# Patient Record
Sex: MALE | Race: BLACK OR AFRICAN AMERICAN | NOT HISPANIC OR LATINO | Employment: OTHER | URBAN - METROPOLITAN AREA
[De-identification: names, ages, dates, MRNs, and addresses within clinical notes are randomized per-mention and may not be internally consistent; named-entity substitution may affect disease eponyms.]

---

## 2017-01-11 ENCOUNTER — LAB VISIT (OUTPATIENT)
Dept: LAB | Facility: HOSPITAL | Age: 62
End: 2017-01-11
Attending: INTERNAL MEDICINE
Payer: COMMERCIAL

## 2017-01-11 DIAGNOSIS — D75.1 POLYCYTHEMIA: ICD-10-CM

## 2017-01-11 LAB
ALBUMIN SERPL BCP-MCNC: 3.6 G/DL
ALP SERPL-CCNC: 106 U/L
ALT SERPL W/O P-5'-P-CCNC: 10 U/L
ANION GAP SERPL CALC-SCNC: 7 MMOL/L
ANISOCYTOSIS BLD QL SMEAR: SLIGHT
AST SERPL-CCNC: 26 U/L
BASOPHILS # BLD AUTO: 0.04 K/UL
BASOPHILS NFR BLD: 0.6 %
BILIRUB SERPL-MCNC: 0.3 MG/DL
BUN SERPL-MCNC: 13 MG/DL
CALCIUM SERPL-MCNC: 9.4 MG/DL
CHLORIDE SERPL-SCNC: 106 MMOL/L
CO2 SERPL-SCNC: 27 MMOL/L
CREAT SERPL-MCNC: 0.9 MG/DL
DIFFERENTIAL METHOD: ABNORMAL
EOSINOPHIL # BLD AUTO: 0.1 K/UL
EOSINOPHIL NFR BLD: 1.3 %
ERYTHROCYTE [DISTWIDTH] IN BLOOD BY AUTOMATED COUNT: 15.5 %
EST. GFR  (AFRICAN AMERICAN): >60 ML/MIN/1.73 M^2
EST. GFR  (NON AFRICAN AMERICAN): >60 ML/MIN/1.73 M^2
GLUCOSE SERPL-MCNC: 84 MG/DL
HCT VFR BLD AUTO: 41.1 %
HGB BLD-MCNC: 12.6 G/DL
HYPOCHROMIA BLD QL SMEAR: ABNORMAL
LDH SERPL L TO P-CCNC: 213 U/L
LYMPHOCYTES # BLD AUTO: 2.3 K/UL
LYMPHOCYTES NFR BLD: 33.1 %
MCH RBC QN AUTO: 21.2 PG
MCHC RBC AUTO-ENTMCNC: 30.7 %
MCV RBC AUTO: 69 FL
MONOCYTES # BLD AUTO: 0.4 K/UL
MONOCYTES NFR BLD: 5.3 %
NEUTROPHILS # BLD AUTO: 4.2 K/UL
NEUTROPHILS NFR BLD: 59.8 %
PLATELET # BLD AUTO: 232 K/UL
PMV BLD AUTO: 10.4 FL
POTASSIUM SERPL-SCNC: 4.4 MMOL/L
PROT SERPL-MCNC: 7.7 G/DL
RBC # BLD AUTO: 5.94 M/UL
SODIUM SERPL-SCNC: 140 MMOL/L
URATE SERPL-MCNC: 5.2 MG/DL
WBC # BLD AUTO: 6.95 K/UL

## 2017-01-11 PROCEDURE — 85025 COMPLETE CBC W/AUTO DIFF WBC: CPT

## 2017-01-11 PROCEDURE — 84550 ASSAY OF BLOOD/URIC ACID: CPT

## 2017-01-11 PROCEDURE — 80053 COMPREHEN METABOLIC PANEL: CPT

## 2017-01-11 PROCEDURE — 83615 LACTATE (LD) (LDH) ENZYME: CPT

## 2017-01-30 PROBLEM — J84.9 INTERSTITIAL LUNG DISEASE: Status: ACTIVE | Noted: 2017-01-30

## 2017-01-30 PROBLEM — I10 HTN, GOAL BELOW 140/90: Status: ACTIVE | Noted: 2017-01-30

## 2017-03-14 ENCOUNTER — LAB VISIT (OUTPATIENT)
Dept: LAB | Facility: HOSPITAL | Age: 62
End: 2017-03-14
Attending: INTERNAL MEDICINE
Payer: COMMERCIAL

## 2017-03-14 DIAGNOSIS — D75.1 POLYCYTHEMIA: ICD-10-CM

## 2017-03-14 LAB
ALBUMIN SERPL BCP-MCNC: 3.6 G/DL
ALP SERPL-CCNC: 101 U/L
ALT SERPL W/O P-5'-P-CCNC: 9 U/L
ANION GAP SERPL CALC-SCNC: 6 MMOL/L
ANISOCYTOSIS BLD QL SMEAR: SLIGHT
AST SERPL-CCNC: 22 U/L
BASOPHILS # BLD AUTO: 0.03 K/UL
BASOPHILS NFR BLD: 0.5 %
BILIRUB SERPL-MCNC: 0.4 MG/DL
BUN SERPL-MCNC: 12 MG/DL
CALCIUM SERPL-MCNC: 9.2 MG/DL
CHLORIDE SERPL-SCNC: 108 MMOL/L
CO2 SERPL-SCNC: 28 MMOL/L
CREAT SERPL-MCNC: 0.9 MG/DL
DIFFERENTIAL METHOD: ABNORMAL
EOSINOPHIL # BLD AUTO: 0.1 K/UL
EOSINOPHIL NFR BLD: 1.3 %
ERYTHROCYTE [DISTWIDTH] IN BLOOD BY AUTOMATED COUNT: 16.8 %
EST. GFR  (AFRICAN AMERICAN): >60 ML/MIN/1.73 M^2
EST. GFR  (NON AFRICAN AMERICAN): >60 ML/MIN/1.73 M^2
GIANT PLATELETS BLD QL SMEAR: PRESENT
GLUCOSE SERPL-MCNC: 86 MG/DL
HCT VFR BLD AUTO: 39.2 %
HGB BLD-MCNC: 12 G/DL
HYPOCHROMIA BLD QL SMEAR: ABNORMAL
LDH SERPL L TO P-CCNC: 219 U/L
LYMPHOCYTES # BLD AUTO: 1.9 K/UL
LYMPHOCYTES NFR BLD: 31.8 %
MCH RBC QN AUTO: 19.9 PG
MCHC RBC AUTO-ENTMCNC: 30.6 %
MCV RBC AUTO: 65 FL
MONOCYTES # BLD AUTO: 0.4 K/UL
MONOCYTES NFR BLD: 7.1 %
NEUTROPHILS # BLD AUTO: 3.5 K/UL
NEUTROPHILS NFR BLD: 59.5 %
OVALOCYTES BLD QL SMEAR: ABNORMAL
PLATELET # BLD AUTO: 243 K/UL
PMV BLD AUTO: 9.5 FL
POTASSIUM SERPL-SCNC: 4.5 MMOL/L
PROT SERPL-MCNC: 8.1 G/DL
RBC # BLD AUTO: 6.04 M/UL
SODIUM SERPL-SCNC: 142 MMOL/L
TARGETS BLD QL SMEAR: ABNORMAL
URATE SERPL-MCNC: 5.1 MG/DL
WBC # BLD AUTO: 5.95 K/UL

## 2017-03-14 PROCEDURE — 85025 COMPLETE CBC W/AUTO DIFF WBC: CPT

## 2017-03-14 PROCEDURE — 83615 LACTATE (LD) (LDH) ENZYME: CPT

## 2017-03-14 PROCEDURE — 80053 COMPREHEN METABOLIC PANEL: CPT

## 2017-03-14 PROCEDURE — 36415 COLL VENOUS BLD VENIPUNCTURE: CPT

## 2017-03-14 PROCEDURE — 84550 ASSAY OF BLOOD/URIC ACID: CPT

## 2017-04-24 ENCOUNTER — LAB VISIT (OUTPATIENT)
Dept: LAB | Facility: HOSPITAL | Age: 62
End: 2017-04-24
Attending: INTERNAL MEDICINE
Payer: COMMERCIAL

## 2017-04-24 DIAGNOSIS — J84.9 INTERSTITIAL LUNG DISEASE: ICD-10-CM

## 2017-04-24 DIAGNOSIS — I10 HTN, GOAL BELOW 140/90: ICD-10-CM

## 2017-04-24 DIAGNOSIS — D75.1 POLYCYTHEMIA: ICD-10-CM

## 2017-04-24 LAB
ALBUMIN SERPL BCP-MCNC: 3.6 G/DL
ALP SERPL-CCNC: 103 U/L
ALT SERPL W/O P-5'-P-CCNC: 9 U/L
ANION GAP SERPL CALC-SCNC: 6 MMOL/L
ANISOCYTOSIS BLD QL SMEAR: SLIGHT
AST SERPL-CCNC: 22 U/L
BASOPHILS # BLD AUTO: 0.05 K/UL
BASOPHILS NFR BLD: 0.6 %
BILIRUB SERPL-MCNC: 0.3 MG/DL
BUN SERPL-MCNC: 12 MG/DL
CALCIUM SERPL-MCNC: 9.2 MG/DL
CHLORIDE SERPL-SCNC: 107 MMOL/L
CO2 SERPL-SCNC: 29 MMOL/L
CREAT SERPL-MCNC: 1 MG/DL
DIFFERENTIAL METHOD: ABNORMAL
EOSINOPHIL # BLD AUTO: 0.1 K/UL
EOSINOPHIL NFR BLD: 1.2 %
ERYTHROCYTE [DISTWIDTH] IN BLOOD BY AUTOMATED COUNT: 20.1 %
EST. GFR  (AFRICAN AMERICAN): >60 ML/MIN/1.73 M^2
EST. GFR  (NON AFRICAN AMERICAN): >60 ML/MIN/1.73 M^2
GLUCOSE SERPL-MCNC: 82 MG/DL
HCT VFR BLD AUTO: 38.1 %
HGB BLD-MCNC: 12 G/DL
HYPOCHROMIA BLD QL SMEAR: ABNORMAL
LYMPHOCYTES # BLD AUTO: 2.7 K/UL
LYMPHOCYTES NFR BLD: 33.1 %
MCH RBC QN AUTO: 19.4 PG
MCHC RBC AUTO-ENTMCNC: 31.5 %
MCV RBC AUTO: 62 FL
MONOCYTES # BLD AUTO: 0.6 K/UL
MONOCYTES NFR BLD: 7.6 %
NEUTROPHILS # BLD AUTO: 4.7 K/UL
NEUTROPHILS NFR BLD: 57.5 %
OVALOCYTES BLD QL SMEAR: ABNORMAL
PLATELET # BLD AUTO: 287 K/UL
PMV BLD AUTO: 10.6 FL
POTASSIUM SERPL-SCNC: 4.2 MMOL/L
PROT SERPL-MCNC: 8.1 G/DL
RBC # BLD AUTO: 6.19 M/UL
SODIUM SERPL-SCNC: 142 MMOL/L
WBC # BLD AUTO: 8.15 K/UL

## 2017-04-24 PROCEDURE — 36415 COLL VENOUS BLD VENIPUNCTURE: CPT

## 2017-04-24 PROCEDURE — 80053 COMPREHEN METABOLIC PANEL: CPT

## 2017-04-24 PROCEDURE — 85025 COMPLETE CBC W/AUTO DIFF WBC: CPT

## 2017-05-16 ENCOUNTER — HOSPITAL ENCOUNTER (OUTPATIENT)
Dept: PULMONOLOGY | Facility: CLINIC | Age: 62
Discharge: HOME | End: 2017-05-16
Payer: COMMERCIAL

## 2017-05-16 ENCOUNTER — HOSPITAL ENCOUNTER (OUTPATIENT)
Dept: RADIOLOGY | Facility: HOSPITAL | Age: 62
Discharge: HOME | End: 2017-05-16
Attending: INTERNAL MEDICINE
Payer: COMMERCIAL

## 2017-05-16 ENCOUNTER — OFFICE VISIT (OUTPATIENT)
Dept: PULMONOLOGY | Facility: CLINIC | Age: 62
End: 2017-05-16
Payer: COMMERCIAL

## 2017-05-16 VITALS
OXYGEN SATURATION: 98 % | WEIGHT: 251 LBS | HEIGHT: 77 IN | DIASTOLIC BLOOD PRESSURE: 76 MMHG | BODY MASS INDEX: 29.64 KG/M2 | HEART RATE: 76 BPM | SYSTOLIC BLOOD PRESSURE: 136 MMHG

## 2017-05-16 DIAGNOSIS — B19.20 HEPATITIS C VIRUS INFECTION, UNSPECIFIED CHRONICITY: ICD-10-CM

## 2017-05-16 DIAGNOSIS — J84.9 ILD (INTERSTITIAL LUNG DISEASE): ICD-10-CM

## 2017-05-16 DIAGNOSIS — J84.10 PULMONARY INTERSTITIAL FIBROSIS: Primary | ICD-10-CM

## 2017-05-16 PROCEDURE — 94727 GAS DIL/WSHOT DETER LNG VOL: CPT | Mod: S$GLB,,, | Performed by: INTERNAL MEDICINE

## 2017-05-16 PROCEDURE — 94729 DIFFUSING CAPACITY: CPT | Mod: S$GLB,,, | Performed by: INTERNAL MEDICINE

## 2017-05-16 PROCEDURE — 3075F SYST BP GE 130 - 139MM HG: CPT | Mod: S$GLB,,, | Performed by: INTERNAL MEDICINE

## 2017-05-16 PROCEDURE — 99214 OFFICE O/P EST MOD 30 MIN: CPT | Mod: S$GLB,,, | Performed by: INTERNAL MEDICINE

## 2017-05-16 PROCEDURE — 1160F RVW MEDS BY RX/DR IN RCRD: CPT | Mod: S$GLB,,, | Performed by: INTERNAL MEDICINE

## 2017-05-16 PROCEDURE — 71020 XR CHEST PA AND LATERAL: CPT | Mod: TC

## 2017-05-16 PROCEDURE — 3078F DIAST BP <80 MM HG: CPT | Mod: S$GLB,,, | Performed by: INTERNAL MEDICINE

## 2017-05-16 PROCEDURE — 71020 XR CHEST PA AND LATERAL: CPT | Mod: 26,,, | Performed by: RADIOLOGY

## 2017-05-16 PROCEDURE — 99999 PR PBB SHADOW E&M-EST. PATIENT-LVL III: CPT | Mod: PBBFAC,,, | Performed by: INTERNAL MEDICINE

## 2017-05-17 NOTE — PROGRESS NOTES
Subjective:       Patient ID: Josiah Orosco Jr. is a 61 y.o. male.    Chief Complaint: Pulmonary Fibrosis    HPI  62 yo AA male who I have followed for the past several years for an unknown interstital lung disease.  He feels well operates a body shop in [place], He has no active pulmonary complaints. His PFT's today are identical to September:FVC:5.65 liters and DLCO: 48% and actually better than the original study done in 2008, at that time FVC was 4.73 liters!! His chest x-ray is stable with a faint increase in interstitial markings.  Love to know what makes the changes but not  Justified to biopsy when he is doing so well.      No flowsheet data found.]  Review of Systems   Constitutional: Negative.    HENT: Negative.    Eyes: Negative.    Respiratory: Negative.         Asymptomatic intersttial lung diseas with only impairment of DLCO   Cardiovascular: Negative.    Genitourinary: Negative.    Musculoskeletal: Negative.    Skin: Negative.    Gastrointestinal: Negative.         Successful treatment of  Hepatitis C   Neurological: Negative.    Hematological:        Polycyhtemia  Treated with periodic plebotomy   Psychiatric/Behavioral: Negative.        Objective:      Physical Exam   Constitutional: He is oriented to person, place, and time. He appears well-developed and well-nourished.   HENT:   Head: Normocephalic and atraumatic.   Right Ear: External ear normal.   Left Ear: External ear normal.   Eyes: Conjunctivae and EOM are normal. Pupils are equal, round, and reactive to light.   Neck: Normal range of motion. Neck supple.   Cardiovascular: Normal rate, regular rhythm and normal heart sounds.    Pulmonary/Chest: Effort normal and breath sounds normal.   Clear without wheezes or rales      Sa02 98% falls to 94% after walking the length of the hunt and vback   Abdominal: Soft. Bowel sounds are normal.   Musculoskeletal: Normal range of motion.   No clubbing   Neurological: He is alert and oriented to person,  place, and time. He has normal reflexes.   Skin: Skin is warm and dry.   Psychiatric: He has a normal mood and affect. His behavior is normal. Judgment and thought content normal.           Assessment:       No diagnosis found.    Outpatient Encounter Prescriptions as of 5/16/2017   Medication Sig Dispense Refill    allopurinol (ZYLOPRIM) 100 MG tablet Take 2 tablets (200 mg total) by mouth once daily. 180 tablet 1    aspirin (ECOTRIN) 81 MG EC tablet Take 81 mg by mouth once daily.      guaifenesin (MUCINEX) 600 mg 12 hr tablet Take 1,200 mg by mouth 2 (two) times daily as needed.       levocetirizine (XYZAL) 5 MG tablet Take 1 tablet (5 mg total) by mouth every evening. 30 tablet 2    losartan-hydrochlorothiazide 50-12.5 mg (HYZAAR) 50-12.5 mg per tablet Take 1 tablet by mouth once daily. 90 tablet 1    naproxen (NAPROSYN) 500 MG tablet Take 1 tablet (500 mg total) by mouth daily as needed. 30 tablet 2    ranitidine (ZANTAC) 150 MG tablet Take 1 tablet (150 mg total) by mouth daily as needed. 180 tablet 1    tamsulosin (FLOMAX) 0.4 mg Cp24 Take 1 capsule (0.4 mg total) by mouth once daily. 90 capsule 2     No facility-administered encounter medications on file as of 5/16/2017.      No orders of the defined types were placed in this encounter.    Plan:         IMP: Stable pulmonary fibrosis of unknown origin   No medication indicated

## 2017-05-30 ENCOUNTER — LAB VISIT (OUTPATIENT)
Dept: LAB | Facility: HOSPITAL | Age: 62
End: 2017-05-30
Attending: INTERNAL MEDICINE
Payer: COMMERCIAL

## 2017-05-30 DIAGNOSIS — D75.1 POLYCYTHEMIA: ICD-10-CM

## 2017-05-30 DIAGNOSIS — I10 HTN, GOAL BELOW 140/90: ICD-10-CM

## 2017-05-30 DIAGNOSIS — J84.9 INTERSTITIAL LUNG DISEASE: ICD-10-CM

## 2017-05-30 DIAGNOSIS — N40.0 BENIGN PROSTATIC HYPERPLASIA, PRESENCE OF LOWER URINARY TRACT SYMPTOMS UNSPECIFIED, UNSPECIFIED MORPHOLOGY: ICD-10-CM

## 2017-05-30 LAB
ALBUMIN SERPL BCP-MCNC: 3.5 G/DL
ALP SERPL-CCNC: 105 U/L
ALT SERPL W/O P-5'-P-CCNC: 10 U/L
ANION GAP SERPL CALC-SCNC: 7 MMOL/L
ANISOCYTOSIS BLD QL SMEAR: SLIGHT
AST SERPL-CCNC: 22 U/L
BASOPHILS # BLD AUTO: 0.04 K/UL
BASOPHILS NFR BLD: 0.5 %
BILIRUB SERPL-MCNC: 0.3 MG/DL
BUN SERPL-MCNC: 9 MG/DL
CALCIUM SERPL-MCNC: 9.2 MG/DL
CHLORIDE SERPL-SCNC: 105 MMOL/L
CO2 SERPL-SCNC: 29 MMOL/L
COMPLEXED PSA SERPL-MCNC: 0.15 NG/ML
CREAT SERPL-MCNC: 0.9 MG/DL
DIFFERENTIAL METHOD: ABNORMAL
EOSINOPHIL # BLD AUTO: 0.1 K/UL
EOSINOPHIL NFR BLD: 1.2 %
ERYTHROCYTE [DISTWIDTH] IN BLOOD BY AUTOMATED COUNT: 21.2 %
EST. GFR  (AFRICAN AMERICAN): >60 ML/MIN/1.73 M^2
EST. GFR  (NON AFRICAN AMERICAN): >60 ML/MIN/1.73 M^2
GLUCOSE SERPL-MCNC: 138 MG/DL
HCT VFR BLD AUTO: 40.6 %
HGB BLD-MCNC: 12.3 G/DL
LYMPHOCYTES # BLD AUTO: 2.1 K/UL
LYMPHOCYTES NFR BLD: 27 %
MCH RBC QN AUTO: 19.1 PG
MCHC RBC AUTO-ENTMCNC: 30.3 %
MCV RBC AUTO: 63 FL
MONOCYTES # BLD AUTO: 0.4 K/UL
MONOCYTES NFR BLD: 5 %
NEUTROPHILS # BLD AUTO: 5.1 K/UL
NEUTROPHILS NFR BLD: 66.3 %
OVALOCYTES BLD QL SMEAR: ABNORMAL
PLATELET # BLD AUTO: 260 K/UL
PMV BLD AUTO: 10.6 FL
POIKILOCYTOSIS BLD QL SMEAR: SLIGHT
POTASSIUM SERPL-SCNC: 4.6 MMOL/L
PROT SERPL-MCNC: 8 G/DL
RBC # BLD AUTO: 6.43 M/UL
SODIUM SERPL-SCNC: 141 MMOL/L
WBC # BLD AUTO: 7.66 K/UL

## 2017-05-30 PROCEDURE — 85025 COMPLETE CBC W/AUTO DIFF WBC: CPT

## 2017-05-30 PROCEDURE — 84153 ASSAY OF PSA TOTAL: CPT

## 2017-05-30 PROCEDURE — 80053 COMPREHEN METABOLIC PANEL: CPT

## 2017-05-30 PROCEDURE — 36415 COLL VENOUS BLD VENIPUNCTURE: CPT

## 2017-07-31 ENCOUNTER — LAB VISIT (OUTPATIENT)
Dept: LAB | Facility: HOSPITAL | Age: 62
End: 2017-07-31
Attending: INTERNAL MEDICINE
Payer: COMMERCIAL

## 2017-07-31 DIAGNOSIS — I10 HTN, GOAL BELOW 140/90: ICD-10-CM

## 2017-07-31 DIAGNOSIS — J84.9 INTERSTITIAL LUNG DISEASE: ICD-10-CM

## 2017-07-31 DIAGNOSIS — D75.1 POLYCYTHEMIA: ICD-10-CM

## 2017-07-31 LAB
ALBUMIN SERPL BCP-MCNC: 3.4 G/DL
ALP SERPL-CCNC: 107 U/L
ALT SERPL W/O P-5'-P-CCNC: 13 U/L
ANION GAP SERPL CALC-SCNC: 3 MMOL/L
ANISOCYTOSIS BLD QL SMEAR: ABNORMAL
AST SERPL-CCNC: 21 U/L
BASOPHILS # BLD AUTO: 0.03 K/UL
BASOPHILS NFR BLD: 0.4 %
BILIRUB SERPL-MCNC: 0.3 MG/DL
BUN SERPL-MCNC: 8 MG/DL
CALCIUM SERPL-MCNC: 9.3 MG/DL
CHLORIDE SERPL-SCNC: 107 MMOL/L
CO2 SERPL-SCNC: 29 MMOL/L
CREAT SERPL-MCNC: 0.9 MG/DL
DIFFERENTIAL METHOD: ABNORMAL
EOSINOPHIL # BLD AUTO: 0.1 K/UL
EOSINOPHIL NFR BLD: 1.2 %
ERYTHROCYTE [DISTWIDTH] IN BLOOD BY AUTOMATED COUNT: 19.7 %
EST. GFR  (AFRICAN AMERICAN): >60 ML/MIN/1.73 M^2
EST. GFR  (NON AFRICAN AMERICAN): >60 ML/MIN/1.73 M^2
GIANT PLATELETS BLD QL SMEAR: PRESENT
GLUCOSE SERPL-MCNC: 116 MG/DL
HCT VFR BLD AUTO: 41.1 %
HGB BLD-MCNC: 12.4 G/DL
LYMPHOCYTES # BLD AUTO: 2.1 K/UL
LYMPHOCYTES NFR BLD: 27.8 %
MCH RBC QN AUTO: 19.6 PG
MCHC RBC AUTO-ENTMCNC: 30.2 G/DL
MCV RBC AUTO: 65 FL
MONOCYTES # BLD AUTO: 0.5 K/UL
MONOCYTES NFR BLD: 6.7 %
NEUTROPHILS # BLD AUTO: 4.8 K/UL
NEUTROPHILS NFR BLD: 64 %
OVALOCYTES BLD QL SMEAR: ABNORMAL
PLATELET # BLD AUTO: 219 K/UL
PMV BLD AUTO: ABNORMAL FL
POIKILOCYTOSIS BLD QL SMEAR: ABNORMAL
POTASSIUM SERPL-SCNC: 5.1 MMOL/L
PROT SERPL-MCNC: 7.9 G/DL
RBC # BLD AUTO: 6.33 M/UL
SCHISTOCYTES BLD QL SMEAR: PRESENT
SODIUM SERPL-SCNC: 139 MMOL/L
TARGETS BLD QL SMEAR: ABNORMAL
WBC # BLD AUTO: 7.51 K/UL

## 2017-07-31 PROCEDURE — 36415 COLL VENOUS BLD VENIPUNCTURE: CPT

## 2017-07-31 PROCEDURE — 80053 COMPREHEN METABOLIC PANEL: CPT

## 2017-07-31 PROCEDURE — 85025 COMPLETE CBC W/AUTO DIFF WBC: CPT

## 2017-10-13 PROBLEM — Z23 FLU VACCINE NEED: Status: ACTIVE | Noted: 2017-10-13

## 2018-01-11 ENCOUNTER — LAB VISIT (OUTPATIENT)
Dept: LAB | Facility: HOSPITAL | Age: 63
End: 2018-01-11
Attending: INTERNAL MEDICINE
Payer: COMMERCIAL

## 2018-01-11 DIAGNOSIS — I10 HTN, GOAL BELOW 140/90: ICD-10-CM

## 2018-01-11 DIAGNOSIS — D75.1 POLYCYTHEMIA: ICD-10-CM

## 2018-01-11 DIAGNOSIS — J84.9 INTERSTITIAL LUNG DISEASE: ICD-10-CM

## 2018-01-11 LAB
ALBUMIN SERPL BCP-MCNC: 3.5 G/DL
ALP SERPL-CCNC: 101 U/L
ALT SERPL W/O P-5'-P-CCNC: 13 U/L
ANION GAP SERPL CALC-SCNC: 5 MMOL/L
ANISOCYTOSIS BLD QL SMEAR: SLIGHT
AST SERPL-CCNC: 23 U/L
BASOPHILS # BLD AUTO: 0.04 K/UL
BASOPHILS NFR BLD: 0.5 %
BILIRUB SERPL-MCNC: 0.4 MG/DL
BUN SERPL-MCNC: 13 MG/DL
CALCIUM SERPL-MCNC: 9.3 MG/DL
CHLORIDE SERPL-SCNC: 105 MMOL/L
CO2 SERPL-SCNC: 28 MMOL/L
CREAT SERPL-MCNC: 1 MG/DL
DIFFERENTIAL METHOD: ABNORMAL
EOSINOPHIL # BLD AUTO: 0.1 K/UL
EOSINOPHIL NFR BLD: 1.3 %
ERYTHROCYTE [DISTWIDTH] IN BLOOD BY AUTOMATED COUNT: 18 %
EST. GFR  (AFRICAN AMERICAN): >60 ML/MIN/1.73 M^2
EST. GFR  (NON AFRICAN AMERICAN): >60 ML/MIN/1.73 M^2
GLUCOSE SERPL-MCNC: 96 MG/DL
HCT VFR BLD AUTO: 39.6 %
HGB BLD-MCNC: 12.2 G/DL
HYPOCHROMIA BLD QL SMEAR: ABNORMAL
LYMPHOCYTES # BLD AUTO: 2.3 K/UL
LYMPHOCYTES NFR BLD: 27.7 %
MCH RBC QN AUTO: 19.5 PG
MCHC RBC AUTO-ENTMCNC: 30.8 G/DL
MCV RBC AUTO: 63 FL
MONOCYTES # BLD AUTO: 0.6 K/UL
MONOCYTES NFR BLD: 7.4 %
NEUTROPHILS # BLD AUTO: 5.2 K/UL
NEUTROPHILS NFR BLD: 63.3 %
OVALOCYTES BLD QL SMEAR: ABNORMAL
PLATELET # BLD AUTO: 226 K/UL
PLATELET BLD QL SMEAR: ABNORMAL
PMV BLD AUTO: 9.2 FL
POIKILOCYTOSIS BLD QL SMEAR: SLIGHT
POLYCHROMASIA BLD QL SMEAR: ABNORMAL
POTASSIUM SERPL-SCNC: 3.9 MMOL/L
PROT SERPL-MCNC: 7.8 G/DL
RBC # BLD AUTO: 6.27 M/UL
SODIUM SERPL-SCNC: 138 MMOL/L
WBC # BLD AUTO: 8.24 K/UL

## 2018-01-11 PROCEDURE — 85025 COMPLETE CBC W/AUTO DIFF WBC: CPT

## 2018-01-11 PROCEDURE — 80053 COMPREHEN METABOLIC PANEL: CPT

## 2018-01-11 PROCEDURE — 36415 COLL VENOUS BLD VENIPUNCTURE: CPT

## 2018-01-12 PROBLEM — R05.9 COUGH: Status: ACTIVE | Noted: 2018-01-12

## 2018-01-12 PROBLEM — J20.9 ACUTE BRONCHITIS: Status: ACTIVE | Noted: 2018-01-12

## 2018-01-17 ENCOUNTER — OFFICE VISIT (OUTPATIENT)
Dept: FAMILY MEDICINE | Facility: CLINIC | Age: 63
End: 2018-01-17
Payer: COMMERCIAL

## 2018-01-17 VITALS
HEIGHT: 77 IN | WEIGHT: 251.13 LBS | TEMPERATURE: 99 F | DIASTOLIC BLOOD PRESSURE: 60 MMHG | BODY MASS INDEX: 29.65 KG/M2 | HEART RATE: 84 BPM | SYSTOLIC BLOOD PRESSURE: 118 MMHG | OXYGEN SATURATION: 97 %

## 2018-01-17 DIAGNOSIS — I10 HTN, GOAL BELOW 140/90: ICD-10-CM

## 2018-01-17 DIAGNOSIS — D75.1 POLYCYTHEMIA: ICD-10-CM

## 2018-01-17 DIAGNOSIS — Z00.00 ROUTINE MEDICAL EXAM: Primary | ICD-10-CM

## 2018-01-17 DIAGNOSIS — E66.3 OVERWEIGHT (BMI 25.0-29.9): ICD-10-CM

## 2018-01-17 DIAGNOSIS — J84.9 ILD (INTERSTITIAL LUNG DISEASE): ICD-10-CM

## 2018-01-17 DIAGNOSIS — R73.03 PREDIABETES: ICD-10-CM

## 2018-01-17 DIAGNOSIS — Z86.19 HISTORY OF HEPATITIS C: ICD-10-CM

## 2018-01-17 DIAGNOSIS — K21.9 GERD WITHOUT ESOPHAGITIS: ICD-10-CM

## 2018-01-17 PROBLEM — R05.9 COUGH: Status: RESOLVED | Noted: 2018-01-12 | Resolved: 2018-01-17

## 2018-01-17 PROBLEM — J20.9 ACUTE BRONCHITIS: Status: RESOLVED | Noted: 2018-01-12 | Resolved: 2018-01-17

## 2018-01-17 PROBLEM — Z23 FLU VACCINE NEED: Status: RESOLVED | Noted: 2017-10-13 | Resolved: 2018-01-17

## 2018-01-17 PROCEDURE — 99999 PR PBB SHADOW E&M-EST. PATIENT-LVL III: CPT | Mod: PBBFAC,,, | Performed by: INTERNAL MEDICINE

## 2018-01-17 PROCEDURE — 99396 PREV VISIT EST AGE 40-64: CPT | Mod: S$GLB,,, | Performed by: INTERNAL MEDICINE

## 2018-01-17 NOTE — PROGRESS NOTES
HISTORY OF PRESENT ILLNESS:  Josiah Orosco Jr. is a 62 y.o. male who presents to the clinic today for a routine medical physical exam. His last physical exam was approximately 1 years(s) ago.      PAST MEDICAL HISTORY:  Past Medical History:   Diagnosis Date    Arthritis     GERD (gastroesophageal reflux disease)     History of HCV, s/p successful treatment w/ SVR12 5/2015     Failed treatment (stage I/II) - followed by hepatology     Joint pain     Lung disease caused by breathing particles     Widespread essentially symmetric interstitial lung disease    Obesity     Polycythemia vera     Prediabetes        PAST SURGICAL HISTORY:  Past Surgical History:   Procedure Laterality Date    BUNIONECTOMY      bilateral    COLONOSCOPY N/A 12/9/2015    Procedure: COLONOSCOPY;  Surgeon: Conrad Iqbal MD;  Location: [facility];  Service: Endoscopy;  Laterality: N/A;    Liver biopsy x2         SOCIAL HISTORY:  Social History     Social History    Marital status:      Spouse name: N/A    Number of children: 0    Years of education: N/A     Occupational History    Self-employed [employer]     Social History Main Topics    Smoking status: Former Smoker     Types: Cigars    Smokeless tobacco: Former User      Comment: pt. smokes 10 cigars per day.    Alcohol use No      Comment: Rarely    Drug use: Yes     Types: Marijuana      Comment: daily    Sexual activity: Not on file     Other Topics Concern    Not on file     Social History Narrative    No narrative on file       FAMILY HISTORY:  Family History   Problem Relation Age of Onset    Heart disease Mother     Kidney disease Mother     Parkinsonism Father     Prostate cancer Cousin      maternal side    Heart attack Sister      CABG    Deep vein thrombosis Sister     Pulmonary embolism Sister     Osteoarthritis Sister      s/p knee replacement    Chronic back pain Brother     Liver disease Neg Hx     Diabetes Neg Hx     Melanoma Neg  Hx        ALLERGIES AND MEDICATIONS: updated and reviewed.  Review of patient's allergies indicates:   Allergen Reactions    Ace inhibitors Other (See Comments)     cough     Medication List with Changes/Refills   Current Medications    ALLOPURINOL (ZYLOPRIM) 100 MG TABLET    Take 2 tablets (200 mg total) by mouth once daily.    ASPIRIN (ECOTRIN) 81 MG EC TABLET    Take 81 mg by mouth once daily.    GUAIFENESIN (MUCINEX) 600 MG 12 HR TABLET    Take 1,200 mg by mouth 2 (two) times daily as needed.     LEVOCETIRIZINE (XYZAL) 5 MG TABLET    Take 1 tablet (5 mg total) by mouth every evening.    LOSARTAN-HYDROCHLOROTHIAZIDE 50-12.5 MG (HYZAAR) 50-12.5 MG PER TABLET    Take 1 tablet by mouth once daily.    NAPROXEN (NAPROSYN) 500 MG TABLET    Take 1 tablet (500 mg total) by mouth daily as needed.    RANITIDINE (ZANTAC) 150 MG TABLET    Take 1 tablet (150 mg total) by mouth daily as needed.    TAMSULOSIN (FLOMAX) 0.4 MG CP24    Take 1 capsule (0.4 mg total) by mouth once daily.   Discontinued Medications    BENZONATATE (TESSALON) 200 MG CAPSULE    Take 1 capsule (200 mg total) by mouth 3 (three) times daily as needed for Cough.         CARE TEAM:  Patient Care Team:  Elaine Landry MD as PCP - General (Internal Medicine)  Jennifer B. Scheuermann, PA as Physician Assistant (Hepatology)           SCREENING HISTORY:  Health Maintenance       Date Due Completion Date    Zoster Vaccine 10/05/2014 ---    Lipid Panel 01/11/2022 1/11/2017    Colonoscopy 12/09/2025 12/9/2015    Override on 2/7/2005: Done    TETANUS VACCINE 05/18/2026 5/18/2016            REVIEW OF SYSTEMS:   The patient reports good dietary habits.  The patient does not exercise regularly, but stays active.  Review of Systems   Constitutional: Negative for chills, fever, malaise/fatigue and weight loss.   HENT: Negative for congestion, ear pain, nosebleeds and sore throat.    Eyes: Negative for blurred vision, pain and discharge.   Respiratory: Negative for  "cough, hemoptysis, shortness of breath and wheezing.    Cardiovascular: Negative for chest pain, palpitations, claudication and leg swelling.   Gastrointestinal: Negative for abdominal pain, blood in stool, constipation, diarrhea, heartburn, melena, nausea and vomiting.   Genitourinary: Negative for dysuria, frequency, hematuria and urgency.   Musculoskeletal: Negative for joint pain and myalgias.   Skin: Negative for itching and rash.   Neurological: Negative for dizziness, tremors, focal weakness, seizures, weakness and headaches.   Endo/Heme/Allergies: Negative for polydipsia. Does not bruise/bleed easily.   Psychiatric/Behavioral: Negative for depression, memory loss, substance abuse and suicidal ideas. The patient is not nervous/anxious and does not have insomnia.            PHYSICAL EXAM:  Vitals:    01/17/18 1006   BP: 118/60   Pulse: 84   Temp: 98.5 °F (36.9 °C)     Weight: 113.9 kg (251 lb 1.7 oz)   Height: 6' 5" (195.6 cm)   Body mass index is 29.78 kg/m².    Physical Examination: General appearance - alert, well appearing, and in no distress and overweight  Mental status - alert, oriented to person, place, and time, normal mood, behavior, speech, dress, motor activity, and thought processes  Eyes - pupils equal and reactive, extraocular eye movements intact, sclera anicteric  Mouth - mucous membranes moist, pharynx normal without lesions  Neck - supple, no significant adenopathy, carotids upstroke normal bilaterally, no bruits, thyroid exam: thyroid is normal in size without nodules or tenderness  Lymphatics - no palpable lymphadenopathy  Chest - clear to auscultation, no wheezes, rales or rhonchi, symmetric air entry  Heart - normal rate and regular rhythm, no gallops noted  Abdomen - soft, nontender, nondistended, no masses or organomegaly   Male - not examined  Back exam - full range of motion, no tenderness, palpable spasm or pain on motion  Neurological - alert, oriented, normal speech, no focal " findings or movement disorder noted, cranial nerves II through XII intact  Musculoskeletal - no joint tenderness, deformity or swelling, no muscular tenderness noted  Extremities - peripheral pulses normal, no pedal edema, no clubbing or cyanosis  Skin - normal coloration and turgor, no rashes, no suspicious skin lesions noted       ASSESSMENT AND PLAN:  1. Routine medical exam  Counseled on age appropriate medical preventative services including age appropriate cancer screenings, age appropriate eye and dental exams, over all nutritional health, need for a consistent exercise regimen, and an over all push towards maintaining a vigorous and active lifestyle.  Counseled on age appropriate vaccines and discussed upcoming health care needs based on age/gender. Discussed good sleep hygiene and stress management.     2. Prediabetes  Stable. We discussed low sugar/low carbohydrate diet and regular exercise to prevent progression. No need for prescription medication at this time.   - Hemoglobin A1c; Future    3. ILD (interstitial lung disease)  Stable. Followed by pulmonary.    4. Polycythemia  He receives regular phlebotomies. Followed by hematology.    5. GERD without esophagitis  Symptoms controlled. Reflux precautions discussed (eliminate tobacco if a smoker; minimize caffeine, chocolate and red/white peppermint intake; avoid heavy and spicy meals; don't lay down within 2-3 hours after eating). Medication as needed. Patient asked to take medication breaks, if possible - discussed chronic use can limit calcium absorption, increases the risk for intestinal infections, and can cause kidney damage. There are also some newer studies that show possible increased risk of mortality.     6. History of HCV, s/p successful treatment w/ SVR12 5/2015  S/p successful treatment. Observe.    7. Overweight (BMI 25.0-29.9)  The patient is asked to make an attempt to improve diet and exercise patterns to aid in medical management of this  problem.     8. HTN, goal below 140/90  Discussed sodium restriction, maintaining ideal body weight and regular exercise program as physiologic means to achieve blood pressure control. The patient will strive towards this. The current medical regimen is effective;  continue present plan and medications. Recommended patient to check home readings to monitor and see me for followup as scheduled or sooner as needed. Patient was educated that both decongestant and anti-inflammatory medication may raise blood pressure.   - Lipid panel; Future           Follow-up in about 1 year (around 1/17/2019). or sooner as needed.

## 2018-03-07 DIAGNOSIS — J44.9 CHRONIC OBSTRUCTIVE PULMONARY DISEASE, UNSPECIFIED COPD TYPE: Primary | ICD-10-CM

## 2018-03-14 ENCOUNTER — OFFICE VISIT (OUTPATIENT)
Dept: PULMONOLOGY | Facility: CLINIC | Age: 63
End: 2018-03-14
Payer: COMMERCIAL

## 2018-03-14 ENCOUNTER — HOSPITAL ENCOUNTER (OUTPATIENT)
Dept: PULMONOLOGY | Facility: CLINIC | Age: 63
Discharge: HOME | End: 2018-03-14
Payer: COMMERCIAL

## 2018-03-14 ENCOUNTER — HOSPITAL ENCOUNTER (OUTPATIENT)
Dept: RADIOLOGY | Facility: HOSPITAL | Age: 63
Discharge: HOME | End: 2018-03-14
Attending: INTERNAL MEDICINE
Payer: COMMERCIAL

## 2018-03-14 VITALS
WEIGHT: 250 LBS | BODY MASS INDEX: 30.44 KG/M2 | OXYGEN SATURATION: 97 % | HEART RATE: 77 BPM | SYSTOLIC BLOOD PRESSURE: 116 MMHG | HEIGHT: 76 IN | DIASTOLIC BLOOD PRESSURE: 74 MMHG

## 2018-03-14 DIAGNOSIS — J84.9 ILD (INTERSTITIAL LUNG DISEASE): Primary | ICD-10-CM

## 2018-03-14 DIAGNOSIS — J44.9 CHRONIC OBSTRUCTIVE PULMONARY DISEASE, UNSPECIFIED COPD TYPE: ICD-10-CM

## 2018-03-14 DIAGNOSIS — D45 POLYCYTHEMIA VERA: ICD-10-CM

## 2018-03-14 DIAGNOSIS — J84.10 PULMONARY INTERSTITIAL FIBROSIS: ICD-10-CM

## 2018-03-14 PROCEDURE — 3078F DIAST BP <80 MM HG: CPT | Mod: CPTII,S$GLB,, | Performed by: INTERNAL MEDICINE

## 2018-03-14 PROCEDURE — 94727 GAS DIL/WSHOT DETER LNG VOL: CPT | Mod: S$GLB,,, | Performed by: INTERNAL MEDICINE

## 2018-03-14 PROCEDURE — 71046 X-RAY EXAM CHEST 2 VIEWS: CPT | Mod: TC,FY

## 2018-03-14 PROCEDURE — 99214 OFFICE O/P EST MOD 30 MIN: CPT | Mod: S$GLB,,, | Performed by: INTERNAL MEDICINE

## 2018-03-14 PROCEDURE — 3074F SYST BP LT 130 MM HG: CPT | Mod: CPTII,S$GLB,, | Performed by: INTERNAL MEDICINE

## 2018-03-14 PROCEDURE — 94729 DIFFUSING CAPACITY: CPT | Mod: S$GLB,,, | Performed by: INTERNAL MEDICINE

## 2018-03-14 PROCEDURE — 99999 PR PBB SHADOW E&M-EST. PATIENT-LVL III: CPT | Mod: PBBFAC,,, | Performed by: INTERNAL MEDICINE

## 2018-03-14 PROCEDURE — 71046 X-RAY EXAM CHEST 2 VIEWS: CPT | Mod: 26,,, | Performed by: RADIOLOGY

## 2018-03-15 NOTE — PROGRESS NOTES
Subjective:       Patient ID: Josiah Orosco Jr. is a 62 y.o. male.    Chief Complaint: No chief complaint on file.    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 He works in his business without limitation but does note exertional dyspnea with climbing stairs. He feels well, His PFT's are unchanged, has normal lung volumes a decreased DLCO; 39% His chest x-ray although abnormal has been stable over the years. I told him that I would like to biopsy his lung, but he is not sick and his process did not respond to steroids in the past. 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      No flowsheet data found.]  Review of Systems   Constitutional: Negative.    HENT: Negative.    Eyes: Negative.    Respiratory: Negative.  Positive for dyspnea on extertion.         ILD   Cardiovascular: Negative.    Genitourinary: Negative.    Musculoskeletal: Negative.    Skin: Negative.    Gastrointestinal: Negative.         Treated Hepatitis C    Hx of GERD's   Neurological: Negative.    Hematological:        Polycythemia Vera   Psychiatric/Behavioral: Negative.        Objective:      Physical Exam   Constitutional: He is oriented to person, place, and time. He appears well-developed and well-nourished.   HENT:   Head: Normocephalic and atraumatic.   Right Ear: External ear normal.   Left Ear: External ear normal.   Eyes: Conjunctivae and EOM are normal. Pupils are equal, round, and reactive to light.   Neck: Normal range of motion. Neck supple.   Cardiovascular: Normal rate, regular rhythm and normal heart sounds.    Pulmonary/Chest: Effort normal and breath sounds normal.   Resting Sa02: 98% fell to 94% after walking the length of the hunt and back   Abdominal: Soft. Bowel sounds are normal.   Musculoskeletal: Normal range of motion.   Neurological: He is alert and oriented to person, place, and time. He  has normal reflexes.   Skin: Skin is warm and dry.   Psychiatric: He has a normal mood and affect. His behavior is normal. Judgment and thought content normal.           Assessment:       No diagnosis found.    Outpatient Encounter Prescriptions as of 3/14/2018   Medication Sig Dispense Refill    allopurinol (ZYLOPRIM) 100 MG tablet TAKE TWO TABLETS BY MOUTH EVERY  tablet 3    aspirin (ECOTRIN) 81 MG EC tablet Take 81 mg by mouth once daily.      guaifenesin (MUCINEX) 600 mg 12 hr tablet Take 1,200 mg by mouth 2 (two) times daily as needed.       losartan-hydrochlorothiazide 50-12.5 mg (HYZAAR) 50-12.5 mg per tablet TAKE ONE TABLET BY MOUTH EVERY DAY 90 tablet 3    naproxen (NAPROSYN) 500 MG tablet Take 1 tablet (500 mg total) by mouth daily as needed. 30 tablet 2    ranitidine (ZANTAC) 150 MG tablet Take 1 tablet (150 mg total) by mouth daily as needed. 180 tablet 1    tamsulosin (FLOMAX) 0.4 mg Cp24 Take 1 capsule (0.4 mg total) by mouth once daily. 90 capsule 2    levocetirizine (XYZAL) 5 MG tablet Take 1 tablet (5 mg total) by mouth every evening. 30 tablet 2     No facility-administered encounter medications on file as of 3/14/2018.      No orders of the defined types were placed in this encounter.    Plan:             IMP: Stable Interstitial Lung Disease of unknown etiology

## 2018-03-16 DIAGNOSIS — J84.9 ILD (INTERSTITIAL LUNG DISEASE): Primary | ICD-10-CM

## 2018-04-06 ENCOUNTER — LAB VISIT (OUTPATIENT)
Dept: LAB | Facility: HOSPITAL | Age: 63
End: 2018-04-06
Attending: INTERNAL MEDICINE
Payer: COMMERCIAL

## 2018-04-06 DIAGNOSIS — J84.9 INTERSTITIAL LUNG DISEASE: ICD-10-CM

## 2018-04-06 DIAGNOSIS — D75.1 POLYCYTHEMIA: ICD-10-CM

## 2018-04-06 DIAGNOSIS — I10 HTN, GOAL BELOW 140/90: ICD-10-CM

## 2018-04-06 LAB
ALBUMIN SERPL BCP-MCNC: 3.7 G/DL
ALP SERPL-CCNC: 105 U/L
ALT SERPL W/O P-5'-P-CCNC: 15 U/L
ANION GAP SERPL CALC-SCNC: 5 MMOL/L
ANISOCYTOSIS BLD QL SMEAR: ABNORMAL
AST SERPL-CCNC: 29 U/L
BASOPHILS # BLD AUTO: 0.03 K/UL
BASOPHILS NFR BLD: 0.5 %
BILIRUB SERPL-MCNC: 0.5 MG/DL
BUN SERPL-MCNC: 13 MG/DL
CALCIUM SERPL-MCNC: 9.7 MG/DL
CHLORIDE SERPL-SCNC: 106 MMOL/L
CO2 SERPL-SCNC: 29 MMOL/L
CREAT SERPL-MCNC: 0.9 MG/DL
DIFFERENTIAL METHOD: ABNORMAL
EOSINOPHIL # BLD AUTO: 0.1 K/UL
EOSINOPHIL NFR BLD: 1.4 %
ERYTHROCYTE [DISTWIDTH] IN BLOOD BY AUTOMATED COUNT: 20.3 %
EST. GFR  (AFRICAN AMERICAN): >60 ML/MIN/1.73 M^2
EST. GFR  (NON AFRICAN AMERICAN): >60 ML/MIN/1.73 M^2
GIANT PLATELETS BLD QL SMEAR: PRESENT
GLUCOSE SERPL-MCNC: 120 MG/DL
HCT VFR BLD AUTO: 40.1 %
HGB BLD-MCNC: 12.2 G/DL
HYPOCHROMIA BLD QL SMEAR: ABNORMAL
LYMPHOCYTES # BLD AUTO: 1.9 K/UL
LYMPHOCYTES NFR BLD: 30.2 %
MCH RBC QN AUTO: 19 PG
MCHC RBC AUTO-ENTMCNC: 30.4 G/DL
MCV RBC AUTO: 62 FL
MONOCYTES # BLD AUTO: 0.4 K/UL
MONOCYTES NFR BLD: 6.7 %
NEUTROPHILS # BLD AUTO: 3.8 K/UL
NEUTROPHILS NFR BLD: 61.2 %
OVALOCYTES BLD QL SMEAR: ABNORMAL
PLATELET # BLD AUTO: 285 K/UL
PLATELET BLD QL SMEAR: ABNORMAL
PMV BLD AUTO: 10.1 FL
POIKILOCYTOSIS BLD QL SMEAR: SLIGHT
POTASSIUM SERPL-SCNC: 4.8 MMOL/L
PROT SERPL-MCNC: 8 G/DL
RBC # BLD AUTO: 6.43 M/UL
SODIUM SERPL-SCNC: 140 MMOL/L
WBC # BLD AUTO: 6.25 K/UL

## 2018-04-06 PROCEDURE — 36415 COLL VENOUS BLD VENIPUNCTURE: CPT

## 2018-04-06 PROCEDURE — 85025 COMPLETE CBC W/AUTO DIFF WBC: CPT

## 2018-04-06 PROCEDURE — 80053 COMPREHEN METABOLIC PANEL: CPT

## 2018-04-16 ENCOUNTER — LAB VISIT (OUTPATIENT)
Dept: LAB | Facility: HOSPITAL | Age: 63
End: 2018-04-16
Attending: INTERNAL MEDICINE
Payer: COMMERCIAL

## 2018-04-16 DIAGNOSIS — R73.03 PREDIABETES: ICD-10-CM

## 2018-04-16 DIAGNOSIS — I10 HTN, GOAL BELOW 140/90: ICD-10-CM

## 2018-04-16 LAB
CHOLEST SERPL-MCNC: 143 MG/DL
CHOLEST/HDLC SERPL: 2.8 {RATIO}
ESTIMATED AVG GLUCOSE: 117 MG/DL
HBA1C MFR BLD HPLC: 5.7 %
HDLC SERPL-MCNC: 52 MG/DL
HDLC SERPL: 36.4 %
LDLC SERPL CALC-MCNC: 76.6 MG/DL
NONHDLC SERPL-MCNC: 91 MG/DL
TRIGL SERPL-MCNC: 72 MG/DL

## 2018-04-16 PROCEDURE — 80061 LIPID PANEL: CPT

## 2018-04-16 PROCEDURE — 83036 HEMOGLOBIN GLYCOSYLATED A1C: CPT

## 2018-04-16 PROCEDURE — 36415 COLL VENOUS BLD VENIPUNCTURE: CPT

## 2018-04-23 ENCOUNTER — TELEPHONE (OUTPATIENT)
Dept: FAMILY MEDICINE | Facility: CLINIC | Age: 63
End: 2018-04-23

## 2018-04-23 NOTE — TELEPHONE ENCOUNTER
Results sent to patient via [facility]:  Your prediabetes is controlled. Please continue a low sugar/low carbohydrate diet and regular exercise to prevent progression.   Your cholesterol is controlled. Please continue current regimen.    Spoke with wife, explained the advise of .  Patient verbalized understandings.

## 2018-04-23 NOTE — TELEPHONE ENCOUNTER
Results sent to patient via [facility]:  Your prediabetes is controlled. Please continue a low sugar/low carbohydrate diet and regular exercise to prevent progression.   Your cholesterol is controlled. Please continue current regimen.

## 2018-10-12 PROBLEM — I27.20 PULMONARY HYPERTENSION: Status: ACTIVE | Noted: 2018-10-12

## 2018-10-12 PROBLEM — Z23 FLU VACCINE NEED: Status: ACTIVE | Noted: 2018-10-12

## 2018-10-18 ENCOUNTER — HOSPITAL ENCOUNTER (OUTPATIENT)
Dept: CARDIOLOGY | Facility: HOSPITAL | Age: 63
Discharge: HOME | End: 2018-10-18
Attending: INTERNAL MEDICINE
Payer: COMMERCIAL

## 2018-10-18 VITALS — HEIGHT: 77 IN | WEIGHT: 250 LBS | BODY MASS INDEX: 29.52 KG/M2

## 2018-10-18 DIAGNOSIS — I27.20 PULMONARY HYPERTENSION: ICD-10-CM

## 2018-10-18 LAB
BSA FOR ECHO PROCEDURE: 2.48 M2
RA PRESSURE: 3 MMHG

## 2018-10-18 PROCEDURE — 93306 TTE W/DOPPLER COMPLETE: CPT | Mod: 26,,, | Performed by: INTERNAL MEDICINE

## 2018-10-18 PROCEDURE — 93306 TTE W/DOPPLER COMPLETE: CPT

## 2019-01-14 ENCOUNTER — LAB VISIT (OUTPATIENT)
Dept: LAB | Facility: HOSPITAL | Age: 64
End: 2019-01-14
Attending: INTERNAL MEDICINE
Payer: COMMERCIAL

## 2019-01-14 DIAGNOSIS — D75.1 POLYCYTHEMIA: ICD-10-CM

## 2019-01-14 LAB
ANISOCYTOSIS BLD QL SMEAR: SLIGHT
BASOPHILS # BLD AUTO: 0.02 K/UL
BASOPHILS NFR BLD: 0.3 %
DACRYOCYTES BLD QL SMEAR: ABNORMAL
DIFFERENTIAL METHOD: ABNORMAL
EOSINOPHIL # BLD AUTO: 0.1 K/UL
EOSINOPHIL NFR BLD: 1.8 %
ERYTHROCYTE [DISTWIDTH] IN BLOOD BY AUTOMATED COUNT: 20.3 %
GIANT PLATELETS BLD QL SMEAR: PRESENT
HCT VFR BLD AUTO: 38.7 %
HGB BLD-MCNC: 11.4 G/DL
LYMPHOCYTES # BLD AUTO: 1.8 K/UL
LYMPHOCYTES NFR BLD: 26.3 %
MCH RBC QN AUTO: 17.8 PG
MCHC RBC AUTO-ENTMCNC: 29.5 G/DL
MCV RBC AUTO: 61 FL
MONOCYTES # BLD AUTO: 0.4 K/UL
MONOCYTES NFR BLD: 6.5 %
NEUTROPHILS # BLD AUTO: 4.4 K/UL
NEUTROPHILS NFR BLD: 65.2 %
OVALOCYTES BLD QL SMEAR: ABNORMAL
PLATELET # BLD AUTO: 308 K/UL
PLATELET BLD QL SMEAR: ABNORMAL
PMV BLD AUTO: ABNORMAL FL
RBC # BLD AUTO: 6.39 M/UL
SCHISTOCYTES BLD QL SMEAR: ABNORMAL
TARGETS BLD QL SMEAR: ABNORMAL
WBC # BLD AUTO: 6.72 K/UL

## 2019-01-14 PROCEDURE — 85025 COMPLETE CBC W/AUTO DIFF WBC: CPT

## 2019-01-14 PROCEDURE — 36415 COLL VENOUS BLD VENIPUNCTURE: CPT

## 2019-03-07 PROBLEM — Z23 FLU VACCINE NEED: Status: RESOLVED | Noted: 2018-10-12 | Resolved: 2019-03-07

## 2019-03-08 ENCOUNTER — OFFICE VISIT (OUTPATIENT)
Dept: FAMILY MEDICINE | Facility: CLINIC | Age: 64
End: 2019-03-08
Payer: COMMERCIAL

## 2019-03-08 VITALS
HEIGHT: 77 IN | TEMPERATURE: 98 F | HEART RATE: 94 BPM | SYSTOLIC BLOOD PRESSURE: 118 MMHG | OXYGEN SATURATION: 95 % | WEIGHT: 244.5 LBS | BODY MASS INDEX: 28.87 KG/M2 | DIASTOLIC BLOOD PRESSURE: 66 MMHG

## 2019-03-08 DIAGNOSIS — J84.9 INTERSTITIAL LUNG DISEASE: ICD-10-CM

## 2019-03-08 DIAGNOSIS — K21.9 GASTROESOPHAGEAL REFLUX DISEASE, ESOPHAGITIS PRESENCE NOT SPECIFIED: ICD-10-CM

## 2019-03-08 DIAGNOSIS — E66.3 OVERWEIGHT (BMI 25.0-29.9): ICD-10-CM

## 2019-03-08 DIAGNOSIS — D75.1 POLYCYTHEMIA: ICD-10-CM

## 2019-03-08 DIAGNOSIS — R35.0 URINARY FREQUENCY: ICD-10-CM

## 2019-03-08 DIAGNOSIS — J84.10 PULMONARY INTERSTITIAL FIBROSIS: ICD-10-CM

## 2019-03-08 DIAGNOSIS — Z71.89 ADVANCED DIRECTIVES, COUNSELING/DISCUSSION: ICD-10-CM

## 2019-03-08 DIAGNOSIS — I27.20 PULMONARY HYPERTENSION: ICD-10-CM

## 2019-03-08 DIAGNOSIS — I10 HTN, GOAL BELOW 140/90: ICD-10-CM

## 2019-03-08 DIAGNOSIS — R73.03 PREDIABETES: ICD-10-CM

## 2019-03-08 DIAGNOSIS — Z23 NEED FOR SHINGLES VACCINE: ICD-10-CM

## 2019-03-08 DIAGNOSIS — Z00.00 ROUTINE MEDICAL EXAM: Primary | ICD-10-CM

## 2019-03-08 DIAGNOSIS — E79.0 HYPERURICEMIA: ICD-10-CM

## 2019-03-08 DIAGNOSIS — Z23 NEED FOR STREPTOCOCCUS PNEUMONIAE VACCINATION: ICD-10-CM

## 2019-03-08 PROCEDURE — 90471 PNEUMOCOCCAL POLYSACCHARIDE VACCINE 23-VALENT =>2YO SQ IM: ICD-10-PCS | Mod: S$GLB,,, | Performed by: INTERNAL MEDICINE

## 2019-03-08 PROCEDURE — 3078F PR MOST RECENT DIASTOLIC BLOOD PRESSURE < 80 MM HG: ICD-10-PCS | Mod: CPTII,S$GLB,, | Performed by: INTERNAL MEDICINE

## 2019-03-08 PROCEDURE — 3074F SYST BP LT 130 MM HG: CPT | Mod: CPTII,S$GLB,, | Performed by: INTERNAL MEDICINE

## 2019-03-08 PROCEDURE — 99999 PR PBB SHADOW E&M-EST. PATIENT-LVL IV: CPT | Mod: PBBFAC,,, | Performed by: INTERNAL MEDICINE

## 2019-03-08 PROCEDURE — 99999 PR PBB SHADOW E&M-EST. PATIENT-LVL IV: ICD-10-PCS | Mod: PBBFAC,,, | Performed by: INTERNAL MEDICINE

## 2019-03-08 PROCEDURE — 99396 PR PREVENTIVE VISIT,EST,40-64: ICD-10-PCS | Mod: 25,S$GLB,, | Performed by: INTERNAL MEDICINE

## 2019-03-08 PROCEDURE — 90732 PNEUMOCOCCAL POLYSACCHARIDE VACCINE 23-VALENT =>2YO SQ IM: ICD-10-PCS | Mod: S$GLB,,, | Performed by: INTERNAL MEDICINE

## 2019-03-08 PROCEDURE — 99396 PREV VISIT EST AGE 40-64: CPT | Mod: 25,S$GLB,, | Performed by: INTERNAL MEDICINE

## 2019-03-08 PROCEDURE — 3078F DIAST BP <80 MM HG: CPT | Mod: CPTII,S$GLB,, | Performed by: INTERNAL MEDICINE

## 2019-03-08 PROCEDURE — 3074F PR MOST RECENT SYSTOLIC BLOOD PRESSURE < 130 MM HG: ICD-10-PCS | Mod: CPTII,S$GLB,, | Performed by: INTERNAL MEDICINE

## 2019-03-08 PROCEDURE — 90471 IMMUNIZATION ADMIN: CPT | Mod: S$GLB,,, | Performed by: INTERNAL MEDICINE

## 2019-03-08 PROCEDURE — 90732 PPSV23 VACC 2 YRS+ SUBQ/IM: CPT | Mod: S$GLB,,, | Performed by: INTERNAL MEDICINE

## 2019-03-08 RX ORDER — LOSARTAN POTASSIUM AND HYDROCHLOROTHIAZIDE 12.5; 5 MG/1; MG/1
1 TABLET ORAL DAILY
Qty: 90 TABLET | Refills: 3 | Status: SHIPPED | OUTPATIENT
Start: 2019-03-08 | End: 2020-03-09 | Stop reason: ALTCHOICE

## 2019-03-08 RX ORDER — ALLOPURINOL 100 MG/1
200 TABLET ORAL DAILY
Qty: 180 TABLET | Refills: 3 | Status: SHIPPED | OUTPATIENT
Start: 2019-03-08 | End: 2020-03-17

## 2019-03-08 NOTE — PROGRESS NOTES
HISTORY OF PRESENT ILLNESS:  Josiah Orosco Jr. is a 63 y.o. male who presents to the clinic today for a routine medical physical exam. His last physical exam was approximately 1 years(s) ago.      PAST MEDICAL HISTORY:  Past Medical History:   Diagnosis Date    Arthritis     GERD (gastroesophageal reflux disease)     History of HCV, s/p successful treatment w/ SVR12 5/2015     Failed treatment (stage I/II) - followed by hepatology     Joint pain     Lung disease caused by breathing particles     Widespread essentially symmetric interstitial lung disease    Obesity     Polycythemia vera     Prediabetes        PAST SURGICAL HISTORY:  Past Surgical History:   Procedure Laterality Date    BIOPSY-BONE MARROW N/A 8/11/2014    Performed by [facility] at [facility]    BUNIONECTOMY      bilateral    COLONOSCOPY N/A 12/9/2015    [facility] by [facility] at [facility]    Liver biopsy x2         SOCIAL HISTORY:  Social History     Socioeconomic History    Marital status:      Spouse name: Not on file    Number of children: 0    Years of education: Not on file    Highest education level: Not on file   Social Needs    Financial resource strain: Not on file    Food insecurity - worry: Not on file    Food insecurity - inability: Not on file    Transportation needs - medical: Not on file    Transportation needs - non-medical: Not on file   Occupational History    Occupation: Self-employed     Employer: [employer]   Tobacco Use    Smoking status: Former Smoker     Types: Cigars    Smokeless tobacco: Former User    Tobacco comment: pt. smokes 10 cigars per day.   Substance and Sexual Activity    Alcohol use: No     Alcohol/week: 0.0 oz     Comment: Rarely    Drug use: Yes     Types: Marijuana     Comment: daily    Sexual activity: Not on file   Other Topics Concern    Not on file   Social History Narrative    Not on file       FAMILY HISTORY:  Family History   Problem Relation  Age of Onset    Heart disease Mother     Kidney disease Mother     Parkinsonism Father     Prostate cancer Cousin         maternal side    Heart attack Sister         CABG    Deep vein thrombosis Sister     Pulmonary embolism Sister     Osteoarthritis Sister         s/p knee replacement    Chronic back pain Brother     Liver disease Neg Hx     Diabetes Neg Hx     Melanoma Neg Hx        ALLERGIES AND MEDICATIONS: updated and reviewed.  Review of patient's allergies indicates:   Allergen Reactions    Ace inhibitors Other (See Comments)     cough     Medication List with Changes/Refills   Current Medications    ASPIRIN (ECOTRIN) 81 MG EC TABLET    Take 81 mg by mouth once daily.    LEVOCETIRIZINE (XYZAL) 5 MG TABLET    Take 1 tablet (5 mg total) by mouth every evening.    NAPROXEN (NAPROSYN) 500 MG TABLET    Take 1 tablet (500 mg total) by mouth daily as needed.    RANITIDINE (ZANTAC) 150 MG TABLET    Take 1 tablet (150 mg total) by mouth daily as needed.   Changed and/or Refilled Medications    Modified Medication Previous Medication    ALLOPURINOL (ZYLOPRIM) 100 MG TABLET allopurinol (ZYLOPRIM) 100 MG tablet       Take 2 tablets (200 mg total) by mouth once daily.    TAKE TWO TABLETS BY MOUTH EVERY DAY    LOSARTAN-HYDROCHLOROTHIAZIDE 50-12.5 MG (HYZAAR) 50-12.5 MG PER TABLET losartan-hydrochlorothiazide 50-12.5 mg (HYZAAR) 50-12.5 mg per tablet       Take 1 tablet by mouth once daily.    TAKE ONE TABLET BY MOUTH EVERY DAY         CARE TEAM:  Patient Care Team:  Elaine Landry MD as PCP - General (Internal Medicine)  Jennifer B. Scheuermann, PA as Physician Assistant (Hepatology)  Karen Johnson MD as Consulting Physician (Hematology)           SCREENING HISTORY:  Health Maintenance       Date Due Completion Date    Zoster Vaccine 10/05/2014 ---    Lipid Panel 04/16/2019 4/16/2018    Hemoglobin A1c 04/16/2019 4/16/2018    Colonoscopy 12/09/2025 12/9/2015    Override on 2/7/2005: Done    TETANUS  "VACCINE 05/18/2026 5/18/2016            REVIEW OF SYSTEMS:   The patient reports good dietary habits.  The patient does not exercise regularly, but is very active at work.  Review of Systems   Constitutional: Negative for appetite change, chills, fatigue, fever and unexpected weight change.   HENT: Negative for congestion, dental problem, ear discharge, ear pain, nosebleeds, sinus pain, sore throat and trouble swallowing.    Eyes: Negative for pain, discharge, itching and visual disturbance.   Respiratory: Positive for shortness of breath (- chronic; intermittent). Negative for cough, chest tightness, wheezing and stridor.    Cardiovascular: Negative for chest pain, palpitations and leg swelling.   Gastrointestinal: Negative for abdominal distention, abdominal pain, blood in stool, diarrhea, nausea and vomiting.   Endocrine: Negative for cold intolerance, heat intolerance, polydipsia and polyuria.   Genitourinary: Positive for difficulty urinating and frequency. Negative for dysuria, flank pain, hematuria and testicular pain.   Musculoskeletal: Negative for back pain, joint swelling and myalgias.   Skin: Negative for rash.   Allergic/Immunologic: Negative for food allergies.   Neurological: Negative for dizziness, tremors, seizures, syncope, weakness, light-headedness, numbness and headaches.   Hematological: Negative for adenopathy. Does not bruise/bleed easily.   Psychiatric/Behavioral: Negative for behavioral problems, confusion, dysphoric mood and sleep disturbance. The patient is not nervous/anxious.            PHYSICAL EXAM:  Vitals:    03/08/19 1109   BP: 118/66   Pulse: 94   Temp: 98.3 °F (36.8 °C)     Weight: 110.9 kg (244 lb 7.8 oz)   Height: 6' 5" (195.6 cm)   Body mass index is 28.99 kg/m².    Physical Examination: General appearance - alert, well appearing, and in no distress and overweight  Mental status - alert, oriented to person, place, and time, normal mood, behavior, speech, dress, motor activity, " and thought processes  Eyes - pupils equal and reactive, extraocular eye movements intact, sclera anicteric  Mouth - mucous membranes moist, pharynx normal without lesions  Neck - supple, no significant adenopathy, carotids upstroke normal bilaterally, no bruits, thyroid exam: thyroid is normal in size without nodules or tenderness  Lymphatics - no palpable lymphadenopathy  Chest - clear to auscultation, no wheezes, rales or rhonchi, symmetric air entry  Heart - normal rate and regular rhythm, no gallops noted  Abdomen - soft, nontender, nondistended, no masses or organomegaly   Male - not examined  Back exam - full range of motion, no tenderness, palpable spasm or pain on motion  Neurological - alert, oriented, normal speech, no focal findings or movement disorder noted, cranial nerves II through XII intact  Musculoskeletal - no joint tenderness, deformity or swelling, no muscular tenderness noted  Extremities - peripheral pulses normal, no pedal edema, no clubbing or cyanosis  Skin - normal coloration and turgor, no rashes, no suspicious skin lesions noted       ASSESSMENT AND PLAN:  1. Routine medical exam  Counseled on age appropriate medical preventative services including age appropriate cancer screenings, age appropriate eye and dental exams, over all nutritional health, need for a consistent exercise regimen, and an over all push towards maintaining a vigorous and active lifestyle.  Counseled on age appropriate vaccines and discussed upcoming health care needs based on age/gender. Discussed good sleep hygiene and stress management.     2. HTN, goal below 140/90  Discussed sodium restriction, maintaining ideal body weight and regular exercise program as physiologic means to achieve blood pressure control. The patient will strive towards this. The current medical regimen is effective;  continue present plan and medications. Recommended patient to check home readings to monitor and see me for followup as  scheduled or sooner as needed. Patient was educated that both decongestant and anti-inflammatory medication may raise blood pressure.   - Lipid panel; Future  - losartan-hydrochlorothiazide 50-12.5 mg (HYZAAR) 50-12.5 mg per tablet; Take 1 tablet by mouth once daily.  Dispense: 90 tablet; Refill: 3    3. Prediabetes  Stable. We discussed low sugar/low carbohydrate diet and regular exercise to prevent progression. No need for prescription medication at this time.   - Hemoglobin A1c; Future    4. Gastroesophageal reflux disease, esophagitis presence not specified  Symptoms controlled: yes. Reflux precautions discussed (eliminate tobacco if a smoker; minimize caffeine, chocolate and red/white peppermint intake; avoid heavy and spicy meals; don't lay down within 2-3 hours after eating; minimize the intake of NSAIDs). Medication as needed. Patient asked to take medication breaks, if possible - discussed chronic use can limit calcium absorption (which can lead to osteopenia/osteoporosis), increases the risk for intestinal infections, and can cause kidney damage. There are also some newer studies that show possible increased risk of mortality.     5. Interstitial lung disease/6. Pulmonary interstitial fibrosis/7. Pulmonary hypertension  Stable/?improving. Followed by pulmonary.    8. Polycythemia  He has phlebotomy about every 6 weeks for treatment per Hematology.  Stable.    9. Overweight (BMI 25.0-29.9)  The patient is asked to make an attempt to improve diet and exercise patterns to aid in medical management of this problem.     10. Need for Streptococcus pneumoniae vaccination    - (In Office Administered) Pneumococcal Polysaccharide Vaccine (23 Valent) (SQ/IM)    11. Need for shingles vaccine  Defer until next office visit.    12. Hyperuricemia  The current medical regimen is effective;  continue present plan and medications.   - allopurinol (ZYLOPRIM) 100 MG tablet; Take 2 tablets (200 mg total) by mouth once daily.   Dispense: 180 tablet; Refill: 3    13. Advanced directives, counseling/discussion  I initiated the process and explained the importance of advance care planning today with the patient and wife.  Advanced directives were discussed due to patient's age and/or chronic medical conditions. Prognosis based on current condition: good.   Paperwork was given to complete living will (At this point in time, the patient does have full decision-making capacity.  We discussed different extreme health states that he could experience, and reviewed what kind of medical care he would want in those situations) and medical POA (The patient received detailed information about the importance of designating a Health Care Power of . He was also instructed to communicate with this person about their wishes for future healthcare, should he become sick and lose decision-making capacity).   [place] was not discussed.   Approximately 8 minute(s) were spent on counseling/discussion regarding end of life decision making.         14. Urinary frequency  The natural history of prostate cancer and ongoing controversy regarding screening and potential treatment outcomes of prostate cancer has been discussed with the patient. The meaning of a false positive PSA and a false negative PSA has been discussed. He indicates understanding of the limitations of this screening test and wishes not to proceed with screening PSA testing - he will discuss further with urology.   He likely has BPH.  I will refer him to Urology for further evaluation and treatment.  - Ambulatory referral to Urology           Follow-up in about 1 year (around 3/8/2020), or if symptoms worsen or fail to improve, for annual exam. or sooner as needed.

## 2019-03-11 ENCOUNTER — LAB VISIT (OUTPATIENT)
Dept: LAB | Facility: HOSPITAL | Age: 64
End: 2019-03-11
Attending: INTERNAL MEDICINE
Payer: COMMERCIAL

## 2019-03-11 DIAGNOSIS — R73.03 PREDIABETES: ICD-10-CM

## 2019-03-11 DIAGNOSIS — I10 HTN, GOAL BELOW 140/90: ICD-10-CM

## 2019-03-11 LAB
CHOLEST SERPL-MCNC: 133 MG/DL
CHOLEST/HDLC SERPL: 3.4 {RATIO}
ESTIMATED AVG GLUCOSE: 123 MG/DL
HBA1C MFR BLD HPLC: 5.9 %
HDLC SERPL-MCNC: 39 MG/DL
HDLC SERPL: 29.3 %
LDLC SERPL CALC-MCNC: 81 MG/DL
NONHDLC SERPL-MCNC: 94 MG/DL
TRIGL SERPL-MCNC: 65 MG/DL

## 2019-03-11 PROCEDURE — 83036 HEMOGLOBIN GLYCOSYLATED A1C: CPT

## 2019-03-11 PROCEDURE — 80061 LIPID PANEL: CPT

## 2019-03-11 PROCEDURE — 36415 COLL VENOUS BLD VENIPUNCTURE: CPT | Mod: PO

## 2019-04-04 ENCOUNTER — OFFICE VISIT (OUTPATIENT)
Dept: UROLOGY | Facility: CLINIC | Age: 64
End: 2019-04-04
Payer: COMMERCIAL

## 2019-04-04 ENCOUNTER — LAB VISIT (OUTPATIENT)
Dept: LAB | Facility: HOSPITAL | Age: 64
End: 2019-04-04
Attending: UROLOGY
Payer: COMMERCIAL

## 2019-04-04 VITALS
WEIGHT: 244.69 LBS | HEIGHT: 77 IN | SYSTOLIC BLOOD PRESSURE: 138 MMHG | BODY MASS INDEX: 28.89 KG/M2 | DIASTOLIC BLOOD PRESSURE: 70 MMHG

## 2019-04-04 DIAGNOSIS — R35.1 NOCTURIA MORE THAN TWICE PER NIGHT: ICD-10-CM

## 2019-04-04 DIAGNOSIS — R33.9 INCOMPLETE EMPTYING OF BLADDER: ICD-10-CM

## 2019-04-04 DIAGNOSIS — N13.8 ENLARGED PROSTATE WITH URINARY OBSTRUCTION: Primary | ICD-10-CM

## 2019-04-04 DIAGNOSIS — N40.3 NODULAR PROSTATE WITH URINARY OBSTRUCTION: ICD-10-CM

## 2019-04-04 DIAGNOSIS — N13.8 NODULAR PROSTATE WITH URINARY OBSTRUCTION: ICD-10-CM

## 2019-04-04 DIAGNOSIS — N40.1 BPH WITH OBSTRUCTION/LOWER URINARY TRACT SYMPTOMS: Primary | ICD-10-CM

## 2019-04-04 DIAGNOSIS — N52.9 ED (ERECTILE DYSFUNCTION) OF ORGANIC ORIGIN: ICD-10-CM

## 2019-04-04 DIAGNOSIS — N40.1 ENLARGED PROSTATE WITH URINARY OBSTRUCTION: Primary | ICD-10-CM

## 2019-04-04 DIAGNOSIS — N13.8 BPH WITH OBSTRUCTION/LOWER URINARY TRACT SYMPTOMS: Primary | ICD-10-CM

## 2019-04-04 LAB — COMPLEXED PSA SERPL-MCNC: 0.17 NG/ML (ref 0–4)

## 2019-04-04 PROCEDURE — 99999 PR PBB SHADOW E&M-EST. PATIENT-LVL III: ICD-10-PCS | Mod: PBBFAC,,, | Performed by: UROLOGY

## 2019-04-04 PROCEDURE — 36415 COLL VENOUS BLD VENIPUNCTURE: CPT

## 2019-04-04 PROCEDURE — 99244 OFF/OP CNSLTJ NEW/EST MOD 40: CPT | Mod: S$GLB,,, | Performed by: UROLOGY

## 2019-04-04 PROCEDURE — 84153 ASSAY OF PSA TOTAL: CPT

## 2019-04-04 PROCEDURE — 99999 PR PBB SHADOW E&M-EST. PATIENT-LVL III: CPT | Mod: PBBFAC,,, | Performed by: UROLOGY

## 2019-04-04 PROCEDURE — 99244 PR OFFICE CONSULTATION,LEVEL IV: ICD-10-PCS | Mod: S$GLB,,, | Performed by: UROLOGY

## 2019-04-04 RX ORDER — ALFUZOSIN HYDROCHLORIDE 10 MG/1
10 TABLET, EXTENDED RELEASE ORAL DAILY
Qty: 30 TABLET | Refills: 11 | Status: SHIPPED | OUTPATIENT
Start: 2019-04-04 | End: 2019-05-07 | Stop reason: ALTCHOICE

## 2019-04-04 NOTE — PROGRESS NOTES
Subjective:       Patient ID: Josiah Orosco Jr. is a 63 y.o. male who was referred by Elaine Landry MD    Chief Complaint:   Chief Complaint   Patient presents with    Urinary Frequency     new pt coming in for incomplete voiding an urinary frequency an prostate exam      Benign Prostatic Hyperplasia  He patient reports frequency, nocturia three times a night and straining. He denies hematuria. The patient states symptoms are of moderate severity. Onset of symptoms was several years ago and was gradual in onset.  He has no personal history and a family history of prostate cancer in a maternal first cousin. He reports a history of kidney stones. He denies flank pain, gross hematuria and recurrent UTI.  He is currently taking no prostate medications.    Erectile Dysfunction  Patient complains of erectile dysfunction. Onset of dysfunction was several years ago and was gradual in onset.  Patient states the nature of difficulty is both attaining and maintaining erection. Full erections occur never. Partial erections occur with intercourse. Libido is not affected. Risk factors for ED include cardiovascular disease. Patient denies history of pelvic radiation. Previous treatment of ED includes none.  He is concerned about his polycythemia.      ACTIVE MEDICAL ISSUES:  Patient Active Problem List   Diagnosis    History of HCV, s/p successful treatment w/ SVR12 5/2015    Lung disease caused by breathing particles    Overweight (BMI 25.0-29.9)    GERD (gastroesophageal reflux disease)    Polycythemia    Former smoker    Hyperuricemia    ILD (interstitial lung disease)    Prediabetes    Arthritis    Allergic cough    HTN, goal below 140/90    Interstitial lung disease    Pulmonary interstitial fibrosis    Pulmonary hypertension       PAST MEDICAL HISTORY  Past Medical History:   Diagnosis Date    Arthritis     GERD (gastroesophageal reflux disease)     History of HCV, s/p successful treatment w/ SVR12  5/2015     Failed treatment (stage I/II) - followed by hepatology     Joint pain     Lung disease caused by breathing particles     Widespread essentially symmetric interstitial lung disease    Obesity     Polycythemia vera     Prediabetes        PAST SURGICAL HISTORY:  Past Surgical History:   Procedure Laterality Date    BIOPSY-BONE MARROW N/A 8/11/2014    [facility]    BUNIONECTOMY      bilateral    COLONOSCOPY N/A 12/9/2015    [facility] by [facility] at [facility] END[facility]    Liver biopsy x2         SOCIAL HISTORY:  Social History     Tobacco Use    Smoking status: Former Smoker     Types: Cigars    Smokeless tobacco: Former User    Tobacco comment: pt. smokes 10 cigars per day.   Substance Use Topics    Alcohol use: No     Alcohol/week: 0.0 oz     Comment: Rarely    Drug use: Yes     Types: Marijuana     Comment: daily       FAMILY HISTORY:  Family History   Problem Relation Age of Onset    Heart disease Mother     Kidney disease Mother     Parkinsonism Father     Prostate cancer Cousin         maternal side    Heart attack Sister         CABG    Deep vein thrombosis Sister     Pulmonary embolism Sister     Osteoarthritis Sister         s/p knee replacement    Chronic back pain Brother     Liver disease Neg Hx     Diabetes Neg Hx     Melanoma Neg Hx        ALLERGIES AND MEDICATIONS: updated and reviewed.  Review of patient's allergies indicates:   Allergen Reactions    Ace inhibitors Other (See Comments)     cough     Current Outpatient Medications   Medication Sig    allopurinol (ZYLOPRIM) 100 MG tablet Take 2 tablets (200 mg total) by mouth once daily.    aspirin (ECOTRIN) 81 MG EC tablet Take 81 mg by mouth once daily.    levocetirizine (XYZAL) 5 MG tablet Take 1 tablet (5 mg total) by mouth every evening.    losartan-hydrochlorothiazide 50-12.5 mg (HYZAAR) 50-12.5 mg per tablet Take 1 tablet by mouth once daily.    naproxen (NAPROSYN) 500 MG  "tablet Take 1 tablet (500 mg total) by mouth daily as needed.    ranitidine (ZANTAC) 150 MG tablet Take 1 tablet (150 mg total) by mouth daily as needed.    alfuzosin (UROXATRAL) 10 mg Tb24 Take 1 tablet (10 mg total) by mouth once daily.     No current facility-administered medications for this visit.        Review of Systems   Constitutional: Negative for activity change, fatigue, fever and unexpected weight change.   HENT: Negative for congestion.    Eyes: Negative for redness.   Respiratory: Negative for chest tightness and shortness of breath.    Cardiovascular: Negative for chest pain and leg swelling.   Gastrointestinal: Negative for abdominal pain, constipation, diarrhea, nausea and vomiting.   Genitourinary: Negative for dysuria, flank pain, frequency, hematuria, penile pain, penile swelling, scrotal swelling, testicular pain and urgency.   Musculoskeletal: Negative for arthralgias and back pain.   Neurological: Negative for dizziness and light-headedness.   Psychiatric/Behavioral: Negative for behavioral problems and confusion. The patient is not nervous/anxious.    All other systems reviewed and are negative.      Objective:      Vitals:    04/04/19 1130   BP: 138/70   Weight: 111 kg (244 lb 11.4 oz)   Height: 6' 5" (1.956 m)     Physical Exam   Nursing note and vitals reviewed.  Constitutional: He is oriented to person, place, and time. He appears well-developed and well-nourished.   HENT:   Head: Normocephalic.   Eyes: Conjunctivae are normal.   Neck: Normal range of motion. No tracheal deviation present. No thyromegaly present.   Cardiovascular: Normal rate and normal heart sounds.    Pulmonary/Chest: Effort normal and breath sounds normal. No respiratory distress. He has no wheezes.   Abdominal: Soft. He exhibits no distension and no mass. There is no hepatosplenomegaly. There is no tenderness. There is no rebound, no guarding and no CVA tenderness. No hernia. Hernia confirmed negative in the right " inguinal area and confirmed negative in the left inguinal area.   Genitourinary: Rectum normal, testes normal and penis normal. Rectal exam shows no external hemorrhoid, no mass and no tenderness. Prostate is enlarged. Prostate is not tender. Right testis shows no mass and no tenderness. Left testis shows no mass and no tenderness.       Musculoskeletal: He exhibits no edema or tenderness.   Lymphadenopathy: No inguinal adenopathy noted on the right or left side.   Neurological: He is alert and oriented to person, place, and time.   Skin: Skin is warm and dry. No rash noted. No erythema.     Psychiatric: He has a normal mood and affect. His behavior is normal. Judgment and thought content normal.       Urine dipstick shows negative for all components.  Micro exam: negative for WBC's or RBC's.    Assessment:       1. BPH with obstruction/lower urinary tract symptoms    2. Nocturia more than twice per night    3. Incomplete emptying of bladder    4. ED (erectile dysfunction) of organic origin          Plan:       1. BPH with obstruction/lower urinary tract symptoms    - POCT urinalysis, dipstick or tablet reag  - Prostate Specific Antigen, Diagnostic; Future  - alfuzosin (UROXATRAL) 10 mg Tb24; Take 1 tablet (10 mg total) by mouth once daily.  Dispense: 30 tablet; Refill: 11    2. Nocturia more than twice per night  Limit evening fluids    3. Incomplete emptying of bladder  DRAGAN    4. ED (erectile dysfunction) of organic origin  I feel it would be okay to try a PDE5i but he wants to check with his hematologist.            No follow-ups on file.

## 2019-04-04 NOTE — LETTER
April 4, 2019      Elaine Landry MD  [address] [facility]  [place] [address]           [facility]  [address] [facility] [address]  [place] [address]  Phone: [phone]  Fax: [phone]          Patient: Josiah Orosco Jr.   MR Number: [id number]   YOB: 1955   Date of Visit: 4/4/2019       Dear Dr. Elaine Landry:    Thank you for referring Josiah Orosco to me for evaluation. Attached you will find relevant portions of my assessment and plan of care.    If you have questions, please do not hesitate to call me. I look forward to following Josiah Orosco along with you.    Sincerely,    ERWIN Shah MD    Enclosure  CC:  No Recipients    If you would like to receive this communication electronically, please contact [email or web address] or [phone] to request more information on "[employer] Link access.    For providers and/or their staff who would like to refer a patient to [facility], please contact us through our one-stop-shop provider referral line, [facility], at [phone].    If you feel you have received this communication in error or would no longer like to receive these types of communications, please e-mail [email or web address]

## 2019-04-12 DIAGNOSIS — J84.10 PULMONARY INTERSTITIAL FIBROSIS: Primary | ICD-10-CM

## 2019-05-02 ENCOUNTER — HOSPITAL ENCOUNTER (OUTPATIENT)
Dept: RADIOLOGY | Facility: HOSPITAL | Age: 64
Discharge: HOME | End: 2019-05-02
Attending: UROLOGY
Payer: COMMERCIAL

## 2019-05-02 DIAGNOSIS — N13.8 BPH WITH OBSTRUCTION/LOWER URINARY TRACT SYMPTOMS: ICD-10-CM

## 2019-05-02 DIAGNOSIS — N40.1 BPH WITH OBSTRUCTION/LOWER URINARY TRACT SYMPTOMS: ICD-10-CM

## 2019-05-02 PROCEDURE — 76770 US EXAM ABDO BACK WALL COMP: CPT | Mod: TC

## 2019-05-02 PROCEDURE — 76770 US RETROPERITONEAL COMPLETE: ICD-10-PCS | Mod: 26,,, | Performed by: RADIOLOGY

## 2019-05-02 PROCEDURE — 76770 US EXAM ABDO BACK WALL COMP: CPT | Mod: 26,,, | Performed by: RADIOLOGY

## 2019-05-07 ENCOUNTER — OFFICE VISIT (OUTPATIENT)
Dept: UROLOGY | Facility: CLINIC | Age: 64
End: 2019-05-07
Payer: COMMERCIAL

## 2019-05-07 VITALS — BODY MASS INDEX: 28.81 KG/M2 | HEIGHT: 77 IN | WEIGHT: 244 LBS

## 2019-05-07 DIAGNOSIS — R35.1 NOCTURIA MORE THAN TWICE PER NIGHT: ICD-10-CM

## 2019-05-07 DIAGNOSIS — N40.1 BPH WITH OBSTRUCTION/LOWER URINARY TRACT SYMPTOMS: Primary | ICD-10-CM

## 2019-05-07 DIAGNOSIS — N13.8 BPH WITH OBSTRUCTION/LOWER URINARY TRACT SYMPTOMS: Primary | ICD-10-CM

## 2019-05-07 DIAGNOSIS — N52.9 ED (ERECTILE DYSFUNCTION) OF ORGANIC ORIGIN: ICD-10-CM

## 2019-05-07 DIAGNOSIS — R33.9 INCOMPLETE EMPTYING OF BLADDER: ICD-10-CM

## 2019-05-07 LAB
BILIRUB SERPL-MCNC: NORMAL MG/DL
BLOOD URINE, POC: NORMAL
COLOR, POC UA: YELLOW
GLUCOSE UR QL STRIP: NORMAL
KETONES UR QL STRIP: NORMAL
LEUKOCYTE ESTERASE URINE, POC: NORMAL
NITRITE, POC UA: NORMAL
PH, POC UA: 5
PROTEIN, POC: NORMAL
SPECIFIC GRAVITY, POC UA: 1030
UROBILINOGEN, POC UA: NORMAL

## 2019-05-07 PROCEDURE — 81001 PR  URINALYSIS, AUTO, W/SCOPE: ICD-10-PCS | Mod: S$GLB,,, | Performed by: UROLOGY

## 2019-05-07 PROCEDURE — 99999 PR PBB SHADOW E&M-EST. PATIENT-LVL III: CPT | Mod: PBBFAC,,, | Performed by: UROLOGY

## 2019-05-07 PROCEDURE — 99214 OFFICE O/P EST MOD 30 MIN: CPT | Mod: 25,S$GLB,, | Performed by: UROLOGY

## 2019-05-07 PROCEDURE — 99999 PR PBB SHADOW E&M-EST. PATIENT-LVL III: ICD-10-PCS | Mod: PBBFAC,,, | Performed by: UROLOGY

## 2019-05-07 PROCEDURE — 3008F BODY MASS INDEX DOCD: CPT | Mod: CPTII,S$GLB,, | Performed by: UROLOGY

## 2019-05-07 PROCEDURE — 81001 URINALYSIS AUTO W/SCOPE: CPT | Mod: S$GLB,,, | Performed by: UROLOGY

## 2019-05-07 PROCEDURE — 99214 PR OFFICE/OUTPT VISIT, EST, LEVL IV, 30-39 MIN: ICD-10-PCS | Mod: 25,S$GLB,, | Performed by: UROLOGY

## 2019-05-07 PROCEDURE — 3008F PR BODY MASS INDEX (BMI) DOCUMENTED: ICD-10-PCS | Mod: CPTII,S$GLB,, | Performed by: UROLOGY

## 2019-05-07 RX ORDER — SILODOSIN 8 MG/1
8 CAPSULE ORAL DAILY
Qty: 30 CAPSULE | Refills: 0 | Status: SHIPPED | OUTPATIENT
Start: 2019-05-07 | End: 2019-06-12 | Stop reason: SDUPTHER

## 2019-05-07 NOTE — PROGRESS NOTES
Subjective:       Patient ID: Josiah Orosco Jr. is a 63 y.o. male who was last seen in this office 4/4/2019    Chief Complaint:   Chief Complaint   Patient presents with    Benign Prostatic Hypertrophy     6 week f/u        Benign Prostatic Hyperplasia  He patient reports frequency, nocturia three times a night and straining. He denies hematuria. The patient states symptoms are of moderate severity. Onset of symptoms was several years ago and was gradual in onset.  He has no personal history and a family history of prostate cancer in a maternal first cousin. He reports a history of kidney stones. He denies flank pain, gross hematuria and recurrent UTI.  He is currently taking no prostate medications.  He took Flomax and now Uroxatral without much success.      Erectile Dysfunction  Patient complains of erectile dysfunction. Onset of dysfunction was several years ago and was gradual in onset.  Patient states the nature of difficulty is both attaining and maintaining erection. Full erections occur never. Partial erections occur with intercourse. Libido is not affected. Risk factors for ED include cardiovascular disease. Patient denies history of pelvic radiation. Previous treatment of ED includes none.  He is concerned about his polycythemia.    ACTIVE MEDICAL ISSUES:  Patient Active Problem List   Diagnosis    History of HCV, s/p successful treatment w/ SVR12 5/2015    Lung disease caused by breathing particles    Overweight (BMI 25.0-29.9)    GERD (gastroesophageal reflux disease)    Polycythemia    Former smoker    Hyperuricemia    ILD (interstitial lung disease)    Prediabetes    Arthritis    Allergic cough    HTN, goal below 140/90    Interstitial lung disease    Pulmonary interstitial fibrosis    Pulmonary hypertension       ALLERGIES AND MEDICATIONS: updated and reviewed.  Review of patient's allergies indicates:   Allergen Reactions    Ace inhibitors Other (See Comments)     cough     Current  "Outpatient Medications   Medication Sig    allopurinol (ZYLOPRIM) 100 MG tablet Take 2 tablets (200 mg total) by mouth once daily.    aspirin (ECOTRIN) 81 MG EC tablet Take 81 mg by mouth once daily.    levocetirizine (XYZAL) 5 MG tablet Take 1 tablet (5 mg total) by mouth every evening.    losartan-hydrochlorothiazide 50-12.5 mg (HYZAAR) 50-12.5 mg per tablet Take 1 tablet by mouth once daily.    naproxen (NAPROSYN) 500 MG tablet Take 1 tablet (500 mg total) by mouth daily as needed.    ranitidine (ZANTAC) 150 MG tablet Take 1 tablet (150 mg total) by mouth daily as needed.    silodosin (RAPAFLO) 8 mg Cap capsule Take 1 capsule (8 mg total) by mouth once daily.     No current facility-administered medications for this visit.        Review of Systems   Constitutional: Negative for activity change, fatigue, fever and unexpected weight change.   HENT: Negative for congestion.    Eyes: Negative for redness.   Respiratory: Negative for chest tightness and shortness of breath.    Cardiovascular: Negative for chest pain and leg swelling.   Gastrointestinal: Negative for abdominal pain, constipation, diarrhea, nausea and vomiting.   Genitourinary: Negative for dysuria, flank pain, frequency, hematuria, penile pain, penile swelling, scrotal swelling, testicular pain and urgency.   Musculoskeletal: Negative for arthralgias and back pain.   Neurological: Negative for dizziness and light-headedness.   Psychiatric/Behavioral: Negative for behavioral problems and confusion. The patient is not nervous/anxious.    All other systems reviewed and are negative.      Objective:      Vitals:    05/07/19 1121   Weight: 110.7 kg (244 lb)   Height: 6' 5" (1.956 m)     Physical Exam   Nursing note and vitals reviewed.  Constitutional: He is oriented to person, place, and time. He appears well-developed and well-nourished.   HENT:   Head: Normocephalic.   Eyes: Conjunctivae are normal.   Neck: Normal range of motion. Neck supple. No " tracheal deviation present. No thyromegaly present.   Cardiovascular: Normal rate and normal heart sounds.    Pulmonary/Chest: Effort normal and breath sounds normal. No respiratory distress. He has no wheezes.   Abdominal: Soft. Bowel sounds are normal. There is no hepatosplenomegaly. There is no tenderness. There is no rebound and no CVA tenderness. No hernia.   Musculoskeletal: Normal range of motion. He exhibits no edema or tenderness.   Lymphadenopathy:     He has no cervical adenopathy.   Neurological: He is alert and oriented to person, place, and time.   Skin: Skin is warm and dry. No rash noted. No erythema.     Psychiatric: He has a normal mood and affect. His behavior is normal. Judgment and thought content normal.       Urine dipstick shows negative for all components.  Micro exam: negative for WBC's or RBC's.    US Retroperitoneal Complete (Kidney and   Order: [id number]   Status:  Final result   Visible to patient:  No (Not Released)   Next appt:  05/09/2019 at 09:45 AM in Radiology ([facility] XROP3 485 LB LIMIT)   Dx:  BPH with obstruction/lower urinary tr...   Details     Reading Physician Reading Date Result Priority   Inder Oakley MD 5/2/2019       Narrative     EXAMINATION:  US RETROPERITONEAL COMPLETE    CLINICAL HISTORY:  Obstructive and reflux of uropathy.  Benign prostatic hyperplasia with lower urinary tract symptoms    TECHNIQUE:  Ultrasound of the kidneys and urinary bladder was performed including color flow and Doppler evaluation of the kidneys.    COMPARISON:  None    FINDINGS:  Right kidney    Right kidney measures 10.9 x 6.5 x 6.3-cm.  Right kidney is normal in location, size, echogenicity and morphology.  No cortical thinning. No loss of corticomedullary distinction. No sizable suspicious mass. No obstructing nephrolith or hydronephrosis.  Resistive index measures 0.7.    Left kidney    Left kidney measures 11.6 x 6.4 x 6.3-cm.  Left kidney is normal in location, size, echogenicity and  morphology.  No cortical thinning. No loss of corticomedullary distinction. No sizable suspicious mass. No obstructing nephrolith or hydronephrosis. Resistive index measures 0.7.    Urinary bladder    The bladder is well distended at the time of scanning and is grossly unremarkable in appearance without appreciable mural thickening or focal nodularity.  Postvoid residual of 7 cc is within normal limits.      Impression       1. Retroperitoneal/renal sonogram is grossly unremarkable.      Electronically signed by: Inder Oakley  Date: 05/02/2019  Time: 11:29           PSA DIAGNOSTIC 0.00 - 4.00 ng/mL 0.17    Comment: PSA Expected levels:   Hormonal Therapy: <0.05 ng/ml   Prostatectomy: <0.01 ng/ml   Radiation Therapy: <1.00 ng/ml    Resulting Agency  [facility]         Specimen Collected: 04/04/19 12:24   Last Resulted: 04/04/19 16:14            Assessment:       1. BPH with obstruction/lower urinary tract symptoms    2. Nocturia more than twice per night    3. Incomplete emptying of bladder    4. ED (erectile dysfunction) of organic origin          Plan:       1. BPH with obstruction/lower urinary tract symptoms  He may need a cystoscopy  - POCT urinalysis, dipstick or tablet reag  - silodosin (RAPAFLO) 8 mg Cap capsule; Take 1 capsule (8 mg total) by mouth once daily.  Dispense: 30 capsule; Refill: 0    2. Nocturia more than twice per night  Limit evening fluids    3. Incomplete emptying of bladder  stable    4. ED (erectile dysfunction) of organic origin  He is declining treatment            Follow up in about 3 months (around 8/7/2019) for Follow up.

## 2019-05-09 ENCOUNTER — HOSPITAL ENCOUNTER (OUTPATIENT)
Dept: RADIOLOGY | Facility: HOSPITAL | Age: 64
Discharge: HOME | End: 2019-05-09
Attending: INTERNAL MEDICINE
Payer: COMMERCIAL

## 2019-05-09 ENCOUNTER — HOSPITAL ENCOUNTER (OUTPATIENT)
Dept: PULMONOLOGY | Facility: CLINIC | Age: 64
Discharge: HOME | End: 2019-05-09
Payer: COMMERCIAL

## 2019-05-09 ENCOUNTER — OFFICE VISIT (OUTPATIENT)
Dept: PULMONOLOGY | Facility: CLINIC | Age: 64
End: 2019-05-09
Payer: COMMERCIAL

## 2019-05-09 VITALS
WEIGHT: 248 LBS | SYSTOLIC BLOOD PRESSURE: 128 MMHG | DIASTOLIC BLOOD PRESSURE: 72 MMHG | HEART RATE: 83 BPM | BODY MASS INDEX: 30.2 KG/M2 | OXYGEN SATURATION: 97 % | HEIGHT: 76 IN

## 2019-05-09 DIAGNOSIS — R06.09 DYSPNEA ON EXERTION: Primary | ICD-10-CM

## 2019-05-09 DIAGNOSIS — J84.10 PULMONARY INTERSTITIAL FIBROSIS: ICD-10-CM

## 2019-05-09 DIAGNOSIS — J84.9 ILD (INTERSTITIAL LUNG DISEASE): ICD-10-CM

## 2019-05-09 PROCEDURE — 71046 XR CHEST PA AND LATERAL: ICD-10-PCS | Mod: 26,,, | Performed by: RADIOLOGY

## 2019-05-09 PROCEDURE — 71046 X-RAY EXAM CHEST 2 VIEWS: CPT | Mod: 26,,, | Performed by: RADIOLOGY

## 2019-05-09 PROCEDURE — 94729 DIFFUSING CAPACITY: CPT | Mod: S$GLB,,, | Performed by: INTERNAL MEDICINE

## 2019-05-09 PROCEDURE — 71046 X-RAY EXAM CHEST 2 VIEWS: CPT | Mod: TC,FY

## 2019-05-09 PROCEDURE — 94727 GAS DIL/WSHOT DETER LNG VOL: CPT | Mod: S$GLB,,, | Performed by: INTERNAL MEDICINE

## 2019-05-09 PROCEDURE — 3078F DIAST BP <80 MM HG: CPT | Mod: CPTII,S$GLB,, | Performed by: INTERNAL MEDICINE

## 2019-05-09 PROCEDURE — 99214 PR OFFICE/OUTPT VISIT, EST, LEVL IV, 30-39 MIN: ICD-10-PCS | Mod: S$GLB,,, | Performed by: INTERNAL MEDICINE

## 2019-05-09 PROCEDURE — 99999 PR PBB SHADOW E&M-EST. PATIENT-LVL III: ICD-10-PCS | Mod: PBBFAC,,, | Performed by: INTERNAL MEDICINE

## 2019-05-09 PROCEDURE — 3008F PR BODY MASS INDEX (BMI) DOCUMENTED: ICD-10-PCS | Mod: CPTII,S$GLB,, | Performed by: INTERNAL MEDICINE

## 2019-05-09 PROCEDURE — 3008F BODY MASS INDEX DOCD: CPT | Mod: CPTII,S$GLB,, | Performed by: INTERNAL MEDICINE

## 2019-05-09 PROCEDURE — 3074F PR MOST RECENT SYSTOLIC BLOOD PRESSURE < 130 MM HG: ICD-10-PCS | Mod: CPTII,S$GLB,, | Performed by: INTERNAL MEDICINE

## 2019-05-09 PROCEDURE — 99999 PR PBB SHADOW E&M-EST. PATIENT-LVL III: CPT | Mod: PBBFAC,,, | Performed by: INTERNAL MEDICINE

## 2019-05-09 PROCEDURE — 99214 OFFICE O/P EST MOD 30 MIN: CPT | Mod: S$GLB,,, | Performed by: INTERNAL MEDICINE

## 2019-05-09 PROCEDURE — 94729 PR C02/MEMBANE DIFFUSE CAPACITY: ICD-10-PCS | Mod: S$GLB,,, | Performed by: INTERNAL MEDICINE

## 2019-05-09 PROCEDURE — 3078F PR MOST RECENT DIASTOLIC BLOOD PRESSURE < 80 MM HG: ICD-10-PCS | Mod: CPTII,S$GLB,, | Performed by: INTERNAL MEDICINE

## 2019-05-09 PROCEDURE — 94727 PR PULM FUNCTION TEST BY GAS: ICD-10-PCS | Mod: S$GLB,,, | Performed by: INTERNAL MEDICINE

## 2019-05-09 PROCEDURE — 3074F SYST BP LT 130 MM HG: CPT | Mod: CPTII,S$GLB,, | Performed by: INTERNAL MEDICINE

## 2019-05-09 NOTE — PROGRESS NOTES
Subjective:      Patient ID: Josiah Orosco Jr. is a 63 y.o. male.    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 I would love to know what has made the changes but he is not sick and his working full time. His PFT's are stable and unchanged: Lung volumes are normal but has a sginificant impairment of his DLCO. Resitng  Sa02;35%  Resting Sa02: 95% pulse of 87 and after walking the length of the hunt and back: 90% and pulse delfin to 117. All parameters are stable and Chest x-ray is unchanged.       No flowsheet data found.  Review of Systems   Constitutional: Negative.    HENT: Negative.    Eyes: Negative.    Respiratory: Negative.  Positive for dyspnea on extertion.         Abnormal chest x-ray   Cardiovascular: Negative.    Genitourinary: Negative.    Musculoskeletal: Negative.    Skin: Negative.    Gastrointestinal: Negative.    Neurological: Negative.    Hematological:        P. vera   Psychiatric/Behavioral: Negative.      Objective:     Physical Exam   Constitutional: He is oriented to person, place, and time. He appears well-developed and well-nourished.   HENT:   Head: Normocephalic and atraumatic.   Right Ear: External ear normal.   Left Ear: External ear normal.   Eyes: Pupils are equal, round, and reactive to light. Conjunctivae and EOM are normal.   Neck: Normal range of motion. Neck supple.   Cardiovascular: Normal rate, regular rhythm and normal heart sounds.   Pulmonary/Chest: Effort normal and breath sounds normal.   Clear     Lung volumes: Normal    DLCO:35%   Abdominal: Soft. Bowel sounds are normal.   Musculoskeletal: Normal range of motion.   Neurological: He is alert and oriented to person, place, and time. He has normal reflexes.   Skin: Skin is warm and dry.   Psychiatric: He has a normal mood and affect. His behavior is normal. Judgment and thought content  normal.       Assessment:     No diagnosis found.  Outpatient Encounter Medications as of 5/9/2019   Medication Sig Dispense Refill    allopurinol (ZYLOPRIM) 100 MG tablet Take 2 tablets (200 mg total) by mouth once daily. 180 tablet 3    aspirin (ECOTRIN) 81 MG EC tablet Take 81 mg by mouth once daily.      levocetirizine (XYZAL) 5 MG tablet Take 1 tablet (5 mg total) by mouth every evening. 30 tablet 2    losartan-hydrochlorothiazide 50-12.5 mg (HYZAAR) 50-12.5 mg per tablet Take 1 tablet by mouth once daily. 90 tablet 3    naproxen (NAPROSYN) 500 MG tablet Take 1 tablet (500 mg total) by mouth daily as needed. 30 tablet 2    ranitidine (ZANTAC) 150 MG tablet Take 1 tablet (150 mg total) by mouth daily as needed. 180 tablet 1    silodosin (RAPAFLO) 8 mg Cap capsule Take 1 capsule (8 mg total) by mouth once daily. 30 capsule 0     No facility-administered encounter medications on file as of 5/9/2019.        Plan:   Stable ILD  Problem List Items Addressed This Visit     None

## 2019-06-12 DIAGNOSIS — N13.8 BPH WITH OBSTRUCTION/LOWER URINARY TRACT SYMPTOMS: ICD-10-CM

## 2019-06-12 DIAGNOSIS — N40.1 BPH WITH OBSTRUCTION/LOWER URINARY TRACT SYMPTOMS: ICD-10-CM

## 2019-06-13 RX ORDER — SILODOSIN 8 MG/1
CAPSULE ORAL
Qty: 30 CAPSULE | Refills: 0 | Status: SHIPPED | OUTPATIENT
Start: 2019-06-13 | End: 2020-12-03

## 2019-11-08 ENCOUNTER — PES CALL (OUTPATIENT)
Dept: ADMINISTRATIVE | Facility: CLINIC | Age: 64
End: 2019-11-08

## 2019-12-03 ENCOUNTER — OFFICE VISIT (OUTPATIENT)
Dept: FAMILY MEDICINE | Facility: CLINIC | Age: 64
End: 2019-12-03
Payer: MEDICARE

## 2019-12-03 VITALS
WEIGHT: 251 LBS | TEMPERATURE: 98 F | DIASTOLIC BLOOD PRESSURE: 78 MMHG | HEART RATE: 97 BPM | HEIGHT: 77 IN | SYSTOLIC BLOOD PRESSURE: 122 MMHG | BODY MASS INDEX: 29.64 KG/M2 | OXYGEN SATURATION: 95 %

## 2019-12-03 DIAGNOSIS — J84.9 ILD (INTERSTITIAL LUNG DISEASE): ICD-10-CM

## 2019-12-03 DIAGNOSIS — Z23 NEED FOR VACCINATION: ICD-10-CM

## 2019-12-03 DIAGNOSIS — Z00.00 ENCOUNTER FOR PREVENTIVE HEALTH EXAMINATION: Primary | ICD-10-CM

## 2019-12-03 DIAGNOSIS — K21.9 GASTROESOPHAGEAL REFLUX DISEASE, ESOPHAGITIS PRESENCE NOT SPECIFIED: ICD-10-CM

## 2019-12-03 DIAGNOSIS — J84.9 INTERSTITIAL LUNG DISEASE: ICD-10-CM

## 2019-12-03 DIAGNOSIS — J64: ICD-10-CM

## 2019-12-03 DIAGNOSIS — J84.10 PULMONARY INTERSTITIAL FIBROSIS: ICD-10-CM

## 2019-12-03 DIAGNOSIS — D45 POLYCYTHEMIA VERA: ICD-10-CM

## 2019-12-03 DIAGNOSIS — I10 HTN, GOAL BELOW 140/90: ICD-10-CM

## 2019-12-03 DIAGNOSIS — I27.20 PULMONARY HYPERTENSION: ICD-10-CM

## 2019-12-03 DIAGNOSIS — Z13.6 SCREENING FOR AAA (ABDOMINAL AORTIC ANEURYSM): ICD-10-CM

## 2019-12-03 PROCEDURE — 99999 PR PBB SHADOW E&M-EST. PATIENT-LVL IV: CPT | Mod: PBBFAC,,, | Performed by: NURSE PRACTITIONER

## 2019-12-03 PROCEDURE — 3078F DIAST BP <80 MM HG: CPT | Mod: S$GLB,,, | Performed by: NURSE PRACTITIONER

## 2019-12-03 PROCEDURE — 3074F SYST BP LT 130 MM HG: CPT | Mod: S$GLB,,, | Performed by: NURSE PRACTITIONER

## 2019-12-03 PROCEDURE — 3078F PR MOST RECENT DIASTOLIC BLOOD PRESSURE < 80 MM HG: ICD-10-PCS | Mod: S$GLB,,, | Performed by: NURSE PRACTITIONER

## 2019-12-03 PROCEDURE — 99999 PR PBB SHADOW E&M-EST. PATIENT-LVL IV: ICD-10-PCS | Mod: PBBFAC,,, | Performed by: NURSE PRACTITIONER

## 2019-12-03 PROCEDURE — 3074F PR MOST RECENT SYSTOLIC BLOOD PRESSURE < 130 MM HG: ICD-10-PCS | Mod: S$GLB,,, | Performed by: NURSE PRACTITIONER

## 2019-12-03 PROCEDURE — G0439 PPPS, SUBSEQ VISIT: HCPCS | Mod: S$GLB,,, | Performed by: NURSE PRACTITIONER

## 2019-12-03 PROCEDURE — G0439 PR MEDICARE ANNUAL WELLNESS SUBSEQUENT VISIT: ICD-10-PCS | Mod: S$GLB,,, | Performed by: NURSE PRACTITIONER

## 2019-12-03 NOTE — PATIENT INSTRUCTIONS
Counseling and Referral of Other Preventative  (Italic type indicates deductible and co-insurance are waived)    Patient Name: Josiah Orosco  Today's Date: 12/3/2019    Health Maintenance       Date Due Completion Date    Shingles Vaccine (1 of 2) 10/05/2004 Patient aware of recommendation for shingles vaccine.     Influenza Vaccine (1) 09/01/2019 10/12/2018, Patient aware of recommendation for updated influenza vaccine.     Abdominal Aortic Aneurysm Screening 10/05/2019 5/24/2011, Patient aware of recommendation for aneurysm screening.     Pneumococcal Vaccine (65+ Low/Medium Risk) (1 of 2 - PCV13) 03/08/2020 3/8/2019    Lipid Panel 03/11/2020 3/11/2019    Hemoglobin A1c 03/11/2020 3/11/2019    Colonoscopy 12/09/2025 12/9/2015    Override on 2/7/2005: Done    TETANUS VACCINE 05/18/2026 5/18/2016        No orders of the defined types were placed in this encounter.    The following information is provided to all patients.  This information is to help you find resources for any of the problems found today that may be affecting your health:                Living healthy guide: www.[facility].[place].gov      Understanding Diabetes: www.diabetes.org      Eating healthy: www.cdc.gov/healthyweight      CDC home safety checklist: www.cdc.gov/steadi/patient.html      Agency on Aging: www.goea.[place].gov      Alcoholics anonymous (AA): www.aa.org      Physical Activity: www.rosendo.nih.gov/nq5vklh      Tobacco use: www.quitwithusla.org

## 2019-12-03 NOTE — PROGRESS NOTES
"Josiah Orosco presented for a  Medicare AWV and comprehensive Health Risk Assessment today. The following components were reviewed and updated:    · Medical history  · Family History  · Social history  · Allergies and Current Medications  · Health Risk Assessment  · Health Maintenance  · Care Team     ** See Completed Assessments for Annual Wellness Visit within the encounter summary.**       The following assessments were completed:  · Living Situation  · CAGE  · Depression Screening  · Timed Get Up and Go  · Whisper Test  · Cognitive Function Screening  ·   ·   · Nutrition Screening  · ADL Screening  · PAQ Screening    Vitals:    12/03/19 0904   BP: 122/78   BP Location: Left arm   Patient Position: Sitting   BP Method: Large (Manual)   Pulse: 97   Temp: 97.9 °F (36.6 °C)   TempSrc: Oral   SpO2: 95%   Weight: 113.9 kg (250 lb 15.9 oz)   Height: 6' 5" (1.956 m)     Body mass index is 29.76 kg/m².  Physical Exam   Constitutional: He is oriented to person, place, and time.   Cardiovascular: Normal rate, regular rhythm and normal heart sounds.   Pulmonary/Chest: Effort normal and breath sounds normal.   Musculoskeletal: Normal range of motion. He exhibits no edema.   Neurological: He is alert and oriented to person, place, and time.   Skin: Skin is warm. Capillary refill takes less than 2 seconds.   Psychiatric: He has a normal mood and affect. His behavior is normal. Thought content normal.   Vitals reviewed.        Diagnoses and health risks identified today and associated recommendations/orders:    1. Encounter for preventive health examination  Education provided about preventive health examinations and procedures; addressed and discussed patient's health concerns. Additionally, reviewed medical record for risk factors and documented the results during this encounter.  - US Abdominal Aorta; Future    2. Polycythemia vera  Stable, patient evaluated/monitored by [facility] hematology dept; continue as advised. "     3. ILD (interstitial lung disease)  Stable, patient evaluated/monitored by [facility] pulmonology dept; continue as advised.     4. Interstitial lung disease  Stable, patient evaluated/monitored by [facility] pulmonology dept; continue as advised.     5. Lung disease caused by breathing particles  Stable, patient evaluated/monitored by [facility] pulmonology dept; continue as advised.     6. Pulmonary hypertension  Stable, patient evaluated/monitored by [facility] pulmonology dept; continue as advised.     7. Pulmonary interstitial fibrosis  Stable, patient evaluated/monitored by [facility] pulmonology dept; continue as advised.     8. Screening for AAA (abdominal aortic aneurysm)  - US Abdominal Aorta; Future    9. Gastroesophageal reflux disease, esophagitis presence not specified  Stable, managed with ranitidine. Continue as advised regarding dietary and lifestyle modifications.      10. HTN, goal below 140/90  Presently at goal. Continue as advised regarding dietary and lifestyle modifications.     11. Need for vaccination  Patient aware of recommendation for shingles vaccine.     Provided Josiah with a 5-10 year written screening schedule and personal prevention plan. Recommendations were developed using the USPSTF age appropriate recommendations. Education, counseling, and referrals were provided as needed. After Visit Summary printed and given to patient which includes a list of additional screenings\tests needed.    Follow up in about 1 year (around 12/3/2020) for assessment .    Dhaval Waller Jr, NP

## 2020-03-06 PROBLEM — J84.9 INTERSTITIAL LUNG DISEASE: Status: RESOLVED | Noted: 2017-01-30 | Resolved: 2020-03-06

## 2020-03-06 PROBLEM — D45 POLYCYTHEMIA VERA: Status: RESOLVED | Noted: 2019-12-03 | Resolved: 2020-03-06

## 2020-03-09 ENCOUNTER — OFFICE VISIT (OUTPATIENT)
Dept: FAMILY MEDICINE | Facility: CLINIC | Age: 65
End: 2020-03-09
Payer: MEDICARE

## 2020-03-09 VITALS
SYSTOLIC BLOOD PRESSURE: 120 MMHG | BODY MASS INDEX: 29.77 KG/M2 | WEIGHT: 252.13 LBS | OXYGEN SATURATION: 95 % | HEIGHT: 77 IN | HEART RATE: 101 BPM | TEMPERATURE: 98 F | DIASTOLIC BLOOD PRESSURE: 54 MMHG

## 2020-03-09 DIAGNOSIS — D75.1 POLYCYTHEMIA: ICD-10-CM

## 2020-03-09 DIAGNOSIS — Z11.4 SCREENING FOR HIV (HUMAN IMMUNODEFICIENCY VIRUS): ICD-10-CM

## 2020-03-09 DIAGNOSIS — E66.3 OVERWEIGHT (BMI 25.0-29.9): ICD-10-CM

## 2020-03-09 DIAGNOSIS — R73.03 PREDIABETES: ICD-10-CM

## 2020-03-09 DIAGNOSIS — Z23 NEED FOR SHINGLES VACCINE: ICD-10-CM

## 2020-03-09 DIAGNOSIS — K21.9 GASTROESOPHAGEAL REFLUX DISEASE, ESOPHAGITIS PRESENCE NOT SPECIFIED: ICD-10-CM

## 2020-03-09 DIAGNOSIS — I27.20 PULMONARY HYPERTENSION: ICD-10-CM

## 2020-03-09 DIAGNOSIS — Z12.5 PROSTATE CANCER SCREENING: ICD-10-CM

## 2020-03-09 DIAGNOSIS — Z23 NEED FOR VACCINATION AGAINST STREPTOCOCCUS PNEUMONIAE USING PNEUMOCOCCAL CONJUGATE VACCINE 13: ICD-10-CM

## 2020-03-09 DIAGNOSIS — Z00.00 ROUTINE MEDICAL EXAM: Primary | ICD-10-CM

## 2020-03-09 DIAGNOSIS — J84.9 ILD (INTERSTITIAL LUNG DISEASE): ICD-10-CM

## 2020-03-09 DIAGNOSIS — I10 HTN, GOAL BELOW 140/90: ICD-10-CM

## 2020-03-09 PROCEDURE — 90670 PCV13 VACCINE IM: CPT | Mod: S$GLB,,, | Performed by: INTERNAL MEDICINE

## 2020-03-09 PROCEDURE — 90670 PNEUMOCOCCAL CONJUGATE VACCINE 13-VALENT LESS THAN 5YO & GREATER THAN: ICD-10-PCS | Mod: S$GLB,,, | Performed by: INTERNAL MEDICINE

## 2020-03-09 PROCEDURE — 99397 PER PM REEVAL EST PAT 65+ YR: CPT | Mod: 25,S$GLB,, | Performed by: INTERNAL MEDICINE

## 2020-03-09 PROCEDURE — 3074F SYST BP LT 130 MM HG: CPT | Mod: S$GLB,,, | Performed by: INTERNAL MEDICINE

## 2020-03-09 PROCEDURE — 99999 PR PBB SHADOW E&M-EST. PATIENT-LVL III: CPT | Mod: PBBFAC,,, | Performed by: INTERNAL MEDICINE

## 2020-03-09 PROCEDURE — G0009 ADMIN PNEUMOCOCCAL VACCINE: HCPCS | Mod: 59,S$GLB,, | Performed by: INTERNAL MEDICINE

## 2020-03-09 PROCEDURE — 99397 PR PREVENTIVE VISIT,EST,65 & OVER: ICD-10-PCS | Mod: 25,S$GLB,, | Performed by: INTERNAL MEDICINE

## 2020-03-09 PROCEDURE — 99999 PR PBB SHADOW E&M-EST. PATIENT-LVL III: ICD-10-PCS | Mod: PBBFAC,,, | Performed by: INTERNAL MEDICINE

## 2020-03-09 PROCEDURE — 3078F DIAST BP <80 MM HG: CPT | Mod: S$GLB,,, | Performed by: INTERNAL MEDICINE

## 2020-03-09 PROCEDURE — G0009 PNEUMOCOCCAL CONJUGATE VACCINE 13-VALENT LESS THAN 5YO & GREATER THAN: ICD-10-PCS | Mod: 59,S$GLB,, | Performed by: INTERNAL MEDICINE

## 2020-03-09 PROCEDURE — 3074F PR MOST RECENT SYSTOLIC BLOOD PRESSURE < 130 MM HG: ICD-10-PCS | Mod: S$GLB,,, | Performed by: INTERNAL MEDICINE

## 2020-03-09 PROCEDURE — 3078F PR MOST RECENT DIASTOLIC BLOOD PRESSURE < 80 MM HG: ICD-10-PCS | Mod: S$GLB,,, | Performed by: INTERNAL MEDICINE

## 2020-03-09 RX ORDER — HYDROCHLOROTHIAZIDE 12.5 MG/1
TABLET ORAL
COMMUNITY
Start: 2020-01-06 | End: 2020-03-09 | Stop reason: SDUPTHER

## 2020-03-09 RX ORDER — HYDROCHLOROTHIAZIDE 12.5 MG/1
TABLET ORAL
Qty: 90 TABLET | Refills: 3 | Status: SHIPPED | OUTPATIENT
Start: 2020-03-09 | End: 2021-04-13 | Stop reason: SDUPTHER

## 2020-03-09 RX ORDER — LOSARTAN POTASSIUM 50 MG/1
TABLET ORAL
COMMUNITY
Start: 2020-01-06 | End: 2020-03-09 | Stop reason: SDUPTHER

## 2020-03-09 RX ORDER — LOSARTAN POTASSIUM 50 MG/1
TABLET ORAL
Qty: 90 TABLET | Refills: 3 | Status: SHIPPED | OUTPATIENT
Start: 2020-03-09 | End: 2021-04-13 | Stop reason: SDUPTHER

## 2020-03-09 NOTE — PROGRESS NOTES
Patient received Pneumococcal 13 vaccine IM and tolerated it well. Patient received VIS information.

## 2020-03-09 NOTE — PROGRESS NOTES
HISTORY OF PRESENT ILLNESS:  Josiah Orosco Jr. is a 64 y.o. male who presents to the clinic today for a routine medical physical exam. His last physical exam was approximately 1 years(s) ago.      PAST MEDICAL HISTORY:  Past Medical History:   Diagnosis Date    Arthritis     GERD (gastroesophageal reflux disease)     History of HCV, s/p successful treatment w/ SVR12 5/2015     Failed treatment (stage I/II) - followed by hepatology     Joint pain     Lung disease caused by breathing particles     Widespread essentially symmetric interstitial lung disease    Obesity     Polycythemia vera     Prediabetes        PAST SURGICAL HISTORY:  Past Surgical History:   Procedure Laterality Date    BUNIONECTOMY      bilateral    COLONOSCOPY N/A 12/9/2015    Procedure: COLONOSCOPY;  Surgeon: Conrad Iqbal MD;  Location: [facility];  Service: Endoscopy;  Laterality: N/A;    Liver biopsy x2         SOCIAL HISTORY:  Social History     Socioeconomic History    Marital status:      Spouse name: Not on file    Number of children: 0    Years of education: Not on file    Highest education level: Not on file   Occupational History    Occupation: Self-employed     Employer: [employer]   Social Needs    Financial resource strain: Not on file    Food insecurity:     Worry: Not on file     Inability: Not on file    Transportation needs:     Medical: Not on file     Non-medical: Not on file   Tobacco Use    Smoking status: Former Smoker     Types: Cigars    Smokeless tobacco: Former User    Tobacco comment: pt. smokes 10 cigars per day.   Substance and Sexual Activity    Alcohol use: No     Alcohol/week: 0.0 standard drinks     Comment: Rarely    Drug use: Yes     Types: Marijuana     Comment: daily    Sexual activity: Yes   Lifestyle    Physical activity:     Days per week: Not on file     Minutes per session: Not on file    Stress: Not on file   Relationships    Social connections:     Talks on phone:  Not on file     Gets together: Not on file     Attends Confucianism service: Not on file     Active member of club or organization: Not on file     Attends meetings of clubs or organizations: Not on file     Relationship status: Not on file   Other Topics Concern    Not on file   Social History Narrative    Not on file       FAMILY HISTORY:  Family History   Problem Relation Age of Onset    Heart disease Mother     Kidney disease Mother     Parkinsonism Father     Prostate cancer Cousin         maternal side    Heart attack Sister         CABG    Deep vein thrombosis Sister     Pulmonary embolism Sister     Osteoarthritis Sister         s/p knee replacement    Chronic back pain Brother     Liver disease Neg Hx     Diabetes Neg Hx     Melanoma Neg Hx        ALLERGIES AND MEDICATIONS: updated and reviewed.  Review of patient's allergies indicates:   Allergen Reactions    Ace inhibitors Other (See Comments)     cough     Medication List with Changes/Refills   Current Medications    ALLOPURINOL (ZYLOPRIM) 100 MG TABLET    Take 2 tablets (200 mg total) by mouth once daily.    ASPIRIN (ECOTRIN) 81 MG EC TABLET    Take 81 mg by mouth once daily.    LEVOCETIRIZINE (XYZAL) 5 MG TABLET    Take 1 tablet (5 mg total) by mouth every evening.    NAPROXEN (NAPROSYN) 500 MG TABLET    Take 1 tablet (500 mg total) by mouth daily as needed.    RANITIDINE (ZANTAC) 150 MG TABLET    Take 1 tablet (150 mg total) by mouth daily as needed.    SILODOSIN (RAPAFLO) 8 MG CAP CAPSULE    TAKE 1 CAPSULE(8 MG) BY MOUTH EVERY DAY   Changed and/or Refilled Medications    Modified Medication Previous Medication    HYDROCHLOROTHIAZIDE (HYDRODIURIL) 12.5 MG TAB hydroCHLOROthiazide (HYDRODIURIL) 12.5 MG Tab       TK 1 T PO QD IN THE MORNING    TK 1 T PO QD IN THE MORNING    LOSARTAN (COZAAR) 50 MG TABLET losartan (COZAAR) 50 MG tablet       TK 1 T PO D    TK 1 T PO D   Discontinued Medications    LOSARTAN-HYDROCHLOROTHIAZIDE 50-12.5 MG  (HYZAAR) 50-12.5 MG PER TABLET    Take 1 tablet by mouth once daily.         CARE TEAM:  Patient Care Team:  Elaine Landry MD as PCP - General (Internal Medicine)  Jennifer B. Scheuermann, PA as Physician Assistant (Hepatology)  Karen Johnson MD as Consulting Physician (Hematology)  Junior Torres OD (Optometry)           SCREENING HISTORY:  Health Maintenance       Date Due Completion Date    HIV Screening 10[birth year]05/1969 ---    Shingles Vaccine (1 of 2) 10/05/2004 ---    Abdominal Aortic Aneurysm Screening 10/05/2019 5/24/2011    Pneumococcal Vaccine (65+ High/Highest Risk) (1 of 2 - PCV13) 03/08/2020 3/8/2019    Hemoglobin A1c 03/11/2020 3/11/2019    Lipid Panel 03/11/2020 3/11/2019    Colonoscopy 12/09/2025 12/9/2015    Override on 2/7/2005: Done    TETANUS VACCINE 05/18/2026 5/18/2016            REVIEW OF SYSTEMS:   The patient reports: good dietary habits.  The patient reports : that they do not exercise, but stay active.  Review of Systems   Constitutional: Negative for chills, fatigue, fever and unexpected weight change.   HENT: Negative for congestion, ear pain, sore throat and voice change.    Eyes: Negative for photophobia, pain, discharge and visual disturbance.   Respiratory: Negative for cough, shortness of breath and wheezing.    Cardiovascular: Negative for chest pain, palpitations and leg swelling.   Gastrointestinal: Negative for abdominal pain, blood in stool, constipation, diarrhea, nausea and vomiting.   Genitourinary: Negative for dysuria and frequency.        Mild nocturia, but he does tend to drink a lot of water and go to bed early.   Musculoskeletal: Positive for arthralgias (- mild; chronic). Negative for gait problem, joint swelling and neck stiffness.   Skin: Negative for color change and rash.   Neurological: Negative for seizures, weakness and headaches.   Hematological: Negative for adenopathy. Does not bruise/bleed easily.   Psychiatric/Behavioral: Negative for behavioral  "problems and dysphoric mood. The patient is not nervous/anxious.      ROS : patient denies: difficulty initiating urination          PHYSICAL EXAM:  Vitals:    03/09/20 1350   BP: (!) 120/54   Pulse: 101   Temp: 98 °F (36.7 °C)     Weight: 114.4 kg (252 lb 1.5 oz)   Height: 6' 5" (195.6 cm)   Body mass index is 29.89 kg/m².    General appearance - alert, well appearing, and in no distress, overweight  Psychiatric - alert, oriented to person, place, and time, normal mood, behavior, speech, dress, motor activity, and thought processes  Eyes - pupils equal and reactive, extraocular eye movements intact, sclera anicteric  Ears - bilateral TM's and external ear canals normal  Mouth - mucous membranes moist, pharynx normal without lesions  Neck - supple, no significant adenopathy, carotids upstroke normal bilaterally, no bruits, thyroid exam: thyroid is normal in size without nodules or tenderness  Lymphatics - no palpable cervical lymphadenopathy  Chest - clear to auscultation, no wheezes, rales or rhonchi, symmetric air entry  Heart - normal rate and regular rhythm, no gallops noted  Abdomen - soft, nontender, nondistended, no masses or organomegaly   Male - not examined  Back exam - full range of motion, no tenderness, palpable spasm or pain on motion  Neurological - alert, normal speech, no focal findings or movement disorder noted, cranial nerves II through XII intact  Musculoskeletal - patient noted to have Mild osteoarthritic changes to both knee joints. No joint effusions noted., no muscular tenderness noted  Extremities - peripheral pulses normal, no pedal edema, no clubbing or cyanosis  Skin - normal coloration and turgor, no rashes, no suspicious skin lesions noted      Labs:  Ordered/Scheduled        ASSESSMENT AND PLAN:  1. Routine medical exam  Counseled on age appropriate medical preventative services including age appropriate cancer screenings, age appropriate eye and dental exams, over all nutritional " health, need for a consistent exercise regimen, and an over all push towards maintaining a vigorous and active lifestyle.  Counseled on age appropriate vaccines and discussed upcoming health care needs based on age/gender. Discussed good sleep hygiene and stress management.    2. HTN, goal below 140/90  Discussed sodium restriction, maintaining ideal body weight and regular exercise program as physiologic means to achieve blood pressure control. The patient will strive towards this.   The current medical regimen is effective;  continue present plan and medications. Recommended patient to check home readings to monitor and see me for followup as scheduled or sooner as needed.   Patient was educated that both decongestant and anti-inflammatory medication may raise blood pressure.  The patient is not eligible for the digital hypertension program.  - CBC auto differential; Future  - Comprehensive metabolic panel; Future  - Lipid panel; Future  - losartan (COZAAR) 50 MG tablet; TK 1 T PO D  Dispense: 90 tablet; Refill: 3  - hydroCHLOROthiazide (HYDRODIURIL) 12.5 MG Tab; TK 1 T PO QD IN THE MORNING  Dispense: 90 tablet; Refill: 3    3. ILD (interstitial lung disease)/4. Pulmonary hypertension  Stable. Followed by pulmonary.    5. Polycythemia  Stable. Asymptomatic. Observe.    6. Prediabetes  Stable. We discussed low sugar/low carbohydrate diet and regular exercise to prevent progression. No need for prescription medication at this time.  - Hemoglobin A1c; Future    7. Gastroesophageal reflux disease, esophagitis presence not specified  Symptoms controlled: yes - takes medication occasionally as needed.   Reflux precautions discussed (eliminate tobacco if a smoker; minimize caffeine, chocolate and red/white peppermint intake; avoid heavy and spicy meals; don't lay down within 2-3 hours after eating; minimize the intake of NSAIDs). Medication as needed.   Patient asked to take medication breaks, if possible - discussed  chronic use can limit calcium absorption (which can lead to osteopenia/osteoporosis), increases the risk for intestinal infections, and can cause kidney damage. There are also some newer studies that show possible increased risk of mortality.    8. Overweight (BMI 25.0-29.9)  The patient is asked to make an attempt to improve diet and exercise patterns to aid in medical management of this problem.    9. Need for shingles vaccine  Deferred today.    10. Screening for HIV (human immunodeficiency virus)    - HIV 1/2 Ag/Ab (4th Gen); Future    11. Prostate cancer screening  The natural history of prostate cancer and ongoing controversy regarding screening and potential treatment outcomes of prostate cancer has been discussed with the patient. The meaning of a false positive PSA and a false negative PSA has been discussed. He indicates understanding of the limitations of this screening test and wishes not to proceed with screening PSA testing.           Follow up in about 1 year (around 3/9/2021), or if symptoms worsen or fail to improve, for annual exam. or sooner as needed.

## 2020-03-14 ENCOUNTER — LAB VISIT (OUTPATIENT)
Dept: LAB | Facility: HOSPITAL | Age: 65
End: 2020-03-14
Attending: INTERNAL MEDICINE
Payer: MEDICARE

## 2020-03-14 DIAGNOSIS — I10 HTN, GOAL BELOW 140/90: ICD-10-CM

## 2020-03-14 DIAGNOSIS — Z11.4 SCREENING FOR HIV (HUMAN IMMUNODEFICIENCY VIRUS): ICD-10-CM

## 2020-03-14 DIAGNOSIS — R73.03 PREDIABETES: ICD-10-CM

## 2020-03-14 LAB
ALBUMIN SERPL BCP-MCNC: 3.5 G/DL (ref 3.5–5.2)
ALP SERPL-CCNC: 101 U/L (ref 55–135)
ALT SERPL W/O P-5'-P-CCNC: 13 U/L (ref 10–44)
ANION GAP SERPL CALC-SCNC: 8 MMOL/L (ref 8–16)
AST SERPL-CCNC: 29 U/L (ref 10–40)
BASOPHILS # BLD AUTO: 0.07 K/UL (ref 0–0.2)
BASOPHILS NFR BLD: 1.2 % (ref 0–1.9)
BILIRUB SERPL-MCNC: 0.3 MG/DL (ref 0.1–1)
BUN SERPL-MCNC: 13 MG/DL (ref 8–23)
CALCIUM SERPL-MCNC: 9.2 MG/DL (ref 8.7–10.5)
CHLORIDE SERPL-SCNC: 108 MMOL/L (ref 95–110)
CHOLEST SERPL-MCNC: 144 MG/DL (ref 120–199)
CHOLEST/HDLC SERPL: 3.3 {RATIO} (ref 2–5)
CO2 SERPL-SCNC: 26 MMOL/L (ref 23–29)
CREAT SERPL-MCNC: 0.9 MG/DL (ref 0.5–1.4)
DIFFERENTIAL METHOD: ABNORMAL
EOSINOPHIL # BLD AUTO: 0.1 K/UL (ref 0–0.5)
EOSINOPHIL NFR BLD: 2.4 % (ref 0–8)
ERYTHROCYTE [DISTWIDTH] IN BLOOD BY AUTOMATED COUNT: 21.7 % (ref 11.5–14.5)
EST. GFR  (AFRICAN AMERICAN): >60 ML/MIN/1.73 M^2
EST. GFR  (NON AFRICAN AMERICAN): >60 ML/MIN/1.73 M^2
ESTIMATED AVG GLUCOSE: 126 MG/DL (ref 68–131)
GLUCOSE SERPL-MCNC: 95 MG/DL (ref 70–110)
HBA1C MFR BLD HPLC: 6 % (ref 4–5.6)
HCT VFR BLD AUTO: 46.6 % (ref 40–54)
HDLC SERPL-MCNC: 43 MG/DL (ref 40–75)
HDLC SERPL: 29.9 % (ref 20–50)
HGB BLD-MCNC: 12.6 G/DL (ref 14–18)
IMM GRANULOCYTES # BLD AUTO: 0.01 K/UL (ref 0–0.04)
IMM GRANULOCYTES NFR BLD AUTO: 0.2 % (ref 0–0.5)
LDLC SERPL CALC-MCNC: 89.2 MG/DL (ref 63–159)
LYMPHOCYTES # BLD AUTO: 1.6 K/UL (ref 1–4.8)
LYMPHOCYTES NFR BLD: 27.5 % (ref 18–48)
MCH RBC QN AUTO: 18.7 PG (ref 27–31)
MCHC RBC AUTO-ENTMCNC: 27 G/DL (ref 32–36)
MCV RBC AUTO: 69 FL (ref 82–98)
MONOCYTES # BLD AUTO: 0.4 K/UL (ref 0.3–1)
MONOCYTES NFR BLD: 7.1 % (ref 4–15)
NEUTROPHILS # BLD AUTO: 3.6 K/UL (ref 1.8–7.7)
NEUTROPHILS NFR BLD: 61.6 % (ref 38–73)
NONHDLC SERPL-MCNC: 101 MG/DL
NRBC BLD-RTO: 0 /100 WBC
PLATELET # BLD AUTO: 279 K/UL (ref 150–350)
PMV BLD AUTO: 10.2 FL (ref 9.2–12.9)
POTASSIUM SERPL-SCNC: 5 MMOL/L (ref 3.5–5.1)
PROT SERPL-MCNC: 8.3 G/DL (ref 6–8.4)
RBC # BLD AUTO: 6.74 M/UL (ref 4.6–6.2)
SODIUM SERPL-SCNC: 142 MMOL/L (ref 136–145)
TRIGL SERPL-MCNC: 59 MG/DL (ref 30–150)
WBC # BLD AUTO: 5.79 K/UL (ref 3.9–12.7)

## 2020-03-14 PROCEDURE — 86703 HIV-1/HIV-2 1 RESULT ANTBDY: CPT

## 2020-03-14 PROCEDURE — 85025 COMPLETE CBC W/AUTO DIFF WBC: CPT

## 2020-03-14 PROCEDURE — 80061 LIPID PANEL: CPT

## 2020-03-14 PROCEDURE — 80053 COMPREHEN METABOLIC PANEL: CPT

## 2020-03-14 PROCEDURE — 83036 HEMOGLOBIN GLYCOSYLATED A1C: CPT

## 2020-03-14 PROCEDURE — 36415 COLL VENOUS BLD VENIPUNCTURE: CPT | Mod: PO

## 2020-03-16 LAB — HIV 1+2 AB+HIV1 P24 AG SERPL QL IA: NEGATIVE

## 2020-03-17 DIAGNOSIS — E79.0 HYPERURICEMIA: ICD-10-CM

## 2020-03-17 RX ORDER — ALLOPURINOL 100 MG/1
TABLET ORAL
Qty: 180 TABLET | Refills: 3 | Status: SHIPPED | OUTPATIENT
Start: 2020-03-17 | End: 2021-03-29 | Stop reason: SDUPTHER

## 2020-04-14 DIAGNOSIS — J84.10 PULMONARY INTERSTITIAL FIBROSIS: Primary | ICD-10-CM

## 2020-09-23 LAB
LEFT EYE DM RETINOPATHY: NEGATIVE
RIGHT EYE DM RETINOPATHY: NEGATIVE

## 2020-10-15 ENCOUNTER — PATIENT OUTREACH (OUTPATIENT)
Dept: ADMINISTRATIVE | Facility: HOSPITAL | Age: 65
End: 2020-10-15

## 2020-11-19 ENCOUNTER — PES CALL (OUTPATIENT)
Dept: ADMINISTRATIVE | Facility: CLINIC | Age: 65
End: 2020-11-19

## 2020-12-03 ENCOUNTER — OFFICE VISIT (OUTPATIENT)
Dept: FAMILY MEDICINE | Facility: CLINIC | Age: 65
End: 2020-12-03
Payer: MEDICARE

## 2020-12-03 VITALS
RESPIRATION RATE: 17 BRPM | TEMPERATURE: 98 F | SYSTOLIC BLOOD PRESSURE: 124 MMHG | HEART RATE: 82 BPM | DIASTOLIC BLOOD PRESSURE: 72 MMHG | OXYGEN SATURATION: 96 % | WEIGHT: 233.94 LBS | HEIGHT: 77 IN | BODY MASS INDEX: 27.62 KG/M2

## 2020-12-03 DIAGNOSIS — Z86.19 HISTORY OF HEPATITIS C: ICD-10-CM

## 2020-12-03 DIAGNOSIS — D75.1 POLYCYTHEMIA: ICD-10-CM

## 2020-12-03 DIAGNOSIS — Z13.6 SCREENING FOR AAA (ABDOMINAL AORTIC ANEURYSM): ICD-10-CM

## 2020-12-03 DIAGNOSIS — I27.20 PULMONARY HYPERTENSION: ICD-10-CM

## 2020-12-03 DIAGNOSIS — J84.10 PULMONARY INTERSTITIAL FIBROSIS: ICD-10-CM

## 2020-12-03 DIAGNOSIS — J84.9 ILD (INTERSTITIAL LUNG DISEASE): ICD-10-CM

## 2020-12-03 DIAGNOSIS — Z00.00 ENCOUNTER FOR PREVENTIVE HEALTH EXAMINATION: Primary | ICD-10-CM

## 2020-12-03 DIAGNOSIS — Z23 NEED FOR SHINGLES VACCINE: ICD-10-CM

## 2020-12-03 PROCEDURE — 99999 PR PBB SHADOW E&M-EST. PATIENT-LVL IV: CPT | Mod: PBBFAC,,, | Performed by: PHYSICIAN ASSISTANT

## 2020-12-03 PROCEDURE — 3008F BODY MASS INDEX DOCD: CPT | Mod: S$GLB,,, | Performed by: PHYSICIAN ASSISTANT

## 2020-12-03 PROCEDURE — 1126F AMNT PAIN NOTED NONE PRSNT: CPT | Mod: S$GLB,,, | Performed by: PHYSICIAN ASSISTANT

## 2020-12-03 PROCEDURE — 3074F SYST BP LT 130 MM HG: CPT | Mod: S$GLB,,, | Performed by: PHYSICIAN ASSISTANT

## 2020-12-03 PROCEDURE — 3074F PR MOST RECENT SYSTOLIC BLOOD PRESSURE < 130 MM HG: ICD-10-PCS | Mod: S$GLB,,, | Performed by: PHYSICIAN ASSISTANT

## 2020-12-03 PROCEDURE — 3078F DIAST BP <80 MM HG: CPT | Mod: S$GLB,,, | Performed by: PHYSICIAN ASSISTANT

## 2020-12-03 PROCEDURE — 1126F PR PAIN SEVERITY QUANTIFIED, NO PAIN PRESENT: ICD-10-PCS | Mod: S$GLB,,, | Performed by: PHYSICIAN ASSISTANT

## 2020-12-03 PROCEDURE — 1101F PR PT FALLS ASSESS DOC 0-1 FALLS W/OUT INJ PAST YR: ICD-10-PCS | Mod: S$GLB,,, | Performed by: PHYSICIAN ASSISTANT

## 2020-12-03 PROCEDURE — 1157F PR ADVANCE CARE PLAN OR EQUIV PRESENT IN MEDICAL RECORD: ICD-10-PCS | Mod: S$GLB,,, | Performed by: PHYSICIAN ASSISTANT

## 2020-12-03 PROCEDURE — 1101F PT FALLS ASSESS-DOCD LE1/YR: CPT | Mod: S$GLB,,, | Performed by: PHYSICIAN ASSISTANT

## 2020-12-03 PROCEDURE — G0439 PR MEDICARE ANNUAL WELLNESS SUBSEQUENT VISIT: ICD-10-PCS | Mod: S$GLB,,, | Performed by: PHYSICIAN ASSISTANT

## 2020-12-03 PROCEDURE — 3008F PR BODY MASS INDEX (BMI) DOCUMENTED: ICD-10-PCS | Mod: S$GLB,,, | Performed by: PHYSICIAN ASSISTANT

## 2020-12-03 PROCEDURE — 1157F ADVNC CARE PLAN IN RCRD: CPT | Mod: S$GLB,,, | Performed by: PHYSICIAN ASSISTANT

## 2020-12-03 PROCEDURE — G0439 PPPS, SUBSEQ VISIT: HCPCS | Mod: S$GLB,,, | Performed by: PHYSICIAN ASSISTANT

## 2020-12-03 PROCEDURE — 99999 PR PBB SHADOW E&M-EST. PATIENT-LVL IV: ICD-10-PCS | Mod: PBBFAC,,, | Performed by: PHYSICIAN ASSISTANT

## 2020-12-03 PROCEDURE — 3288F FALL RISK ASSESSMENT DOCD: CPT | Mod: S$GLB,,, | Performed by: PHYSICIAN ASSISTANT

## 2020-12-03 PROCEDURE — 3288F PR FALLS RISK ASSESSMENT DOCUMENTED: ICD-10-PCS | Mod: S$GLB,,, | Performed by: PHYSICIAN ASSISTANT

## 2020-12-03 PROCEDURE — 3078F PR MOST RECENT DIASTOLIC BLOOD PRESSURE < 80 MM HG: ICD-10-PCS | Mod: S$GLB,,, | Performed by: PHYSICIAN ASSISTANT

## 2020-12-03 RX ORDER — A/SINGAPORE/GP1908/2015 IVR-180 (AN A/MICHIGAN/45/2015 (H1N1)PDM09-LIKE VIRUS, A/HONG KONG/4801/2014, NYMC X-263B (H3N2) (AN A/HONG KONG/4801/2014-LIKE VIRUS), AND B/BRISBANE/60/2008, WILD TYPE (A B/BRISBANE/60/2008-LIKE VIRUS) 15; 15; 15 UG/.5ML; UG/.5ML; UG/.5ML
INJECTION, SUSPENSION INTRAMUSCULAR
COMMUNITY
Start: 2020-10-12 | End: 2021-03-10

## 2020-12-03 NOTE — PROGRESS NOTES
"  Josiah Orosco presented for a  Medicare AWV and comprehensive Health Risk Assessment today. The following components were reviewed and updated:    · Medical history  · Family History  · Social history  · Allergies and Current Medications  · Health Risk Assessment  · Health Maintenance  · Care Team         ** See Completed Assessments for Annual Wellness Visit within the encounter summary.**         The following assessments were completed:  · Living Situation  · CAGE  · Depression Screening  · Timed Get Up and Go  · Whisper Test  · Cognitive Function Screening  · Nutrition Screening  · ADL Screening  · PAQ Screening        Vitals:    12/03/20 1012   BP: 124/72   BP Location: Left arm   Patient Position: Sitting   BP Method: Large (Manual)   Pulse: 82   Resp: 17   Temp: 98.1 °F (36.7 °C)   TempSrc: Oral   SpO2: 96%   Weight: 106.1 kg (233 lb 14.5 oz)   Height: 6' 5" (1.956 m)     Body mass index is 27.74 kg/m².  Physical Exam          Diagnoses and health risks identified today and associated recommendations/orders:    1. Encounter for preventive health examination  Provided Josiah with a 5-10 year written screening schedule and personal prevention plan. Recommendations were developed using the USPSTF age appropriate recommendations. Education, counseling, and referrals were provided as needed. After Visit Summary printed and given to patient which includes a list of additional screenings\tests needed.    2. Need for shingles vaccine  Will get at pharmacy    3. Screening for AAA (abdominal aortic aneurysm)  Had US    4. Pulmonary hypertension  Followed by pulm    5. Pulmonary interstitial fibrosis  Followed by pulm    6. ILD (interstitial lung disease)  Followed by pulm    7. Polycythemia  Followed by heme onc    8. History of HCV, s/p successful treatment w/ SVR12 5/2015  stable      No follow-ups on file.    Karen Newby PA-C    "

## 2020-12-03 NOTE — PATIENT INSTRUCTIONS
Counseling and Referral of Other Preventative  (Italic type indicates deductible and co-insurance are waived)    Patient Name: Josiah Orosco  Today's Date: 12/3/2020    Health Maintenance       Date Due Completion Date    Shingles Vaccine (1 of 2) 10/05/2004 ---    Abdominal Aortic Aneurysm Screening 10/05/2019 5/24/2011    Lipid Panel 03/14/2021 3/14/2020    Hemoglobin A1c 03/14/2021 3/14/2020    Pneumococcal Vaccine (65+ Low/Medium Risk) (2 of 2 - PPSV23) 03/08/2024 3/9/2020    Colorectal Cancer Screening 12/09/2025 12/9/2015    Override on 2/7/2005: Done    TETANUS VACCINE 05/18/2026 5/18/2016        No orders of the defined types were placed in this encounter.    The following information is provided to all patients.  This information is to help you find resources for any of the problems found today that may be affecting your health:                Living healthy guide: www.[facility].[place].[facility]      Understanding Diabetes: www.diabetes.org      Eating healthy: www.cdc.gov/healthyweight      CDC home safety checklist: www.cdc.gov/steadi/patient.html      Agency on Aging: www.goea.[place].[facility]      Alcoholics anonymous (AA): www.aa.org      Physical Activity: www.rosendo.nih.gov/lh4smpt      Tobacco use: www.quitwithusla.org

## 2021-02-24 ENCOUNTER — PATIENT OUTREACH (OUTPATIENT)
Dept: ADMINISTRATIVE | Facility: HOSPITAL | Age: 66
End: 2021-02-24

## 2021-02-24 DIAGNOSIS — Z13.6 SCREENING FOR AAA (ABDOMINAL AORTIC ANEURYSM): Primary | ICD-10-CM

## 2021-03-02 ENCOUNTER — TELEPHONE (OUTPATIENT)
Dept: FAMILY MEDICINE | Facility: CLINIC | Age: 66
End: 2021-03-02

## 2021-03-02 DIAGNOSIS — I10 HTN, GOAL BELOW 140/90: Primary | ICD-10-CM

## 2021-03-02 DIAGNOSIS — R73.03 PREDIABETES: ICD-10-CM

## 2021-03-06 ENCOUNTER — LAB VISIT (OUTPATIENT)
Dept: LAB | Facility: HOSPITAL | Age: 66
End: 2021-03-06
Attending: INTERNAL MEDICINE
Payer: MEDICARE

## 2021-03-06 DIAGNOSIS — R73.03 PREDIABETES: ICD-10-CM

## 2021-03-06 DIAGNOSIS — I10 HTN, GOAL BELOW 140/90: ICD-10-CM

## 2021-03-06 LAB
ALBUMIN SERPL BCP-MCNC: 3.4 G/DL (ref 3.5–5.2)
ALP SERPL-CCNC: 102 U/L (ref 55–135)
ALT SERPL W/O P-5'-P-CCNC: 11 U/L (ref 10–44)
ANION GAP SERPL CALC-SCNC: 5 MMOL/L (ref 8–16)
AST SERPL-CCNC: 26 U/L (ref 10–40)
BASOPHILS # BLD AUTO: 0.06 K/UL (ref 0–0.2)
BASOPHILS NFR BLD: 0.9 % (ref 0–1.9)
BILIRUB SERPL-MCNC: 0.5 MG/DL (ref 0.1–1)
BUN SERPL-MCNC: 12 MG/DL (ref 8–23)
CALCIUM SERPL-MCNC: 9.3 MG/DL (ref 8.7–10.5)
CHLORIDE SERPL-SCNC: 106 MMOL/L (ref 95–110)
CHOLEST SERPL-MCNC: 136 MG/DL (ref 120–199)
CHOLEST/HDLC SERPL: 3.5 {RATIO} (ref 2–5)
CO2 SERPL-SCNC: 29 MMOL/L (ref 23–29)
CREAT SERPL-MCNC: 0.9 MG/DL (ref 0.5–1.4)
DIFFERENTIAL METHOD: ABNORMAL
EOSINOPHIL # BLD AUTO: 0.1 K/UL (ref 0–0.5)
EOSINOPHIL NFR BLD: 1.4 % (ref 0–8)
ERYTHROCYTE [DISTWIDTH] IN BLOOD BY AUTOMATED COUNT: 19.9 % (ref 11.5–14.5)
EST. GFR  (AFRICAN AMERICAN): >60 ML/MIN/1.73 M^2
EST. GFR  (NON AFRICAN AMERICAN): >60 ML/MIN/1.73 M^2
ESTIMATED AVG GLUCOSE: 120 MG/DL (ref 68–131)
GLUCOSE SERPL-MCNC: 96 MG/DL (ref 70–110)
HBA1C MFR BLD: 5.8 % (ref 4–5.6)
HCT VFR BLD AUTO: 47 % (ref 40–54)
HDLC SERPL-MCNC: 39 MG/DL (ref 40–75)
HDLC SERPL: 28.7 % (ref 20–50)
HGB BLD-MCNC: 13.6 G/DL (ref 14–18)
IMM GRANULOCYTES # BLD AUTO: 0.01 K/UL (ref 0–0.04)
IMM GRANULOCYTES NFR BLD AUTO: 0.1 % (ref 0–0.5)
LDLC SERPL CALC-MCNC: 87.8 MG/DL (ref 63–159)
LYMPHOCYTES # BLD AUTO: 1.6 K/UL (ref 1–4.8)
LYMPHOCYTES NFR BLD: 22.9 % (ref 18–48)
MCH RBC QN AUTO: 20.3 PG (ref 27–31)
MCHC RBC AUTO-ENTMCNC: 28.9 G/DL (ref 32–36)
MCV RBC AUTO: 70 FL (ref 82–98)
MONOCYTES # BLD AUTO: 0.5 K/UL (ref 0.3–1)
MONOCYTES NFR BLD: 7.8 % (ref 4–15)
NEUTROPHILS # BLD AUTO: 4.6 K/UL (ref 1.8–7.7)
NEUTROPHILS NFR BLD: 66.9 % (ref 38–73)
NONHDLC SERPL-MCNC: 97 MG/DL
NRBC BLD-RTO: 0 /100 WBC
PLATELET # BLD AUTO: 259 K/UL (ref 150–350)
PMV BLD AUTO: 11 FL (ref 9.2–12.9)
POTASSIUM SERPL-SCNC: 4.4 MMOL/L (ref 3.5–5.1)
PROT SERPL-MCNC: 8.3 G/DL (ref 6–8.4)
RBC # BLD AUTO: 6.69 M/UL (ref 4.6–6.2)
SODIUM SERPL-SCNC: 140 MMOL/L (ref 136–145)
TRIGL SERPL-MCNC: 46 MG/DL (ref 30–150)
WBC # BLD AUTO: 6.91 K/UL (ref 3.9–12.7)

## 2021-03-06 PROCEDURE — 36415 COLL VENOUS BLD VENIPUNCTURE: CPT | Mod: PO | Performed by: INTERNAL MEDICINE

## 2021-03-06 PROCEDURE — 80061 LIPID PANEL: CPT | Performed by: INTERNAL MEDICINE

## 2021-03-06 PROCEDURE — 83036 HEMOGLOBIN GLYCOSYLATED A1C: CPT | Performed by: INTERNAL MEDICINE

## 2021-03-06 PROCEDURE — 85025 COMPLETE CBC W/AUTO DIFF WBC: CPT | Performed by: INTERNAL MEDICINE

## 2021-03-06 PROCEDURE — 80053 COMPREHEN METABOLIC PANEL: CPT | Performed by: INTERNAL MEDICINE

## 2021-03-10 ENCOUNTER — OFFICE VISIT (OUTPATIENT)
Dept: FAMILY MEDICINE | Facility: CLINIC | Age: 66
End: 2021-03-10
Payer: MEDICARE

## 2021-03-10 VITALS
HEART RATE: 88 BPM | BODY MASS INDEX: 26.86 KG/M2 | DIASTOLIC BLOOD PRESSURE: 70 MMHG | HEIGHT: 77 IN | WEIGHT: 227.5 LBS | TEMPERATURE: 98 F | SYSTOLIC BLOOD PRESSURE: 128 MMHG | OXYGEN SATURATION: 96 %

## 2021-03-10 DIAGNOSIS — Z00.00 ROUTINE MEDICAL EXAM: Primary | ICD-10-CM

## 2021-03-10 DIAGNOSIS — E66.3 OVERWEIGHT (BMI 25.0-29.9): ICD-10-CM

## 2021-03-10 DIAGNOSIS — I10 HTN, GOAL BELOW 140/90: ICD-10-CM

## 2021-03-10 DIAGNOSIS — K21.9 GASTROESOPHAGEAL REFLUX DISEASE, UNSPECIFIED WHETHER ESOPHAGITIS PRESENT: ICD-10-CM

## 2021-03-10 DIAGNOSIS — Z23 NEED FOR SHINGLES VACCINE: ICD-10-CM

## 2021-03-10 DIAGNOSIS — Z13.6 SCREENING FOR AAA (ABDOMINAL AORTIC ANEURYSM): ICD-10-CM

## 2021-03-10 DIAGNOSIS — D75.1 POLYCYTHEMIA: ICD-10-CM

## 2021-03-10 DIAGNOSIS — I27.20 PULMONARY HYPERTENSION: ICD-10-CM

## 2021-03-10 DIAGNOSIS — J84.9 ILD (INTERSTITIAL LUNG DISEASE): ICD-10-CM

## 2021-03-10 DIAGNOSIS — J84.10 PULMONARY INTERSTITIAL FIBROSIS: ICD-10-CM

## 2021-03-10 DIAGNOSIS — R73.03 PREDIABETES: ICD-10-CM

## 2021-03-10 DIAGNOSIS — F12.90 MARIJUANA USER: ICD-10-CM

## 2021-03-10 PROCEDURE — 3008F BODY MASS INDEX DOCD: CPT | Mod: S$GLB,,, | Performed by: INTERNAL MEDICINE

## 2021-03-10 PROCEDURE — 1126F AMNT PAIN NOTED NONE PRSNT: CPT | Mod: S$GLB,,, | Performed by: INTERNAL MEDICINE

## 2021-03-10 PROCEDURE — 99213 PR OFFICE/OUTPT VISIT, EST, LEVL III, 20-29 MIN: ICD-10-PCS | Mod: S$GLB,,, | Performed by: INTERNAL MEDICINE

## 2021-03-10 PROCEDURE — 1157F PR ADVANCE CARE PLAN OR EQUIV PRESENT IN MEDICAL RECORD: ICD-10-PCS | Mod: S$GLB,,, | Performed by: INTERNAL MEDICINE

## 2021-03-10 PROCEDURE — 99999 PR PBB SHADOW E&M-EST. PATIENT-LVL IV: CPT | Mod: PBBFAC,,, | Performed by: INTERNAL MEDICINE

## 2021-03-10 PROCEDURE — 1101F PT FALLS ASSESS-DOCD LE1/YR: CPT | Mod: S$GLB,,, | Performed by: INTERNAL MEDICINE

## 2021-03-10 PROCEDURE — 3074F PR MOST RECENT SYSTOLIC BLOOD PRESSURE < 130 MM HG: ICD-10-PCS | Mod: S$GLB,,, | Performed by: INTERNAL MEDICINE

## 2021-03-10 PROCEDURE — 1126F PR PAIN SEVERITY QUANTIFIED, NO PAIN PRESENT: ICD-10-PCS | Mod: S$GLB,,, | Performed by: INTERNAL MEDICINE

## 2021-03-10 PROCEDURE — 99999 PR PBB SHADOW E&M-EST. PATIENT-LVL IV: ICD-10-PCS | Mod: PBBFAC,,, | Performed by: INTERNAL MEDICINE

## 2021-03-10 PROCEDURE — 1157F ADVNC CARE PLAN IN RCRD: CPT | Mod: S$GLB,,, | Performed by: INTERNAL MEDICINE

## 2021-03-10 PROCEDURE — 99213 OFFICE O/P EST LOW 20 MIN: CPT | Mod: S$GLB,,, | Performed by: INTERNAL MEDICINE

## 2021-03-10 PROCEDURE — 3008F PR BODY MASS INDEX (BMI) DOCUMENTED: ICD-10-PCS | Mod: S$GLB,,, | Performed by: INTERNAL MEDICINE

## 2021-03-10 PROCEDURE — 3078F PR MOST RECENT DIASTOLIC BLOOD PRESSURE < 80 MM HG: ICD-10-PCS | Mod: S$GLB,,, | Performed by: INTERNAL MEDICINE

## 2021-03-10 PROCEDURE — 3078F DIAST BP <80 MM HG: CPT | Mod: S$GLB,,, | Performed by: INTERNAL MEDICINE

## 2021-03-10 PROCEDURE — 3074F SYST BP LT 130 MM HG: CPT | Mod: S$GLB,,, | Performed by: INTERNAL MEDICINE

## 2021-03-10 PROCEDURE — 3288F PR FALLS RISK ASSESSMENT DOCUMENTED: ICD-10-PCS | Mod: S$GLB,,, | Performed by: INTERNAL MEDICINE

## 2021-03-10 PROCEDURE — 1101F PR PT FALLS ASSESS DOC 0-1 FALLS W/OUT INJ PAST YR: ICD-10-PCS | Mod: S$GLB,,, | Performed by: INTERNAL MEDICINE

## 2021-03-10 PROCEDURE — 3288F FALL RISK ASSESSMENT DOCD: CPT | Mod: S$GLB,,, | Performed by: INTERNAL MEDICINE

## 2021-03-29 DIAGNOSIS — E79.0 HYPERURICEMIA: ICD-10-CM

## 2021-03-29 RX ORDER — ALLOPURINOL 100 MG/1
200 TABLET ORAL DAILY
Qty: 180 TABLET | Refills: 3 | Status: SHIPPED | OUTPATIENT
Start: 2021-03-29

## 2021-04-13 DIAGNOSIS — I10 HTN, GOAL BELOW 140/90: ICD-10-CM

## 2021-04-13 RX ORDER — HYDROCHLOROTHIAZIDE 12.5 MG/1
TABLET ORAL
Qty: 90 TABLET | Refills: 2 | Status: SHIPPED | OUTPATIENT
Start: 2021-04-13 | End: 2023-01-05

## 2021-04-13 RX ORDER — LOSARTAN POTASSIUM 50 MG/1
TABLET ORAL
Qty: 90 TABLET | Refills: 2 | Status: ON HOLD | OUTPATIENT
Start: 2021-04-13 | End: 2023-08-08 | Stop reason: HOSPADM

## 2021-11-03 LAB
LEFT EYE DM RETINOPATHY: NEGATIVE
RIGHT EYE DM RETINOPATHY: NEGATIVE

## 2021-11-19 ENCOUNTER — PATIENT OUTREACH (OUTPATIENT)
Dept: ADMINISTRATIVE | Facility: HOSPITAL | Age: 66
End: 2021-11-19
Payer: MEDICARE

## 2022-02-04 ENCOUNTER — TELEPHONE (OUTPATIENT)
Dept: PULMONOLOGY | Facility: CLINIC | Age: 67
End: 2022-02-04
Payer: MEDICARE

## 2022-02-04 DIAGNOSIS — J84.10 PULMONARY INTERSTITIAL FIBROSIS: Primary | ICD-10-CM

## 2022-02-04 NOTE — TELEPHONE ENCOUNTER
----- Message from Cruzito Almaraz sent at 2/4/2022 11:47 AM CST -----  Contact: patient  Patient requesting call back in regards to scheduling appointment.      [phone]

## 2022-02-04 NOTE — TELEPHONE ENCOUNTER
I called Mrs Orosco back and offered a visit Tuesday 2-8-22 with a chest xray prior. She accepted the appointment. Melisa Bhatt

## 2022-02-08 ENCOUNTER — HOSPITAL ENCOUNTER (OUTPATIENT)
Dept: RADIOLOGY | Facility: HOSPITAL | Age: 67
Discharge: HOME | End: 2022-02-08
Attending: INTERNAL MEDICINE
Payer: MEDICARE

## 2022-02-08 ENCOUNTER — OFFICE VISIT (OUTPATIENT)
Dept: PULMONOLOGY | Facility: CLINIC | Age: 67
End: 2022-02-08
Payer: MEDICARE

## 2022-02-08 VITALS
HEART RATE: 88 BPM | HEIGHT: 77 IN | SYSTOLIC BLOOD PRESSURE: 118 MMHG | BODY MASS INDEX: 26 KG/M2 | WEIGHT: 220.25 LBS | OXYGEN SATURATION: 88 % | DIASTOLIC BLOOD PRESSURE: 76 MMHG

## 2022-02-08 DIAGNOSIS — J84.10 PULMONARY INTERSTITIAL FIBROSIS: ICD-10-CM

## 2022-02-08 DIAGNOSIS — J84.10 PULMONARY INTERSTITIAL FIBROSIS: Primary | ICD-10-CM

## 2022-02-08 PROCEDURE — 99214 OFFICE O/P EST MOD 30 MIN: CPT | Mod: S$GLB,,, | Performed by: INTERNAL MEDICINE

## 2022-02-08 PROCEDURE — 1159F MED LIST DOCD IN RCRD: CPT | Mod: CPTII,S$GLB,, | Performed by: INTERNAL MEDICINE

## 2022-02-08 PROCEDURE — 3074F SYST BP LT 130 MM HG: CPT | Mod: CPTII,S$GLB,, | Performed by: INTERNAL MEDICINE

## 2022-02-08 PROCEDURE — 1126F PR PAIN SEVERITY QUANTIFIED, NO PAIN PRESENT: ICD-10-PCS | Mod: CPTII,S$GLB,, | Performed by: INTERNAL MEDICINE

## 2022-02-08 PROCEDURE — 3008F BODY MASS INDEX DOCD: CPT | Mod: CPTII,S$GLB,, | Performed by: INTERNAL MEDICINE

## 2022-02-08 PROCEDURE — 99214 PR OFFICE/OUTPT VISIT, EST, LEVL IV, 30-39 MIN: ICD-10-PCS | Mod: S$GLB,,, | Performed by: INTERNAL MEDICINE

## 2022-02-08 PROCEDURE — 3078F PR MOST RECENT DIASTOLIC BLOOD PRESSURE < 80 MM HG: ICD-10-PCS | Mod: CPTII,S$GLB,, | Performed by: INTERNAL MEDICINE

## 2022-02-08 PROCEDURE — 4010F ACE/ARB THERAPY RXD/TAKEN: CPT | Mod: CPTII,S$GLB,, | Performed by: INTERNAL MEDICINE

## 2022-02-08 PROCEDURE — 1126F AMNT PAIN NOTED NONE PRSNT: CPT | Mod: CPTII,S$GLB,, | Performed by: INTERNAL MEDICINE

## 2022-02-08 PROCEDURE — 3078F DIAST BP <80 MM HG: CPT | Mod: CPTII,S$GLB,, | Performed by: INTERNAL MEDICINE

## 2022-02-08 PROCEDURE — 3008F PR BODY MASS INDEX (BMI) DOCUMENTED: ICD-10-PCS | Mod: CPTII,S$GLB,, | Performed by: INTERNAL MEDICINE

## 2022-02-08 PROCEDURE — 1159F PR MEDICATION LIST DOCUMENTED IN MEDICAL RECORD: ICD-10-PCS | Mod: CPTII,S$GLB,, | Performed by: INTERNAL MEDICINE

## 2022-02-08 PROCEDURE — 99999 PR PBB SHADOW E&M-EST. PATIENT-LVL III: ICD-10-PCS | Mod: PBBFAC,,, | Performed by: INTERNAL MEDICINE

## 2022-02-08 PROCEDURE — 1157F PR ADVANCE CARE PLAN OR EQUIV PRESENT IN MEDICAL RECORD: ICD-10-PCS | Mod: CPTII,S$GLB,, | Performed by: INTERNAL MEDICINE

## 2022-02-08 PROCEDURE — 99999 PR PBB SHADOW E&M-EST. PATIENT-LVL III: CPT | Mod: PBBFAC,,, | Performed by: INTERNAL MEDICINE

## 2022-02-08 PROCEDURE — 1157F ADVNC CARE PLAN IN RCRD: CPT | Mod: CPTII,S$GLB,, | Performed by: INTERNAL MEDICINE

## 2022-02-08 PROCEDURE — 4010F PR ACE/ARB THEARPY RXD/TAKEN: ICD-10-PCS | Mod: CPTII,S$GLB,, | Performed by: INTERNAL MEDICINE

## 2022-02-08 PROCEDURE — 3074F PR MOST RECENT SYSTOLIC BLOOD PRESSURE < 130 MM HG: ICD-10-PCS | Mod: CPTII,S$GLB,, | Performed by: INTERNAL MEDICINE

## 2022-02-08 PROCEDURE — 71046 XR CHEST PA AND LATERAL: ICD-10-PCS | Mod: 26,,, | Performed by: RADIOLOGY

## 2022-02-08 PROCEDURE — 71046 X-RAY EXAM CHEST 2 VIEWS: CPT | Mod: 26,,, | Performed by: RADIOLOGY

## 2022-02-08 PROCEDURE — 71046 X-RAY EXAM CHEST 2 VIEWS: CPT | Mod: TC,FY

## 2022-02-08 RX ORDER — INFLUENZA A VIRUS A/VICTORIA/2570/2019 IVR-215 (H1N1) ANTIGEN (FORMALDEHYDE INACTIVATED), INFLUENZA A VIRUS A/TASMANIA/503/2020 IVR-221 (H3N2) ANTIGEN (FORMALDEHYDE INACTIVATED), INFLUENZA B VIRUS B/PHUKET/3073/2013 ANTIGEN (FORMALDEHYDE INACTIVATED), AND INFLUENZA B VIRUS B/WASHINGTON/02/2019 ANTIGEN (FORMALDEHYDE INACTIVATED) 60; 60; 60; 60 UG/.7ML; UG/.7ML; UG/.7ML; UG/.7ML
0.5 INJECTION, SUSPENSION INTRAMUSCULAR ONCE
COMMUNITY
Start: 2021-10-28 | End: 2022-03-14 | Stop reason: ALTCHOICE

## 2022-02-08 NOTE — PROGRESS NOTES
Subjective:      Patient ID: Josiah Orosco Jr. is a 66 y.o. male.    Chief Complaint: Pulmonary Fibrosis    HPI  65 yo AA male with a hx  Of interstitial lung  Disease without any limiting pulmonary symptoms. 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 Not limited in his  Work. His chest x-ray today shows slight more prominence of the small nodules R>L   Rarely coughs  No hemoptysis.    Peak flow : 600 l/min  And Sa02: 96% Last PFT's were done in 2019  Had normal spirometry but reduced DLCO.      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 Since his process seems to be inert. Can't justify an open lung biopsy to satisfy my curiosity.  Mes: Cozaar and HCTZ12.5   Zyloprim for gout.        No flowsheet data found.  Review of Systems   Respiratory:        Chronic interstitial lung disease  First seen by me August 2013   Musculoskeletal:        On zyloprim for gout.     Objective:     Physical Exam  Constitutional:       Appearance: He is well-developed and well-nourished.   HENT:      Head: Normocephalic and atraumatic.      Right Ear: External ear normal.      Left Ear: External ear normal.   Eyes:      Extraocular Movements: EOM normal.      Conjunctiva/sclera: Conjunctivae normal.      Pupils: Pupils are equal, round, and reactive to light.   Cardiovascular:      Rate and Rhythm: Normal rate and regular rhythm.      Heart sounds: Normal heart sounds.   Pulmonary:      Effort: Pulmonary effort is normal.      Breath sounds: Normal breath sounds.      Comments: Clear to A&P   No wheezes  Or rales    Peak flow: 600 l/min    Sa02; 97%    X-ray stable over the years.   Abdominal:      General: Bowel sounds are normal.      Palpations: Abdomen is soft.   Musculoskeletal:         General: Normal range of motion.      Cervical back: Normal range of motion and neck supple.   Skin:     General: Skin is warm and dry.   Neurological:      Mental Status: He is alert and oriented to person, place, and  time.      Deep Tendon Reflexes: Reflexes are normal and symmetric.   Psychiatric:         Mood and Affect: Mood and affect normal.         Behavior: Behavior normal.         Thought Content: Thought content normal.         Judgment: Judgment normal.         Assessment:     1. Pulmonary interstitial fibrosis      Outpatient Encounter Medications as of 2/8/2022   Medication Sig Dispense Refill    allopurinoL (ZYLOPRIM) 100 MG tablet Take 2 tablets (200 mg total) by mouth once daily. 180 tablet 3    aspirin (ECOTRIN) 81 MG EC tablet Take 81 mg by mouth once daily.      FLUZONE HIGHDOSE QUAD 21-22  mcg/0.7 mL Syrg 0.5 mLs once.      hydroCHLOROthiazide (HYDRODIURIL) 12.5 MG Tab TK 1 T PO QD IN THE MORNING 90 tablet 2    levocetirizine (XYZAL) 5 MG tablet Take 1 tablet (5 mg total) by mouth every evening. 30 tablet 2    losartan (COZAAR) 50 MG tablet TK 1 T PO D 90 tablet 2     No facility-administered encounter medications on file as of 2/8/2022.       Plan:     Problem List Items Addressed This Visit     Pulmonary interstitial fibrosis - Primary        Stable interstitial lung disease

## 2022-03-08 DIAGNOSIS — R73.03 PREDIABETES: ICD-10-CM

## 2022-03-08 DIAGNOSIS — I10 HTN, GOAL BELOW 140/90: Primary | ICD-10-CM

## 2022-03-09 ENCOUNTER — TELEPHONE (OUTPATIENT)
Dept: FAMILY MEDICINE | Facility: CLINIC | Age: 67
End: 2022-03-09
Payer: MEDICARE

## 2022-03-09 NOTE — TELEPHONE ENCOUNTER
----- Message from Elaine Landry MD sent at 3/8/2022  9:35 PM CST -----  Please call patient to schedule labs and address health maintenance prior to next office visit.

## 2022-03-11 ENCOUNTER — LAB VISIT (OUTPATIENT)
Dept: LAB | Facility: HOSPITAL | Age: 67
End: 2022-03-11
Attending: INTERNAL MEDICINE
Payer: MEDICARE

## 2022-03-11 DIAGNOSIS — I10 HTN, GOAL BELOW 140/90: ICD-10-CM

## 2022-03-11 DIAGNOSIS — R73.03 PREDIABETES: ICD-10-CM

## 2022-03-11 LAB
ALBUMIN SERPL BCP-MCNC: 3.6 G/DL (ref 3.5–5.2)
ALP SERPL-CCNC: 100 U/L (ref 55–135)
ALT SERPL W/O P-5'-P-CCNC: 12 U/L (ref 10–44)
ANION GAP SERPL CALC-SCNC: 10 MMOL/L (ref 8–16)
AST SERPL-CCNC: 25 U/L (ref 10–40)
BASOPHILS # BLD AUTO: 0.03 K/UL (ref 0–0.2)
BASOPHILS NFR BLD: 0.4 % (ref 0–1.9)
BILIRUB SERPL-MCNC: 0.5 MG/DL (ref 0.1–1)
BUN SERPL-MCNC: 10 MG/DL (ref 8–23)
CALCIUM SERPL-MCNC: 9.5 MG/DL (ref 8.7–10.5)
CHLORIDE SERPL-SCNC: 105 MMOL/L (ref 95–110)
CHOLEST SERPL-MCNC: 142 MG/DL (ref 120–199)
CHOLEST/HDLC SERPL: 3.4 {RATIO} (ref 2–5)
CO2 SERPL-SCNC: 28 MMOL/L (ref 23–29)
CREAT SERPL-MCNC: 0.9 MG/DL (ref 0.5–1.4)
DIFFERENTIAL METHOD: ABNORMAL
EOSINOPHIL # BLD AUTO: 0.1 K/UL (ref 0–0.5)
EOSINOPHIL NFR BLD: 1.6 % (ref 0–8)
ERYTHROCYTE [DISTWIDTH] IN BLOOD BY AUTOMATED COUNT: 18.6 % (ref 11.5–14.5)
EST. GFR  (AFRICAN AMERICAN): >60 ML/MIN/1.73 M^2
EST. GFR  (NON AFRICAN AMERICAN): >60 ML/MIN/1.73 M^2
ESTIMATED AVG GLUCOSE: 123 MG/DL (ref 68–131)
GLUCOSE SERPL-MCNC: 101 MG/DL (ref 70–110)
HBA1C MFR BLD: 5.9 % (ref 4–5.6)
HCT VFR BLD AUTO: 50.4 % (ref 40–54)
HDLC SERPL-MCNC: 42 MG/DL (ref 40–75)
HDLC SERPL: 29.6 % (ref 20–50)
HGB BLD-MCNC: 14.8 G/DL (ref 14–18)
IMM GRANULOCYTES # BLD AUTO: 0.02 K/UL (ref 0–0.04)
IMM GRANULOCYTES NFR BLD AUTO: 0.3 % (ref 0–0.5)
LDLC SERPL CALC-MCNC: 88.8 MG/DL (ref 63–159)
LYMPHOCYTES # BLD AUTO: 1.9 K/UL (ref 1–4.8)
LYMPHOCYTES NFR BLD: 27.1 % (ref 18–48)
MCH RBC QN AUTO: 21.5 PG (ref 27–31)
MCHC RBC AUTO-ENTMCNC: 29.4 G/DL (ref 32–36)
MCV RBC AUTO: 73 FL (ref 82–98)
MONOCYTES # BLD AUTO: 0.5 K/UL (ref 0.3–1)
MONOCYTES NFR BLD: 7.6 % (ref 4–15)
NEUTROPHILS # BLD AUTO: 4.3 K/UL (ref 1.8–7.7)
NEUTROPHILS NFR BLD: 63 % (ref 38–73)
NONHDLC SERPL-MCNC: 100 MG/DL
NRBC BLD-RTO: 0 /100 WBC
PLATELET # BLD AUTO: 256 K/UL (ref 150–450)
PMV BLD AUTO: 10.2 FL (ref 9.2–12.9)
POTASSIUM SERPL-SCNC: 4.6 MMOL/L (ref 3.5–5.1)
PROT SERPL-MCNC: 8.7 G/DL (ref 6–8.4)
RBC # BLD AUTO: 6.87 M/UL (ref 4.6–6.2)
SODIUM SERPL-SCNC: 143 MMOL/L (ref 136–145)
TRIGL SERPL-MCNC: 56 MG/DL (ref 30–150)
WBC # BLD AUTO: 6.87 K/UL (ref 3.9–12.7)

## 2022-03-11 PROCEDURE — 80061 LIPID PANEL: CPT | Performed by: INTERNAL MEDICINE

## 2022-03-11 PROCEDURE — 80053 COMPREHEN METABOLIC PANEL: CPT | Performed by: INTERNAL MEDICINE

## 2022-03-11 PROCEDURE — 85025 COMPLETE CBC W/AUTO DIFF WBC: CPT | Performed by: INTERNAL MEDICINE

## 2022-03-11 PROCEDURE — 83036 HEMOGLOBIN GLYCOSYLATED A1C: CPT | Performed by: INTERNAL MEDICINE

## 2022-03-11 PROCEDURE — 36415 COLL VENOUS BLD VENIPUNCTURE: CPT | Performed by: INTERNAL MEDICINE

## 2022-03-14 ENCOUNTER — OFFICE VISIT (OUTPATIENT)
Dept: FAMILY MEDICINE | Facility: CLINIC | Age: 67
End: 2022-03-14
Payer: MEDICARE

## 2022-03-14 VITALS
DIASTOLIC BLOOD PRESSURE: 64 MMHG | TEMPERATURE: 98 F | SYSTOLIC BLOOD PRESSURE: 132 MMHG | OXYGEN SATURATION: 96 % | WEIGHT: 219.13 LBS | BODY MASS INDEX: 25.87 KG/M2 | HEART RATE: 70 BPM | HEIGHT: 77 IN

## 2022-03-14 DIAGNOSIS — Z00.00 ROUTINE MEDICAL EXAM: Primary | ICD-10-CM

## 2022-03-14 DIAGNOSIS — I10 HTN, GOAL BELOW 140/90: ICD-10-CM

## 2022-03-14 DIAGNOSIS — I27.20 PULMONARY HYPERTENSION: ICD-10-CM

## 2022-03-14 DIAGNOSIS — K21.9 GASTROESOPHAGEAL REFLUX DISEASE, UNSPECIFIED WHETHER ESOPHAGITIS PRESENT: ICD-10-CM

## 2022-03-14 DIAGNOSIS — E66.3 OVERWEIGHT (BMI 25.0-29.9): ICD-10-CM

## 2022-03-14 DIAGNOSIS — D75.1 POLYCYTHEMIA: ICD-10-CM

## 2022-03-14 DIAGNOSIS — R73.03 PREDIABETES: ICD-10-CM

## 2022-03-14 DIAGNOSIS — Z13.6 SCREENING FOR AAA (ABDOMINAL AORTIC ANEURYSM): ICD-10-CM

## 2022-03-14 DIAGNOSIS — J84.10 PULMONARY INTERSTITIAL FIBROSIS: ICD-10-CM

## 2022-03-14 PROCEDURE — 1157F PR ADVANCE CARE PLAN OR EQUIV PRESENT IN MEDICAL RECORD: ICD-10-PCS | Mod: CPTII,S$GLB,, | Performed by: INTERNAL MEDICINE

## 2022-03-14 PROCEDURE — 3008F BODY MASS INDEX DOCD: CPT | Mod: CPTII,S$GLB,, | Performed by: INTERNAL MEDICINE

## 2022-03-14 PROCEDURE — 99999 PR PBB SHADOW E&M-EST. PATIENT-LVL IV: CPT | Mod: PBBFAC,,, | Performed by: INTERNAL MEDICINE

## 2022-03-14 PROCEDURE — 3044F PR MOST RECENT HEMOGLOBIN A1C LEVEL <7.0%: ICD-10-PCS | Mod: CPTII,S$GLB,, | Performed by: INTERNAL MEDICINE

## 2022-03-14 PROCEDURE — 1101F PR PT FALLS ASSESS DOC 0-1 FALLS W/OUT INJ PAST YR: ICD-10-PCS | Mod: CPTII,S$GLB,, | Performed by: INTERNAL MEDICINE

## 2022-03-14 PROCEDURE — 1126F AMNT PAIN NOTED NONE PRSNT: CPT | Mod: CPTII,S$GLB,, | Performed by: INTERNAL MEDICINE

## 2022-03-14 PROCEDURE — 99999 PR PBB SHADOW E&M-EST. PATIENT-LVL IV: ICD-10-PCS | Mod: PBBFAC,,, | Performed by: INTERNAL MEDICINE

## 2022-03-14 PROCEDURE — 99397 PR PREVENTIVE VISIT,EST,65 & OVER: ICD-10-PCS | Mod: GZ,S$GLB,, | Performed by: INTERNAL MEDICINE

## 2022-03-14 PROCEDURE — 4010F ACE/ARB THERAPY RXD/TAKEN: CPT | Mod: CPTII,S$GLB,, | Performed by: INTERNAL MEDICINE

## 2022-03-14 PROCEDURE — 3075F SYST BP GE 130 - 139MM HG: CPT | Mod: CPTII,S$GLB,, | Performed by: INTERNAL MEDICINE

## 2022-03-14 PROCEDURE — 99397 PER PM REEVAL EST PAT 65+ YR: CPT | Mod: GZ,S$GLB,, | Performed by: INTERNAL MEDICINE

## 2022-03-14 PROCEDURE — 3078F PR MOST RECENT DIASTOLIC BLOOD PRESSURE < 80 MM HG: ICD-10-PCS | Mod: CPTII,S$GLB,, | Performed by: INTERNAL MEDICINE

## 2022-03-14 PROCEDURE — 1157F ADVNC CARE PLAN IN RCRD: CPT | Mod: CPTII,S$GLB,, | Performed by: INTERNAL MEDICINE

## 2022-03-14 PROCEDURE — 3078F DIAST BP <80 MM HG: CPT | Mod: CPTII,S$GLB,, | Performed by: INTERNAL MEDICINE

## 2022-03-14 PROCEDURE — 3288F PR FALLS RISK ASSESSMENT DOCUMENTED: ICD-10-PCS | Mod: CPTII,S$GLB,, | Performed by: INTERNAL MEDICINE

## 2022-03-14 PROCEDURE — 4010F PR ACE/ARB THEARPY RXD/TAKEN: ICD-10-PCS | Mod: CPTII,S$GLB,, | Performed by: INTERNAL MEDICINE

## 2022-03-14 PROCEDURE — 1160F RVW MEDS BY RX/DR IN RCRD: CPT | Mod: CPTII,S$GLB,, | Performed by: INTERNAL MEDICINE

## 2022-03-14 PROCEDURE — 1160F PR REVIEW ALL MEDS BY PRESCRIBER/CLIN PHARMACIST DOCUMENTED: ICD-10-PCS | Mod: CPTII,S$GLB,, | Performed by: INTERNAL MEDICINE

## 2022-03-14 PROCEDURE — 3075F PR MOST RECENT SYSTOLIC BLOOD PRESS GE 130-139MM HG: ICD-10-PCS | Mod: CPTII,S$GLB,, | Performed by: INTERNAL MEDICINE

## 2022-03-14 PROCEDURE — 3044F HG A1C LEVEL LT 7.0%: CPT | Mod: CPTII,S$GLB,, | Performed by: INTERNAL MEDICINE

## 2022-03-14 PROCEDURE — 1159F MED LIST DOCD IN RCRD: CPT | Mod: CPTII,S$GLB,, | Performed by: INTERNAL MEDICINE

## 2022-03-14 PROCEDURE — 3008F PR BODY MASS INDEX (BMI) DOCUMENTED: ICD-10-PCS | Mod: CPTII,S$GLB,, | Performed by: INTERNAL MEDICINE

## 2022-03-14 PROCEDURE — 1159F PR MEDICATION LIST DOCUMENTED IN MEDICAL RECORD: ICD-10-PCS | Mod: CPTII,S$GLB,, | Performed by: INTERNAL MEDICINE

## 2022-03-14 PROCEDURE — 1101F PT FALLS ASSESS-DOCD LE1/YR: CPT | Mod: CPTII,S$GLB,, | Performed by: INTERNAL MEDICINE

## 2022-03-14 PROCEDURE — 3288F FALL RISK ASSESSMENT DOCD: CPT | Mod: CPTII,S$GLB,, | Performed by: INTERNAL MEDICINE

## 2022-03-14 PROCEDURE — 1126F PR PAIN SEVERITY QUANTIFIED, NO PAIN PRESENT: ICD-10-PCS | Mod: CPTII,S$GLB,, | Performed by: INTERNAL MEDICINE

## 2022-03-14 NOTE — PROGRESS NOTES
HISTORY OF PRESENT ILLNESS:  Josiah Orosco Jr. is a 66 y.o. male who presents to the clinic today for a routine medical physical exam. His last physical exam was approximately 1 years(s) ago.      PAST MEDICAL HISTORY:  Past Medical History:   Diagnosis Date    Arthritis     GERD (gastroesophageal reflux disease)     History of HCV, s/p successful treatment w/ SVR12 5/2015     Failed treatment (stage I/II) - followed by hepatology     Joint pain     Lung disease caused by breathing particles     Widespread essentially symmetric interstitial lung disease    Obesity     Polycythemia vera     Prediabetes        PAST SURGICAL HISTORY:  Past Surgical History:   Procedure Laterality Date    BUNIONECTOMY      bilateral    COLONOSCOPY N/A 12/9/2015    Procedure: COLONOSCOPY;  Surgeon: Conrad Iqbal MD;  Location: [facility];  Service: Endoscopy;  Laterality: N/A;    Liver biopsy x2      rotator cuff Right        SOCIAL HISTORY:  Social History     Socioeconomic History    Marital status:     Number of children: 0    Highest education level: Some college, no degree   Occupational History    Occupation: Self-employed     Employer: [employer]   Tobacco Use    Smoking status: Former Smoker     Types: Cigars    Smokeless tobacco: Former User    Tobacco comment: pt. smokes 10 cigars per day.   Substance and Sexual Activity    Alcohol use: No     Alcohol/week: 0.0 standard drinks     Comment: Rarely    Drug use: Yes     Types: Marijuana     Comment: daily    Sexual activity: Yes     Partners: Female       FAMILY HISTORY:  Family History   Problem Relation Age of Onset    Heart disease Mother     Kidney disease Mother     Parkinsonism Father     Prostate cancer Cousin         maternal side    Heart attack Sister         CABG    Deep vein thrombosis Sister     Pulmonary embolism Sister     Osteoarthritis Sister         s/p knee replacement    Chronic back pain Brother     Liver disease  Neg Hx     Diabetes Neg Hx     Melanoma Neg Hx        ALLERGIES AND MEDICATIONS: updated and reviewed.  Review of patient's allergies indicates:   Allergen Reactions    Ace inhibitors Other (See Comments)     cough     Medication List with Changes/Refills   Current Medications    ALLOPURINOL (ZYLOPRIM) 100 MG TABLET    Take 2 tablets (200 mg total) by mouth once daily.    ASPIRIN (ECOTRIN) 81 MG EC TABLET    Take 81 mg by mouth once daily.    FLUZONE HIGHDOSE QUAD 21-22  MCG/0.7 ML SYRG    0.5 mLs once.    HYDROCHLOROTHIAZIDE (HYDRODIURIL) 12.5 MG TAB    TK 1 T PO QD IN THE MORNING    LEVOCETIRIZINE (XYZAL) 5 MG TABLET    Take 1 tablet (5 mg total) by mouth every evening.    LOSARTAN (COZAAR) 50 MG TABLET    TK 1 T PO D         CARE TEAM:  Patient Care Team:  Elaine Landry MD as PCP - General (Internal Medicine)  Jennifer B. Scheuermann, PA as Physician Assistant (Hepatology)  Karen Johnson MD as Consulting Physician (Hematology)  Junior Torres OD (Optometry)  Meche Reyes LPN as Licensed Practical Nurse           SCREENING HISTORY:  Health Maintenance       Date Due Completion Date    Abdominal Aortic Aneurysm Screening 10/05/2019 5/24/2011    Lipid Panel 03/11/2023 3/11/2022    Hemoglobin A1c 03/11/2023 3/11/2022    Pneumococcal Vaccines (Age 65+) (2 of 2 - PPSV23) 03/08/2024 3/9/2020    Colorectal Cancer Screening 12/09/2025 12/9/2015    Override on 2/7/2005: Done    TETANUS VACCINE 05/18/2026 5/18/2016            REVIEW OF SYSTEMS:   The patient reports: good dietary habits. He is overall consuming fewer calories in the last few years (initially due to grieving over the sudden loss of his nephew) - he has lost about 30 lbs in this time frame).  The patient reports : that they do not exercise regularly, but stay active.  Review of Systems   Constitutional: Negative for chills, fatigue, fever and unexpected weight change.   HENT: Negative for congestion, ear pain, sore throat and voice  "change.    Eyes: Negative for photophobia, pain, discharge and visual disturbance.   Respiratory: Positive for shortness of breath (- with walking up the stairs; stable; followed by pulmonology). Negative for cough and wheezing.    Cardiovascular: Negative for chest pain, palpitations and leg swelling.   Gastrointestinal: Negative for abdominal pain, blood in stool, constipation, diarrhea, nausea and vomiting.   Genitourinary: Negative for dysuria and frequency.   Musculoskeletal: Positive for arthralgias (- mild; chronic; stable). Negative for gait problem, joint swelling and neck stiffness.   Skin: Negative for color change and rash.   Neurological: Negative for seizures, weakness and headaches.   Hematological: Negative for adenopathy. Does not bruise/bleed easily.   Psychiatric/Behavioral: Negative for behavioral problems and dysphoric mood. The patient is not nervous/anxious.      ROS : patient denies: difficulty initiating urination          PHYSICAL EXAM:  Vitals:    03/14/22 0855   BP: 132/64   Pulse: 70   Temp: 98 °F (36.7 °C)     Weight: 99.4 kg (219 lb 2.2 oz)   Height: 6' 5" (195.6 cm)   Body mass index is 25.99 kg/m².    General appearance - alert, well appearing, and in no distress, overweight  Psychiatric - alert, oriented to person, place, and time, normal behavior, speech, dress, motor activity and thought process  Eyes - pupils equal and reactive, extraocular eye movements intact, sclera anicteric  Mouth - not examined; patient wearing mask due to Covid 19 pandemic  Neck - supple, no significant adenopathy, carotids upstroke normal bilaterally, no bruits  Lymphatics - no palpable cervical lymphadenopathy  Chest - clear to auscultation, no wheezes, rales or rhonchi, symmetric air entry  Heart - normal rate and regular rhythm, no gallops noted  Neurological - alert, normal speech, no focal findings or movement disorder noted, cranial nerves II through XII intact  Musculoskeletal - patient noted to " have Mild osteoarthritic changes to both knee joints. No joint effusions noted., no muscular tenderness noted  Extremities - no pedal edema noted  Skin - normal coloration, no suspicious skin lesions      Labs:  Results for orders placed or performed in visit on 03/11/22   CBC Auto Differential   Result Value Ref Range    WBC 6.87 3.90 - 12.70 K/uL    RBC 6.87 (H) 4.60 - 6.20 M/uL    Hemoglobin 14.8 14.0 - 18.0 g/dL    Hematocrit 50.4 40.0 - 54.0 %    MCV 73 (L) 82 - 98 fL    MCH 21.5 (L) 27.0 - 31.0 pg    MCHC 29.4 (L) 32.0 - 36.0 g/dL    RDW 18.6 (H) 11.5 - 14.5 %    Platelets 256 150 - 450 K/uL    MPV 10.2 9.2 - 12.9 fL    Immature Granulocytes 0.3 0.0 - 0.5 %    Gran # (ANC) 4.3 1.8 - 7.7 K/uL    Immature Grans (Abs) 0.02 0.00 - 0.04 K/uL    Lymph # 1.9 1.0 - 4.8 K/uL    Mono # 0.5 0.3 - 1.0 K/uL    Eos # 0.1 0.0 - 0.5 K/uL    Baso # 0.03 0.00 - 0.20 K/uL    nRBC 0 0 /100 WBC    Gran % 63.0 38.0 - 73.0 %    Lymph % 27.1 18.0 - 48.0 %    Mono % 7.6 4.0 - 15.0 %    Eosinophil % 1.6 0.0 - 8.0 %    Basophil % 0.4 0.0 - 1.9 %    Differential Method Automated    Comprehensive Metabolic Panel   Result Value Ref Range    Sodium 143 136 - 145 mmol/L    Potassium 4.6 3.5 - 5.1 mmol/L    Chloride 105 95 - 110 mmol/L    CO2 28 23 - 29 mmol/L    Glucose 101 70 - 110 mg/dL    BUN 10 8 - 23 mg/dL    Creatinine 0.9 0.5 - 1.4 mg/dL    Calcium 9.5 8.7 - 10.5 mg/dL    Total Protein 8.7 (H) 6.0 - 8.4 g/dL    Albumin 3.6 3.5 - 5.2 g/dL    Total Bilirubin 0.5 0.1 - 1.0 mg/dL    Alkaline Phosphatase 100 55 - 135 U/L    AST 25 10 - 40 U/L    ALT 12 10 - 44 U/L    Anion Gap 10 8 - 16 mmol/L    eGFR if African American >60 >60 mL/min/1.73 m^2    eGFR if non African American >60 >60 mL/min/1.73 m^2   Lipid Panel   Result Value Ref Range    Cholesterol 142 120 - 199 mg/dL    Triglycerides 56 30 - 150 mg/dL    HDL 42 40 - 75 mg/dL    LDL Cholesterol 88.8 63.0 - 159.0 mg/dL    HDL/Cholesterol Ratio 29.6 20.0 - 50.0 %    Total Cholesterol/HDL  Ratio 3.4 2.0 - 5.0    Non-HDL Cholesterol 100 mg/dL   Hemoglobin A1C   Result Value Ref Range    Hemoglobin A1C 5.9 (H) 4.0 - 5.6 %    Estimated Avg Glucose 123 68 - 131 mg/dL            ASSESSMENT AND PLAN:  1. Routine medical exam  Counseled on age appropriate medical preventative services including age appropriate cancer screenings, age appropriate eye and dental exams, over all nutritional health, need for a consistent exercise regimen, and an over all push towards maintaining a vigorous and active lifestyle.  Counseled on age appropriate vaccines and discussed upcoming health care needs based on age/gender. Discussed good sleep hygiene and stress management.    2. HTN, goal below 140/90  The current medical regimen is effective;  continue present plan and medications. Recommended patient to check home readings to monitor and see me for followup as scheduled or sooner as needed.   Discussed sodium restriction, maintaining ideal body weight and regular exercise program as physiologic means to continue to achieve blood pressure control in addition to medication compliance.  Patient was educated that both decongestant and anti-inflammatory medication may raise blood pressure.  The patient declined participation in the digital hypertension program.    3. Pulmonary hypertension/4. Pulmonary interstitial fibrosis  The current medical regimen is effective;  continue present plan and medications.   Followed by: Pulmonology.     5. Polycythemia  The current medical regimen is effective;  continue present plan and medications.   Followed by: Hematology/Oncology.     6. Prediabetes  Stable. We discussed low sugar/low carbohydrate diet and regular exercise to prevent progression. No need for prescription medication at this time.    7. Gastroesophageal reflux disease, unspecified whether esophagitis present  Symptoms controlled: yes - takes medication occasionally as needed.   Reflux precautions discussed (eliminate tobacco  if a smoker; minimize caffeine, chocolate and red/white peppermint intake; avoid heavy and spicy meals; don't lay down within 2-3 hours after eating; minimize the intake of NSAIDs). Medication as needed.   Patient asked to take medication breaks, if possible - discussed chronic use can limit calcium absorption (which can lead to osteopenia/osteoporosis), increases the risk for intestinal infections, and can cause kidney damage. There are also some newer studies that show possible increased risk of mortality.    8. Screening for AAA (abdominal aortic aneurysm)    - US Abdominal Aorta; Future    9. Overweight (BMI 25.0-29.9)  The patient is asked to continue to make an attempt to improve diet and exercise patterns to aid in medical management of this problem.          Orders Placed This Encounter   Procedures    US Abdominal Aorta       Follow up in about 1 year (around 3/14/2023), or if symptoms worsen or fail to improve, for annual exam. or sooner as needed.

## 2022-03-17 ENCOUNTER — HOSPITAL ENCOUNTER (OUTPATIENT)
Dept: RADIOLOGY | Facility: HOSPITAL | Age: 67
Discharge: HOME | End: 2022-03-17
Attending: INTERNAL MEDICINE
Payer: MEDICARE

## 2022-03-17 DIAGNOSIS — Z13.6 SCREENING FOR AAA (ABDOMINAL AORTIC ANEURYSM): ICD-10-CM

## 2022-03-17 PROBLEM — I70.0 THORACIC AORTA ATHEROSCLEROSIS: Status: ACTIVE | Noted: 2022-03-17

## 2022-03-17 PROCEDURE — 76775 US EXAM ABDO BACK WALL LIM: CPT | Mod: TC

## 2022-03-17 PROCEDURE — 76775 US EXAM ABDO BACK WALL LIM: CPT | Mod: 26,,, | Performed by: RADIOLOGY

## 2022-03-17 PROCEDURE — 76775 US ABDOMINAL AORTA: ICD-10-PCS | Mod: 26,,, | Performed by: RADIOLOGY

## 2022-05-18 LAB
LEFT EYE DM RETINOPATHY: NEGATIVE
RIGHT EYE DM RETINOPATHY: NEGATIVE

## 2022-05-24 ENCOUNTER — PATIENT OUTREACH (OUTPATIENT)
Dept: ADMINISTRATIVE | Facility: HOSPITAL | Age: 67
End: 2022-05-24
Payer: MEDICARE

## 2022-08-02 ENCOUNTER — TELEPHONE (OUTPATIENT)
Dept: FAMILY MEDICINE | Facility: CLINIC | Age: 67
End: 2022-08-02
Payer: MEDICARE

## 2022-08-02 NOTE — TELEPHONE ENCOUNTER
----- Message from Una Waller sent at 8/2/2022 12:42 PM CDT -----  Contact: Lillian Logan [phone]  Type:  Patient Requesting Referral    Who Called: Lillian Logan    Referral to What Specialty: Cardiology    Reason for Referral:Heart rate was fast, wife states per Dr Morelos    Does the patient want the referral with a specific physician?: No     [facility]    Would the patient rather a call back or a response via [facility]? Call back    Best Call Back Number: [phone]

## 2022-08-03 ENCOUNTER — TELEPHONE (OUTPATIENT)
Dept: FAMILY MEDICINE | Facility: CLINIC | Age: 67
End: 2022-08-03
Payer: MEDICARE

## 2022-08-03 DIAGNOSIS — R00.0 RAPID HEART RATE: Primary | ICD-10-CM

## 2022-08-03 DIAGNOSIS — I49.9 CARDIAC ARRHYTHMIA, UNSPECIFIED CARDIAC ARRHYTHMIA TYPE: ICD-10-CM

## 2022-08-03 NOTE — TELEPHONE ENCOUNTER
Spoke with patient's wife and she informed me that when she saw Dr Morelos; he request that patient see Cardiology for rapid heart rate. Patient was like to stay on the [facility].

## 2022-08-03 NOTE — TELEPHONE ENCOUNTER
----- Message from Una Waller sent at 8/2/2022  3:48 PM CDT -----  Contact: Lillian Logan [phone]  Type:  Patient Returning Call    Who Called: Wife Doreen    Who Left Message for Patient: Francisca    Does the patient know what this is regarding?: Returning call    Would the patient rather a call back or a response via [facility]? Call back    Best Call Back Number: [phone]

## 2022-08-08 ENCOUNTER — OFFICE VISIT (OUTPATIENT)
Dept: CARDIOLOGY | Facility: CLINIC | Age: 67
End: 2022-08-08
Payer: MEDICARE

## 2022-08-08 VITALS
HEART RATE: 83 BPM | OXYGEN SATURATION: 94 % | HEIGHT: 77 IN | SYSTOLIC BLOOD PRESSURE: 131 MMHG | WEIGHT: 214.75 LBS | DIASTOLIC BLOOD PRESSURE: 63 MMHG | BODY MASS INDEX: 25.36 KG/M2 | RESPIRATION RATE: 18 BRPM

## 2022-08-08 DIAGNOSIS — I49.9 CARDIAC ARRHYTHMIA, UNSPECIFIED CARDIAC ARRHYTHMIA TYPE: ICD-10-CM

## 2022-08-08 DIAGNOSIS — R06.09 DOE (DYSPNEA ON EXERTION): Primary | ICD-10-CM

## 2022-08-08 DIAGNOSIS — R00.0 RAPID HEART RATE: ICD-10-CM

## 2022-08-08 DIAGNOSIS — R06.02 SOB (SHORTNESS OF BREATH): ICD-10-CM

## 2022-08-08 PROCEDURE — 1126F PR PAIN SEVERITY QUANTIFIED, NO PAIN PRESENT: ICD-10-PCS | Mod: CPTII,S$GLB,, | Performed by: INTERNAL MEDICINE

## 2022-08-08 PROCEDURE — 3075F SYST BP GE 130 - 139MM HG: CPT | Mod: CPTII,S$GLB,, | Performed by: INTERNAL MEDICINE

## 2022-08-08 PROCEDURE — 1159F MED LIST DOCD IN RCRD: CPT | Mod: CPTII,S$GLB,, | Performed by: INTERNAL MEDICINE

## 2022-08-08 PROCEDURE — 99204 PR OFFICE/OUTPT VISIT, NEW, LEVL IV, 45-59 MIN: ICD-10-PCS | Mod: S$GLB,,, | Performed by: INTERNAL MEDICINE

## 2022-08-08 PROCEDURE — 3044F PR MOST RECENT HEMOGLOBIN A1C LEVEL <7.0%: ICD-10-PCS | Mod: CPTII,S$GLB,, | Performed by: INTERNAL MEDICINE

## 2022-08-08 PROCEDURE — 1157F PR ADVANCE CARE PLAN OR EQUIV PRESENT IN MEDICAL RECORD: ICD-10-PCS | Mod: CPTII,S$GLB,, | Performed by: INTERNAL MEDICINE

## 2022-08-08 PROCEDURE — 1101F PR PT FALLS ASSESS DOC 0-1 FALLS W/OUT INJ PAST YR: ICD-10-PCS | Mod: CPTII,S$GLB,, | Performed by: INTERNAL MEDICINE

## 2022-08-08 PROCEDURE — 1160F PR REVIEW ALL MEDS BY PRESCRIBER/CLIN PHARMACIST DOCUMENTED: ICD-10-PCS | Mod: CPTII,S$GLB,, | Performed by: INTERNAL MEDICINE

## 2022-08-08 PROCEDURE — 4010F ACE/ARB THERAPY RXD/TAKEN: CPT | Mod: CPTII,S$GLB,, | Performed by: INTERNAL MEDICINE

## 2022-08-08 PROCEDURE — 1126F AMNT PAIN NOTED NONE PRSNT: CPT | Mod: CPTII,S$GLB,, | Performed by: INTERNAL MEDICINE

## 2022-08-08 PROCEDURE — 99204 OFFICE O/P NEW MOD 45 MIN: CPT | Mod: S$GLB,,, | Performed by: INTERNAL MEDICINE

## 2022-08-08 PROCEDURE — 3078F PR MOST RECENT DIASTOLIC BLOOD PRESSURE < 80 MM HG: ICD-10-PCS | Mod: CPTII,S$GLB,, | Performed by: INTERNAL MEDICINE

## 2022-08-08 PROCEDURE — 93000 EKG 12-LEAD: ICD-10-PCS | Mod: S$GLB,,, | Performed by: INTERNAL MEDICINE

## 2022-08-08 PROCEDURE — 3288F FALL RISK ASSESSMENT DOCD: CPT | Mod: CPTII,S$GLB,, | Performed by: INTERNAL MEDICINE

## 2022-08-08 PROCEDURE — 1157F ADVNC CARE PLAN IN RCRD: CPT | Mod: CPTII,S$GLB,, | Performed by: INTERNAL MEDICINE

## 2022-08-08 PROCEDURE — 3008F PR BODY MASS INDEX (BMI) DOCUMENTED: ICD-10-PCS | Mod: CPTII,S$GLB,, | Performed by: INTERNAL MEDICINE

## 2022-08-08 PROCEDURE — 3075F PR MOST RECENT SYSTOLIC BLOOD PRESS GE 130-139MM HG: ICD-10-PCS | Mod: CPTII,S$GLB,, | Performed by: INTERNAL MEDICINE

## 2022-08-08 PROCEDURE — 99999 PR PBB SHADOW E&M-EST. PATIENT-LVL IV: CPT | Mod: PBBFAC,,, | Performed by: INTERNAL MEDICINE

## 2022-08-08 PROCEDURE — 1159F PR MEDICATION LIST DOCUMENTED IN MEDICAL RECORD: ICD-10-PCS | Mod: CPTII,S$GLB,, | Performed by: INTERNAL MEDICINE

## 2022-08-08 PROCEDURE — 99999 PR PBB SHADOW E&M-EST. PATIENT-LVL IV: ICD-10-PCS | Mod: PBBFAC,,, | Performed by: INTERNAL MEDICINE

## 2022-08-08 PROCEDURE — 93000 ELECTROCARDIOGRAM COMPLETE: CPT | Mod: S$GLB,,, | Performed by: INTERNAL MEDICINE

## 2022-08-08 PROCEDURE — 3288F PR FALLS RISK ASSESSMENT DOCUMENTED: ICD-10-PCS | Mod: CPTII,S$GLB,, | Performed by: INTERNAL MEDICINE

## 2022-08-08 PROCEDURE — 1101F PT FALLS ASSESS-DOCD LE1/YR: CPT | Mod: CPTII,S$GLB,, | Performed by: INTERNAL MEDICINE

## 2022-08-08 PROCEDURE — 4010F PR ACE/ARB THEARPY RXD/TAKEN: ICD-10-PCS | Mod: CPTII,S$GLB,, | Performed by: INTERNAL MEDICINE

## 2022-08-08 PROCEDURE — 1160F RVW MEDS BY RX/DR IN RCRD: CPT | Mod: CPTII,S$GLB,, | Performed by: INTERNAL MEDICINE

## 2022-08-08 PROCEDURE — 3044F HG A1C LEVEL LT 7.0%: CPT | Mod: CPTII,S$GLB,, | Performed by: INTERNAL MEDICINE

## 2022-08-08 PROCEDURE — 3078F DIAST BP <80 MM HG: CPT | Mod: CPTII,S$GLB,, | Performed by: INTERNAL MEDICINE

## 2022-08-08 PROCEDURE — 3008F BODY MASS INDEX DOCD: CPT | Mod: CPTII,S$GLB,, | Performed by: INTERNAL MEDICINE

## 2022-08-08 NOTE — PROGRESS NOTES
CARDIOVASCULAR CONSULTATION    REASON FOR CONSULT:   Josiah Orosco Jr. is a 66 y.o. male who presents for evaluation      HISTORY OF PRESENT ILLNESS:     Patient is a pleasant 66-year-old man.  Here for evaluation.  Patient states that lately he has been experiencing dyspnea on exertion.  Also he was told that he has a regular rhythm and hence has been referred.  Patient denies any palpitations.  Denies any orthopnea or PND.  EKG done personally reviewed and shows sinus rhythm with PACs.          PAST MEDICAL HISTORY:     Past Medical History:   Diagnosis Date    Arthritis     GERD (gastroesophageal reflux disease)     History of HCV, s/p successful treatment w/ SVR12 5/2015     Failed treatment (stage I/II) - followed by hepatology     Joint pain     Lung disease caused by breathing particles     Widespread essentially symmetric interstitial lung disease    Obesity     Polycythemia vera     Prediabetes        PAST SURGICAL HISTORY:     Past Surgical History:   Procedure Laterality Date    BUNIONECTOMY      bilateral    COLONOSCOPY N/A 12/9/2015    Procedure: COLONOSCOPY;  Surgeon: Conrad Iqbal MD;  Location: [facility];  Service: Endoscopy;  Laterality: N/A;    Liver biopsy x2      rotator cuff Right        ALLERGIES AND MEDICATION:     Review of patient's allergies indicates:   Allergen Reactions    Ace inhibitors Other (See Comments)     cough        Medication List          Accurate as of August 8, 2022  4:12 PM. If you have any questions, ask your nurse or doctor.            CONTINUE taking these medications    allopurinoL 100 MG tablet  Commonly known as: ZYLOPRIM  Take 2 tablets (200 mg total) by mouth once daily.     aspirin 81 MG EC tablet  Commonly known as: ECOTRIN     hydroCHLOROthiazide 12.5 MG Tab  Commonly known as: HYDRODIURIL  TK 1 T PO QD IN THE MORNING     levocetirizine 5 MG tablet  Commonly known as: XYZAL  Take 1 tablet (5 mg total) by mouth every evening.     losartan 50 MG  "tablet  Commonly known as: COZAAR  TK 1 T PO D            SOCIAL HISTORY:     Social History     Socioeconomic History    Marital status:     Number of children: 0    Highest education level: Some college, no degree   Occupational History    Occupation: Self-employed     Employer: [employer]   Tobacco Use    Smoking status: Former Smoker     Types: Cigars    Smokeless tobacco: Former User    Tobacco comment: pt. smokes 10 cigars per day.   Substance and Sexual Activity    Alcohol use: No     Alcohol/week: 0.0 standard drinks     Comment: Rarely    Drug use: Yes     Types: Marijuana     Comment: daily    Sexual activity: Yes     Partners: Female       FAMILY HISTORY:     Family History   Problem Relation Age of Onset    Heart disease Mother     Kidney disease Mother     Parkinsonism Father     Prostate cancer Cousin         maternal side    Heart attack Sister         CABG    Deep vein thrombosis Sister     Pulmonary embolism Sister     Osteoarthritis Sister         s/p knee replacement    Chronic back pain Brother     Liver disease Neg Hx     Diabetes Neg Hx     Melanoma Neg Hx        REVIEW OF SYSTEMS:   Review of Systems   Constitutional: Negative.   HENT: Negative.    Eyes: Negative.    Cardiovascular: Positive for dyspnea on exertion and palpitations.   Respiratory: Positive for shortness of breath.    Endocrine: Negative.    Hematologic/Lymphatic: Negative.    Skin: Negative.    Musculoskeletal: Negative.    Gastrointestinal: Negative.    Genitourinary: Negative.    Neurological: Negative.    Psychiatric/Behavioral: Negative.    Allergic/Immunologic: Negative.        A 10 point review of systems was performed and all the pertinent positives have been mentioned. Rest of review of systems was negative.        PHYSICAL EXAM:     Vitals:    08/08/22 1505   BP: 131/63   Pulse: 83   Resp: 18    Body mass index is 25.46 kg/m².  Weight: 97.4 kg (214 lb 11.7 oz)   Height: 6' 5" (195.6 cm) "     Physical Exam  Vitals reviewed.   Constitutional:       Appearance: He is well-developed.   HENT:      Head: Normocephalic.   Eyes:      Conjunctiva/sclera: Conjunctivae normal.      Pupils: Pupils are equal, round, and reactive to light.   Cardiovascular:      Rate and Rhythm: Normal rate and regular rhythm.      Heart sounds: Normal heart sounds.   Pulmonary:      Effort: Pulmonary effort is normal.      Breath sounds: Normal breath sounds.   Abdominal:      General: Bowel sounds are normal.      Palpations: Abdomen is soft.   Musculoskeletal:      Cervical back: Normal range of motion and neck supple.   Skin:     General: Skin is warm.   Neurological:      Mental Status: He is alert and oriented to person, place, and time.           DATA:     Laboratory:  CBC:  Recent Labs   Lab 03/14/20  0837 03/06/21  0822 03/11/22  0705   WBC 5.79 6.91 6.87   Hemoglobin 12.6 L 13.6 L 14.8   Hematocrit 46.6 47.0 50.4   Platelets 279 259 256       CHEMISTRIES:  Recent Labs   Lab 03/14/20  0837 03/06/21  0822 03/11/22  0705   Glucose 95 96 101   Sodium 142 140 143   Potassium 5.0 4.4 4.6   BUN 13 12 10   Creatinine 0.9 0.9 0.9   eGFR if African American >60.0 >60.0 >60   eGFR if non African American >60.0 >60.0 >60   Calcium 9.2 9.3 9.5       CARDIAC BIOMARKERS:        COAGS:        LIPIDS/LFTS:  Recent Labs   Lab 03/14/20  0837 03/06/21  0822 03/11/22  0705   Cholesterol 144 136 142   Triglycerides 59 46 56   HDL 43 39 L 42   LDL Cholesterol 89.2 87.8 88.8   Non-HDL Cholesterol 101 97 100   AST 29 26 25   ALT 13 11 12       Hemoglobin A1C   Date Value Ref Range Status   03/11/2022 5.9 (H) 4.0 - 5.6 % Final     Comment:     ADA Screening Guidelines:  5.7-6.4%  Consistent with prediabetes  >or=6.5%  Consistent with diabetes    High levels of fetal hemoglobin interfere with the HbA1C  assay. Heterozygous hemoglobin variants (HbS, HgC, etc)do  not significantly interfere with this assay.   However, presence of multiple variants  may affect accuracy.     03/06/2021 5.8 (H) 4.0 - 5.6 % Final     Comment:     ADA Screening Guidelines:  5.7-6.4%  Consistent with prediabetes  >or=6.5%  Consistent with diabetes    High levels of fetal hemoglobin interfere with the HbA1C  assay. Heterozygous hemoglobin variants (HbS, HgC, etc)do  not significantly interfere with this assay.   However, presence of multiple variants may affect accuracy.     03/14/2020 6.0 (H) 4.0 - 5.6 % Final     Comment:     ADA Screening Guidelines:  5.7-6.4%  Consistent with prediabetes  >or=6.5%  Consistent with diabetes  High levels of fetal hemoglobin interfere with the HbA1C  assay. Heterozygous hemoglobin variants (HbS, HgC, etc)do  not significantly interfere with this assay.   However, presence of multiple variants may affect accuracy.         TSH        The 10-year ASCVD risk score (Michael SANDHU Jr., et al., 2013) is: 17.1%    Values used to calculate the score:      Age: 66 years      Sex: Male      Is Non- : Yes      Diabetic: No      Tobacco smoker: No      Systolic Blood Pressure: 131 mmHg      Is BP treated: Yes      HDL Cholesterol: 42 mg/dL      Total Cholesterol: 142 mg/dL             ASSESSMENT AND PLAN     Patient Active Problem List   Diagnosis    History of HCV, s/p successful treatment w/ SVR12 5/2015    Lung disease caused by breathing particles    Overweight (BMI 25.0-29.9)    GERD (gastroesophageal reflux disease)    Polycythemia    Former smoker    Hyperuricemia    ILD (interstitial lung disease)    Prediabetes    Arthritis    Allergic cough    HTN, goal below 140/90    Pulmonary interstitial fibrosis    Pulmonary hypertension    Marijuana user    Thoracic aorta atherosclerosis       Dyspnea on exertion, palpitations and shortness of breath.  Further evaluation with the help of stress test, echo, Holter monitor.  Follow-up after testing.          Thank you very much for involving me in the care of your patient.  Please  do not hesitate to contact me if there are any questions.      David Cueva MD, FAC, [facility]  Interventional Cardiologist, [facility].           This note was dictated with the help of speech recognition software.  There might be un-intended errors and/or substitutions.

## 2022-08-30 ENCOUNTER — HOSPITAL ENCOUNTER (OUTPATIENT)
Dept: CARDIOLOGY | Facility: HOSPITAL | Age: 67
Discharge: HOME | End: 2022-08-30
Attending: INTERNAL MEDICINE
Payer: MEDICARE

## 2022-08-30 ENCOUNTER — HOSPITAL ENCOUNTER (OUTPATIENT)
Dept: RADIOLOGY | Facility: HOSPITAL | Age: 67
Discharge: HOME | End: 2022-08-30
Attending: INTERNAL MEDICINE
Payer: MEDICARE

## 2022-08-30 DIAGNOSIS — I49.9 CARDIAC ARRHYTHMIA, UNSPECIFIED CARDIAC ARRHYTHMIA TYPE: ICD-10-CM

## 2022-08-30 DIAGNOSIS — R06.09 DOE (DYSPNEA ON EXERTION): ICD-10-CM

## 2022-08-30 DIAGNOSIS — R00.0 RAPID HEART RATE: ICD-10-CM

## 2022-08-30 DIAGNOSIS — R06.02 SOB (SHORTNESS OF BREATH): ICD-10-CM

## 2022-08-30 LAB
ASCENDING AORTA: 3.36 CM
AV INDEX (PROSTH): 0.85
AV MEAN GRADIENT: 4 MMHG
AV PEAK GRADIENT: 6 MMHG
AV VALVE AREA: 2.69 CM2
AV VELOCITY RATIO: 0.8
CV ECHO LV RWT: 0.45 CM
CV STRESS BASE HR: 65 BPM
DIASTOLIC BLOOD PRESSURE: 90 MMHG
DOP CALC AO PEAK VEL: 1.26 M/S
DOP CALC AO VTI: 27.05 CM
DOP CALC LVOT AREA: 3.2 CM2
DOP CALC LVOT DIAMETER: 2.01 CM
DOP CALC LVOT PEAK VEL: 1.01 M/S
DOP CALC LVOT STROKE VOLUME: 72.72 CM3
DOP CALCLVOT PEAK VEL VTI: 22.93 CM
E WAVE DECELERATION TIME: 183.63 MSEC
E/A RATIO: 1.28
E/E' RATIO: 11.73 M/S
ECHO LV POSTERIOR WALL: 1.08 CM (ref 0.6–1.1)
EJECTION FRACTION: 60 %
FRACTIONAL SHORTENING: 30 % (ref 28–44)
INTERVENTRICULAR SEPTUM: 0.96 CM (ref 0.6–1.1)
IVRT: 107.27 MSEC
LA MAJOR: 5.59 CM
LA MINOR: 6.09 CM
LA WIDTH: 4.62 CM
LEFT ATRIUM SIZE: 4.36 CM
LEFT ATRIUM VOLUME: 99.81 CM3
LEFT INTERNAL DIMENSION IN SYSTOLE: 3.37 CM (ref 2.1–4)
LEFT VENTRICLE DIASTOLIC VOLUME: 109.61 ML
LEFT VENTRICLE SYSTOLIC VOLUME: 46.32 ML
LEFT VENTRICULAR INTERNAL DIMENSION IN DIASTOLE: 4.84 CM (ref 3.5–6)
LEFT VENTRICULAR MASS: 177.22 G
LV LATERAL E/E' RATIO: 9.78 M/S
LV SEPTAL E/E' RATIO: 14.67 M/S
MV PEAK A VEL: 0.69 M/S
MV PEAK E VEL: 0.88 M/S
NUC STRESS DIASTOLIC VOLUME INDEX: 92
NUC STRESS EJECTION FRACTION: 61 %
NUC STRESS SYSTOLIC VOLUME INDEX: 36
OHS CV CPX 1 MINUTE RECOVERY HEART RATE: 157 BPM
OHS CV CPX 85 PERCENT MAX PREDICTED HEART RATE MALE: 130
OHS CV CPX ESTIMATED METS: 6
OHS CV CPX MAX PREDICTED HEART RATE: 153
OHS CV CPX PATIENT IS FEMALE: 0
OHS CV CPX PATIENT IS MALE: 1
OHS CV CPX PEAK DIASTOLIC BLOOD PRESSURE: 113 MMHG
OHS CV CPX PEAK HEAR RATE: 148 BPM
OHS CV CPX PEAK RATE PRESSURE PRODUCT: NORMAL
OHS CV CPX PEAK SYSTOLIC BLOOD PRESSURE: 175 MMHG
OHS CV CPX PERCENT MAX PREDICTED HEART RATE ACHIEVED: 97
OHS CV CPX RATE PRESSURE PRODUCT PRESENTING: NORMAL
PISA TR MAX VEL: 2.97 M/S
PV PEAK VELOCITY: 0.89 CM/S
RA MAJOR: 5.7 CM
RA PRESSURE: 3 MMHG
RA WIDTH: 3.96 CM
RIGHT VENTRICULAR END-DIASTOLIC DIMENSION: 4.08 CM
RV TISSUE DOPPLER FREE WALL SYSTOLIC VELOCITY 1 (APICAL 4 CHAMBER VIEW): 9.69 CM/S
SINUS: 3.83 CM
STJ: 3.3 CM
STRESS ECHO POST EXERCISE DUR MIN: 4 MINUTES
STRESS ECHO POST EXERCISE DUR SEC: 11 SECONDS
SYSTOLIC BLOOD PRESSURE: 183 MMHG
TDI LATERAL: 0.09 M/S
TDI SEPTAL: 0.06 M/S
TDI: 0.08 M/S
TR MAX PG: 35 MMHG
TRICUSPID ANNULAR PLANE SYSTOLIC EXCURSION: 2.5 CM
TV REST PULMONARY ARTERY PRESSURE: 38 MMHG

## 2022-08-30 PROCEDURE — 93018 STRESS TEST WITH MYOCARDIAL PERFUSION (CUPID ONLY): ICD-10-PCS | Mod: ,,, | Performed by: INTERNAL MEDICINE

## 2022-08-30 PROCEDURE — 93016 STRESS TEST WITH MYOCARDIAL PERFUSION (CUPID ONLY): ICD-10-PCS | Mod: ,,, | Performed by: INTERNAL MEDICINE

## 2022-08-30 PROCEDURE — 93306 TTE W/DOPPLER COMPLETE: CPT

## 2022-08-30 PROCEDURE — 78452 STRESS TEST WITH MYOCARDIAL PERFUSION (CUPID ONLY): ICD-10-PCS | Mod: 26,,, | Performed by: INTERNAL MEDICINE

## 2022-08-30 PROCEDURE — 93227 HOLTER MONITOR - 48 HOUR (CUPID ONLY): ICD-10-PCS | Mod: ,,, | Performed by: INTERNAL MEDICINE

## 2022-08-30 PROCEDURE — 93225 XTRNL ECG REC<48 HRS REC: CPT

## 2022-08-30 PROCEDURE — 93306 TTE W/DOPPLER COMPLETE: CPT | Mod: 26,,, | Performed by: INTERNAL MEDICINE

## 2022-08-30 PROCEDURE — 93017 CV STRESS TEST TRACING ONLY: CPT

## 2022-08-30 PROCEDURE — 78452 HT MUSCLE IMAGE SPECT MULT: CPT | Mod: 26,,, | Performed by: INTERNAL MEDICINE

## 2022-08-30 PROCEDURE — 93306 ECHO (CUPID ONLY): ICD-10-PCS | Mod: 26,,, | Performed by: INTERNAL MEDICINE

## 2022-08-30 PROCEDURE — 93016 CV STRESS TEST SUPVJ ONLY: CPT | Mod: ,,, | Performed by: INTERNAL MEDICINE

## 2022-08-30 PROCEDURE — A9502 TC99M TETROFOSMIN: HCPCS

## 2022-08-30 PROCEDURE — 93018 CV STRESS TEST I&R ONLY: CPT | Mod: ,,, | Performed by: INTERNAL MEDICINE

## 2022-08-30 PROCEDURE — 93227 XTRNL ECG REC<48 HR R&I: CPT | Mod: ,,, | Performed by: INTERNAL MEDICINE

## 2022-08-30 NOTE — NURSING
Patient completed nuclear treadmill stress test, achieving max  during test. In recovery, patient noted to be in SVT with , /102, SpO2 86%. Placed on 3 L/min NC and Dr. Wade called. All vitals improved back to baseline within a few minutes: HR 86, /94, SpO2 99% on room air. Patient stated breathing was back to baseline. Dr. Wade notified and instructed to continue to monitor and administer Labetalol IV if needed.

## 2022-09-01 LAB
OHS CV EVENT MONITOR DAY: 0
OHS CV HOLTER LENGTH DECIMAL HOURS: 47.98
OHS CV HOLTER LENGTH HOURS: 47
OHS CV HOLTER LENGTH MINUTES: 59
OHS CV HOLTER SINUS AVERAGE HR: 88
OHS CV HOLTER SINUS MAX HR: 164
OHS CV HOLTER SINUS MIN HR: 44

## 2022-09-06 ENCOUNTER — OFFICE VISIT (OUTPATIENT)
Dept: CARDIOLOGY | Facility: CLINIC | Age: 67
End: 2022-09-06
Payer: MEDICARE

## 2022-09-06 VITALS
RESPIRATION RATE: 18 BRPM | BODY MASS INDEX: 25.5 KG/M2 | HEART RATE: 85 BPM | HEIGHT: 77 IN | DIASTOLIC BLOOD PRESSURE: 64 MMHG | WEIGHT: 215.94 LBS | OXYGEN SATURATION: 97 % | SYSTOLIC BLOOD PRESSURE: 118 MMHG

## 2022-09-06 DIAGNOSIS — F12.90 MARIJUANA USER: ICD-10-CM

## 2022-09-06 DIAGNOSIS — R05.8 ALLERGIC COUGH: ICD-10-CM

## 2022-09-06 DIAGNOSIS — J64: ICD-10-CM

## 2022-09-06 DIAGNOSIS — M19.90 ARTHRITIS: ICD-10-CM

## 2022-09-06 DIAGNOSIS — R00.2 PALPITATIONS: Primary | ICD-10-CM

## 2022-09-06 DIAGNOSIS — K21.9 GASTROESOPHAGEAL REFLUX DISEASE, UNSPECIFIED WHETHER ESOPHAGITIS PRESENT: ICD-10-CM

## 2022-09-06 DIAGNOSIS — Z86.19 HISTORY OF HEPATITIS C: ICD-10-CM

## 2022-09-06 DIAGNOSIS — D75.1 POLYCYTHEMIA: ICD-10-CM

## 2022-09-06 DIAGNOSIS — J84.9 ILD (INTERSTITIAL LUNG DISEASE): ICD-10-CM

## 2022-09-06 DIAGNOSIS — I70.0 THORACIC AORTA ATHEROSCLEROSIS: ICD-10-CM

## 2022-09-06 DIAGNOSIS — E66.3 OVERWEIGHT (BMI 25.0-29.9): ICD-10-CM

## 2022-09-06 DIAGNOSIS — I27.20 PULMONARY HYPERTENSION: ICD-10-CM

## 2022-09-06 DIAGNOSIS — J84.10 PULMONARY INTERSTITIAL FIBROSIS: ICD-10-CM

## 2022-09-06 DIAGNOSIS — R73.03 PREDIABETES: ICD-10-CM

## 2022-09-06 DIAGNOSIS — I10 HTN, GOAL BELOW 140/90: ICD-10-CM

## 2022-09-06 DIAGNOSIS — Z87.891 FORMER SMOKER: ICD-10-CM

## 2022-09-06 PROCEDURE — 1160F RVW MEDS BY RX/DR IN RCRD: CPT | Mod: CPTII,S$GLB,, | Performed by: INTERNAL MEDICINE

## 2022-09-06 PROCEDURE — 3008F BODY MASS INDEX DOCD: CPT | Mod: CPTII,S$GLB,, | Performed by: INTERNAL MEDICINE

## 2022-09-06 PROCEDURE — 1101F PR PT FALLS ASSESS DOC 0-1 FALLS W/OUT INJ PAST YR: ICD-10-PCS | Mod: CPTII,S$GLB,, | Performed by: INTERNAL MEDICINE

## 2022-09-06 PROCEDURE — 99999 PR PBB SHADOW E&M-EST. PATIENT-LVL III: CPT | Mod: PBBFAC,,, | Performed by: INTERNAL MEDICINE

## 2022-09-06 PROCEDURE — 1159F PR MEDICATION LIST DOCUMENTED IN MEDICAL RECORD: ICD-10-PCS | Mod: CPTII,S$GLB,, | Performed by: INTERNAL MEDICINE

## 2022-09-06 PROCEDURE — 1101F PT FALLS ASSESS-DOCD LE1/YR: CPT | Mod: CPTII,S$GLB,, | Performed by: INTERNAL MEDICINE

## 2022-09-06 PROCEDURE — 4010F ACE/ARB THERAPY RXD/TAKEN: CPT | Mod: CPTII,S$GLB,, | Performed by: INTERNAL MEDICINE

## 2022-09-06 PROCEDURE — 3044F HG A1C LEVEL LT 7.0%: CPT | Mod: CPTII,S$GLB,, | Performed by: INTERNAL MEDICINE

## 2022-09-06 PROCEDURE — 1157F ADVNC CARE PLAN IN RCRD: CPT | Mod: CPTII,S$GLB,, | Performed by: INTERNAL MEDICINE

## 2022-09-06 PROCEDURE — 99214 OFFICE O/P EST MOD 30 MIN: CPT | Mod: S$GLB,,, | Performed by: INTERNAL MEDICINE

## 2022-09-06 PROCEDURE — 99214 PR OFFICE/OUTPT VISIT, EST, LEVL IV, 30-39 MIN: ICD-10-PCS | Mod: S$GLB,,, | Performed by: INTERNAL MEDICINE

## 2022-09-06 PROCEDURE — 3044F PR MOST RECENT HEMOGLOBIN A1C LEVEL <7.0%: ICD-10-PCS | Mod: CPTII,S$GLB,, | Performed by: INTERNAL MEDICINE

## 2022-09-06 PROCEDURE — 3074F PR MOST RECENT SYSTOLIC BLOOD PRESSURE < 130 MM HG: ICD-10-PCS | Mod: CPTII,S$GLB,, | Performed by: INTERNAL MEDICINE

## 2022-09-06 PROCEDURE — 3288F FALL RISK ASSESSMENT DOCD: CPT | Mod: CPTII,S$GLB,, | Performed by: INTERNAL MEDICINE

## 2022-09-06 PROCEDURE — 3288F PR FALLS RISK ASSESSMENT DOCUMENTED: ICD-10-PCS | Mod: CPTII,S$GLB,, | Performed by: INTERNAL MEDICINE

## 2022-09-06 PROCEDURE — 1159F MED LIST DOCD IN RCRD: CPT | Mod: CPTII,S$GLB,, | Performed by: INTERNAL MEDICINE

## 2022-09-06 PROCEDURE — 3078F PR MOST RECENT DIASTOLIC BLOOD PRESSURE < 80 MM HG: ICD-10-PCS | Mod: CPTII,S$GLB,, | Performed by: INTERNAL MEDICINE

## 2022-09-06 PROCEDURE — 1160F PR REVIEW ALL MEDS BY PRESCRIBER/CLIN PHARMACIST DOCUMENTED: ICD-10-PCS | Mod: CPTII,S$GLB,, | Performed by: INTERNAL MEDICINE

## 2022-09-06 PROCEDURE — 99999 PR PBB SHADOW E&M-EST. PATIENT-LVL III: ICD-10-PCS | Mod: PBBFAC,,, | Performed by: INTERNAL MEDICINE

## 2022-09-06 PROCEDURE — 3074F SYST BP LT 130 MM HG: CPT | Mod: CPTII,S$GLB,, | Performed by: INTERNAL MEDICINE

## 2022-09-06 PROCEDURE — 1126F PR PAIN SEVERITY QUANTIFIED, NO PAIN PRESENT: ICD-10-PCS | Mod: CPTII,S$GLB,, | Performed by: INTERNAL MEDICINE

## 2022-09-06 PROCEDURE — 1157F PR ADVANCE CARE PLAN OR EQUIV PRESENT IN MEDICAL RECORD: ICD-10-PCS | Mod: CPTII,S$GLB,, | Performed by: INTERNAL MEDICINE

## 2022-09-06 PROCEDURE — 3008F PR BODY MASS INDEX (BMI) DOCUMENTED: ICD-10-PCS | Mod: CPTII,S$GLB,, | Performed by: INTERNAL MEDICINE

## 2022-09-06 PROCEDURE — 4010F PR ACE/ARB THEARPY RXD/TAKEN: ICD-10-PCS | Mod: CPTII,S$GLB,, | Performed by: INTERNAL MEDICINE

## 2022-09-06 PROCEDURE — 3078F DIAST BP <80 MM HG: CPT | Mod: CPTII,S$GLB,, | Performed by: INTERNAL MEDICINE

## 2022-09-06 PROCEDURE — 1126F AMNT PAIN NOTED NONE PRSNT: CPT | Mod: CPTII,S$GLB,, | Performed by: INTERNAL MEDICINE

## 2022-09-06 NOTE — PROGRESS NOTES
CARDIOVASCULAR CONSULTATION    REASON FOR CONSULT:   Josiah Orosco Jr. is a 66 y.o. male who presents for evaluation      HISTORY OF PRESENT ILLNESS:     Patient is a pleasant 66-year-old man.  Here for evaluation.  Patient states that lately he has been experiencing dyspnea on exertion.  Also he was told that he has a regular rhythm and hence has been referred.  Patient denies any palpitations.  Denies any orthopnea or PND.  EKG done personally reviewed and shows sinus rhythm with PACs.    Notes from September 2022:  Patient here for follow-up.    Sinus rhythm with heart rates varying between 44 and 164 BPM with an average of 88 BPM.  There were rare PVCs totalling 402 and averaging 8.38 per hour. There were 1 couplets. There were 1 triplets.  There were very frequent PACs  SVT/possible atrial flutter.  Recommend event monitor.    The left ventricle is normal in size with mild concentric hypertrophy and normal systolic function.  The estimated ejection fraction is 60%.  Indeterminate left ventricular diastolic function.  Mild right atrial enlargement.  Normal right ventricular size with normal right ventricular systolic function.  Mild tricuspid regurgitation.  Normal central venous pressure (3 mmHg).  The estimated PA systolic pressure is 38 mmHg.  There is mild pulmonary hypertension.      Normal myocardial perfusion scan. There is no evidence of myocardial ischemia or infarction.    There is a  mild to moderate intensity fixed perfusion abnormality in the inferior wall of the left ventricle secondary to diaphragm attenuation.    The gated perfusion images showed an ejection fraction of 61% post stress.    There is normal wall motion post stress.    The EKG portion of this study is negative for ischemia.    The patient reported no chest pain during the stress test.    During stress, occasional PVCs are noted. , During stress, SVTs are noted.          PAST MEDICAL HISTORY:     Past Medical History:   Diagnosis  Date    Arthritis     GERD (gastroesophageal reflux disease)     History of HCV, s/p successful treatment w/ SVR12 5/2015     Failed treatment (stage I/II) - followed by hepatology     Joint pain     Lung disease caused by breathing particles     Widespread essentially symmetric interstitial lung disease    Obesity     Polycythemia vera     Prediabetes        PAST SURGICAL HISTORY:     Past Surgical History:   Procedure Laterality Date    BUNIONECTOMY      bilateral    COLONOSCOPY N/A 12/9/2015    Procedure: COLONOSCOPY;  Surgeon: Conrad Iqbal MD;  Location: [facility];  Service: Endoscopy;  Laterality: N/A;    Liver biopsy x2      rotator cuff Right        ALLERGIES AND MEDICATION:     Review of patient's allergies indicates:   Allergen Reactions    Ace inhibitors Other (See Comments)     cough        Medication List            Accurate as of September 6, 2022  3:09 PM. If you have any questions, ask your nurse or doctor.                CONTINUE taking these medications      allopurinoL 100 MG tablet  Commonly known as: ZYLOPRIM  Take 2 tablets (200 mg total) by mouth once daily.     aspirin 81 MG EC tablet  Commonly known as: ECOTRIN     hydroCHLOROthiazide 12.5 MG Tab  Commonly known as: HYDRODIURIL  TK 1 T PO QD IN THE MORNING     levocetirizine 5 MG tablet  Commonly known as: XYZAL  Take 1 tablet (5 mg total) by mouth every evening.     losartan 50 MG tablet  Commonly known as: COZAAR  TK 1 T PO D              SOCIAL HISTORY:     Social History     Socioeconomic History    Marital status:     Number of children: 0    Highest education level: Some college, no degree   Occupational History    Occupation: Self-employed     Employer: [employer]   Tobacco Use    Smoking status: Former     Types: Cigars    Smokeless tobacco: Former    Tobacco comments:     pt. smokes 10 cigars per day.   Substance and Sexual Activity    Alcohol use: No     Alcohol/week: 0.0 standard drinks     Comment: Rarely    Drug  "use: Yes     Types: Marijuana     Comment: daily    Sexual activity: Yes     Partners: Female       FAMILY HISTORY:     Family History   Problem Relation Age of Onset    Heart disease Mother     Kidney disease Mother     Parkinsonism Father     Prostate cancer Cousin         maternal side    Heart attack Sister         CABG    Deep vein thrombosis Sister     Pulmonary embolism Sister     Osteoarthritis Sister         s/p knee replacement    Chronic back pain Brother     Liver disease Neg Hx     Diabetes Neg Hx     Melanoma Neg Hx        REVIEW OF SYSTEMS:   Review of Systems   Constitutional: Negative.   HENT: Negative.     Eyes: Negative.    Cardiovascular:  Positive for palpitations.   Endocrine: Negative.    Hematologic/Lymphatic: Negative.    Skin: Negative.    Musculoskeletal: Negative.    Gastrointestinal: Negative.    Genitourinary: Negative.    Neurological: Negative.    Psychiatric/Behavioral: Negative.     Allergic/Immunologic: Negative.      A 10 point review of systems was performed and all the pertinent positives have been mentioned. Rest of review of systems was negative.        PHYSICAL EXAM:     Vitals:    09/06/22 1451   BP: 118/64   Pulse: 85   Resp: 18    Body mass index is 25.61 kg/m².  Weight: 98 kg (215 lb 15.1 oz)   Height: 6' 5" (195.6 cm)     Physical Exam  Vitals reviewed.   Constitutional:       Appearance: He is well-developed.   HENT:      Head: Normocephalic.   Eyes:      Conjunctiva/sclera: Conjunctivae normal.      Pupils: Pupils are equal, round, and reactive to light.   Cardiovascular:      Rate and Rhythm: Normal rate and regular rhythm.      Heart sounds: Normal heart sounds.   Pulmonary:      Effort: Pulmonary effort is normal.      Breath sounds: Normal breath sounds.   Abdominal:      General: Bowel sounds are normal.      Palpations: Abdomen is soft.   Musculoskeletal:      Cervical back: Normal range of motion and neck supple.   Skin:     General: Skin is warm. "   Neurological:      Mental Status: He is alert and oriented to person, place, and time.         DATA:     Laboratory:  CBC:  Recent Labs   Lab 03/14/20  0837 03/06/21  0822 03/11/22  0705   WBC 5.79 6.91 6.87   Hemoglobin 12.6 L 13.6 L 14.8   Hematocrit 46.6 47.0 50.4   Platelets 279 259 256         CHEMISTRIES:  Recent Labs   Lab 03/14/20  0837 03/06/21  0822 03/11/22  0705   Glucose 95 96 101   Sodium 142 140 143   Potassium 5.0 4.4 4.6   BUN 13 12 10   Creatinine 0.9 0.9 0.9   eGFR if African American >60.0 >60.0 >60   eGFR if non African American >60.0 >60.0 >60   Calcium 9.2 9.3 9.5         CARDIAC BIOMARKERS:        COAGS:        LIPIDS/LFTS:  Recent Labs   Lab 03/14/20  0837 03/06/21  0822 03/11/22  0705   Cholesterol 144 136 142   Triglycerides 59 46 56   HDL 43 39 L 42   LDL Cholesterol 89.2 87.8 88.8   Non-HDL Cholesterol 101 97 100   AST 29 26 25   ALT 13 11 12         Hemoglobin A1C   Date Value Ref Range Status   03/11/2022 5.9 (H) 4.0 - 5.6 % Final     Comment:     ADA Screening Guidelines:  5.7-6.4%  Consistent with prediabetes  >or=6.5%  Consistent with diabetes    High levels of fetal hemoglobin interfere with the HbA1C  assay. Heterozygous hemoglobin variants (HbS, HgC, etc)do  not significantly interfere with this assay.   However, presence of multiple variants may affect accuracy.     03/06/2021 5.8 (H) 4.0 - 5.6 % Final     Comment:     ADA Screening Guidelines:  5.7-6.4%  Consistent with prediabetes  >or=6.5%  Consistent with diabetes    High levels of fetal hemoglobin interfere with the HbA1C  assay. Heterozygous hemoglobin variants (HbS, HgC, etc)do  not significantly interfere with this assay.   However, presence of multiple variants may affect accuracy.     03/14/2020 6.0 (H) 4.0 - 5.6 % Final     Comment:     ADA Screening Guidelines:  5.7-6.4%  Consistent with prediabetes  >or=6.5%  Consistent with diabetes  High levels of fetal hemoglobin interfere with the HbA1C  assay. Heterozygous  hemoglobin variants (HbS, HgC, etc)do  not significantly interfere with this assay.   However, presence of multiple variants may affect accuracy.         TSH        The 10-year ASCVD risk score (Felix DK, et al., 2019) is: 14.3%    Values used to calculate the score:      Age: 66 years      Sex: Male      Is Non- : Yes      Diabetic: No      Tobacco smoker: No      Systolic Blood Pressure: 118 mmHg      Is BP treated: Yes      HDL Cholesterol: 42 mg/dL      Total Cholesterol: 142 mg/dL             ASSESSMENT AND PLAN     Patient Active Problem List   Diagnosis    History of HCV, s/p successful treatment w/ SVR12 5/2015    Lung disease caused by breathing particles    Overweight (BMI 25.0-29.9)    GERD (gastroesophageal reflux disease)    Polycythemia    Former smoker    Hyperuricemia    ILD (interstitial lung disease)    Prediabetes    Arthritis    Allergic cough    HTN, goal below 140/90    Pulmonary interstitial fibrosis    Pulmonary hypertension    Marijuana user    Thoracic aorta atherosclerosis       Dyspnea on exertion, palpitations and shortness of breath.  Further evaluation with the help of stress test, echo, Holter monitor.  Stress test did not show any significant ischemia.  Echo showed normal left ventricle systolic function.  Holter was suspicious for SVT/atrial flutter.  Event monitor was recommended.  Will order event monitor.  Follow-up in 2-3 months.          Thank you very much for involving me in the care of your patient.  Please do not hesitate to contact me if there are any questions.      David Cueva MD, FACC, [facility]  Interventional Cardiologist, [facility].           This note was dictated with the help of speech recognition software.  There might be un-intended errors and/or substitutions.

## 2022-09-12 ENCOUNTER — CLINICAL SUPPORT (OUTPATIENT)
Dept: CARDIOLOGY | Facility: HOSPITAL | Age: 67
End: 2022-09-12
Attending: INTERNAL MEDICINE
Payer: MEDICARE

## 2022-09-12 DIAGNOSIS — R00.2 PALPITATIONS: ICD-10-CM

## 2022-09-12 PROCEDURE — 93272 ECG/REVIEW INTERPRET ONLY: CPT | Mod: ,,, | Performed by: INTERNAL MEDICINE

## 2022-09-12 PROCEDURE — 93272 CARDIAC EVENT MONITOR (CUPID ONLY): ICD-10-PCS | Mod: ,,, | Performed by: INTERNAL MEDICINE

## 2022-09-12 PROCEDURE — 93270 REMOTE 30 DAY ECG REV/REPORT: CPT

## 2022-10-24 ENCOUNTER — OFFICE VISIT (OUTPATIENT)
Dept: CARDIOLOGY | Facility: CLINIC | Age: 67
End: 2022-10-24
Payer: MEDICARE

## 2022-10-24 VITALS
RESPIRATION RATE: 18 BRPM | WEIGHT: 215.19 LBS | SYSTOLIC BLOOD PRESSURE: 118 MMHG | HEIGHT: 77 IN | BODY MASS INDEX: 25.41 KG/M2 | HEART RATE: 100 BPM | OXYGEN SATURATION: 98 % | DIASTOLIC BLOOD PRESSURE: 62 MMHG

## 2022-10-24 DIAGNOSIS — I27.20 PULMONARY HYPERTENSION: ICD-10-CM

## 2022-10-24 DIAGNOSIS — J84.10 PULMONARY INTERSTITIAL FIBROSIS: ICD-10-CM

## 2022-10-24 DIAGNOSIS — I10 HTN, GOAL BELOW 140/90: Primary | ICD-10-CM

## 2022-10-24 DIAGNOSIS — E66.3 OVERWEIGHT (BMI 25.0-29.9): ICD-10-CM

## 2022-10-24 DIAGNOSIS — K21.9 GASTROESOPHAGEAL REFLUX DISEASE, UNSPECIFIED WHETHER ESOPHAGITIS PRESENT: ICD-10-CM

## 2022-10-24 DIAGNOSIS — Z86.19 HISTORY OF HEPATITIS C: ICD-10-CM

## 2022-10-24 DIAGNOSIS — Z87.891 FORMER SMOKER: ICD-10-CM

## 2022-10-24 DIAGNOSIS — J64: ICD-10-CM

## 2022-10-24 DIAGNOSIS — J84.9 ILD (INTERSTITIAL LUNG DISEASE): ICD-10-CM

## 2022-10-24 DIAGNOSIS — M19.90 ARTHRITIS: ICD-10-CM

## 2022-10-24 DIAGNOSIS — I70.0 THORACIC AORTA ATHEROSCLEROSIS: ICD-10-CM

## 2022-10-24 PROCEDURE — 99999 PR PBB SHADOW E&M-EST. PATIENT-LVL III: CPT | Mod: PBBFAC,,, | Performed by: INTERNAL MEDICINE

## 2022-10-24 PROCEDURE — 4010F PR ACE/ARB THEARPY RXD/TAKEN: ICD-10-PCS | Mod: CPTII,S$GLB,, | Performed by: INTERNAL MEDICINE

## 2022-10-24 PROCEDURE — 4010F ACE/ARB THERAPY RXD/TAKEN: CPT | Mod: CPTII,S$GLB,, | Performed by: INTERNAL MEDICINE

## 2022-10-24 PROCEDURE — 3288F PR FALLS RISK ASSESSMENT DOCUMENTED: ICD-10-PCS | Mod: CPTII,S$GLB,, | Performed by: INTERNAL MEDICINE

## 2022-10-24 PROCEDURE — 1159F PR MEDICATION LIST DOCUMENTED IN MEDICAL RECORD: ICD-10-PCS | Mod: CPTII,S$GLB,, | Performed by: INTERNAL MEDICINE

## 2022-10-24 PROCEDURE — 99999 PR PBB SHADOW E&M-EST. PATIENT-LVL III: ICD-10-PCS | Mod: PBBFAC,,, | Performed by: INTERNAL MEDICINE

## 2022-10-24 PROCEDURE — 3074F PR MOST RECENT SYSTOLIC BLOOD PRESSURE < 130 MM HG: ICD-10-PCS | Mod: CPTII,S$GLB,, | Performed by: INTERNAL MEDICINE

## 2022-10-24 PROCEDURE — 1126F PR PAIN SEVERITY QUANTIFIED, NO PAIN PRESENT: ICD-10-PCS | Mod: CPTII,S$GLB,, | Performed by: INTERNAL MEDICINE

## 2022-10-24 PROCEDURE — 99214 PR OFFICE/OUTPT VISIT, EST, LEVL IV, 30-39 MIN: ICD-10-PCS | Mod: S$GLB,,, | Performed by: INTERNAL MEDICINE

## 2022-10-24 PROCEDURE — 1101F PT FALLS ASSESS-DOCD LE1/YR: CPT | Mod: CPTII,S$GLB,, | Performed by: INTERNAL MEDICINE

## 2022-10-24 PROCEDURE — 1157F PR ADVANCE CARE PLAN OR EQUIV PRESENT IN MEDICAL RECORD: ICD-10-PCS | Mod: CPTII,S$GLB,, | Performed by: INTERNAL MEDICINE

## 2022-10-24 PROCEDURE — 1157F ADVNC CARE PLAN IN RCRD: CPT | Mod: CPTII,S$GLB,, | Performed by: INTERNAL MEDICINE

## 2022-10-24 PROCEDURE — 3044F HG A1C LEVEL LT 7.0%: CPT | Mod: CPTII,S$GLB,, | Performed by: INTERNAL MEDICINE

## 2022-10-24 PROCEDURE — 1126F AMNT PAIN NOTED NONE PRSNT: CPT | Mod: CPTII,S$GLB,, | Performed by: INTERNAL MEDICINE

## 2022-10-24 PROCEDURE — 3078F DIAST BP <80 MM HG: CPT | Mod: CPTII,S$GLB,, | Performed by: INTERNAL MEDICINE

## 2022-10-24 PROCEDURE — 1101F PR PT FALLS ASSESS DOC 0-1 FALLS W/OUT INJ PAST YR: ICD-10-PCS | Mod: CPTII,S$GLB,, | Performed by: INTERNAL MEDICINE

## 2022-10-24 PROCEDURE — 3044F PR MOST RECENT HEMOGLOBIN A1C LEVEL <7.0%: ICD-10-PCS | Mod: CPTII,S$GLB,, | Performed by: INTERNAL MEDICINE

## 2022-10-24 PROCEDURE — 1160F PR REVIEW ALL MEDS BY PRESCRIBER/CLIN PHARMACIST DOCUMENTED: ICD-10-PCS | Mod: CPTII,S$GLB,, | Performed by: INTERNAL MEDICINE

## 2022-10-24 PROCEDURE — 3288F FALL RISK ASSESSMENT DOCD: CPT | Mod: CPTII,S$GLB,, | Performed by: INTERNAL MEDICINE

## 2022-10-24 PROCEDURE — 3074F SYST BP LT 130 MM HG: CPT | Mod: CPTII,S$GLB,, | Performed by: INTERNAL MEDICINE

## 2022-10-24 PROCEDURE — 99214 OFFICE O/P EST MOD 30 MIN: CPT | Mod: S$GLB,,, | Performed by: INTERNAL MEDICINE

## 2022-10-24 PROCEDURE — 1159F MED LIST DOCD IN RCRD: CPT | Mod: CPTII,S$GLB,, | Performed by: INTERNAL MEDICINE

## 2022-10-24 PROCEDURE — 3078F PR MOST RECENT DIASTOLIC BLOOD PRESSURE < 80 MM HG: ICD-10-PCS | Mod: CPTII,S$GLB,, | Performed by: INTERNAL MEDICINE

## 2022-10-24 PROCEDURE — 1160F RVW MEDS BY RX/DR IN RCRD: CPT | Mod: CPTII,S$GLB,, | Performed by: INTERNAL MEDICINE

## 2022-10-24 RX ORDER — METOPROLOL SUCCINATE 25 MG/1
25 TABLET, EXTENDED RELEASE ORAL DAILY
Qty: 90 TABLET | Refills: 3 | Status: SHIPPED | OUTPATIENT
Start: 2022-10-24 | End: 2023-04-26

## 2022-10-24 NOTE — PROGRESS NOTES
CARDIOVASCULAR CONSULTATION    REASON FOR CONSULT:   Josiah Orosco Jr. is a 67 y.o. male who presents for evaluation      HISTORY OF PRESENT ILLNESS:     Patient is a pleasant 66-year-old man.  Here for evaluation.  Patient states that lately he has been experiencing dyspnea on exertion.  Also he was told that he has a regular rhythm and hence has been referred.  Patient denies any palpitations.  Denies any orthopnea or PND.  EKG done personally reviewed and shows sinus rhythm with PACs.    Notes from September 2022:  Patient here for follow-up.    Sinus rhythm with heart rates varying between 44 and 164 BPM with an average of 88 BPM.  There were rare PVCs totalling 402 and averaging 8.38 per hour. There were 1 couplets. There were 1 triplets.  There were very frequent PACs  SVT/possible atrial flutter.  Recommend event monitor.    The left ventricle is normal in size with mild concentric hypertrophy and normal systolic function.  The estimated ejection fraction is 60%.  Indeterminate left ventricular diastolic function.  Mild right atrial enlargement.  Normal right ventricular size with normal right ventricular systolic function.  Mild tricuspid regurgitation.  Normal central venous pressure (3 mmHg).  The estimated PA systolic pressure is 38 mmHg.  There is mild pulmonary hypertension.      Normal myocardial perfusion scan. There is no evidence of myocardial ischemia or infarction.    There is a  mild to moderate intensity fixed perfusion abnormality in the inferior wall of the left ventricle secondary to diaphragm attenuation.    The gated perfusion images showed an ejection fraction of 61% post stress.    There is normal wall motion post stress.    The EKG portion of this study is negative for ischemia.    The patient reported no chest pain during the stress test.    During stress, occasional PVCs are noted. , During stress, SVTs are noted.      Notes from October 2022:    Patient here for follow-up.  Doing  fine.  Event monitor showed some PACs and periods of tachycardia as described below.  Patient asymptomatic.      At baseline, in the absence of symptoms, the rhythm was sinus with heart rate 85 beats per minute.  There were PACs and sequential PACs.  An auto trigger event on 09/27 showed sinus tachycardia at 155 beats per minute, and on 09/28 another auto trigger event also showed sinus tachycardia.  There also was a regular narrow complex tachycardia at 157 beats per minute.  This lasted 27 seconds and began following 2 PVCs.  P-waves were not clearly evident.  There was a gradual slow down with P-waves becoming evident, to become consistent with sinus tachycardia with occasional PACs.  No activity level (e.g., exercise) was specified.     Impression: Sinus rhythm with PACs and periods of tachycardia, as described above.        PAST MEDICAL HISTORY:     Past Medical History:   Diagnosis Date    Arthritis     GERD (gastroesophageal reflux disease)     History of HCV, s/p successful treatment w/ SVR12 5/2015     Failed treatment (stage I/II) - followed by hepatology     Joint pain     Lung disease caused by breathing particles     Widespread essentially symmetric interstitial lung disease    Obesity     Polycythemia vera     Prediabetes        PAST SURGICAL HISTORY:     Past Surgical History:   Procedure Laterality Date    BUNIONECTOMY      bilateral    COLONOSCOPY N/A 12/9/2015    Procedure: COLONOSCOPY;  Surgeon: Conrad Iqbal MD;  Location: [facility];  Service: Endoscopy;  Laterality: N/A;    Liver biopsy x2      rotator cuff Right        ALLERGIES AND MEDICATION:     Review of patient's allergies indicates:   Allergen Reactions    Ace inhibitors Other (See Comments)     cough        Medication List            Accurate as of October 24, 2022  3:00 PM. If you have any questions, ask your nurse or doctor.                CONTINUE taking these medications      allopurinoL 100 MG tablet  Commonly known as:  ZYLOPRIM  Take 2 tablets (200 mg total) by mouth once daily.     aspirin 81 MG EC tablet  Commonly known as: ECOTRIN     hydroCHLOROthiazide 12.5 MG Tab  Commonly known as: HYDRODIURIL  TK 1 T PO QD IN THE MORNING     levocetirizine 5 MG tablet  Commonly known as: XYZAL  Take 1 tablet (5 mg total) by mouth every evening.     losartan 50 MG tablet  Commonly known as: COZAAR  TK 1 T PO D              SOCIAL HISTORY:     Social History     Socioeconomic History    Marital status:     Number of children: 0    Highest education level: Some college, no degree   Occupational History    Occupation: Self-employed     Employer: [employer]   Tobacco Use    Smoking status: Former     Types: Cigars    Smokeless tobacco: Former    Tobacco comments:     pt. smokes 10 cigars per day.   Substance and Sexual Activity    Alcohol use: No     Alcohol/week: 0.0 standard drinks     Comment: Rarely    Drug use: Yes     Types: Marijuana     Comment: daily    Sexual activity: Yes     Partners: Female       FAMILY HISTORY:     Family History   Problem Relation Age of Onset    Heart disease Mother     Kidney disease Mother     Parkinsonism Father     Prostate cancer Cousin         maternal side    Heart attack Sister         CABG    Deep vein thrombosis Sister     Pulmonary embolism Sister     Osteoarthritis Sister         s/p knee replacement    Chronic back pain Brother     Liver disease Neg Hx     Diabetes Neg Hx     Melanoma Neg Hx        REVIEW OF SYSTEMS:   Review of Systems   Constitutional: Negative.   HENT: Negative.     Eyes: Negative.    Cardiovascular:  Positive for palpitations.   Endocrine: Negative.    Hematologic/Lymphatic: Negative.    Skin: Negative.    Musculoskeletal: Negative.    Gastrointestinal: Negative.    Genitourinary: Negative.    Neurological: Negative.    Psychiatric/Behavioral: Negative.     Allergic/Immunologic: Negative.      A 10 point review of systems was performed and all the pertinent positives  "have been mentioned. Rest of review of systems was negative.        PHYSICAL EXAM:     Vitals:    10/24/22 1454   BP: 118/62   Pulse: 100   Resp: 18    Body mass index is 25.52 kg/m².  Weight: 97.6 kg (215 lb 2.7 oz)   Height: 6' 5" (195.6 cm)     Physical Exam  Vitals reviewed.   Constitutional:       Appearance: He is well-developed.   HENT:      Head: Normocephalic.   Eyes:      Conjunctiva/sclera: Conjunctivae normal.      Pupils: Pupils are equal, round, and reactive to light.   Cardiovascular:      Rate and Rhythm: Normal rate and regular rhythm.      Heart sounds: Normal heart sounds.   Pulmonary:      Effort: Pulmonary effort is normal.      Breath sounds: Normal breath sounds.   Abdominal:      General: Bowel sounds are normal.      Palpations: Abdomen is soft.   Musculoskeletal:      Cervical back: Normal range of motion and neck supple.   Skin:     General: Skin is warm.   Neurological:      Mental Status: He is alert and oriented to person, place, and time.         DATA:     Laboratory:  CBC:  Recent Labs   Lab 03/14/20  0837 03/06/21  0822 03/11/22  0705   WBC 5.79 6.91 6.87   Hemoglobin 12.6 L 13.6 L 14.8   Hematocrit 46.6 47.0 50.4   Platelets 279 259 256         CHEMISTRIES:  Recent Labs   Lab 03/14/20  0837 03/06/21  0822 03/11/22  0705   Glucose 95 96 101   Sodium 142 140 143   Potassium 5.0 4.4 4.6   BUN 13 12 10   Creatinine 0.9 0.9 0.9   eGFR if African American >60.0 >60.0 >60   eGFR if non African American >60.0 >60.0 >60   Calcium 9.2 9.3 9.5         CARDIAC BIOMARKERS:        COAGS:        LIPIDS/LFTS:  Recent Labs   Lab 03/14/20  0837 03/06/21  0822 03/11/22  0705   Cholesterol 144 136 142   Triglycerides 59 46 56   HDL 43 39 L 42   LDL Cholesterol 89.2 87.8 88.8   Non-HDL Cholesterol 101 97 100   AST 29 26 25   ALT 13 11 12         Hemoglobin A1C   Date Value Ref Range Status   03/11/2022 5.9 (H) 4.0 - 5.6 % Final     Comment:     ADA Screening Guidelines:  5.7-6.4%  Consistent with " prediabetes  >or=6.5%  Consistent with diabetes    High levels of fetal hemoglobin interfere with the HbA1C  assay. Heterozygous hemoglobin variants (HbS, HgC, etc)do  not significantly interfere with this assay.   However, presence of multiple variants may affect accuracy.     03/06/2021 5.8 (H) 4.0 - 5.6 % Final     Comment:     ADA Screening Guidelines:  5.7-6.4%  Consistent with prediabetes  >or=6.5%  Consistent with diabetes    High levels of fetal hemoglobin interfere with the HbA1C  assay. Heterozygous hemoglobin variants (HbS, HgC, etc)do  not significantly interfere with this assay.   However, presence of multiple variants may affect accuracy.     03/14/2020 6.0 (H) 4.0 - 5.6 % Final     Comment:     ADA Screening Guidelines:  5.7-6.4%  Consistent with prediabetes  >or=6.5%  Consistent with diabetes  High levels of fetal hemoglobin interfere with the HbA1C  assay. Heterozygous hemoglobin variants (HbS, HgC, etc)do  not significantly interfere with this assay.   However, presence of multiple variants may affect accuracy.         TSH        The 10-year ASCVD risk score (Felix DK, et al., 2019) is: 14.8%    Values used to calculate the score:      Age: 67 years      Sex: Male      Is Non- : Yes      Diabetic: No      Tobacco smoker: No      Systolic Blood Pressure: 118 mmHg      Is BP treated: Yes      HDL Cholesterol: 42 mg/dL      Total Cholesterol: 142 mg/dL             ASSESSMENT AND PLAN     Patient Active Problem List   Diagnosis    History of HCV, s/p successful treatment w/ SVR12 5/2015    Lung disease caused by breathing particles    Overweight (BMI 25.0-29.9)    GERD (gastroesophageal reflux disease)    Polycythemia    Former smoker    Hyperuricemia    ILD (interstitial lung disease)    Prediabetes    Arthritis    Allergic cough    HTN, goal below 140/90    Pulmonary interstitial fibrosis    Pulmonary hypertension    Marijuana user    Thoracic aorta atherosclerosis        Dyspnea on exertion, palpitations and shortness of breath.  Further evaluation with the help of stress test, echo, Holter monitor.  Stress test did not show any significant ischemia.  Echo showed normal left ventricle systolic function.  Discontinue hydrochlorothiazide.  Start Toprol-XL 25 mg daily     Follow-up in 3 months.        Thank you very much for involving me in the care of your patient.  Please do not hesitate to contact me if there are any questions.      David Cueva MD, FACC, [facility]  Interventional Cardiologist, [facility].           This note was dictated with the help of speech recognition software.  There might be un-intended errors and/or substitutions.

## 2022-11-28 ENCOUNTER — TELEPHONE (OUTPATIENT)
Dept: FAMILY MEDICINE | Facility: CLINIC | Age: 67
End: 2022-11-28
Payer: MEDICARE

## 2022-11-28 NOTE — TELEPHONE ENCOUNTER
----- Message from Lise Brian sent at 11/28/2022 12:58 PM CST -----  Type: Call Back      Who called: Doreen       What is the request in detail: Patient is requesting a call back. Pt wife states that patient needs a surgery clearance to have cataract surgery. She stats that she would like to schedule an appointment between 12/26 - 01/06.   Please advise.     Can the clinic reply by [facility]? No      Would the patient rather a call back or a response via [facility]? Call back       Best call back number: [phone] (home) [phone] (work)      Additional Information:

## 2022-11-28 NOTE — TELEPHONE ENCOUNTER
Called pt twice, no answer. LVM for pt to call the clinic back to scheduled pre op clearance with Dr. Landry.

## 2022-11-29 ENCOUNTER — TELEPHONE (OUTPATIENT)
Dept: FAMILY MEDICINE | Facility: CLINIC | Age: 67
End: 2022-11-29
Payer: MEDICARE

## 2022-11-29 NOTE — TELEPHONE ENCOUNTER
----- Message from Roxanne French sent at 11/29/2022 11:46 AM CST -----  Regarding: rt a missed call   Type:  Patient Returning Call    Who Called: Doreen pt wife     Who Left Message for Patient:unknown    Does the patient know what this is regarding? yes    Best Call Back Number:[phone]    Additional Information:

## 2022-11-29 NOTE — TELEPHONE ENCOUNTER
Called and spoke with wife (ANDREW) and scheduled pre-op appointment 12/29 at 11:00 am with NP Sonja Carpenter

## 2022-12-20 ENCOUNTER — TELEPHONE (OUTPATIENT)
Dept: PULMONOLOGY | Facility: CLINIC | Age: 67
End: 2022-12-20
Payer: MEDICARE

## 2022-12-20 DIAGNOSIS — J84.10 PULMONARY INTERSTITIAL FIBROSIS: Primary | ICD-10-CM

## 2022-12-20 NOTE — TELEPHONE ENCOUNTER
----- Message from Rossana Osorio sent at 12/20/2022  8:43 AM CST -----  Contact: Pt  Pt is requesting a callback from Melisa to schedule pt from a recall letter with provider and Xray. Pt is requesting Feb. Appts.      Confirmed contact below:   Contact Name:Josiah Landrumord  Phone Number: [phone]

## 2022-12-20 NOTE — TELEPHONE ENCOUNTER
Mrs Orosco called to request a February visit for her  Josiah for Pulmonary Fibrosis. I told her Dr Hunter's schedule is not open yet but I will hold on to their message and will schedule it in February and mail. Melisa Bhatt

## 2022-12-29 ENCOUNTER — OFFICE VISIT (OUTPATIENT)
Dept: FAMILY MEDICINE | Facility: CLINIC | Age: 67
End: 2022-12-29
Payer: MEDICARE

## 2022-12-29 VITALS
HEIGHT: 77 IN | SYSTOLIC BLOOD PRESSURE: 130 MMHG | WEIGHT: 217.5 LBS | HEART RATE: 88 BPM | TEMPERATURE: 99 F | BODY MASS INDEX: 25.68 KG/M2 | DIASTOLIC BLOOD PRESSURE: 76 MMHG | OXYGEN SATURATION: 96 %

## 2022-12-29 DIAGNOSIS — R05.8 ALLERGIC COUGH: ICD-10-CM

## 2022-12-29 DIAGNOSIS — I27.20 PULMONARY HYPERTENSION: ICD-10-CM

## 2022-12-29 DIAGNOSIS — I10 HTN, GOAL BELOW 140/90: ICD-10-CM

## 2022-12-29 DIAGNOSIS — J84.10 PULMONARY INTERSTITIAL FIBROSIS: ICD-10-CM

## 2022-12-29 DIAGNOSIS — K21.9 GASTROESOPHAGEAL REFLUX DISEASE, UNSPECIFIED WHETHER ESOPHAGITIS PRESENT: ICD-10-CM

## 2022-12-29 DIAGNOSIS — E66.3 OVERWEIGHT (BMI 25.0-29.9): ICD-10-CM

## 2022-12-29 DIAGNOSIS — D75.1 POLYCYTHEMIA: ICD-10-CM

## 2022-12-29 DIAGNOSIS — Z01.818 PRE-OP EVALUATION: Primary | ICD-10-CM

## 2022-12-29 PROCEDURE — 4010F PR ACE/ARB THEARPY RXD/TAKEN: ICD-10-PCS | Mod: CPTII,S$GLB,, | Performed by: NURSE PRACTITIONER

## 2022-12-29 PROCEDURE — 1126F AMNT PAIN NOTED NONE PRSNT: CPT | Mod: CPTII,S$GLB,, | Performed by: NURSE PRACTITIONER

## 2022-12-29 PROCEDURE — 3008F BODY MASS INDEX DOCD: CPT | Mod: CPTII,S$GLB,, | Performed by: NURSE PRACTITIONER

## 2022-12-29 PROCEDURE — 1159F PR MEDICATION LIST DOCUMENTED IN MEDICAL RECORD: ICD-10-PCS | Mod: CPTII,S$GLB,, | Performed by: NURSE PRACTITIONER

## 2022-12-29 PROCEDURE — 3075F SYST BP GE 130 - 139MM HG: CPT | Mod: CPTII,S$GLB,, | Performed by: NURSE PRACTITIONER

## 2022-12-29 PROCEDURE — 1159F MED LIST DOCD IN RCRD: CPT | Mod: CPTII,S$GLB,, | Performed by: NURSE PRACTITIONER

## 2022-12-29 PROCEDURE — 1157F ADVNC CARE PLAN IN RCRD: CPT | Mod: CPTII,S$GLB,, | Performed by: NURSE PRACTITIONER

## 2022-12-29 PROCEDURE — 3008F PR BODY MASS INDEX (BMI) DOCUMENTED: ICD-10-PCS | Mod: CPTII,S$GLB,, | Performed by: NURSE PRACTITIONER

## 2022-12-29 PROCEDURE — 3288F PR FALLS RISK ASSESSMENT DOCUMENTED: ICD-10-PCS | Mod: CPTII,S$GLB,, | Performed by: NURSE PRACTITIONER

## 2022-12-29 PROCEDURE — 1101F PT FALLS ASSESS-DOCD LE1/YR: CPT | Mod: CPTII,S$GLB,, | Performed by: NURSE PRACTITIONER

## 2022-12-29 PROCEDURE — 99999 PR PBB SHADOW E&M-EST. PATIENT-LVL III: ICD-10-PCS | Mod: PBBFAC,,, | Performed by: NURSE PRACTITIONER

## 2022-12-29 PROCEDURE — 3075F PR MOST RECENT SYSTOLIC BLOOD PRESS GE 130-139MM HG: ICD-10-PCS | Mod: CPTII,S$GLB,, | Performed by: NURSE PRACTITIONER

## 2022-12-29 PROCEDURE — 1101F PR PT FALLS ASSESS DOC 0-1 FALLS W/OUT INJ PAST YR: ICD-10-PCS | Mod: CPTII,S$GLB,, | Performed by: NURSE PRACTITIONER

## 2022-12-29 PROCEDURE — 3078F PR MOST RECENT DIASTOLIC BLOOD PRESSURE < 80 MM HG: ICD-10-PCS | Mod: CPTII,S$GLB,, | Performed by: NURSE PRACTITIONER

## 2022-12-29 PROCEDURE — 1126F PR PAIN SEVERITY QUANTIFIED, NO PAIN PRESENT: ICD-10-PCS | Mod: CPTII,S$GLB,, | Performed by: NURSE PRACTITIONER

## 2022-12-29 PROCEDURE — 3044F HG A1C LEVEL LT 7.0%: CPT | Mod: CPTII,S$GLB,, | Performed by: NURSE PRACTITIONER

## 2022-12-29 PROCEDURE — 1160F PR REVIEW ALL MEDS BY PRESCRIBER/CLIN PHARMACIST DOCUMENTED: ICD-10-PCS | Mod: CPTII,S$GLB,, | Performed by: NURSE PRACTITIONER

## 2022-12-29 PROCEDURE — 3078F DIAST BP <80 MM HG: CPT | Mod: CPTII,S$GLB,, | Performed by: NURSE PRACTITIONER

## 2022-12-29 PROCEDURE — 4010F ACE/ARB THERAPY RXD/TAKEN: CPT | Mod: CPTII,S$GLB,, | Performed by: NURSE PRACTITIONER

## 2022-12-29 PROCEDURE — 1157F PR ADVANCE CARE PLAN OR EQUIV PRESENT IN MEDICAL RECORD: ICD-10-PCS | Mod: CPTII,S$GLB,, | Performed by: NURSE PRACTITIONER

## 2022-12-29 PROCEDURE — 1160F RVW MEDS BY RX/DR IN RCRD: CPT | Mod: CPTII,S$GLB,, | Performed by: NURSE PRACTITIONER

## 2022-12-29 PROCEDURE — 3044F PR MOST RECENT HEMOGLOBIN A1C LEVEL <7.0%: ICD-10-PCS | Mod: CPTII,S$GLB,, | Performed by: NURSE PRACTITIONER

## 2022-12-29 PROCEDURE — 99214 OFFICE O/P EST MOD 30 MIN: CPT | Mod: S$GLB,,, | Performed by: NURSE PRACTITIONER

## 2022-12-29 PROCEDURE — 3288F FALL RISK ASSESSMENT DOCD: CPT | Mod: CPTII,S$GLB,, | Performed by: NURSE PRACTITIONER

## 2022-12-29 PROCEDURE — 99214 PR OFFICE/OUTPT VISIT, EST, LEVL IV, 30-39 MIN: ICD-10-PCS | Mod: S$GLB,,, | Performed by: NURSE PRACTITIONER

## 2022-12-29 PROCEDURE — 99999 PR PBB SHADOW E&M-EST. PATIENT-LVL III: CPT | Mod: PBBFAC,,, | Performed by: NURSE PRACTITIONER

## 2022-12-29 RX ORDER — FLUTICASONE PROPIONATE 50 MCG
1 SPRAY, SUSPENSION (ML) NASAL DAILY
Qty: 9.9 ML | Refills: 0 | Status: SHIPPED | OUTPATIENT
Start: 2022-12-29 | End: 2023-05-03

## 2023-01-05 ENCOUNTER — OFFICE VISIT (OUTPATIENT)
Dept: CARDIOLOGY | Facility: CLINIC | Age: 68
End: 2023-01-05
Payer: MEDICARE

## 2023-01-05 VITALS
DIASTOLIC BLOOD PRESSURE: 66 MMHG | HEIGHT: 77 IN | HEART RATE: 89 BPM | OXYGEN SATURATION: 93 % | BODY MASS INDEX: 25.75 KG/M2 | WEIGHT: 218.13 LBS | SYSTOLIC BLOOD PRESSURE: 132 MMHG | RESPIRATION RATE: 18 BRPM

## 2023-01-05 DIAGNOSIS — J64: ICD-10-CM

## 2023-01-05 DIAGNOSIS — I27.20 PULMONARY HYPERTENSION: Primary | ICD-10-CM

## 2023-01-05 DIAGNOSIS — J84.9 ILD (INTERSTITIAL LUNG DISEASE): ICD-10-CM

## 2023-01-05 DIAGNOSIS — I70.0 THORACIC AORTA ATHEROSCLEROSIS: ICD-10-CM

## 2023-01-05 DIAGNOSIS — R73.03 PREDIABETES: ICD-10-CM

## 2023-01-05 DIAGNOSIS — E66.3 OVERWEIGHT (BMI 25.0-29.9): ICD-10-CM

## 2023-01-05 DIAGNOSIS — I10 HTN, GOAL BELOW 140/90: ICD-10-CM

## 2023-01-05 PROCEDURE — 1160F RVW MEDS BY RX/DR IN RCRD: CPT | Mod: CPTII,S$GLB,, | Performed by: INTERNAL MEDICINE

## 2023-01-05 PROCEDURE — 1159F PR MEDICATION LIST DOCUMENTED IN MEDICAL RECORD: ICD-10-PCS | Mod: CPTII,S$GLB,, | Performed by: INTERNAL MEDICINE

## 2023-01-05 PROCEDURE — 99999 PR PBB SHADOW E&M-EST. PATIENT-LVL III: ICD-10-PCS | Mod: PBBFAC,,, | Performed by: INTERNAL MEDICINE

## 2023-01-05 PROCEDURE — 3008F PR BODY MASS INDEX (BMI) DOCUMENTED: ICD-10-PCS | Mod: CPTII,S$GLB,, | Performed by: INTERNAL MEDICINE

## 2023-01-05 PROCEDURE — 3008F BODY MASS INDEX DOCD: CPT | Mod: CPTII,S$GLB,, | Performed by: INTERNAL MEDICINE

## 2023-01-05 PROCEDURE — 99214 PR OFFICE/OUTPT VISIT, EST, LEVL IV, 30-39 MIN: ICD-10-PCS | Mod: S$GLB,,, | Performed by: INTERNAL MEDICINE

## 2023-01-05 PROCEDURE — 1101F PT FALLS ASSESS-DOCD LE1/YR: CPT | Mod: CPTII,S$GLB,, | Performed by: INTERNAL MEDICINE

## 2023-01-05 PROCEDURE — 99214 OFFICE O/P EST MOD 30 MIN: CPT | Mod: S$GLB,,, | Performed by: INTERNAL MEDICINE

## 2023-01-05 PROCEDURE — 3288F PR FALLS RISK ASSESSMENT DOCUMENTED: ICD-10-PCS | Mod: CPTII,S$GLB,, | Performed by: INTERNAL MEDICINE

## 2023-01-05 PROCEDURE — 93000 EKG 12-LEAD: ICD-10-PCS | Mod: S$GLB,,, | Performed by: INTERNAL MEDICINE

## 2023-01-05 PROCEDURE — 3288F FALL RISK ASSESSMENT DOCD: CPT | Mod: CPTII,S$GLB,, | Performed by: INTERNAL MEDICINE

## 2023-01-05 PROCEDURE — 1157F PR ADVANCE CARE PLAN OR EQUIV PRESENT IN MEDICAL RECORD: ICD-10-PCS | Mod: CPTII,S$GLB,, | Performed by: INTERNAL MEDICINE

## 2023-01-05 PROCEDURE — 99999 PR PBB SHADOW E&M-EST. PATIENT-LVL III: CPT | Mod: PBBFAC,,, | Performed by: INTERNAL MEDICINE

## 2023-01-05 PROCEDURE — 1126F PR PAIN SEVERITY QUANTIFIED, NO PAIN PRESENT: ICD-10-PCS | Mod: CPTII,S$GLB,, | Performed by: INTERNAL MEDICINE

## 2023-01-05 PROCEDURE — 3075F SYST BP GE 130 - 139MM HG: CPT | Mod: CPTII,S$GLB,, | Performed by: INTERNAL MEDICINE

## 2023-01-05 PROCEDURE — 1160F PR REVIEW ALL MEDS BY PRESCRIBER/CLIN PHARMACIST DOCUMENTED: ICD-10-PCS | Mod: CPTII,S$GLB,, | Performed by: INTERNAL MEDICINE

## 2023-01-05 PROCEDURE — 1159F MED LIST DOCD IN RCRD: CPT | Mod: CPTII,S$GLB,, | Performed by: INTERNAL MEDICINE

## 2023-01-05 PROCEDURE — 3075F PR MOST RECENT SYSTOLIC BLOOD PRESS GE 130-139MM HG: ICD-10-PCS | Mod: CPTII,S$GLB,, | Performed by: INTERNAL MEDICINE

## 2023-01-05 PROCEDURE — 3078F PR MOST RECENT DIASTOLIC BLOOD PRESSURE < 80 MM HG: ICD-10-PCS | Mod: CPTII,S$GLB,, | Performed by: INTERNAL MEDICINE

## 2023-01-05 PROCEDURE — 3078F DIAST BP <80 MM HG: CPT | Mod: CPTII,S$GLB,, | Performed by: INTERNAL MEDICINE

## 2023-01-05 PROCEDURE — 1157F ADVNC CARE PLAN IN RCRD: CPT | Mod: CPTII,S$GLB,, | Performed by: INTERNAL MEDICINE

## 2023-01-05 PROCEDURE — 1101F PR PT FALLS ASSESS DOC 0-1 FALLS W/OUT INJ PAST YR: ICD-10-PCS | Mod: CPTII,S$GLB,, | Performed by: INTERNAL MEDICINE

## 2023-01-05 PROCEDURE — 93000 ELECTROCARDIOGRAM COMPLETE: CPT | Mod: S$GLB,,, | Performed by: INTERNAL MEDICINE

## 2023-01-05 PROCEDURE — 1126F AMNT PAIN NOTED NONE PRSNT: CPT | Mod: CPTII,S$GLB,, | Performed by: INTERNAL MEDICINE

## 2023-01-05 NOTE — PROGRESS NOTES
CARDIOVASCULAR CONSULTATION    REASON FOR CONSULT:   Josiah Orosco Jr. is a 67 y.o. male who presents for evaluation      HISTORY OF PRESENT ILLNESS:     Patient is a pleasant 66-year-old man.  Here for evaluation.  Patient states that lately he has been experiencing dyspnea on exertion.  Also he was told that he has a regular rhythm and hence has been referred.  Patient denies any palpitations.  Denies any orthopnea or PND.  EKG done personally reviewed and shows sinus rhythm with PACs.    Notes from September 2022:  Patient here for follow-up.    Sinus rhythm with heart rates varying between 44 and 164 BPM with an average of 88 BPM.  There were rare PVCs totalling 402 and averaging 8.38 per hour. There were 1 couplets. There were 1 triplets.  There were very frequent PACs  SVT/possible atrial flutter.  Recommend event monitor.    The left ventricle is normal in size with mild concentric hypertrophy and normal systolic function.  The estimated ejection fraction is 60%.  Indeterminate left ventricular diastolic function.  Mild right atrial enlargement.  Normal right ventricular size with normal right ventricular systolic function.  Mild tricuspid regurgitation.  Normal central venous pressure (3 mmHg).  The estimated PA systolic pressure is 38 mmHg.  There is mild pulmonary hypertension.      Normal myocardial perfusion scan. There is no evidence of myocardial ischemia or infarction.    There is a  mild to moderate intensity fixed perfusion abnormality in the inferior wall of the left ventricle secondary to diaphragm attenuation.    The gated perfusion images showed an ejection fraction of 61% post stress.    There is normal wall motion post stress.    The EKG portion of this study is negative for ischemia.    The patient reported no chest pain during the stress test.    During stress, occasional PVCs are noted. , During stress, SVTs are noted.      Notes from October 2022:    Patient here for follow-up.  Doing  fine.  Event monitor showed some PACs and periods of tachycardia as described below.  Patient asymptomatic.      At baseline, in the absence of symptoms, the rhythm was sinus with heart rate 85 beats per minute.  There were PACs and sequential PACs.  An auto trigger event on 09/27 showed sinus tachycardia at 155 beats per minute, and on 09/28 another auto trigger event also showed sinus tachycardia.  There also was a regular narrow complex tachycardia at 157 beats per minute.  This lasted 27 seconds and began following 2 PVCs.  P-waves were not clearly evident.  There was a gradual slow down with P-waves becoming evident, to become consistent with sinus tachycardia with occasional PACs.  No activity level (e.g., exercise) was specified.     Impression: Sinus rhythm with PACs and periods of tachycardia, as described above.    Notes from January 23    Patient doing fine.  Denies any chest pains at rest on exertion, orthopnea, PND, swelling of feet    PAST MEDICAL HISTORY:     Past Medical History:   Diagnosis Date    Arthritis     GERD (gastroesophageal reflux disease)     History of HCV, s/p successful treatment w/ SVR12 5/2015     Failed treatment (stage I/II) - followed by hepatology     Joint pain     Lung disease caused by breathing particles     Widespread essentially symmetric interstitial lung disease    Obesity     Polycythemia vera     Prediabetes        PAST SURGICAL HISTORY:     Past Surgical History:   Procedure Laterality Date    BUNIONECTOMY      bilateral    COLONOSCOPY N/A 12/9/2015    Procedure: COLONOSCOPY;  Surgeon: Conrad Iqbal MD;  Location: [facility];  Service: Endoscopy;  Laterality: N/A;    Liver biopsy x2      rotator cuff Right        ALLERGIES AND MEDICATION:     Review of patient's allergies indicates:   Allergen Reactions    Ace inhibitors Other (See Comments)     cough        Medication List            Accurate as of January 5, 2023  3:25 PM. If you have any questions, ask your nurse  or doctor.                CONTINUE taking these medications      allopurinoL 100 MG tablet  Commonly known as: ZYLOPRIM  Take 2 tablets (200 mg total) by mouth once daily.     aspirin 81 MG EC tablet  Commonly known as: ECOTRIN     fluticasone propionate 50 mcg/actuation nasal spray  Commonly known as: FLONASE  1 spray (50 mcg total) by Each Nostril route once daily.     levocetirizine 5 MG tablet  Commonly known as: XYZAL  Take 1 tablet (5 mg total) by mouth every evening.     losartan 50 MG tablet  Commonly known as: COZAAR  TK 1 T PO D     metoprolol succinate 25 MG 24 hr tablet  Commonly known as: TOPROL-XL  Take 1 tablet (25 mg total) by mouth once daily.            STOP taking these medications      hydroCHLOROthiazide 12.5 MG Tab  Commonly known as: HYDRODIURIL  Stopped by: David Cueva MD              SOCIAL HISTORY:     Social History     Socioeconomic History    Marital status:     Number of children: 0    Highest education level: Some college, no degree   Occupational History    Occupation: Self-employed     Employer: [email or web address]   Tobacco Use    Smoking status: Former     Types: Cigars    Smokeless tobacco: Never    Tobacco comments:     pt. smokes 10 cigars per day.   Substance and Sexual Activity    Alcohol use: No     Alcohol/week: 0.0 standard drinks     Comment: Rarely    Drug use: Yes     Types: Marijuana     Comment: daily    Sexual activity: Yes     Partners: Female       FAMILY HISTORY:     Family History   Problem Relation Age of Onset    Heart disease Mother     Kidney disease Mother     Parkinsonism Father     Prostate cancer Cousin         maternal side    Heart attack Sister         CABG    Deep vein thrombosis Sister     Pulmonary embolism Sister     Osteoarthritis Sister         s/p knee replacement    Chronic back pain Brother     Liver disease Neg Hx     Diabetes Neg Hx     Melanoma Neg Hx        REVIEW OF SYSTEMS:   Review of Systems   Constitutional: Negative.   HENT:  "Negative.     Eyes: Negative.    Cardiovascular:  Positive for palpitations.   Endocrine: Negative.    Hematologic/Lymphatic: Negative.    Skin: Negative.    Musculoskeletal: Negative.    Gastrointestinal: Negative.    Genitourinary: Negative.    Neurological: Negative.    Psychiatric/Behavioral: Negative.     Allergic/Immunologic: Negative.      A 10 point review of systems was performed and all the pertinent positives have been mentioned. Rest of review of systems was negative.        PHYSICAL EXAM:     Vitals:    01/05/23 1458   BP: 132/66   Pulse: 89   Resp: 18    Body mass index is 25.87 kg/m².  Weight: 98.9 kg (218 lb 2.3 oz)   Height: 6' 5" (195.6 cm)     Physical Exam  Vitals reviewed.   Constitutional:       Appearance: He is well-developed.   HENT:      Head: Normocephalic.   Eyes:      Conjunctiva/sclera: Conjunctivae normal.      Pupils: Pupils are equal, round, and reactive to light.   Cardiovascular:      Rate and Rhythm: Normal rate and regular rhythm.      Heart sounds: Normal heart sounds.   Pulmonary:      Effort: Pulmonary effort is normal.      Breath sounds: Normal breath sounds.   Abdominal:      General: Bowel sounds are normal.      Palpations: Abdomen is soft.   Musculoskeletal:      Cervical back: Normal range of motion and neck supple.   Skin:     General: Skin is warm.   Neurological:      Mental Status: He is alert and oriented to person, place, and time.         DATA:     Laboratory:  CBC:  Recent Labs   Lab 03/14/20  0837 03/06/21  0822 03/11/22  0705   WBC 5.79 6.91 6.87   Hemoglobin 12.6 L 13.6 L 14.8   Hematocrit 46.6 47.0 50.4   Platelets 279 259 256       CHEMISTRIES:  Recent Labs   Lab 03/14/20  0837 03/06/21  0822 03/11/22  0705   Glucose 95 96 101   Sodium 142 140 143   Potassium 5.0 4.4 4.6   BUN 13 12 10   Creatinine 0.9 0.9 0.9   eGFR if African American >60.0 >60.0 >60   eGFR if non African American >60.0 >60.0 >60   Calcium 9.2 9.3 9.5       CARDIAC BIOMARKERS:    "     COAGS:        LIPIDS/LFTS:  Recent Labs   Lab 03/14/20  0837 03/06/21  0822 03/11/22  0705   Cholesterol 144 136 142   Triglycerides 59 46 56   HDL 43 39 L 42   LDL Cholesterol 89.2 87.8 88.8   Non-HDL Cholesterol 101 97 100   AST 29 26 25   ALT 13 11 12       Hemoglobin A1C   Date Value Ref Range Status   03/11/2022 5.9 (H) 4.0 - 5.6 % Final     Comment:     ADA Screening Guidelines:  5.7-6.4%  Consistent with prediabetes  >or=6.5%  Consistent with diabetes    High levels of fetal hemoglobin interfere with the HbA1C  assay. Heterozygous hemoglobin variants (HbS, HgC, etc)do  not significantly interfere with this assay.   However, presence of multiple variants may affect accuracy.     03/06/2021 5.8 (H) 4.0 - 5.6 % Final     Comment:     ADA Screening Guidelines:  5.7-6.4%  Consistent with prediabetes  >or=6.5%  Consistent with diabetes    High levels of fetal hemoglobin interfere with the HbA1C  assay. Heterozygous hemoglobin variants (HbS, HgC, etc)do  not significantly interfere with this assay.   However, presence of multiple variants may affect accuracy.     03/14/2020 6.0 (H) 4.0 - 5.6 % Final     Comment:     ADA Screening Guidelines:  5.7-6.4%  Consistent with prediabetes  >or=6.5%  Consistent with diabetes  High levels of fetal hemoglobin interfere with the HbA1C  assay. Heterozygous hemoglobin variants (HbS, HgC, etc)do  not significantly interfere with this assay.   However, presence of multiple variants may affect accuracy.         TSH        The 10-year ASCVD risk score (Felix CHOE, et al., 2019) is: 18%    Values used to calculate the score:      Age: 67 years      Sex: Male      Is Non- : Yes      Diabetic: No      Tobacco smoker: No      Systolic Blood Pressure: 132 mmHg      Is BP treated: Yes      HDL Cholesterol: 42 mg/dL      Total Cholesterol: 142 mg/dL             ASSESSMENT AND PLAN     Patient Active Problem List   Diagnosis    History of HCV, s/p successful  treatment w/ SVR12 5/2015    Lung disease caused by breathing particles    Overweight (BMI 25.0-29.9)    GERD (gastroesophageal reflux disease)    Polycythemia    Former smoker    Hyperuricemia    ILD (interstitial lung disease)    Prediabetes    Arthritis    Allergic cough    HTN, goal below 140/90    Pulmonary interstitial fibrosis    Pulmonary hypertension    Marijuana user    Thoracic aorta atherosclerosis       Dyspnea on exertion, palpitations and shortness of breath.  Further evaluation with the help of stress test, echo, Holter monitor.  Stress test did not show any significant ischemia.  Echo showed normal left ventricle systolic function.  Continue Toprol-XL and losartan Follow-up in 6 months.        Thank you very much for involving me in the care of your patient.  Please do not hesitate to contact me if there are any questions.      David Cueva MD, FACC, [facility]  Interventional Cardiologist, [facility].           This note was dictated with the help of speech recognition software.  There might be un-intended errors and/or substitutions.

## 2023-01-10 ENCOUNTER — PES CALL (OUTPATIENT)
Dept: ADMINISTRATIVE | Facility: CLINIC | Age: 68
End: 2023-01-10
Payer: MEDICARE

## 2023-01-17 ENCOUNTER — TELEPHONE (OUTPATIENT)
Dept: PULMONOLOGY | Facility: CLINIC | Age: 68
End: 2023-01-17
Payer: MEDICARE

## 2023-01-17 NOTE — TELEPHONE ENCOUNTER
I called Mrs Orosco and her 's appointment with Dr Hunter is 2-1-22. He will have a chest xray and PFTS and see Dr Hunter. Appointment accepted and mailed. Melisa Lucio LPN

## 2023-01-17 NOTE — TELEPHONE ENCOUNTER
----- Message from Juan Luis Celis sent at 1/17/2023 11:24 AM CST -----  Regarding: Appt  Contact: Doreen/wife [phone]  Doreen pt's wife is calling to schedule follow up with provider as she was told she would received a call med January to schedule for February, she has not received that call.

## 2023-02-01 ENCOUNTER — HOSPITAL ENCOUNTER (OUTPATIENT)
Dept: PULMONOLOGY | Facility: CLINIC | Age: 68
Discharge: HOME | End: 2023-02-01
Payer: MEDICARE

## 2023-02-01 ENCOUNTER — HOSPITAL ENCOUNTER (OUTPATIENT)
Dept: RADIOLOGY | Facility: HOSPITAL | Age: 68
Discharge: HOME | End: 2023-02-01
Attending: INTERNAL MEDICINE
Payer: MEDICARE

## 2023-02-01 ENCOUNTER — OFFICE VISIT (OUTPATIENT)
Dept: PULMONOLOGY | Facility: CLINIC | Age: 68
End: 2023-02-01
Payer: MEDICARE

## 2023-02-01 VITALS
HEIGHT: 76 IN | OXYGEN SATURATION: 94 % | BODY MASS INDEX: 26.26 KG/M2 | WEIGHT: 215.63 LBS | SYSTOLIC BLOOD PRESSURE: 138 MMHG | DIASTOLIC BLOOD PRESSURE: 76 MMHG | HEART RATE: 56 BPM

## 2023-02-01 DIAGNOSIS — J84.10 PULMONARY INTERSTITIAL FIBROSIS: ICD-10-CM

## 2023-02-01 DIAGNOSIS — J84.9 ILD (INTERSTITIAL LUNG DISEASE): Primary | ICD-10-CM

## 2023-02-01 PROCEDURE — 94729 DIFFUSING CAPACITY: CPT | Mod: S$GLB,,, | Performed by: INTERNAL MEDICINE

## 2023-02-01 PROCEDURE — 99214 PR OFFICE/OUTPT VISIT, EST, LEVL IV, 30-39 MIN: ICD-10-PCS | Mod: 25,S$GLB,, | Performed by: INTERNAL MEDICINE

## 2023-02-01 PROCEDURE — 1159F MED LIST DOCD IN RCRD: CPT | Mod: CPTII,S$GLB,, | Performed by: INTERNAL MEDICINE

## 2023-02-01 PROCEDURE — 4010F ACE/ARB THERAPY RXD/TAKEN: CPT | Mod: CPTII,S$GLB,, | Performed by: INTERNAL MEDICINE

## 2023-02-01 PROCEDURE — 3075F PR MOST RECENT SYSTOLIC BLOOD PRESS GE 130-139MM HG: ICD-10-PCS | Mod: CPTII,S$GLB,, | Performed by: INTERNAL MEDICINE

## 2023-02-01 PROCEDURE — 71046 X-RAY EXAM CHEST 2 VIEWS: CPT | Mod: 26,,, | Performed by: RADIOLOGY

## 2023-02-01 PROCEDURE — 94729 PR C02/MEMBANE DIFFUSE CAPACITY: ICD-10-PCS | Mod: S$GLB,,, | Performed by: INTERNAL MEDICINE

## 2023-02-01 PROCEDURE — 3078F PR MOST RECENT DIASTOLIC BLOOD PRESSURE < 80 MM HG: ICD-10-PCS | Mod: CPTII,S$GLB,, | Performed by: INTERNAL MEDICINE

## 2023-02-01 PROCEDURE — 1157F ADVNC CARE PLAN IN RCRD: CPT | Mod: CPTII,S$GLB,, | Performed by: INTERNAL MEDICINE

## 2023-02-01 PROCEDURE — 71046 X-RAY EXAM CHEST 2 VIEWS: CPT | Mod: TC,FY

## 2023-02-01 PROCEDURE — 3008F BODY MASS INDEX DOCD: CPT | Mod: CPTII,S$GLB,, | Performed by: INTERNAL MEDICINE

## 2023-02-01 PROCEDURE — 1157F PR ADVANCE CARE PLAN OR EQUIV PRESENT IN MEDICAL RECORD: ICD-10-PCS | Mod: CPTII,S$GLB,, | Performed by: INTERNAL MEDICINE

## 2023-02-01 PROCEDURE — 94727 PR PULM FUNCTION TEST BY GAS: ICD-10-PCS | Mod: S$GLB,,, | Performed by: INTERNAL MEDICINE

## 2023-02-01 PROCEDURE — 99999 PR PBB SHADOW E&M-EST. PATIENT-LVL III: ICD-10-PCS | Mod: PBBFAC,,, | Performed by: INTERNAL MEDICINE

## 2023-02-01 PROCEDURE — 3008F PR BODY MASS INDEX (BMI) DOCUMENTED: ICD-10-PCS | Mod: CPTII,S$GLB,, | Performed by: INTERNAL MEDICINE

## 2023-02-01 PROCEDURE — 1126F PR PAIN SEVERITY QUANTIFIED, NO PAIN PRESENT: ICD-10-PCS | Mod: CPTII,S$GLB,, | Performed by: INTERNAL MEDICINE

## 2023-02-01 PROCEDURE — 94010 BREATHING CAPACITY TEST: CPT | Mod: S$GLB,,, | Performed by: INTERNAL MEDICINE

## 2023-02-01 PROCEDURE — 4010F PR ACE/ARB THEARPY RXD/TAKEN: ICD-10-PCS | Mod: CPTII,S$GLB,, | Performed by: INTERNAL MEDICINE

## 2023-02-01 PROCEDURE — 71046 XR CHEST PA AND LATERAL: ICD-10-PCS | Mod: 26,,, | Performed by: RADIOLOGY

## 2023-02-01 PROCEDURE — 3075F SYST BP GE 130 - 139MM HG: CPT | Mod: CPTII,S$GLB,, | Performed by: INTERNAL MEDICINE

## 2023-02-01 PROCEDURE — 3078F DIAST BP <80 MM HG: CPT | Mod: CPTII,S$GLB,, | Performed by: INTERNAL MEDICINE

## 2023-02-01 PROCEDURE — 1159F PR MEDICATION LIST DOCUMENTED IN MEDICAL RECORD: ICD-10-PCS | Mod: CPTII,S$GLB,, | Performed by: INTERNAL MEDICINE

## 2023-02-01 PROCEDURE — 94010 BREATHING CAPACITY TEST: ICD-10-PCS | Mod: S$GLB,,, | Performed by: INTERNAL MEDICINE

## 2023-02-01 PROCEDURE — 99214 OFFICE O/P EST MOD 30 MIN: CPT | Mod: 25,S$GLB,, | Performed by: INTERNAL MEDICINE

## 2023-02-01 PROCEDURE — 99999 PR PBB SHADOW E&M-EST. PATIENT-LVL III: CPT | Mod: PBBFAC,,, | Performed by: INTERNAL MEDICINE

## 2023-02-01 PROCEDURE — 94727 GAS DIL/WSHOT DETER LNG VOL: CPT | Mod: S$GLB,,, | Performed by: INTERNAL MEDICINE

## 2023-02-01 PROCEDURE — 1126F AMNT PAIN NOTED NONE PRSNT: CPT | Mod: CPTII,S$GLB,, | Performed by: INTERNAL MEDICINE

## 2023-02-01 NOTE — PROGRESS NOTES
Subjective:      Patient ID: Josiah Orosco Jr. is a 67 y.o. male.    Chief Complaint: Pulmonary Fibrosis    HPI67 yo male with a interstitial lung disease that I have followed for years He has been in the auto repair business for years and the changes on his chest  x-ray would be compatible with  an inhalation disease. His mechanics  of breathing  eg, spirometry is normal but has impairment of DLCO  Resting Sa02  96% but does fall to 92% when he walks  the length of the [place] and back.,   He does appreciate dyspnea climbing stairs or if has to rush.      I reviewed his film and it is stable  compared to a film of 2013  and his spirometry is normal DLCO/VA:32%  Was 49 % August 2013 when I first saw him.   He is not interested in using supplemental oxygen.      No flowsheet data found.  Review of Systems   Constitutional: Negative.    HENT: Negative.     Eyes: Negative.    Respiratory:  Positive for dyspnea on extertion.         Chronic interstitial lung disease of unknown etiology   Cardiovascular: Negative.    Genitourinary: Negative.    Musculoskeletal: Negative.    Skin: Negative.    Gastrointestinal: Negative.    Neurological: Negative.    Psychiatric/Behavioral: Negative.     Objective:     Physical Exam  Constitutional:       Appearance: He is well-developed.   HENT:      Head: Normocephalic and atraumatic.      Right Ear: External ear normal.      Left Ear: External ear normal.   Eyes:      Conjunctiva/sclera: Conjunctivae normal.      Pupils: Pupils are equal, round, and reactive to light.   Cardiovascular:      Rate and Rhythm: Normal rate and regular rhythm.      Heart sounds: Normal heart sounds.   Pulmonary:      Effort: Pulmonary effort is normal.      Breath sounds: Normal breath sounds.      Comments: Clear without focal wheezes or rales.     Reviewed   his  x-ray and lung studies with the patient and wife  Abdominal:      General: Bowel sounds are normal.      Palpations: Abdomen is soft.    Musculoskeletal:         General: Normal range of motion.      Cervical back: Normal range of motion and neck supple.   Skin:     General: Skin is warm and dry.   Neurological:      Mental Status: He is alert and oriented to person, place, and time.      Deep Tendon Reflexes: Reflexes are normal and symmetric.   Psychiatric:         Behavior: Behavior normal.         Thought Content: Thought content normal.         Judgment: Judgment normal.       Assessment:     No diagnosis found.  Outpatient Encounter Medications as of 2/1/2023   Medication Sig Dispense Refill    allopurinoL (ZYLOPRIM) 100 MG tablet Take 2 tablets (200 mg total) by mouth once daily. 180 tablet 3    aspirin (ECOTRIN) 81 MG EC tablet Take 81 mg by mouth once daily.      fluticasone propionate (FLONASE) 50 mcg/actuation nasal spray 1 spray (50 mcg total) by Each Nostril route once daily. 9.9 mL 0    levocetirizine (XYZAL) 5 MG tablet Take 1 tablet (5 mg total) by mouth every evening. 30 tablet 2    losartan (COZAAR) 50 MG tablet TK 1 T PO D 90 tablet 2    metoprolol succinate (TOPROL-XL) 25 MG 24 hr tablet Take 1 tablet (25 mg total) by mouth once daily. 90 tablet 3     No facility-administered encounter medications on file as of 2/1/2023.       Plan:   Continue annual follow up   Problem List Items Addressed This Visit    None

## 2023-02-07 DIAGNOSIS — J84.9 ILD (INTERSTITIAL LUNG DISEASE): Primary | ICD-10-CM

## 2023-03-17 ENCOUNTER — PATIENT OUTREACH (OUTPATIENT)
Dept: ADMINISTRATIVE | Facility: HOSPITAL | Age: 68
End: 2023-03-17
Payer: MEDICARE

## 2023-03-17 DIAGNOSIS — R73.03 PREDIABETES: Primary | ICD-10-CM

## 2023-03-17 NOTE — PROGRESS NOTES
[facility] [employer] Annual Wellness Exam 02.20.23 - appointment already scheduled on 04/21/2023.

## 2023-03-27 ENCOUNTER — TELEPHONE (OUTPATIENT)
Dept: FAMILY MEDICINE | Facility: CLINIC | Age: 68
End: 2023-03-27
Payer: MEDICARE

## 2023-03-27 NOTE — TELEPHONE ENCOUNTER
----- Message from Stu Sauceda sent at 3/27/2023 10:25 AM CDT -----  Type: Patient Call Back    Who called:Doreen/ Spouse     What is the request in detail: Asking for clearance to be faxed over to [facility] for  cataract Surgery with fax # > [phone]    Can the clinic reply by [facility]? NO     Would the patient rather a call back or a response via [facility]? CALL     Best call back number: [phone]

## 2023-04-06 ENCOUNTER — TELEPHONE (OUTPATIENT)
Dept: FAMILY MEDICINE | Facility: CLINIC | Age: 68
End: 2023-04-06

## 2023-04-06 NOTE — TELEPHONE ENCOUNTER
Patient was seen on 12/2022 by GENA Carpenter at the [facility] location for pre op clearance. Patient wife stated that the patient previous eye surgery for Jan was cancelled and she has another appointment schedule this month. Patient wife is requesting the pre op clearance form to be fax to Dr. Marcum  office. I informed the patient wife per GENA Nicole that the patient has to be seen since it has been 4 months since last pre op clearance. Patient wife declined appointment.  Patient wife stated that she will call the [facility] clinic to get the pre op clearance form from Dec to be faxed to Dr. Marcum office. After checking media tab, nothing was scan or seen in patient chart.

## 2023-04-10 ENCOUNTER — TELEPHONE (OUTPATIENT)
Dept: FAMILY MEDICINE | Facility: CLINIC | Age: 68
End: 2023-04-10
Payer: MEDICARE

## 2023-04-10 NOTE — TELEPHONE ENCOUNTER
----- Message from Luke Boyle sent at 4/10/2023  9:46 AM CDT -----  Regarding: Self [phone]  Type: Patient Call Back    Who called: Self     What is the request in detail: called in regards to a message that was sent before in regards to a surgery clearance, stated they need it asap. Would like a call back. Procedure is on the 13th     Can the clinic reply by [facility]? No     Would the patient rather a call back or a response via [facility]? Call back     Best call back number:  [phone]     Additional Information: Fax #  [phone]    Thank you.

## 2023-04-11 ENCOUNTER — TELEPHONE (OUTPATIENT)
Dept: FAMILY MEDICINE | Facility: CLINIC | Age: 68
End: 2023-04-11
Payer: MEDICARE

## 2023-04-11 DIAGNOSIS — R73.03 PREDIABETES: Primary | ICD-10-CM

## 2023-04-11 DIAGNOSIS — I10 HTN, GOAL BELOW 140/90: ICD-10-CM

## 2023-04-12 ENCOUNTER — TELEPHONE (OUTPATIENT)
Dept: CARDIOLOGY | Facility: CLINIC | Age: 68
End: 2023-04-12
Payer: MEDICARE

## 2023-04-12 NOTE — TELEPHONE ENCOUNTER
Call received from patient spouse that patient oxygen level was low and heart rate high and informed to see cardiologist per Dr. Johnson on Monday of this week. Ekg was done that showed  afib and was started on blood thinner. Patient unsure of the name.   Will refer to provider and schedule noel. Patient and spouse educated to go to emergency room to address concerns.     Radha HERRERA Mirna  04/12/2023  5:02 PM        The oxygen has been as low as 91% and heart rate as high as ----- Message from Alie Will sent at 4/10/2023  2:10 PM CDT -----  Type:  Sooner Appointment Request    Patient is requesting a sooner appointment.  Patient declined first available appointment listed as well as another facility and provider .  Patient will not accept being placed on the waitlist and is requesting a message be sent to doctor.    Name of Caller: wife     When is the first available appointment? 6/15/2023    Symptoms: abnormal EKG    Would the patient rather a call back or a response via [facility]? Call    Best Call Back Number: .[phone] (home)

## 2023-04-14 ENCOUNTER — LAB VISIT (OUTPATIENT)
Dept: LAB | Facility: HOSPITAL | Age: 68
End: 2023-04-14
Attending: INTERNAL MEDICINE
Payer: MEDICARE

## 2023-04-14 DIAGNOSIS — I10 HTN, GOAL BELOW 140/90: ICD-10-CM

## 2023-04-14 DIAGNOSIS — R73.03 PREDIABETES: ICD-10-CM

## 2023-04-14 LAB
ALBUMIN SERPL BCP-MCNC: 3.2 G/DL (ref 3.5–5.2)
ALP SERPL-CCNC: 107 U/L (ref 55–135)
ALT SERPL W/O P-5'-P-CCNC: 24 U/L (ref 10–44)
ANION GAP SERPL CALC-SCNC: 11 MMOL/L (ref 8–16)
AST SERPL-CCNC: 30 U/L (ref 10–40)
BASOPHILS # BLD AUTO: 0.05 K/UL (ref 0–0.2)
BASOPHILS NFR BLD: 0.6 % (ref 0–1.9)
BILIRUB SERPL-MCNC: 0.5 MG/DL (ref 0.1–1)
BUN SERPL-MCNC: 12 MG/DL (ref 8–23)
CALCIUM SERPL-MCNC: 9.6 MG/DL (ref 8.7–10.5)
CHLORIDE SERPL-SCNC: 102 MMOL/L (ref 95–110)
CO2 SERPL-SCNC: 25 MMOL/L (ref 23–29)
CREAT SERPL-MCNC: 0.9 MG/DL (ref 0.5–1.4)
DIFFERENTIAL METHOD: ABNORMAL
EOSINOPHIL # BLD AUTO: 0 K/UL (ref 0–0.5)
EOSINOPHIL NFR BLD: 0.1 % (ref 0–8)
ERYTHROCYTE [DISTWIDTH] IN BLOOD BY AUTOMATED COUNT: 19 % (ref 11.5–14.5)
EST. GFR  (NO RACE VARIABLE): >60 ML/MIN/1.73 M^2
ESTIMATED AVG GLUCOSE: 126 MG/DL (ref 68–131)
GLUCOSE SERPL-MCNC: 85 MG/DL (ref 70–110)
HBA1C MFR BLD: 6 % (ref 4–5.6)
HCT VFR BLD AUTO: 46.5 % (ref 40–54)
HGB BLD-MCNC: 13.4 G/DL (ref 14–18)
IMM GRANULOCYTES # BLD AUTO: 0.02 K/UL (ref 0–0.04)
IMM GRANULOCYTES NFR BLD AUTO: 0.2 % (ref 0–0.5)
LYMPHOCYTES # BLD AUTO: 1.4 K/UL (ref 1–4.8)
LYMPHOCYTES NFR BLD: 16.1 % (ref 18–48)
MCH RBC QN AUTO: 20.5 PG (ref 27–31)
MCHC RBC AUTO-ENTMCNC: 28.8 G/DL (ref 32–36)
MCV RBC AUTO: 71 FL (ref 82–98)
MONOCYTES # BLD AUTO: 0.5 K/UL (ref 0.3–1)
MONOCYTES NFR BLD: 5.2 % (ref 4–15)
NEUTROPHILS # BLD AUTO: 6.8 K/UL (ref 1.8–7.7)
NEUTROPHILS NFR BLD: 77.8 % (ref 38–73)
NRBC BLD-RTO: 0 /100 WBC
PLATELET # BLD AUTO: 394 K/UL (ref 150–450)
PMV BLD AUTO: 10 FL (ref 9.2–12.9)
POTASSIUM SERPL-SCNC: 5.3 MMOL/L (ref 3.5–5.1)
PROT SERPL-MCNC: 8.1 G/DL (ref 6–8.4)
RBC # BLD AUTO: 6.55 M/UL (ref 4.6–6.2)
SODIUM SERPL-SCNC: 138 MMOL/L (ref 136–145)
WBC # BLD AUTO: 8.68 K/UL (ref 3.9–12.7)

## 2023-04-14 PROCEDURE — 80053 COMPREHEN METABOLIC PANEL: CPT | Performed by: INTERNAL MEDICINE

## 2023-04-14 PROCEDURE — 36415 COLL VENOUS BLD VENIPUNCTURE: CPT | Mod: PO | Performed by: INTERNAL MEDICINE

## 2023-04-14 PROCEDURE — 83036 HEMOGLOBIN GLYCOSYLATED A1C: CPT | Performed by: INTERNAL MEDICINE

## 2023-04-14 PROCEDURE — 85025 COMPLETE CBC W/AUTO DIFF WBC: CPT | Performed by: INTERNAL MEDICINE

## 2023-04-21 ENCOUNTER — OFFICE VISIT (OUTPATIENT)
Dept: FAMILY MEDICINE | Facility: CLINIC | Age: 68
End: 2023-04-21
Payer: MEDICARE

## 2023-04-21 VITALS
HEIGHT: 76 IN | HEART RATE: 123 BPM | WEIGHT: 213.5 LBS | DIASTOLIC BLOOD PRESSURE: 86 MMHG | SYSTOLIC BLOOD PRESSURE: 120 MMHG | TEMPERATURE: 98 F | OXYGEN SATURATION: 93 % | BODY MASS INDEX: 26 KG/M2

## 2023-04-21 DIAGNOSIS — Z12.5 PROSTATE CANCER SCREENING: ICD-10-CM

## 2023-04-21 DIAGNOSIS — R73.03 PREDIABETES: ICD-10-CM

## 2023-04-21 DIAGNOSIS — Z00.00 ROUTINE MEDICAL EXAM: Primary | ICD-10-CM

## 2023-04-21 DIAGNOSIS — D75.1 POLYCYTHEMIA: ICD-10-CM

## 2023-04-21 DIAGNOSIS — J84.10 PULMONARY INTERSTITIAL FIBROSIS: ICD-10-CM

## 2023-04-21 DIAGNOSIS — J84.9 ILD (INTERSTITIAL LUNG DISEASE): ICD-10-CM

## 2023-04-21 DIAGNOSIS — I70.0 THORACIC AORTA ATHEROSCLEROSIS: ICD-10-CM

## 2023-04-21 DIAGNOSIS — I27.20 PULMONARY HYPERTENSION: ICD-10-CM

## 2023-04-21 DIAGNOSIS — K21.9 GASTROESOPHAGEAL REFLUX DISEASE, UNSPECIFIED WHETHER ESOPHAGITIS PRESENT: ICD-10-CM

## 2023-04-21 DIAGNOSIS — E66.3 OVERWEIGHT (BMI 25.0-29.9): ICD-10-CM

## 2023-04-21 DIAGNOSIS — I10 HTN, GOAL BELOW 140/90: ICD-10-CM

## 2023-04-21 PROCEDURE — 3288F PR FALLS RISK ASSESSMENT DOCUMENTED: ICD-10-PCS | Mod: CPTII,S$GLB,, | Performed by: INTERNAL MEDICINE

## 2023-04-21 PROCEDURE — 1126F PR PAIN SEVERITY QUANTIFIED, NO PAIN PRESENT: ICD-10-PCS | Mod: CPTII,S$GLB,, | Performed by: INTERNAL MEDICINE

## 2023-04-21 PROCEDURE — 99397 PER PM REEVAL EST PAT 65+ YR: CPT | Mod: GZ,S$GLB,, | Performed by: INTERNAL MEDICINE

## 2023-04-21 PROCEDURE — 4010F PR ACE/ARB THEARPY RXD/TAKEN: ICD-10-PCS | Mod: CPTII,S$GLB,, | Performed by: INTERNAL MEDICINE

## 2023-04-21 PROCEDURE — 99397 PR PREVENTIVE VISIT,EST,65 & OVER: ICD-10-PCS | Mod: GZ,S$GLB,, | Performed by: INTERNAL MEDICINE

## 2023-04-21 PROCEDURE — 3008F PR BODY MASS INDEX (BMI) DOCUMENTED: ICD-10-PCS | Mod: CPTII,S$GLB,, | Performed by: INTERNAL MEDICINE

## 2023-04-21 PROCEDURE — 1101F PR PT FALLS ASSESS DOC 0-1 FALLS W/OUT INJ PAST YR: ICD-10-PCS | Mod: CPTII,S$GLB,, | Performed by: INTERNAL MEDICINE

## 2023-04-21 PROCEDURE — 1126F AMNT PAIN NOTED NONE PRSNT: CPT | Mod: CPTII,S$GLB,, | Performed by: INTERNAL MEDICINE

## 2023-04-21 PROCEDURE — 1159F MED LIST DOCD IN RCRD: CPT | Mod: CPTII,S$GLB,, | Performed by: INTERNAL MEDICINE

## 2023-04-21 PROCEDURE — 1101F PT FALLS ASSESS-DOCD LE1/YR: CPT | Mod: CPTII,S$GLB,, | Performed by: INTERNAL MEDICINE

## 2023-04-21 PROCEDURE — 3079F PR MOST RECENT DIASTOLIC BLOOD PRESSURE 80-89 MM HG: ICD-10-PCS | Mod: CPTII,S$GLB,, | Performed by: INTERNAL MEDICINE

## 2023-04-21 PROCEDURE — 1159F PR MEDICATION LIST DOCUMENTED IN MEDICAL RECORD: ICD-10-PCS | Mod: CPTII,S$GLB,, | Performed by: INTERNAL MEDICINE

## 2023-04-21 PROCEDURE — 3288F FALL RISK ASSESSMENT DOCD: CPT | Mod: CPTII,S$GLB,, | Performed by: INTERNAL MEDICINE

## 2023-04-21 PROCEDURE — 1157F ADVNC CARE PLAN IN RCRD: CPT | Mod: CPTII,S$GLB,, | Performed by: INTERNAL MEDICINE

## 2023-04-21 PROCEDURE — 3044F HG A1C LEVEL LT 7.0%: CPT | Mod: CPTII,S$GLB,, | Performed by: INTERNAL MEDICINE

## 2023-04-21 PROCEDURE — 99999 PR PBB SHADOW E&M-EST. PATIENT-LVL III: CPT | Mod: PBBFAC,,, | Performed by: INTERNAL MEDICINE

## 2023-04-21 PROCEDURE — 1157F PR ADVANCE CARE PLAN OR EQUIV PRESENT IN MEDICAL RECORD: ICD-10-PCS | Mod: CPTII,S$GLB,, | Performed by: INTERNAL MEDICINE

## 2023-04-21 PROCEDURE — 3044F PR MOST RECENT HEMOGLOBIN A1C LEVEL <7.0%: ICD-10-PCS | Mod: CPTII,S$GLB,, | Performed by: INTERNAL MEDICINE

## 2023-04-21 PROCEDURE — 99999 PR PBB SHADOW E&M-EST. PATIENT-LVL III: ICD-10-PCS | Mod: PBBFAC,,, | Performed by: INTERNAL MEDICINE

## 2023-04-21 PROCEDURE — 3079F DIAST BP 80-89 MM HG: CPT | Mod: CPTII,S$GLB,, | Performed by: INTERNAL MEDICINE

## 2023-04-21 PROCEDURE — 3074F SYST BP LT 130 MM HG: CPT | Mod: CPTII,S$GLB,, | Performed by: INTERNAL MEDICINE

## 2023-04-21 PROCEDURE — 3008F BODY MASS INDEX DOCD: CPT | Mod: CPTII,S$GLB,, | Performed by: INTERNAL MEDICINE

## 2023-04-21 PROCEDURE — 3074F PR MOST RECENT SYSTOLIC BLOOD PRESSURE < 130 MM HG: ICD-10-PCS | Mod: CPTII,S$GLB,, | Performed by: INTERNAL MEDICINE

## 2023-04-21 PROCEDURE — 4010F ACE/ARB THERAPY RXD/TAKEN: CPT | Mod: CPTII,S$GLB,, | Performed by: INTERNAL MEDICINE

## 2023-04-21 RX ORDER — AMOXICILLIN AND CLAVULANATE POTASSIUM 875; 125 MG/1; MG/1
1 TABLET, FILM COATED ORAL EVERY 12 HOURS
COMMUNITY
Start: 2023-04-10 | End: 2023-04-21

## 2023-04-21 RX ORDER — NAPROXEN 500 MG/1
500 TABLET ORAL EVERY 12 HOURS PRN
Status: ON HOLD | COMMUNITY
Start: 2023-04-10 | End: 2023-07-29 | Stop reason: HOSPADM

## 2023-04-21 NOTE — PROGRESS NOTES
CHIEF COMPLAINT:   Chief Complaint   Patient presents with    Annual Exam         HISTORY OF PRESENT ILLNESS:  Josiah Orosco Jr. is a 67 y.o. male who presents to the clinic today for a routine medical physical exam. His last physical exam was approximately 1 years(s) ago.    Objective    PAST MEDICAL HISTORY:  Past Medical History:   Diagnosis Date    Arthritis     GERD (gastroesophageal reflux disease)     History of HCV, s/p successful treatment w/ SVR12 5/2015     Failed treatment (stage I/II) - followed by hepatology     Joint pain     Lung disease caused by breathing particles     Widespread essentially symmetric interstitial lung disease    Obesity     Polycythemia vera     Prediabetes        PAST SURGICAL HISTORY:  Past Surgical History:   Procedure Laterality Date    BUNIONECTOMY      bilateral    COLONOSCOPY N/A 12/9/2015    Procedure: COLONOSCOPY;  Surgeon: Conrad Iqbal MD;  Location: [facility];  Service: Endoscopy;  Laterality: N/A;    Liver biopsy x2      rotator cuff Right        SOCIAL HISTORY:  Social History     Socioeconomic History    Marital status:     Number of children: 0    Highest education level: Some college, no degree   Occupational History    Occupation: Self-employed     Employer: [employer]   Tobacco Use    Smoking status: Former    Smokeless tobacco: Never    Tobacco comments:     pt. smokes 10 cigars per day.   Substance and Sexual Activity    Alcohol use: No     Alcohol/week: 0.0 standard drinks     Comment: Rarely    Drug use: Yes     Types: Marijuana     Comment: daily    Sexual activity: Yes     Partners: Female       FAMILY HISTORY:  Family History   Problem Relation Age of Onset    Heart disease Mother     Kidney disease Mother     Parkinsonism Father     Prostate cancer Cousin         maternal side    Heart attack Sister         CABG    Deep vein thrombosis Sister     Pulmonary embolism Sister     Osteoarthritis Sister         s/p knee replacement    Chronic  back pain Brother     Liver disease Neg Hx     Diabetes Neg Hx     Melanoma Neg Hx        ALLERGIES AND MEDICATIONS: updated and reviewed.  Review of patient's allergies indicates:   Allergen Reactions    Ace inhibitors Other (See Comments)     cough     Medication List with Changes/Refills   Current Medications    ALLOPURINOL (ZYLOPRIM) 100 MG TABLET    Take 2 tablets (200 mg total) by mouth once daily.    APIXABAN (ELIQUIS ORAL)    Take by mouth.    ASPIRIN (ECOTRIN) 81 MG EC TABLET    Take 81 mg by mouth once daily.    FLUTICASONE PROPIONATE (FLONASE) 50 MCG/ACTUATION NASAL SPRAY    1 spray (50 mcg total) by Each Nostril route once daily.    FLUTICASONE/UMECLIDIN/VILANTER (TRELEGY ELLIPTA INHL)    Inhale into the lungs.    LEVOCETIRIZINE (XYZAL) 5 MG TABLET    Take 1 tablet (5 mg total) by mouth every evening.    LOSARTAN (COZAAR) 50 MG TABLET    TK 1 T PO D    METOPROLOL SUCCINATE (TOPROL-XL) 25 MG 24 HR TABLET    Take 1 tablet (25 mg total) by mouth once daily.    NAPROXEN (NAPROSYN) 500 MG TABLET    Take 500 mg by mouth every 12 (twelve) hours as needed.   Discontinued Medications    AMOXICILLIN-CLAVULANATE 875-125MG (AUGMENTIN) 875-125 MG PER TABLET    Take 1 tablet by mouth every 12 (twelve) hours.         CARE TEAM:  Patient Care Team:  Elaine Landry MD as PCP - General (Internal Medicine)  Jennifer B. Scheuermann, PA as Physician Assistant (Hepatology)  Karen Johnson MD as Consulting Physician (Hematology)  Junior Torres OD (Optometry)  Meche Reyes LPN as Care Coordinator              SCREENING HISTORY:  Health Maintenance         Date Due Completion Date    High Dose Statin Never done ---    PROSTATE-SPECIFIC ANTIGEN 04/04/2020 4/4/2019    Pneumococcal Vaccines (Age 65+) (3 - PPSV23 if available, else PCV20) 03/08/2024 3/9/2020    Hemoglobin A1c (Prediabetes) 04/14/2024 4/14/2023    Colorectal Cancer Screening 12/09/2025 12/9/2015    TETANUS VACCINE 05/18/2026 5/18/2016    Lipid Panel  "03/11/2027 3/11/2022              REVIEW OF SYSTEMS:   The patient reports : fair dietary habits. He admits to a lot of sugar in his diet.  The patient reports  : that they do not exercise regularly, but stay active.  Review of Systems   Constitutional:  Positive for fatigue (intermittent) and unexpected weight change. Negative for chills and fever.   HENT:  Negative for congestion.    Respiratory:  Positive for shortness of breath (with exertion; recently had acute exacerbation due to bronchitis).    Cardiovascular:  Negative for chest pain, palpitations and leg swelling.   Gastrointestinal:  Negative for abdominal pain.   Genitourinary:  Negative for dysuria.   ROS : patient denies: difficulty initiating urination          PHYSICAL EXAM:  274}  Vitals:    04/21/23 1516   BP: 120/86   BP Location: Right arm   Patient Position: Sitting   BP Method: Medium (Manual)   Pulse: (!) 123   Temp: 97.7 °F (36.5 °C)   TempSrc: Oral   SpO2: (!) 93%  Comment: after resting in exam room (was 86 % when first roomed)   Weight: 96.9 kg (213 lb 8.3 oz)   Height: 6' 4" (1.93 m)       Body mass index is 25.99 kg/m².    General appearance - alert, well appearing, and in no distress, overweight  Psychiatric - alert, oriented to person, place, and time, normal behavior, speech, dress, motor activity and thought process  Eyes - pupils equal and reactive, extraocular eye movements intact, sclera anicteric  Mouth - not examined  Neck - supple, no significant adenopathy, carotids upstroke normal bilaterally, no bruits  Lymphatics - no palpable cervical lymphadenopathy  Chest - clear to auscultation, no wheezes, rales or rhonchi, symmetric air entry, no tachypnea, retractions or cyanosis, no shortness of breath with speech or exertion  Heart - tachycardia with regular rhythm  Neurological - alert, normal speech, no focal findings; cranial nerves II through XII intact  Musculoskeletal - patient noted to have Moderate osteoarthritic changes " to both knee joints. No joint effusions noted., moderate osteoarthritic changes noted in both hands  Extremities - no pedal edema noted  Skin - normal coloration, no suspicious skin lesions      Labs:  Results for orders placed or performed in visit on 04/14/23   CBC Auto Differential   Result Value Ref Range    WBC 8.68 3.90 - 12.70 K/uL    RBC 6.55 (H) 4.60 - 6.20 M/uL    Hemoglobin 13.4 (L) 14.0 - 18.0 g/dL    Hematocrit 46.5 40.0 - 54.0 %    MCV 71 (L) 82 - 98 fL    MCH 20.5 (L) 27.0 - 31.0 pg    MCHC 28.8 (L) 32.0 - 36.0 g/dL    RDW 19.0 (H) 11.5 - 14.5 %    Platelets 394 150 - 450 K/uL    MPV 10.0 9.2 - 12.9 fL    Immature Granulocytes 0.2 0.0 - 0.5 %    Gran # (ANC) 6.8 1.8 - 7.7 K/uL    Immature Grans (Abs) 0.02 0.00 - 0.04 K/uL    Lymph # 1.4 1.0 - 4.8 K/uL    Mono # 0.5 0.3 - 1.0 K/uL    Eos # 0.0 0.0 - 0.5 K/uL    Baso # 0.05 0.00 - 0.20 K/uL    nRBC 0 0 /100 WBC    Gran % 77.8 (H) 38.0 - 73.0 %    Lymph % 16.1 (L) 18.0 - 48.0 %    Mono % 5.2 4.0 - 15.0 %    Eosinophil % 0.1 0.0 - 8.0 %    Basophil % 0.6 0.0 - 1.9 %    Differential Method Automated    Comprehensive Metabolic Panel   Result Value Ref Range    Sodium 138 136 - 145 mmol/L    Potassium 5.3 (H) 3.5 - 5.1 mmol/L    Chloride 102 95 - 110 mmol/L    CO2 25 23 - 29 mmol/L    Glucose 85 70 - 110 mg/dL    BUN 12 8 - 23 mg/dL    Creatinine 0.9 0.5 - 1.4 mg/dL    Calcium 9.6 8.7 - 10.5 mg/dL    Total Protein 8.1 6.0 - 8.4 g/dL    Albumin 3.2 (L) 3.5 - 5.2 g/dL    Total Bilirubin 0.5 0.1 - 1.0 mg/dL    Alkaline Phosphatase 107 55 - 135 U/L    AST 30 10 - 40 U/L    ALT 24 10 - 44 U/L    Anion Gap 11 8 - 16 mmol/L    eGFR >60.0 >60 mL/min/1.73 m^2   Hemoglobin A1C   Result Value Ref Range    Hemoglobin A1C 6.0 (H) 4.0 - 5.6 %    Estimated Avg Glucose 126 68 - 131 mg/dL            ASSESSMENT AND PLAN: 274}  1. Routine medical exam  Counseled on age appropriate medical preventative services including age appropriate cancer screenings, age appropriate eye  and dental exams, over all nutritional health, need for a consistent exercise regimen, and an over all push towards maintaining a vigorous and active lifestyle.  Counseled on age appropriate vaccines and discussed upcoming health care needs based on age/gender. Discussed good sleep hygiene and stress management.    2. HTN, goal below 140/90  BP Readings from Last 1 Encounters:   04/21/23 120/86      The current medical regimen is effective;  continue present plan and medications. Recommended patient to check home readings to monitor and see me for followup as scheduled or sooner as needed.   Discussed sodium restriction, maintaining ideal body weight and regular exercise program as physiologic means to continue to achieve blood pressure control in addition to medication compliance.  Patient was educated that both decongestant and anti-inflammatory medication may raise blood pressure.  The patient is not active on the digital hypertension program.    3. ILD (interstitial lung disease)/4. Pulmonary interstitial fibrosis  The current medical regimen is effective;  continue present plan and medications.   Followed by: Pulmonology.   Reports that he recently had bronchitis that caused significant shortness of breath.  He feels better.  He was hypoxic when he 1st came in the room to see me today.  We discussed that readings below 89% require supplemental oxygen.  I asked him to invest in a pulse oximeter so he can check himself at home when he exerts himself.    5. Pulmonary hypertension  Stable. Observe.    6. Polycythemia  The current medical regimen is effective;  continue present plan and medications.   Followed by: Hematology/Oncology.   Overview:  - getting regular phlebotomy (approximately every 3 weeks)  - followed by hematology, Dr. Johnson      7. Gastroesophageal reflux disease, unspecified whether esophagitis present  Symptoms controlled: yes - takes medication occasionally as needed.   Reflux precautions  discussed (eliminate tobacco if a smoker; minimize caffeine, chocolate and red/white peppermint intake; avoid heavy and spicy meals; don't lay down within 2-3 hours after eating; minimize the intake of NSAIDs). Medication as needed.   Patient asked to take medication breaks, if possible - discussed chronic use can limit calcium absorption (which can lead to osteopenia/osteoporosis), increases the risk for intestinal infections, and can cause kidney damage. There are also some newer studies that show possible increased risk of mortality.    8. Prediabetes  Lab Results   Component Value Date    HGBA1C 6.0 (H) 04/14/2023     Stable. We discussed low sugar/low carbohydrate diet and regular exercise to prevent progression. No need for prescription medication at this time.  Check A1c yearly.    9. Thoracic aorta atherosclerosis  I discussed with the patient that he should be on statin medication to help prevent strokes and heart attacks.  His wife reports that they recently saw his hematologist when he was having an acute bronchitis attack.  At that time I suspect he may have been found to have an abnormal heart rhythm as well as an abnormal EKG.  Reportedly he was started on 5 new medications 1 of which was Eliquis.  His wife thinks he was also started on a statin.  She will call me with his new medication names at her earliest convenience.  They do have follow-up with Cardiology in the near future.  If he is not already on a statin we will start him on one.  Overview:  - noted on CXR - 5/9/2019      10. Overweight (BMI 25.0-29.9)  BMI Readings from Last 3 Encounters:   04/21/23 25.99 kg/m²   02/01/23 26.24 kg/m²   01/05/23 25.87 kg/m²     The patient is asked to make an attempt to improve diet and exercise patterns to aid in medical management of this problem.  We did discuss that his last lab test showed some decrease in his overall albumin/protein status.  He will start focusing on protein in his diet.    11. Prostate  cancer screening  Routine screening with next blood draw.  -     PSA, Screening; Future; Expected date: 04/21/2023           Orders Placed This Encounter   Procedures    PSA, Screening       Follow up in about 1 year (around 4/21/2024), or if symptoms worsen or fail to improve, for annual exam. or sooner as needed.

## 2023-04-24 ENCOUNTER — TELEPHONE (OUTPATIENT)
Dept: FAMILY MEDICINE | Facility: CLINIC | Age: 68
End: 2023-04-24
Payer: MEDICARE

## 2023-04-24 NOTE — TELEPHONE ENCOUNTER
----- Message from Gordon Escamilla MA sent at 4/24/2023 11:56 AM CDT -----  Type: Patient Call Back    Who called: Wife - Ms. Orosco    What is the request in detail: pt. Was seen and left without his AVS.. she would like to know if it jeanette, be mailed because she wants it for her records..     Can the clinic reply by [facility]?No    Would the patient rather a call back or a response via [facility]? yes    Best call back number: [phone] (home) [phone] (work)

## 2023-04-26 ENCOUNTER — OFFICE VISIT (OUTPATIENT)
Dept: CARDIOLOGY | Facility: CLINIC | Age: 68
End: 2023-04-26
Payer: MEDICARE

## 2023-04-26 VITALS
WEIGHT: 212.75 LBS | OXYGEN SATURATION: 92 % | HEART RATE: 121 BPM | RESPIRATION RATE: 18 BRPM | DIASTOLIC BLOOD PRESSURE: 74 MMHG | SYSTOLIC BLOOD PRESSURE: 132 MMHG | HEIGHT: 76 IN | BODY MASS INDEX: 25.91 KG/M2

## 2023-04-26 DIAGNOSIS — J84.9 ILD (INTERSTITIAL LUNG DISEASE): ICD-10-CM

## 2023-04-26 DIAGNOSIS — Z86.19 HISTORY OF HEPATITIS C: ICD-10-CM

## 2023-04-26 DIAGNOSIS — I70.0 THORACIC AORTA ATHEROSCLEROSIS: ICD-10-CM

## 2023-04-26 DIAGNOSIS — I27.20 PULMONARY HYPERTENSION: Primary | ICD-10-CM

## 2023-04-26 DIAGNOSIS — K21.9 GASTROESOPHAGEAL REFLUX DISEASE, UNSPECIFIED WHETHER ESOPHAGITIS PRESENT: ICD-10-CM

## 2023-04-26 DIAGNOSIS — J84.10 PULMONARY INTERSTITIAL FIBROSIS: ICD-10-CM

## 2023-04-26 DIAGNOSIS — E66.3 OVERWEIGHT (BMI 25.0-29.9): ICD-10-CM

## 2023-04-26 DIAGNOSIS — I48.0 PAROXYSMAL A-FIB: ICD-10-CM

## 2023-04-26 PROCEDURE — 1101F PT FALLS ASSESS-DOCD LE1/YR: CPT | Mod: CPTII,S$GLB,, | Performed by: INTERNAL MEDICINE

## 2023-04-26 PROCEDURE — 3008F PR BODY MASS INDEX (BMI) DOCUMENTED: ICD-10-PCS | Mod: CPTII,S$GLB,, | Performed by: INTERNAL MEDICINE

## 2023-04-26 PROCEDURE — 1160F PR REVIEW ALL MEDS BY PRESCRIBER/CLIN PHARMACIST DOCUMENTED: ICD-10-PCS | Mod: CPTII,S$GLB,, | Performed by: INTERNAL MEDICINE

## 2023-04-26 PROCEDURE — 1101F PR PT FALLS ASSESS DOC 0-1 FALLS W/OUT INJ PAST YR: ICD-10-PCS | Mod: CPTII,S$GLB,, | Performed by: INTERNAL MEDICINE

## 2023-04-26 PROCEDURE — 4010F ACE/ARB THERAPY RXD/TAKEN: CPT | Mod: CPTII,S$GLB,, | Performed by: INTERNAL MEDICINE

## 2023-04-26 PROCEDURE — 1159F MED LIST DOCD IN RCRD: CPT | Mod: CPTII,S$GLB,, | Performed by: INTERNAL MEDICINE

## 2023-04-26 PROCEDURE — 1126F PR PAIN SEVERITY QUANTIFIED, NO PAIN PRESENT: ICD-10-PCS | Mod: CPTII,S$GLB,, | Performed by: INTERNAL MEDICINE

## 2023-04-26 PROCEDURE — 3044F HG A1C LEVEL LT 7.0%: CPT | Mod: CPTII,S$GLB,, | Performed by: INTERNAL MEDICINE

## 2023-04-26 PROCEDURE — 93000 EKG 12-LEAD: ICD-10-PCS | Mod: S$GLB,,, | Performed by: INTERNAL MEDICINE

## 2023-04-26 PROCEDURE — 93000 ELECTROCARDIOGRAM COMPLETE: CPT | Mod: S$GLB,,, | Performed by: INTERNAL MEDICINE

## 2023-04-26 PROCEDURE — 1157F ADVNC CARE PLAN IN RCRD: CPT | Mod: CPTII,S$GLB,, | Performed by: INTERNAL MEDICINE

## 2023-04-26 PROCEDURE — 1159F PR MEDICATION LIST DOCUMENTED IN MEDICAL RECORD: ICD-10-PCS | Mod: CPTII,S$GLB,, | Performed by: INTERNAL MEDICINE

## 2023-04-26 PROCEDURE — 4010F PR ACE/ARB THEARPY RXD/TAKEN: ICD-10-PCS | Mod: CPTII,S$GLB,, | Performed by: INTERNAL MEDICINE

## 2023-04-26 PROCEDURE — 3008F BODY MASS INDEX DOCD: CPT | Mod: CPTII,S$GLB,, | Performed by: INTERNAL MEDICINE

## 2023-04-26 PROCEDURE — 1157F PR ADVANCE CARE PLAN OR EQUIV PRESENT IN MEDICAL RECORD: ICD-10-PCS | Mod: CPTII,S$GLB,, | Performed by: INTERNAL MEDICINE

## 2023-04-26 PROCEDURE — 99214 OFFICE O/P EST MOD 30 MIN: CPT | Mod: S$GLB,,, | Performed by: INTERNAL MEDICINE

## 2023-04-26 PROCEDURE — 99999 PR PBB SHADOW E&M-EST. PATIENT-LVL IV: ICD-10-PCS | Mod: PBBFAC,,, | Performed by: INTERNAL MEDICINE

## 2023-04-26 PROCEDURE — 99214 PR OFFICE/OUTPT VISIT, EST, LEVL IV, 30-39 MIN: ICD-10-PCS | Mod: S$GLB,,, | Performed by: INTERNAL MEDICINE

## 2023-04-26 PROCEDURE — 3044F PR MOST RECENT HEMOGLOBIN A1C LEVEL <7.0%: ICD-10-PCS | Mod: CPTII,S$GLB,, | Performed by: INTERNAL MEDICINE

## 2023-04-26 PROCEDURE — 3288F PR FALLS RISK ASSESSMENT DOCUMENTED: ICD-10-PCS | Mod: CPTII,S$GLB,, | Performed by: INTERNAL MEDICINE

## 2023-04-26 PROCEDURE — 3288F FALL RISK ASSESSMENT DOCD: CPT | Mod: CPTII,S$GLB,, | Performed by: INTERNAL MEDICINE

## 2023-04-26 PROCEDURE — 3078F DIAST BP <80 MM HG: CPT | Mod: CPTII,S$GLB,, | Performed by: INTERNAL MEDICINE

## 2023-04-26 PROCEDURE — 99999 PR PBB SHADOW E&M-EST. PATIENT-LVL IV: CPT | Mod: PBBFAC,,, | Performed by: INTERNAL MEDICINE

## 2023-04-26 PROCEDURE — 1126F AMNT PAIN NOTED NONE PRSNT: CPT | Mod: CPTII,S$GLB,, | Performed by: INTERNAL MEDICINE

## 2023-04-26 PROCEDURE — 3075F PR MOST RECENT SYSTOLIC BLOOD PRESS GE 130-139MM HG: ICD-10-PCS | Mod: CPTII,S$GLB,, | Performed by: INTERNAL MEDICINE

## 2023-04-26 PROCEDURE — 3075F SYST BP GE 130 - 139MM HG: CPT | Mod: CPTII,S$GLB,, | Performed by: INTERNAL MEDICINE

## 2023-04-26 PROCEDURE — 1160F RVW MEDS BY RX/DR IN RCRD: CPT | Mod: CPTII,S$GLB,, | Performed by: INTERNAL MEDICINE

## 2023-04-26 PROCEDURE — 3078F PR MOST RECENT DIASTOLIC BLOOD PRESSURE < 80 MM HG: ICD-10-PCS | Mod: CPTII,S$GLB,, | Performed by: INTERNAL MEDICINE

## 2023-04-26 RX ORDER — METOPROLOL SUCCINATE 50 MG/1
50 TABLET, EXTENDED RELEASE ORAL DAILY
Qty: 90 TABLET | Refills: 3 | Status: ON HOLD | OUTPATIENT
Start: 2023-04-26 | End: 2023-05-08 | Stop reason: SDUPTHER

## 2023-04-26 NOTE — PROGRESS NOTES
CARDIOVASCULAR CONSULTATION    REASON FOR CONSULT:   Josiah Orosco Jr. is a 67 y.o. male who presents for evaluation      HISTORY OF PRESENT ILLNESS:     Patient is a pleasant 66-year-old man.  Here for evaluation.  Patient states that lately he has been experiencing dyspnea on exertion.  Also he was told that he has a regular rhythm and hence has been referred.  Patient denies any palpitations.  Denies any orthopnea or PND.  EKG done personally reviewed and shows sinus rhythm with PACs.    Notes from September 2022:  Patient here for follow-up.    Sinus rhythm with heart rates varying between 44 and 164 BPM with an average of 88 BPM.  There were rare PVCs totalling 402 and averaging 8.38 per hour. There were 1 couplets. There were 1 triplets.  There were very frequent PACs  SVT/possible atrial flutter.  Recommend event monitor.    The left ventricle is normal in size with mild concentric hypertrophy and normal systolic function.  The estimated ejection fraction is 60%.  Indeterminate left ventricular diastolic function.  Mild right atrial enlargement.  Normal right ventricular size with normal right ventricular systolic function.  Mild tricuspid regurgitation.  Normal central venous pressure (3 mmHg).  The estimated PA systolic pressure is 38 mmHg.  There is mild pulmonary hypertension.      Normal myocardial perfusion scan. There is no evidence of myocardial ischemia or infarction.    There is a  mild to moderate intensity fixed perfusion abnormality in the inferior wall of the left ventricle secondary to diaphragm attenuation.    The gated perfusion images showed an ejection fraction of 61% post stress.    There is normal wall motion post stress.    The EKG portion of this study is negative for ischemia.    The patient reported no chest pain during the stress test.    During stress, occasional PVCs are noted. , During stress, SVTs are noted.      Notes from October 2022:    Patient here for follow-up.  Doing  fine.  Event monitor showed some PACs and periods of tachycardia as described below.  Patient asymptomatic.      At baseline, in the absence of symptoms, the rhythm was sinus with heart rate 85 beats per minute.  There were PACs and sequential PACs.  An auto trigger event on 09/27 showed sinus tachycardia at 155 beats per minute, and on 09/28 another auto trigger event also showed sinus tachycardia.  There also was a regular narrow complex tachycardia at 157 beats per minute.  This lasted 27 seconds and began following 2 PVCs.  P-waves were not clearly evident.  There was a gradual slow down with P-waves becoming evident, to become consistent with sinus tachycardia with occasional PACs.  No activity level (e.g., exercise) was specified.     Impression: Sinus rhythm with PACs and periods of tachycardia, as described above.    Notes from January 23    Patient doing fine.  Denies any chest pains at rest on exertion, orthopnea, PND, swelling of feet    Notes from April 23: Patient here for follow-up.  Lately has been experiencing dyspnea on exertion.  EKG done at outside hospital demonstrated atrial flutter and patient was initiated on Eliquis.        PAST MEDICAL HISTORY:     Past Medical History:   Diagnosis Date    Arthritis     GERD (gastroesophageal reflux disease)     History of HCV, s/p successful treatment w/ SVR12 5/2015     Failed treatment (stage I/II) - followed by hepatology     Joint pain     Lung disease caused by breathing particles     Widespread essentially symmetric interstitial lung disease    Obesity     Polycythemia vera     Prediabetes        PAST SURGICAL HISTORY:     Past Surgical History:   Procedure Laterality Date    BUNIONECTOMY      bilateral    COLONOSCOPY N/A 12/9/2015    Procedure: COLONOSCOPY;  Surgeon: Conrad Iqbal MD;  Location: [facility];  Service: Endoscopy;  Laterality: N/A;    Liver biopsy x2      rotator cuff Right        ALLERGIES AND MEDICATION:     Review of patient's  allergies indicates:   Allergen Reactions    Ace inhibitors Other (See Comments)     cough        Medication List            Accurate as of April 26, 2023  2:07 PM. If you have any questions, ask your nurse or doctor.                CONTINUE taking these medications      allopurinoL 100 MG tablet  Commonly known as: ZYLOPRIM  Take 2 tablets (200 mg total) by mouth once daily.     aspirin 81 MG EC tablet  Commonly known as: ECOTRIN     ELIQUIS ORAL     fluticasone propionate 50 mcg/actuation nasal spray  Commonly known as: FLONASE  1 spray (50 mcg total) by Each Nostril route once daily.     levocetirizine 5 MG tablet  Commonly known as: XYZAL  Take 1 tablet (5 mg total) by mouth every evening.     losartan 50 MG tablet  Commonly known as: COZAAR  TK 1 T PO D     metoprolol succinate 25 MG 24 hr tablet  Commonly known as: TOPROL-XL  Take 1 tablet (25 mg total) by mouth once daily.     naproxen 500 MG tablet  Commonly known as: NAPROSYN     TRELEGY ELLIPTA INHL              SOCIAL HISTORY:     Social History     Socioeconomic History    Marital status:     Number of children: 0    Highest education level: Some college, no degree   Occupational History    Occupation: Self-employed     Employer: [employer]   Tobacco Use    Smoking status: Former    Smokeless tobacco: Never    Tobacco comments:     pt. smokes 10 cigars per day.   Substance and Sexual Activity    Alcohol use: No     Alcohol/week: 0.0 standard drinks     Comment: Rarely    Drug use: Yes     Types: Marijuana     Comment: daily    Sexual activity: Yes     Partners: Female       FAMILY HISTORY:     Family History   Problem Relation Age of Onset    Heart disease Mother     Kidney disease Mother     Parkinsonism Father     Prostate cancer Cousin         maternal side    Heart attack Sister         CABG    Deep vein thrombosis Sister     Pulmonary embolism Sister     Osteoarthritis Sister         s/p knee replacement    Chronic back pain Brother      "Liver disease Neg Hx     Diabetes Neg Hx     Melanoma Neg Hx        REVIEW OF SYSTEMS:   Review of Systems   Constitutional: Negative.   HENT: Negative.     Eyes: Negative.    Cardiovascular:  Positive for palpitations.   Endocrine: Negative.    Hematologic/Lymphatic: Negative.    Skin: Negative.    Musculoskeletal: Negative.    Gastrointestinal: Negative.    Genitourinary: Negative.    Neurological: Negative.    Psychiatric/Behavioral: Negative.     Allergic/Immunologic: Negative.      A 10 point review of systems was performed and all the pertinent positives have been mentioned. Rest of review of systems was negative.        PHYSICAL EXAM:     Vitals:    04/26/23 1310   BP: 132/74   Pulse: (!) 121   Resp: 18    Body mass index is 25.9 kg/m².  Weight: 96.5 kg (212 lb 11.9 oz)   Height: 6' 4" (193 cm)     Physical Exam  Vitals reviewed.   Constitutional:       Appearance: He is well-developed.   HENT:      Head: Normocephalic.   Eyes:      Conjunctiva/sclera: Conjunctivae normal.      Pupils: Pupils are equal, round, and reactive to light.   Cardiovascular:      Rate and Rhythm: Tachycardia present. Rhythm irregular.      Heart sounds: Normal heart sounds.   Pulmonary:      Effort: Pulmonary effort is normal.      Breath sounds: Normal breath sounds.   Abdominal:      General: Bowel sounds are normal.      Palpations: Abdomen is soft.   Musculoskeletal:      Cervical back: Normal range of motion and neck supple.   Skin:     General: Skin is warm.   Neurological:      Mental Status: He is alert and oriented to person, place, and time.         DATA:     Laboratory:  CBC:  Recent Labs   Lab 03/06/21  0822 03/11/22  0705 04/14/23  0953   WBC 6.91 6.87 8.68   Hemoglobin 13.6 L 14.8 13.4 L   Hematocrit 47.0 50.4 46.5   Platelets 259 256 394         CHEMISTRIES:  Recent Labs   Lab 03/06/21  0822 03/11/22  0705 04/14/23  0953   Glucose 96 101 85   Sodium 140 143 138   Potassium 4.4 4.6 5.3 H   BUN 12 10 12   Creatinine 0.9 " 0.9 0.9   eGFR if African American >60.0 >60  --    eGFR if non  >60.0 >60  --    Calcium 9.3 9.5 9.6         CARDIAC BIOMARKERS:        COAGS:        LIPIDS/LFTS:  Recent Labs   Lab 03/06/21  0822 03/11/22  0705 04/14/23  0953   Cholesterol 136 142  --    Triglycerides 46 56  --    HDL 39 L 42  --    LDL Cholesterol 87.8 88.8  --    Non-HDL Cholesterol 97 100  --    AST 26 25 30   ALT 11 12 24         Hemoglobin A1C   Date Value Ref Range Status   04/14/2023 6.0 (H) 4.0 - 5.6 % Final     Comment:     ADA Screening Guidelines:  5.7-6.4%  Consistent with prediabetes  >or=6.5%  Consistent with diabetes    High levels of fetal hemoglobin interfere with the HbA1C  assay. Heterozygous hemoglobin variants (HbS, HgC, etc)do  not significantly interfere with this assay.   However, presence of multiple variants may affect accuracy.     03/11/2022 5.9 (H) 4.0 - 5.6 % Final     Comment:     ADA Screening Guidelines:  5.7-6.4%  Consistent with prediabetes  >or=6.5%  Consistent with diabetes    High levels of fetal hemoglobin interfere with the HbA1C  assay. Heterozygous hemoglobin variants (HbS, HgC, etc)do  not significantly interfere with this assay.   However, presence of multiple variants may affect accuracy.     03/06/2021 5.8 (H) 4.0 - 5.6 % Final     Comment:     ADA Screening Guidelines:  5.7-6.4%  Consistent with prediabetes  >or=6.5%  Consistent with diabetes    High levels of fetal hemoglobin interfere with the HbA1C  assay. Heterozygous hemoglobin variants (HbS, HgC, etc)do  not significantly interfere with this assay.   However, presence of multiple variants may affect accuracy.         TSH        The 10-year ASCVD risk score (Felix CHOE, et al., 2019) is: 18%    Values used to calculate the score:      Age: 67 years      Sex: Male      Is Non- : Yes      Diabetic: No      Tobacco smoker: No      Systolic Blood Pressure: 132 mmHg      Is BP treated: Yes      HDL Cholesterol:  42 mg/dL      Total Cholesterol: 142 mg/dL             ASSESSMENT AND PLAN     Patient Active Problem List   Diagnosis    History of HCV, s/p successful treatment w/ SVR12 5/2015    Lung disease caused by breathing particles    Overweight (BMI 25.0-29.9)    GERD (gastroesophageal reflux disease)    Polycythemia    Former smoker    Hyperuricemia    ILD (interstitial lung disease)    Prediabetes    Arthritis    Allergic cough    HTN, goal below 140/90    Pulmonary interstitial fibrosis    Pulmonary hypertension    Marijuana user    Thoracic aorta atherosclerosis       Dyspnea on exertion, palpitations and shortness of breath.  Further evaluation with the help of stress test, echo, Holter monitor.  Stress test did not show any significant ischemia.  Echo showed normal left ventricle systolic function.      New onset atrial flutter:  Symptomatic.  Patient's wife states that she feels that since the flutter, he has gotten more short of breath and has dyspnea on exertion.  And gets tired easier.  We discussed various options including cardioversion after detailed discussion she decided to proceed with ELENA and cardioversion for his atrial flutter.    Continue Eliquis 5 mg b.i.d.    -No absolute contraindications of esophageal stricture, tumor, perforation, laceration,or diverticulum and/or active GI bleed  -The risks, benefits & alternatives of the procedure were explained to the patient.   -The risks of transesophageal echo include but are not limited to:  Dental trauma, esophageal trauma/perforation, bleeding, laryngospasm/brochospasm, aspiration, sore throat/hoarseness, & dislodgement of the endotracheal tube/nasogastric tube (where applicable).    -The risks of ANES monitored sedation include hypotension, respiratory depression, arrhythmias, bronchospasm, & death.    -Informed consent was obtained. The patient is agreeable to proceed with the procedure and all questions and concerns addressed.      Risks, benefits and  alternatives of the ELENA/Cardioversion procedure were discussed with the patient including throat irritation, aspiration, anesthetic complications, esophageal irritation/perforation, skin irritation, arrhythmia, etc.  Patient understands and agrees to proceed.  Consent was placed on the chart.      Follow-up after procedure        Thank you very much for involving me in the care of your patient.  Please do not hesitate to contact me if there are any questions.      David Cueva MD, FACC, [facility]  Interventional Cardiologist, [facility].           This note was dictated with the help of speech recognition software.  There might be un-intended errors and/or substitutions.

## 2023-04-26 NOTE — H&P (VIEW-ONLY)
CARDIOVASCULAR CONSULTATION    REASON FOR CONSULT:   Josiah Orosco Jr. is a 67 y.o. male who presents for evaluation      HISTORY OF PRESENT ILLNESS:     Patient is a pleasant 66-year-old man.  Here for evaluation.  Patient states that lately he has been experiencing dyspnea on exertion.  Also he was told that he has a regular rhythm and hence has been referred.  Patient denies any palpitations.  Denies any orthopnea or PND.  EKG done personally reviewed and shows sinus rhythm with PACs.    Notes from September 2022:  Patient here for follow-up.    Sinus rhythm with heart rates varying between 44 and 164 BPM with an average of 88 BPM.  There were rare PVCs totalling 402 and averaging 8.38 per hour. There were 1 couplets. There were 1 triplets.  There were very frequent PACs  SVT/possible atrial flutter.  Recommend event monitor.    The left ventricle is normal in size with mild concentric hypertrophy and normal systolic function.  The estimated ejection fraction is 60%.  Indeterminate left ventricular diastolic function.  Mild right atrial enlargement.  Normal right ventricular size with normal right ventricular systolic function.  Mild tricuspid regurgitation.  Normal central venous pressure (3 mmHg).  The estimated PA systolic pressure is 38 mmHg.  There is mild pulmonary hypertension.      Normal myocardial perfusion scan. There is no evidence of myocardial ischemia or infarction.    There is a  mild to moderate intensity fixed perfusion abnormality in the inferior wall of the left ventricle secondary to diaphragm attenuation.    The gated perfusion images showed an ejection fraction of 61% post stress.    There is normal wall motion post stress.    The EKG portion of this study is negative for ischemia.    The patient reported no chest pain during the stress test.    During stress, occasional PVCs are noted. , During stress, SVTs are noted.      Notes from October 2022:    Patient here for follow-up.  Doing  fine.  Event monitor showed some PACs and periods of tachycardia as described below.  Patient asymptomatic.      At baseline, in the absence of symptoms, the rhythm was sinus with heart rate 85 beats per minute.  There were PACs and sequential PACs.  An auto trigger event on 09/27 showed sinus tachycardia at 155 beats per minute, and on 09/28 another auto trigger event also showed sinus tachycardia.  There also was a regular narrow complex tachycardia at 157 beats per minute.  This lasted 27 seconds and began following 2 PVCs.  P-waves were not clearly evident.  There was a gradual slow down with P-waves becoming evident, to become consistent with sinus tachycardia with occasional PACs.  No activity level (e.g., exercise) was specified.     Impression: Sinus rhythm with PACs and periods of tachycardia, as described above.    Notes from January 23    Patient doing fine.  Denies any chest pains at rest on exertion, orthopnea, PND, swelling of feet    Notes from April 23: Patient here for follow-up.  Lately has been experiencing dyspnea on exertion.  EKG done at outside hospital demonstrated atrial flutter and patient was initiated on Eliquis.        PAST MEDICAL HISTORY:     Past Medical History:   Diagnosis Date    Arthritis     GERD (gastroesophageal reflux disease)     History of HCV, s/p successful treatment w/ SVR12 5/2015     Failed treatment (stage I/II) - followed by hepatology     Joint pain     Lung disease caused by breathing particles     Widespread essentially symmetric interstitial lung disease    Obesity     Polycythemia vera     Prediabetes        PAST SURGICAL HISTORY:     Past Surgical History:   Procedure Laterality Date    BUNIONECTOMY      bilateral    COLONOSCOPY N/A 12/9/2015    Procedure: COLONOSCOPY;  Surgeon: Conrad Iqbal MD;  Location: [facility];  Service: Endoscopy;  Laterality: N/A;    Liver biopsy x2      rotator cuff Right        ALLERGIES AND MEDICATION:     Review of patient's  allergies indicates:   Allergen Reactions    Ace inhibitors Other (See Comments)     cough        Medication List            Accurate as of April 26, 2023  2:07 PM. If you have any questions, ask your nurse or doctor.                CONTINUE taking these medications      allopurinoL 100 MG tablet  Commonly known as: ZYLOPRIM  Take 2 tablets (200 mg total) by mouth once daily.     aspirin 81 MG EC tablet  Commonly known as: ECOTRIN     ELIQUIS ORAL     fluticasone propionate 50 mcg/actuation nasal spray  Commonly known as: FLONASE  1 spray (50 mcg total) by Each Nostril route once daily.     levocetirizine 5 MG tablet  Commonly known as: XYZAL  Take 1 tablet (5 mg total) by mouth every evening.     losartan 50 MG tablet  Commonly known as: COZAAR  TK 1 T PO D     metoprolol succinate 25 MG 24 hr tablet  Commonly known as: TOPROL-XL  Take 1 tablet (25 mg total) by mouth once daily.     naproxen 500 MG tablet  Commonly known as: NAPROSYN     TRELEGY ELLIPTA INHL              SOCIAL HISTORY:     Social History     Socioeconomic History    Marital status:     Number of children: 0    Highest education level: Some college, no degree   Occupational History    Occupation: Self-employed     Employer: [employer]   Tobacco Use    Smoking status: Former    Smokeless tobacco: Never    Tobacco comments:     pt. smokes 10 cigars per day.   Substance and Sexual Activity    Alcohol use: No     Alcohol/week: 0.0 standard drinks     Comment: Rarely    Drug use: Yes     Types: Marijuana     Comment: daily    Sexual activity: Yes     Partners: Female       FAMILY HISTORY:     Family History   Problem Relation Age of Onset    Heart disease Mother     Kidney disease Mother     Parkinsonism Father     Prostate cancer Cousin         maternal side    Heart attack Sister         CABG    Deep vein thrombosis Sister     Pulmonary embolism Sister     Osteoarthritis Sister         s/p knee replacement    Chronic back pain Brother      "Liver disease Neg Hx     Diabetes Neg Hx     Melanoma Neg Hx        REVIEW OF SYSTEMS:   Review of Systems   Constitutional: Negative.   HENT: Negative.     Eyes: Negative.    Cardiovascular:  Positive for palpitations.   Endocrine: Negative.    Hematologic/Lymphatic: Negative.    Skin: Negative.    Musculoskeletal: Negative.    Gastrointestinal: Negative.    Genitourinary: Negative.    Neurological: Negative.    Psychiatric/Behavioral: Negative.     Allergic/Immunologic: Negative.      A 10 point review of systems was performed and all the pertinent positives have been mentioned. Rest of review of systems was negative.        PHYSICAL EXAM:     Vitals:    04/26/23 1310   BP: 132/74   Pulse: (!) 121   Resp: 18    Body mass index is 25.9 kg/m².  Weight: 96.5 kg (212 lb 11.9 oz)   Height: 6' 4" (193 cm)     Physical Exam  Vitals reviewed.   Constitutional:       Appearance: He is well-developed.   HENT:      Head: Normocephalic.   Eyes:      Conjunctiva/sclera: Conjunctivae normal.      Pupils: Pupils are equal, round, and reactive to light.   Cardiovascular:      Rate and Rhythm: Tachycardia present. Rhythm irregular.      Heart sounds: Normal heart sounds.   Pulmonary:      Effort: Pulmonary effort is normal.      Breath sounds: Normal breath sounds.   Abdominal:      General: Bowel sounds are normal.      Palpations: Abdomen is soft.   Musculoskeletal:      Cervical back: Normal range of motion and neck supple.   Skin:     General: Skin is warm.   Neurological:      Mental Status: He is alert and oriented to person, place, and time.         DATA:     Laboratory:  CBC:  Recent Labs   Lab 03/06/21  0822 03/11/22  0705 04/14/23  0953   WBC 6.91 6.87 8.68   Hemoglobin 13.6 L 14.8 13.4 L   Hematocrit 47.0 50.4 46.5   Platelets 259 256 394         CHEMISTRIES:  Recent Labs   Lab 03/06/21  0822 03/11/22  0705 04/14/23  0953   Glucose 96 101 85   Sodium 140 143 138   Potassium 4.4 4.6 5.3 H   BUN 12 10 12   Creatinine 0.9 " 0.9 0.9   eGFR if African American >60.0 >60  --    eGFR if non  >60.0 >60  --    Calcium 9.3 9.5 9.6         CARDIAC BIOMARKERS:        COAGS:        LIPIDS/LFTS:  Recent Labs   Lab 03/06/21  0822 03/11/22  0705 04/14/23  0953   Cholesterol 136 142  --    Triglycerides 46 56  --    HDL 39 L 42  --    LDL Cholesterol 87.8 88.8  --    Non-HDL Cholesterol 97 100  --    AST 26 25 30   ALT 11 12 24         Hemoglobin A1C   Date Value Ref Range Status   04/14/2023 6.0 (H) 4.0 - 5.6 % Final     Comment:     ADA Screening Guidelines:  5.7-6.4%  Consistent with prediabetes  >or=6.5%  Consistent with diabetes    High levels of fetal hemoglobin interfere with the HbA1C  assay. Heterozygous hemoglobin variants (HbS, HgC, etc)do  not significantly interfere with this assay.   However, presence of multiple variants may affect accuracy.     03/11/2022 5.9 (H) 4.0 - 5.6 % Final     Comment:     ADA Screening Guidelines:  5.7-6.4%  Consistent with prediabetes  >or=6.5%  Consistent with diabetes    High levels of fetal hemoglobin interfere with the HbA1C  assay. Heterozygous hemoglobin variants (HbS, HgC, etc)do  not significantly interfere with this assay.   However, presence of multiple variants may affect accuracy.     03/06/2021 5.8 (H) 4.0 - 5.6 % Final     Comment:     ADA Screening Guidelines:  5.7-6.4%  Consistent with prediabetes  >or=6.5%  Consistent with diabetes    High levels of fetal hemoglobin interfere with the HbA1C  assay. Heterozygous hemoglobin variants (HbS, HgC, etc)do  not significantly interfere with this assay.   However, presence of multiple variants may affect accuracy.         TSH        The 10-year ASCVD risk score (Felix CHOE, et al., 2019) is: 18%    Values used to calculate the score:      Age: 67 years      Sex: Male      Is Non- : Yes      Diabetic: No      Tobacco smoker: No      Systolic Blood Pressure: 132 mmHg      Is BP treated: Yes      HDL Cholesterol:  42 mg/dL      Total Cholesterol: 142 mg/dL             ASSESSMENT AND PLAN     Patient Active Problem List   Diagnosis    History of HCV, s/p successful treatment w/ SVR12 5/2015    Lung disease caused by breathing particles    Overweight (BMI 25.0-29.9)    GERD (gastroesophageal reflux disease)    Polycythemia    Former smoker    Hyperuricemia    ILD (interstitial lung disease)    Prediabetes    Arthritis    Allergic cough    HTN, goal below 140/90    Pulmonary interstitial fibrosis    Pulmonary hypertension    Marijuana user    Thoracic aorta atherosclerosis       Dyspnea on exertion, palpitations and shortness of breath.  Further evaluation with the help of stress test, echo, Holter monitor.  Stress test did not show any significant ischemia.  Echo showed normal left ventricle systolic function.      New onset atrial flutter:  Symptomatic.  Patient's wife states that she feels that since the flutter, he has gotten more short of breath and has dyspnea on exertion.  And gets tired easier.  We discussed various options including cardioversion after detailed discussion she decided to proceed with ELENA and cardioversion for his atrial flutter.    Continue Eliquis 5 mg b.i.d.    -No absolute contraindications of esophageal stricture, tumor, perforation, laceration,or diverticulum and/or active GI bleed  -The risks, benefits & alternatives of the procedure were explained to the patient.   -The risks of transesophageal echo include but are not limited to:  Dental trauma, esophageal trauma/perforation, bleeding, laryngospasm/brochospasm, aspiration, sore throat/hoarseness, & dislodgement of the endotracheal tube/nasogastric tube (where applicable).    -The risks of ANES monitored sedation include hypotension, respiratory depression, arrhythmias, bronchospasm, & death.    -Informed consent was obtained. The patient is agreeable to proceed with the procedure and all questions and concerns addressed.      Risks, benefits and  alternatives of the ELENA/Cardioversion procedure were discussed with the patient including throat irritation, aspiration, anesthetic complications, esophageal irritation/perforation, skin irritation, arrhythmia, etc.  Patient understands and agrees to proceed.  Consent was placed on the chart.      Follow-up after procedure        Thank you very much for involving me in the care of your patient.  Please do not hesitate to contact me if there are any questions.      David Cueva MD, FACC, [facility]  Interventional Cardiologist, [facility].           This note was dictated with the help of speech recognition software.  There might be un-intended errors and/or substitutions.

## 2023-05-02 DIAGNOSIS — R05.8 ALLERGIC COUGH: ICD-10-CM

## 2023-05-03 RX ORDER — FLUTICASONE PROPIONATE 50 MCG
SPRAY, SUSPENSION (ML) NASAL
Qty: 16 G | Refills: 0 | Status: SHIPPED | OUTPATIENT
Start: 2023-05-03

## 2023-05-04 ENCOUNTER — HOSPITAL ENCOUNTER (OUTPATIENT)
Dept: PREADMISSION TESTING | Facility: HOSPITAL | Age: 68
Discharge: HOME | End: 2023-05-04
Attending: INTERNAL MEDICINE
Payer: MEDICARE

## 2023-05-04 VITALS
SYSTOLIC BLOOD PRESSURE: 136 MMHG | OXYGEN SATURATION: 95 % | RESPIRATION RATE: 18 BRPM | DIASTOLIC BLOOD PRESSURE: 87 MMHG | HEART RATE: 119 BPM | TEMPERATURE: 98 F | BODY MASS INDEX: 24.81 KG/M2 | HEIGHT: 77 IN | WEIGHT: 210.13 LBS

## 2023-05-04 NOTE — DISCHARGE INSTRUCTIONS
Before 7 AM, enter through the Emergency Entrance..   After 7 AM enter through the Main Entrance.      Your procedure  is scheduled for _____5/8/2023_____.    Call [phone] between 2pm and 5pm on __5/5/2023_____to find out your arrival time for the day of surgery.    You may use the main entrance to the hospital on the [facility] side, or the entrance that is next to the [facility].    You may have one visitor.  No children allowed.     You will be going to the Same Day Surgery Unit on the 2nd floor of the hospital.    Important instructions:  Do not eat anything after midnight.  You may have plain water, non carbonated.  You may also have Gatorade or Powerade after midnight.    Stop all fluids 2 hours before your surgery.    It is okay to brush your teeth.  Do not have gum, candy or mints.    SEE MEDICATION SHEET.   TAKE MEDICATIONS AS DIRECTED WITH SIPS OF WATER.      Do not take any diabetic medication on the morning of surgery unless instructed to do so by your doctor or pre op nurse.    STOP taking Aspirin, Ibuprofen,  Advil, Motrin, Mobic(meloxicam), Aleve (naproxen), Fish oil, and Vitamin E for at least 7 days before your surgery.     You may take Tylenol if needed which is not a blood thinner.    Please shower the night before and the morning of your surgery.            .    .    Contact lenses and removable denture work may not be worn during your procedure.    You may wear deodorant only. If you are having breast surgery, do not wear deodorant on the operative side.    Do not wear powder, body lotion, perfume/cologne or make-up.    Do not wear any jewelry or have any metal on your body.    You will be asked to remove any dentures or partials for the procedure.    If you are going home on the same day of surgery, you must arrange for a family member or a friend to drive you home.  Public transportation is prohibited.  You will not be able to drive home if you were given anesthesia or  sedation.    Patients who want to have their Post-op prescriptions filled from our in-house [facility], bring a Credit/Debit Card or cash with you. A co-pay may be required.  The pharmacy closes at 5:30 pm.    Wear loose fitting clothes allowing for bandages.    Please leave money and valuables home.      You may bring your cell phone.    Call the doctor if fever or illness should occur before your surgery.    Call [phone] to contact us here if needed.                            CLOTHES ON DAY OF SURGERY    SHOULDER surgery:  you must have a very oversized shirt.  Very, Very large.  You will probably have a large sling on with your arm strapped to your chest.  You will not be able to put the arm of the operated shoulder into a sleeve.  You can put the arm of the un-operated shoulder into the sleeve, but the shirt will need to be draped over the operated shoulder.       ARM or HAND surgery:  make sure that your sleeves are large and loose enough to pass over large dressings or cast.      BREAST or UNDERARM surgery:  wear a loose, button down shirt so that you can dress without raising your arms over your head.    ABDOMINAL surgery:  wear loose, comfortable clothing.  Nothing tight around the abdomen.  NO JEANS    PENIS or SCROTAL surgery:  loose comfortable clothing.  Large sweat pants, pajama pants or a robe.  ABSOLUTELY NO JEANS      LEG or FOOT surgery:  wear large loose pants that are able to pass over any large dressings or casts.  You could also wear loose shorts or a skirt.

## 2023-05-08 ENCOUNTER — ANESTHESIA (OUTPATIENT)
Dept: CARDIOLOGY | Facility: HOSPITAL | Age: 68
End: 2023-05-08
Payer: MEDICARE

## 2023-05-08 ENCOUNTER — ANESTHESIA EVENT (OUTPATIENT)
Dept: CARDIOLOGY | Facility: HOSPITAL | Age: 68
End: 2023-05-08
Payer: MEDICARE

## 2023-05-08 ENCOUNTER — HOSPITAL ENCOUNTER (OUTPATIENT)
Facility: HOSPITAL | Age: 68
Discharge: HOME | End: 2023-05-08
Attending: INTERNAL MEDICINE | Admitting: INTERNAL MEDICINE
Payer: MEDICARE

## 2023-05-08 ENCOUNTER — HOSPITAL ENCOUNTER (OUTPATIENT)
Dept: CARDIOLOGY | Facility: HOSPITAL | Age: 68
Discharge: HOME | End: 2023-05-08
Attending: INTERNAL MEDICINE | Admitting: INTERNAL MEDICINE
Payer: MEDICARE

## 2023-05-08 VITALS
OXYGEN SATURATION: 92 % | TEMPERATURE: 99 F | RESPIRATION RATE: 23 BRPM | SYSTOLIC BLOOD PRESSURE: 141 MMHG | DIASTOLIC BLOOD PRESSURE: 85 MMHG | HEART RATE: 104 BPM

## 2023-05-08 DIAGNOSIS — I27.20 PULMONARY HYPERTENSION: ICD-10-CM

## 2023-05-08 DIAGNOSIS — I48.0 PAROXYSMAL A-FIB: ICD-10-CM

## 2023-05-08 DIAGNOSIS — R05.8 ALLERGIC COUGH: ICD-10-CM

## 2023-05-08 DIAGNOSIS — Z86.19 HISTORY OF HEPATITIS C: ICD-10-CM

## 2023-05-08 DIAGNOSIS — I48.91 A-FIB: ICD-10-CM

## 2023-05-08 DIAGNOSIS — D75.1 POLYCYTHEMIA: ICD-10-CM

## 2023-05-08 DIAGNOSIS — E79.0 HYPERURICEMIA: ICD-10-CM

## 2023-05-08 DIAGNOSIS — J84.9 ILD (INTERSTITIAL LUNG DISEASE): ICD-10-CM

## 2023-05-08 DIAGNOSIS — F12.90 MARIJUANA USER: ICD-10-CM

## 2023-05-08 DIAGNOSIS — K21.9 GASTROESOPHAGEAL REFLUX DISEASE, UNSPECIFIED WHETHER ESOPHAGITIS PRESENT: ICD-10-CM

## 2023-05-08 DIAGNOSIS — R73.03 PREDIABETES: ICD-10-CM

## 2023-05-08 DIAGNOSIS — Z87.891 FORMER SMOKER: ICD-10-CM

## 2023-05-08 DIAGNOSIS — E66.3 OVERWEIGHT (BMI 25.0-29.9): ICD-10-CM

## 2023-05-08 DIAGNOSIS — I70.0 THORACIC AORTA ATHEROSCLEROSIS: Primary | ICD-10-CM

## 2023-05-08 DIAGNOSIS — I10 HTN, GOAL BELOW 140/90: ICD-10-CM

## 2023-05-08 DIAGNOSIS — J64: ICD-10-CM

## 2023-05-08 DIAGNOSIS — J84.10 PULMONARY INTERSTITIAL FIBROSIS: ICD-10-CM

## 2023-05-08 DIAGNOSIS — M19.90 ARTHRITIS: ICD-10-CM

## 2023-05-08 PROCEDURE — 37000008 HC ANESTHESIA 1ST 15 MINUTES: Performed by: INTERNAL MEDICINE

## 2023-05-08 PROCEDURE — 93312 TRANSESOPHAGEAL ECHO (TEE) W/ POSSIBLE CARDIOVERSION: ICD-10-PCS | Mod: 26,,, | Performed by: INTERNAL MEDICINE

## 2023-05-08 PROCEDURE — 93010 EKG 12-LEAD: ICD-10-PCS | Mod: ,,, | Performed by: INTERNAL MEDICINE

## 2023-05-08 PROCEDURE — 37000009 HC ANESTHESIA EA ADD 15 MINS: Performed by: INTERNAL MEDICINE

## 2023-05-08 PROCEDURE — 25000003 PHARM REV CODE 250: Performed by: STUDENT IN AN ORGANIZED HEALTH CARE EDUCATION/TRAINING PROGRAM

## 2023-05-08 PROCEDURE — D9220A PRA ANESTHESIA: Mod: ANES,,, | Performed by: ANESTHESIOLOGY

## 2023-05-08 PROCEDURE — D9220A PRA ANESTHESIA: ICD-10-PCS | Mod: ANES,,, | Performed by: ANESTHESIOLOGY

## 2023-05-08 PROCEDURE — 93320 PR DOPPLER ECHO HEART,COMPLETE: ICD-10-PCS | Mod: 26,,, | Performed by: INTERNAL MEDICINE

## 2023-05-08 PROCEDURE — D9220A PRA ANESTHESIA: Mod: CRNA,,, | Performed by: STUDENT IN AN ORGANIZED HEALTH CARE EDUCATION/TRAINING PROGRAM

## 2023-05-08 PROCEDURE — 93312 ECHO TRANSESOPHAGEAL: CPT | Mod: 26,,, | Performed by: INTERNAL MEDICINE

## 2023-05-08 PROCEDURE — 63600175 PHARM REV CODE 636 W HCPCS: Performed by: STUDENT IN AN ORGANIZED HEALTH CARE EDUCATION/TRAINING PROGRAM

## 2023-05-08 PROCEDURE — 93325 DOPPLER ECHO COLOR FLOW MAPG: CPT

## 2023-05-08 PROCEDURE — D9220A PRA ANESTHESIA: ICD-10-PCS | Mod: CRNA,,, | Performed by: STUDENT IN AN ORGANIZED HEALTH CARE EDUCATION/TRAINING PROGRAM

## 2023-05-08 PROCEDURE — 93010 ELECTROCARDIOGRAM REPORT: CPT | Mod: ,,, | Performed by: INTERNAL MEDICINE

## 2023-05-08 PROCEDURE — 93325 DOPPLER ECHO COLOR FLOW MAPG: CPT | Mod: 26,,, | Performed by: INTERNAL MEDICINE

## 2023-05-08 PROCEDURE — 92960 CARDIOVERSION ELECTRIC EXT: CPT | Performed by: INTERNAL MEDICINE

## 2023-05-08 PROCEDURE — 92960 PR CARDIOVERSION, ELECTIVE;EXTERN: ICD-10-PCS | Mod: ,,, | Performed by: INTERNAL MEDICINE

## 2023-05-08 PROCEDURE — 93005 ELECTROCARDIOGRAM TRACING: CPT

## 2023-05-08 PROCEDURE — 93320 DOPPLER ECHO COMPLETE: CPT | Mod: 26,,, | Performed by: INTERNAL MEDICINE

## 2023-05-08 PROCEDURE — 93325 TRANSESOPHAGEAL ECHO (TEE) W/ POSSIBLE CARDIOVERSION: ICD-10-PCS | Mod: 26,,, | Performed by: INTERNAL MEDICINE

## 2023-05-08 PROCEDURE — 92960 CARDIOVERSION ELECTRIC EXT: CPT | Mod: ,,, | Performed by: INTERNAL MEDICINE

## 2023-05-08 RX ORDER — METOPROLOL TARTRATE 1 MG/ML
INJECTION, SOLUTION INTRAVENOUS
Status: DISCONTINUED | OUTPATIENT
Start: 2023-05-08 | End: 2023-05-08

## 2023-05-08 RX ORDER — LIDOCAINE HYDROCHLORIDE 40 MG/ML
SOLUTION TOPICAL
Status: DISCONTINUED | OUTPATIENT
Start: 2023-05-08 | End: 2023-05-08

## 2023-05-08 RX ORDER — LIDOCAINE HYDROCHLORIDE 20 MG/ML
INJECTION INTRAVENOUS
Status: DISCONTINUED | OUTPATIENT
Start: 2023-05-08 | End: 2023-05-08

## 2023-05-08 RX ORDER — METOPROLOL SUCCINATE 50 MG/1
75 TABLET, EXTENDED RELEASE ORAL DAILY
Qty: 90 TABLET | Refills: 3 | Status: ON HOLD | OUTPATIENT
Start: 2023-05-08 | End: 2023-07-29 | Stop reason: HOSPADM

## 2023-05-08 RX ORDER — PROPOFOL 10 MG/ML
VIAL (ML) INTRAVENOUS
Status: DISCONTINUED | OUTPATIENT
Start: 2023-05-08 | End: 2023-05-08

## 2023-05-08 RX ADMIN — LIDOCAINE HYDROCHLORIDE 4 ML: 40 SOLUTION TOPICAL at 12:05

## 2023-05-08 RX ADMIN — METOPROLOL TARTRATE 2.5 MG: 5 INJECTION, SOLUTION INTRAVENOUS at 01:05

## 2023-05-08 RX ADMIN — LIDOCAINE HYDROCHLORIDE 40 MG: 20 INJECTION, SOLUTION INTRAVENOUS at 12:05

## 2023-05-08 RX ADMIN — PROPOFOL 40 MG: 10 INJECTION, EMULSION INTRAVENOUS at 01:05

## 2023-05-08 RX ADMIN — PROPOFOL 40 MG: 10 INJECTION, EMULSION INTRAVENOUS at 12:05

## 2023-05-08 RX ADMIN — SODIUM CHLORIDE, SODIUM LACTATE, POTASSIUM CHLORIDE, AND CALCIUM CHLORIDE: .6; .31; .03; .02 INJECTION, SOLUTION INTRAVENOUS at 12:05

## 2023-05-08 RX ADMIN — PROPOFOL 80 MG: 10 INJECTION, EMULSION INTRAVENOUS at 12:05

## 2023-05-08 NOTE — ANESTHESIA PREPROCEDURE EVALUATION
05/08/2023  Josiah Orosco Jr. is a 67 y.o., male.  To undergo Procedure(s) (LRB):  TRANSESOPHAGEAL ECHOCARDIOGRAM WITH POSSIBLE CARDIOVERSION (ELENA W/ POSS CARDIOVERSION) (N/A)  ECHOCARDIOGRAM, TRANSESOPHAGEAL (N/A)  Cardioversion (N/A)     Denies CP/SOB/GERD/MI/CVA/URI symptoms.  METS > 4  NPO > 8    Past Medical History:  Past Medical History:   Diagnosis Date    Arthritis     GERD (gastroesophageal reflux disease)     History of HCV, s/p successful treatment w/ SVR12 5/2015     Failed treatment (stage I/II) - followed by hepatology     Joint pain     Lung disease caused by breathing particles     Widespread essentially symmetric interstitial lung disease    Obesity     Polycythemia vera     Prediabetes        Past Surgical History:  Past Surgical History:   Procedure Laterality Date    BUNIONECTOMY      bilateral    COLONOSCOPY N/A 12/9/2015    Procedure: COLONOSCOPY;  Surgeon: Conrad Iqbal MD;  Location: [facility];  Service: Endoscopy;  Laterality: N/A;    Liver biopsy x2      rotator cuff Right        Social History:  Social History     Socioeconomic History    Marital status:     Number of children: 0    Highest education level: Some college, no degree   Occupational History    Occupation: Self-employed     Employer: [employer]   Tobacco Use    Smoking status: Former    Smokeless tobacco: Never    Tobacco comments:     pt. smokes 10 cigars per day.   Substance and Sexual Activity    Alcohol use: No     Alcohol/week: 0.0 standard drinks     Comment: Rarely    Drug use: Yes     Types: Marijuana     Comment: daily    Sexual activity: Yes     Partners: Female       Medications:  No current facility-administered medications on file prior to encounter.     Current Outpatient Medications on File Prior to Encounter   Medication Sig Dispense Refill    allopurinoL (ZYLOPRIM)  100 MG tablet Take 2 tablets (200 mg total) by mouth once daily. 180 tablet 3    apixaban (ELIQUIS) 5 mg Tab Take 1 tablet (5 mg total) by mouth 2 (two) times daily. 180 tablet 3    aspirin (ECOTRIN) 81 MG EC tablet Take 81 mg by mouth once daily.      levocetirizine (XYZAL) 5 MG tablet Take 1 tablet (5 mg total) by mouth every evening. 30 tablet 2    losartan (COZAAR) 50 MG tablet TK 1 T PO D 90 tablet 2    metoprolol succinate (TOPROL-XL) 50 MG 24 hr tablet Take 1 tablet (50 mg total) by mouth once daily. 90 tablet 3    naproxen (NAPROSYN) 500 MG tablet Take 500 mg by mouth every 12 (twelve) hours as needed.      fluticasone/umeclidin/vilanter (TRELEGY ELLIPTA INHL) Inhale into the lungs.         Allergies:  Review of patient's allergies indicates:   Allergen Reactions    Ace inhibitors Other (See Comments)     cough       Active Problems:  Patient Active Problem List   Diagnosis    History of HCV, s/p successful treatment w/ SVR12 5/2015    Lung disease caused by breathing particles    Overweight (BMI 25.0-29.9)    GERD (gastroesophageal reflux disease)    Polycythemia    Former smoker    Hyperuricemia    ILD (interstitial lung disease)    Prediabetes    Arthritis    Allergic cough    HTN, goal below 140/90    Pulmonary interstitial fibrosis    Pulmonary hypertension    Marijuana user    Thoracic aorta atherosclerosis       Diagnostic Studies:    EKG (4/26/23):  Atrial flutter   Pulmonary disease pattern   Incomplete right bundle branch block   Left anterior fascicular block   Voltage criteria for left ventricular hypertrophy   Nonspecific ST and T wave abnormality     TTE (8/30/22):   The left ventricle is normal in size with mild concentric hypertrophy and normal systolic function.   The estimated ejection fraction is 60%.   Indeterminate left ventricular diastolic function.   Mild right atrial enlargement.   Normal right ventricular size with normal right ventricular systolic  function.   Mild tricuspid regurgitation.   Normal central venous pressure (3 mmHg).   The estimated PA systolic pressure is 38 mmHg.   There is mild pulmonary hypertension.    24 Hour Vitals:  Temp:  [36.7 °C (98 °F)] 36.7 °C (98 °F)  Pulse:  [118] 118  Resp:  [21] 21  SpO2:  [97 %] 97 %  BP: (152)/(89) 152/89   See Nursing Charting For Additional Vitals    Pre-op Assessment    I have reviewed the Patient Summary Reports.     I have reviewed the Nursing Notes.       Review of Systems  Anesthesia Hx:  No problems with previous Anesthesia   Denies Personal Hx of Anesthesia complications.   Social:  Former Smoker, Social Alcohol Use    Hematology/Oncology:        Hematology Comments: Eliquis for Aflutter   Cardiovascular:   Exercise tolerance: good Hypertension Dysrhythmias YANG New-onset Aflutter with YANG    2022 stress: negative for ischemia    2022 Echo:  ? The left ventricle is normal in size with mild concentric hypertrophy and normal systolic function.  ? The estimated ejection fraction is 60%.  ? Indeterminate left ventricular diastolic function.  ? Mild right atrial enlargement.  ? Normal right ventricular size with normal right ventricular systolic function.  ? Mild tricuspid regurgitation.  ? Normal central venous pressure (3 mmHg).  ? The estimated PA systolic pressure is 38 mmHg.  ? There is mild pulmonary hypertension.   Pulmonary:   Interstitial lung disease   Hepatic/GI:   GERD Liver Disease, Hepatitis H/o treated Hep C   Neurological:  Neurology Normal    Endocrine:  Endocrine Normal        Physical Exam  General: Cooperative, Alert, Oriented and Well nourished    Airway:  Mallampati: II   Mouth Opening: Normal  TM Distance: Normal      Dental:  Intact    Chest/Lungs:  Clear to auscultation, Normal Respiratory Rate    Heart:  Rate: Tachycardia        Anesthesia Plan  Type of Anesthesia, risks & benefits discussed:    Anesthesia Type: Gen Natural Airway, MAC  Intra-op Monitoring Plan: Standard ASA  Monitors  Post Op Pain Control Plan: multimodal analgesia  Induction:  IV  Informed Consent: Informed consent signed with the Patient and all parties understand the risks and agree with anesthesia plan.  All questions answered.   ASA Score: 3    Ready For Surgery From Anesthesia Perspective.     .

## 2023-05-08 NOTE — ANESTHESIA POSTPROCEDURE EVALUATION
Anesthesia Post Evaluation    Patient: Josiah Orosco Jr.    Procedure(s) Performed: Procedure(s) (LRB):  TRANSESOPHAGEAL ECHOCARDIOGRAM WITH POSSIBLE CARDIOVERSION (ELENA W/ POSS CARDIOVERSION) (N/A)  ECHOCARDIOGRAM, TRANSESOPHAGEAL (N/A)  Cardioversion (N/A)    Final Anesthesia Type: MAC      Patient location during evaluation: PACU  Patient participation: Yes- Able to Participate  Level of consciousness: awake and alert and oriented  Post-procedure vital signs: reviewed and stable  Pain management: adequate  Airway patency: patent    PONV status at discharge: No PONV  Anesthetic complications: no      Cardiovascular status: hemodynamically stable and blood pressure returned to baseline  Respiratory status: spontaneous ventilation, room air and unassisted  Hydration status: euvolemic  Follow-up not needed.          Vitals Value Taken Time   /92 05/08/23 1347   Temp 37 °C (98.6 °F) 05/08/23 1327   Pulse 104 05/08/23 1354   Resp 24 05/08/23 1354   SpO2 95 % 05/08/23 1354   Vitals shown include unvalidated device data.      No case tracking events are documented in the log.      Pain/Marycruz Score: No data recorded

## 2023-05-08 NOTE — TRANSFER OF CARE
Anesthesia Transfer of Care Note    Patient: Josiah Orosco Jr.    Procedure(s) Performed: Procedure(s) (LRB):  TRANSESOPHAGEAL ECHOCARDIOGRAM WITH POSSIBLE CARDIOVERSION (ELENA W/ POSS CARDIOVERSION) (N/A)  ECHOCARDIOGRAM, TRANSESOPHAGEAL (N/A)  Cardioversion (N/A)    Patient location: [facility]    Anesthesia Type: general    Transport from OR: Transported from OR on room air with adequate spontaneous ventilation    Post pain: adequate analgesia    Post assessment: no apparent anesthetic complications and tolerated procedure well    Post vital signs: stable    Level of consciousness: alert, awake and oriented    Nausea/Vomiting: no nausea/vomiting    Complications: none    Transfer of care protocol was followedComments: Recovered by Cath Lab RN      Last vitals:   Visit Vitals  /76 (BP Location: Left arm, Patient Position: Lying)   Pulse 105   Temp 37 °C (98.6 °F) (Skin)   Resp 16   SpO2 99%

## 2023-05-12 LAB — EJECTION FRACTION: 55 %

## 2023-05-22 ENCOUNTER — OFFICE VISIT (OUTPATIENT)
Dept: CARDIOLOGY | Facility: CLINIC | Age: 68
End: 2023-05-22
Payer: MEDICARE

## 2023-05-22 ENCOUNTER — TELEPHONE (OUTPATIENT)
Dept: FAMILY MEDICINE | Facility: CLINIC | Age: 68
End: 2023-05-22
Payer: MEDICARE

## 2023-05-22 VITALS
BODY MASS INDEX: 24.58 KG/M2 | RESPIRATION RATE: 18 BRPM | DIASTOLIC BLOOD PRESSURE: 66 MMHG | SYSTOLIC BLOOD PRESSURE: 122 MMHG | HEIGHT: 77 IN | WEIGHT: 208.13 LBS | HEART RATE: 78 BPM | OXYGEN SATURATION: 95 %

## 2023-05-22 DIAGNOSIS — J84.9 ILD (INTERSTITIAL LUNG DISEASE): ICD-10-CM

## 2023-05-22 DIAGNOSIS — J84.10 PULMONARY INTERSTITIAL FIBROSIS: ICD-10-CM

## 2023-05-22 DIAGNOSIS — R63.4 UNEXPLAINED WEIGHT LOSS: Primary | ICD-10-CM

## 2023-05-22 DIAGNOSIS — M19.90 ARTHRITIS: ICD-10-CM

## 2023-05-22 DIAGNOSIS — I10 HTN, GOAL BELOW 140/90: ICD-10-CM

## 2023-05-22 DIAGNOSIS — I70.0 THORACIC AORTA ATHEROSCLEROSIS: ICD-10-CM

## 2023-05-22 DIAGNOSIS — F12.90 MARIJUANA USER: ICD-10-CM

## 2023-05-22 DIAGNOSIS — I27.20 PULMONARY HYPERTENSION: Primary | ICD-10-CM

## 2023-05-22 DIAGNOSIS — E66.3 OVERWEIGHT (BMI 25.0-29.9): ICD-10-CM

## 2023-05-22 DIAGNOSIS — K21.9 GASTROESOPHAGEAL REFLUX DISEASE, UNSPECIFIED WHETHER ESOPHAGITIS PRESENT: ICD-10-CM

## 2023-05-22 PROCEDURE — 3078F DIAST BP <80 MM HG: CPT | Mod: CPTII,S$GLB,, | Performed by: INTERNAL MEDICINE

## 2023-05-22 PROCEDURE — 1101F PR PT FALLS ASSESS DOC 0-1 FALLS W/OUT INJ PAST YR: ICD-10-PCS | Mod: CPTII,S$GLB,, | Performed by: INTERNAL MEDICINE

## 2023-05-22 PROCEDURE — 1157F ADVNC CARE PLAN IN RCRD: CPT | Mod: CPTII,S$GLB,, | Performed by: INTERNAL MEDICINE

## 2023-05-22 PROCEDURE — 3074F SYST BP LT 130 MM HG: CPT | Mod: CPTII,S$GLB,, | Performed by: INTERNAL MEDICINE

## 2023-05-22 PROCEDURE — 93000 ELECTROCARDIOGRAM COMPLETE: CPT | Mod: S$GLB,,, | Performed by: INTERNAL MEDICINE

## 2023-05-22 PROCEDURE — 3078F PR MOST RECENT DIASTOLIC BLOOD PRESSURE < 80 MM HG: ICD-10-PCS | Mod: CPTII,S$GLB,, | Performed by: INTERNAL MEDICINE

## 2023-05-22 PROCEDURE — 1160F PR REVIEW ALL MEDS BY PRESCRIBER/CLIN PHARMACIST DOCUMENTED: ICD-10-PCS | Mod: CPTII,S$GLB,, | Performed by: INTERNAL MEDICINE

## 2023-05-22 PROCEDURE — 4010F ACE/ARB THERAPY RXD/TAKEN: CPT | Mod: CPTII,S$GLB,, | Performed by: INTERNAL MEDICINE

## 2023-05-22 PROCEDURE — 3288F PR FALLS RISK ASSESSMENT DOCUMENTED: ICD-10-PCS | Mod: CPTII,S$GLB,, | Performed by: INTERNAL MEDICINE

## 2023-05-22 PROCEDURE — 3074F PR MOST RECENT SYSTOLIC BLOOD PRESSURE < 130 MM HG: ICD-10-PCS | Mod: CPTII,S$GLB,, | Performed by: INTERNAL MEDICINE

## 2023-05-22 PROCEDURE — 1126F AMNT PAIN NOTED NONE PRSNT: CPT | Mod: CPTII,S$GLB,, | Performed by: INTERNAL MEDICINE

## 2023-05-22 PROCEDURE — 4010F PR ACE/ARB THEARPY RXD/TAKEN: ICD-10-PCS | Mod: CPTII,S$GLB,, | Performed by: INTERNAL MEDICINE

## 2023-05-22 PROCEDURE — 3288F FALL RISK ASSESSMENT DOCD: CPT | Mod: CPTII,S$GLB,, | Performed by: INTERNAL MEDICINE

## 2023-05-22 PROCEDURE — 3044F HG A1C LEVEL LT 7.0%: CPT | Mod: CPTII,S$GLB,, | Performed by: INTERNAL MEDICINE

## 2023-05-22 PROCEDURE — 1101F PT FALLS ASSESS-DOCD LE1/YR: CPT | Mod: CPTII,S$GLB,, | Performed by: INTERNAL MEDICINE

## 2023-05-22 PROCEDURE — 99214 OFFICE O/P EST MOD 30 MIN: CPT | Mod: S$GLB,,, | Performed by: INTERNAL MEDICINE

## 2023-05-22 PROCEDURE — 99214 OFFICE O/P EST MOD 30 MIN: CPT | Mod: PO | Performed by: INTERNAL MEDICINE

## 2023-05-22 PROCEDURE — 1159F PR MEDICATION LIST DOCUMENTED IN MEDICAL RECORD: ICD-10-PCS | Mod: CPTII,S$GLB,, | Performed by: INTERNAL MEDICINE

## 2023-05-22 PROCEDURE — 1160F RVW MEDS BY RX/DR IN RCRD: CPT | Mod: CPTII,S$GLB,, | Performed by: INTERNAL MEDICINE

## 2023-05-22 PROCEDURE — 99999 PR PBB SHADOW E&M-EST. PATIENT-LVL IV: CPT | Mod: PBBFAC,,, | Performed by: INTERNAL MEDICINE

## 2023-05-22 PROCEDURE — 93000 EKG 12-LEAD: ICD-10-PCS | Mod: S$GLB,,, | Performed by: INTERNAL MEDICINE

## 2023-05-22 PROCEDURE — 1157F PR ADVANCE CARE PLAN OR EQUIV PRESENT IN MEDICAL RECORD: ICD-10-PCS | Mod: CPTII,S$GLB,, | Performed by: INTERNAL MEDICINE

## 2023-05-22 PROCEDURE — 3008F BODY MASS INDEX DOCD: CPT | Mod: CPTII,S$GLB,, | Performed by: INTERNAL MEDICINE

## 2023-05-22 PROCEDURE — 3044F PR MOST RECENT HEMOGLOBIN A1C LEVEL <7.0%: ICD-10-PCS | Mod: CPTII,S$GLB,, | Performed by: INTERNAL MEDICINE

## 2023-05-22 PROCEDURE — 3008F PR BODY MASS INDEX (BMI) DOCUMENTED: ICD-10-PCS | Mod: CPTII,S$GLB,, | Performed by: INTERNAL MEDICINE

## 2023-05-22 PROCEDURE — 1126F PR PAIN SEVERITY QUANTIFIED, NO PAIN PRESENT: ICD-10-PCS | Mod: CPTII,S$GLB,, | Performed by: INTERNAL MEDICINE

## 2023-05-22 PROCEDURE — 99214 PR OFFICE/OUTPT VISIT, EST, LEVL IV, 30-39 MIN: ICD-10-PCS | Mod: S$GLB,,, | Performed by: INTERNAL MEDICINE

## 2023-05-22 PROCEDURE — 99999 PR PBB SHADOW E&M-EST. PATIENT-LVL IV: ICD-10-PCS | Mod: PBBFAC,,, | Performed by: INTERNAL MEDICINE

## 2023-05-22 PROCEDURE — 1159F MED LIST DOCD IN RCRD: CPT | Mod: CPTII,S$GLB,, | Performed by: INTERNAL MEDICINE

## 2023-05-22 NOTE — TELEPHONE ENCOUNTER
Please advise patient's wife that I share her concern.  Did Dr. Wade have any thoughts on the weight loss?  Recommend we do some labs now: TSH, prealbumin.  If these labs are ok then he may benefit from a nutrition consultation.

## 2023-05-22 NOTE — TELEPHONE ENCOUNTER
Spoke to patient wife and she is asking for a phone call. She sts that her  is constantly losing 3 to 4 lbs a week. She is unsure as to what is the problem. She is very concerned. Please advise

## 2023-05-22 NOTE — TELEPHONE ENCOUNTER
----- Message from Stu Sauceda sent at 5/22/2023  2:41 PM CDT -----  Type:  Patient Returning Call    Who Called: Self     Who Left Message for Patient: Anita     Does the patient know what this is regarding?: NO     Would the patient rather a call back or a response via [facility]? CALL     Best Call Back Number: [phone]    SAYS IF SHE DOESN'T ANSWER TO PLEASE LEAVE A VOICE MESSAGE

## 2023-05-22 NOTE — TELEPHONE ENCOUNTER
Called and scheduled labs 05/23 and informed patient wife (ANDREW) of previous message verbally understand.

## 2023-05-22 NOTE — PROGRESS NOTES
CARDIOVASCULAR CONSULTATION    REASON FOR CONSULT:   Josiah Orosco Jr. is a 67 y.o. male who presents for evaluation      HISTORY OF PRESENT ILLNESS:     Patient is a pleasant 66-year-old man.  Here for evaluation.  Patient states that lately he has been experiencing dyspnea on exertion.  Also he was told that he has a regular rhythm and hence has been referred.  Patient denies any palpitations.  Denies any orthopnea or PND.  EKG done personally reviewed and shows sinus rhythm with PACs.    Notes from September 2022:  Patient here for follow-up.    Sinus rhythm with heart rates varying between 44 and 164 BPM with an average of 88 BPM.  There were rare PVCs totalling 402 and averaging 8.38 per hour. There were 1 couplets. There were 1 triplets.  There were very frequent PACs  SVT/possible atrial flutter.  Recommend event monitor.    The left ventricle is normal in size with mild concentric hypertrophy and normal systolic function.  The estimated ejection fraction is 60%.  Indeterminate left ventricular diastolic function.  Mild right atrial enlargement.  Normal right ventricular size with normal right ventricular systolic function.  Mild tricuspid regurgitation.  Normal central venous pressure (3 mmHg).  The estimated PA systolic pressure is 38 mmHg.  There is mild pulmonary hypertension.      Normal myocardial perfusion scan. There is no evidence of myocardial ischemia or infarction.    There is a  mild to moderate intensity fixed perfusion abnormality in the inferior wall of the left ventricle secondary to diaphragm attenuation.    The gated perfusion images showed an ejection fraction of 61% post stress.    There is normal wall motion post stress.    The EKG portion of this study is negative for ischemia.    The patient reported no chest pain during the stress test.    During stress, occasional PVCs are noted. , During stress, SVTs are noted.      Notes from October 2022:    Patient here for follow-up.  Doing  fine.  Event monitor showed some PACs and periods of tachycardia as described below.  Patient asymptomatic.      At baseline, in the absence of symptoms, the rhythm was sinus with heart rate 85 beats per minute.  There were PACs and sequential PACs.  An auto trigger event on 09/27 showed sinus tachycardia at 155 beats per minute, and on 09/28 another auto trigger event also showed sinus tachycardia.  There also was a regular narrow complex tachycardia at 157 beats per minute.  This lasted 27 seconds and began following 2 PVCs.  P-waves were not clearly evident.  There was a gradual slow down with P-waves becoming evident, to become consistent with sinus tachycardia with occasional PACs.  No activity level (e.g., exercise) was specified.     Impression: Sinus rhythm with PACs and periods of tachycardia, as described above.    Notes from January 23    Patient doing fine.  Denies any chest pains at rest on exertion, orthopnea, PND, swelling of feet    Notes from April 23: Patient here for follow-up.  Lately has been experiencing dyspnea on exertion.  EKG done at outside hospital demonstrated atrial flutter and patient was initiated on Eliquis.        May 23:  Patient here for follow-up after cardioversion.  States breathing better after cardioversion.  Now back to his baseline.  EKG done today shows sinus rhythm.    Normal systolic function.  Normal right ventricular size with normal right ventricular systolic function.  Moderate left atrial enlargement.  Mild right atrial enlargement.  Mild-to-moderate mitral regurgitation.  Mild tricuspid regurgitation.  No thrombus is present in the appendage.  The estimated ejection fraction is 55%.  A synchronized cardioversion was successfully performed with restoration of normal sinus rhythm.    PAST MEDICAL HISTORY:     Past Medical History:   Diagnosis Date    A-fib 5/8/2023    Arthritis     GERD (gastroesophageal reflux disease)     History of HCV, s/p successful treatment w/  SVR12 5/2015     Failed treatment (stage I/II) - followed by hepatology     Joint pain     Lung disease caused by breathing particles     Widespread essentially symmetric interstitial lung disease    Obesity     Polycythemia vera     Prediabetes        PAST SURGICAL HISTORY:     Past Surgical History:   Procedure Laterality Date    BUNIONECTOMY      bilateral    CARDIOVERSION N/A 5/8/2023    Procedure: Cardioversion;  Surgeon: David Cueva MD;  Location: [facility] CATH LAB;  Service: Cardiology;  Laterality: N/A;    COLONOSCOPY N/A 12/9/2015    Procedure: COLONOSCOPY;  Surgeon: Conrad Iqbal MD;  Location: [facility] ENDO;  Service: Endoscopy;  Laterality: N/A;    Liver biopsy x2      rotator cuff Right     TRANSESOPHAGEAL ECHOCARDIOGRAM WITH POSSIBLE CARDIOVERSION (ELENA W/ POSS CARDIOVERSION) N/A 5/8/2023    Procedure: TRANSESOPHAGEAL ECHOCARDIOGRAM WITH POSSIBLE CARDIOVERSION (ELENA W/ POSS CARDIOVERSION);  Surgeon: David Cueva MD;  Location: [facility] CATH LAB;  Service: Cardiology;  Laterality: N/A;  12:30PM    RN PREOP 5/4/2023---EKG ON ARRIVAL    TRANSESOPHAGEAL ECHOCARDIOGRAPHY N/A 5/8/2023    Procedure: ECHOCARDIOGRAM, TRANSESOPHAGEAL;  Surgeon: David Cueva MD;  Location: [facility] CATH LAB;  Service: Cardiology;  Laterality: N/A;       ALLERGIES AND MEDICATION:     Review of patient's allergies indicates:   Allergen Reactions    Ace inhibitors Other (See Comments)     cough        Medication List            Accurate as of May 22, 2023 10:58 AM. If you have any questions, ask your nurse or doctor.                CONTINUE taking these medications      allopurinoL 100 MG tablet  Commonly known as: ZYLOPRIM  Take 2 tablets (200 mg total) by mouth once daily.     apixaban 5 mg Tab  Commonly known as: ELIQUIS  Take 1 tablet (5 mg total) by mouth 2 (two) times daily.     aspirin 81 MG EC tablet  Commonly known as: ECOTRIN     fluticasone propionate 50 mcg/actuation nasal spray  Commonly known as: FLONASE  SHAKE LIQUID AND  USE 1 SPRAY(50 MCG) IN EACH NOSTRIL EVERY DAY     levocetirizine 5 MG tablet  Commonly known as: XYZAL  Take 1 tablet (5 mg total) by mouth every evening.     losartan 50 MG tablet  Commonly known as: COZAAR  TK 1 T PO D     metoprolol succinate 50 MG 24 hr tablet  Commonly known as: TOPROL-XL  Take 1.5 tablets (75 mg total) by mouth once daily.     naproxen 500 MG tablet  Commonly known as: NAPROSYN     TRELEGY ELLIPTA INHL              SOCIAL HISTORY:     Social History     Socioeconomic History    Marital status:     Number of children: 0    Highest education level: Some college, no degree   Occupational History    Occupation: Self-employed     Employer: [employer]   Tobacco Use    Smoking status: Former    Smokeless tobacco: Never    Tobacco comments:     pt. smokes 10 cigars per day.   Substance and Sexual Activity    Alcohol use: No     Alcohol/week: 0.0 standard drinks     Comment: Rarely    Drug use: Yes     Types: Marijuana     Comment: daily    Sexual activity: Yes     Partners: Female       FAMILY HISTORY:     Family History   Problem Relation Age of Onset    Heart disease Mother     Kidney disease Mother     Parkinsonism Father     Prostate cancer Cousin         maternal side    Heart attack Sister         CABG    Deep vein thrombosis Sister     Pulmonary embolism Sister     Osteoarthritis Sister         s/p knee replacement    Chronic back pain Brother     Liver disease Neg Hx     Diabetes Neg Hx     Melanoma Neg Hx        REVIEW OF SYSTEMS:   Review of Systems   Constitutional: Negative.   HENT: Negative.     Eyes: Negative.    Cardiovascular:  Positive for palpitations.   Endocrine: Negative.    Hematologic/Lymphatic: Negative.    Skin: Negative.    Musculoskeletal: Negative.    Gastrointestinal: Negative.    Genitourinary: Negative.    Neurological: Negative.    Psychiatric/Behavioral: Negative.     Allergic/Immunologic: Negative.      A 10 point review of systems was performed and all the  "pertinent positives have been mentioned. Rest of review of systems was negative.        PHYSICAL EXAM:     Vitals:    05/22/23 1053   BP: 122/66   Pulse: 78   Resp: 18    Body mass index is 24.68 kg/m².  Weight: 94.4 kg (208 lb 1.8 oz)   Height: 6' 5" (195.6 cm)     Physical Exam  Vitals reviewed.   Constitutional:       Appearance: He is well-developed.   HENT:      Head: Normocephalic.   Eyes:      Conjunctiva/sclera: Conjunctivae normal.      Pupils: Pupils are equal, round, and reactive to light.   Cardiovascular:      Rate and Rhythm: Tachycardia present. Rhythm irregular.      Heart sounds: Normal heart sounds.   Pulmonary:      Effort: Pulmonary effort is normal.      Breath sounds: Normal breath sounds.   Abdominal:      General: Bowel sounds are normal.      Palpations: Abdomen is soft.   Musculoskeletal:      Cervical back: Normal range of motion and neck supple.   Skin:     General: Skin is warm.   Neurological:      Mental Status: He is alert and oriented to person, place, and time.         DATA:     Laboratory:  CBC:  Recent Labs   Lab 03/06/21  0822 03/11/22  0705 04/14/23  0953   WBC 6.91 6.87 8.68   Hemoglobin 13.6 L 14.8 13.4 L   Hematocrit 47.0 50.4 46.5   Platelets 259 256 394         CHEMISTRIES:  Recent Labs   Lab 03/06/21  0822 03/11/22  0705 04/14/23  0953   Glucose 96 101 85   Sodium 140 143 138   Potassium 4.4 4.6 5.3 H   BUN 12 10 12   Creatinine 0.9 0.9 0.9   eGFR if African American >60.0 >60  --    eGFR if non  >60.0 >60  --    Calcium 9.3 9.5 9.6         CARDIAC BIOMARKERS:        COAGS:        LIPIDS/LFTS:  Recent Labs   Lab 03/06/21  0822 03/11/22  0705 04/14/23  0953   Cholesterol 136 142  --    Triglycerides 46 56  --    HDL 39 L 42  --    LDL Cholesterol 87.8 88.8  --    Non-HDL Cholesterol 97 100  --    AST 26 25 30   ALT 11 12 24         Hemoglobin A1C   Date Value Ref Range Status   04/14/2023 6.0 (H) 4.0 - 5.6 % Final     Comment:     ADA Screening " Guidelines:  5.7-6.4%  Consistent with prediabetes  >or=6.5%  Consistent with diabetes    High levels of fetal hemoglobin interfere with the HbA1C  assay. Heterozygous hemoglobin variants (HbS, HgC, etc)do  not significantly interfere with this assay.   However, presence of multiple variants may affect accuracy.     03/11/2022 5.9 (H) 4.0 - 5.6 % Final     Comment:     ADA Screening Guidelines:  5.7-6.4%  Consistent with prediabetes  >or=6.5%  Consistent with diabetes    High levels of fetal hemoglobin interfere with the HbA1C  assay. Heterozygous hemoglobin variants (HbS, HgC, etc)do  not significantly interfere with this assay.   However, presence of multiple variants may affect accuracy.     03/06/2021 5.8 (H) 4.0 - 5.6 % Final     Comment:     ADA Screening Guidelines:  5.7-6.4%  Consistent with prediabetes  >or=6.5%  Consistent with diabetes    High levels of fetal hemoglobin interfere with the HbA1C  assay. Heterozygous hemoglobin variants (HbS, HgC, etc)do  not significantly interfere with this assay.   However, presence of multiple variants may affect accuracy.         TSH        The 10-year ASCVD risk score (Felix CHOE, et al., 2019) is: 15.6%    Values used to calculate the score:      Age: 67 years      Sex: Male      Is Non- : Yes      Diabetic: No      Tobacco smoker: No      Systolic Blood Pressure: 122 mmHg      Is BP treated: Yes      HDL Cholesterol: 42 mg/dL      Total Cholesterol: 142 mg/dL             ASSESSMENT AND PLAN     Patient Active Problem List   Diagnosis    History of HCV, s/p successful treatment w/ SVR12 5/2015    Lung disease caused by breathing particles    Overweight (BMI 25.0-29.9)    GERD (gastroesophageal reflux disease)    Polycythemia    Former smoker    Hyperuricemia    ILD (interstitial lung disease)    Prediabetes    Arthritis    Allergic cough    HTN, goal below 140/90    Pulmonary interstitial fibrosis    Pulmonary hypertension    Marijuana user     Thoracic aorta atherosclerosis    A-fib       Dyspnea on exertion, palpitations and shortness of breath.  Further evaluation with the help of stress test, echo, Holter monitor.  Stress test did not show any significant ischemia.  Echo showed normal left ventricle systolic function.      atrial flutter:  Symptomatic.  Now status post cardioversion.  Back in sinus rhythm.      Continue Eliquis 5 mg b.i.d.      Follow-up after 6 months    Thank you very much for involving me in the care of your patient.  Please do not hesitate to contact me if there are any questions.      David Cueva MD, FACC, [facility]  Interventional Cardiologist, [facility].           This note was dictated with the help of speech recognition software.  There might be un-intended errors and/or substitutions.

## 2023-05-23 ENCOUNTER — TELEPHONE (OUTPATIENT)
Dept: FAMILY MEDICINE | Facility: CLINIC | Age: 68
End: 2023-05-23
Payer: MEDICARE

## 2023-05-23 ENCOUNTER — LAB VISIT (OUTPATIENT)
Dept: LAB | Facility: HOSPITAL | Age: 68
End: 2023-05-23
Attending: INTERNAL MEDICINE
Payer: MEDICARE

## 2023-05-23 DIAGNOSIS — R63.4 UNEXPLAINED WEIGHT LOSS: ICD-10-CM

## 2023-05-23 DIAGNOSIS — R63.4 UNINTENTIONAL WEIGHT LOSS: Primary | ICD-10-CM

## 2023-05-23 DIAGNOSIS — N18.1 CHRONIC KIDNEY DISEASE, STAGE 1: ICD-10-CM

## 2023-05-23 LAB
PREALB SERPL-MCNC: 16 MG/DL (ref 20–43)
TSH SERPL DL<=0.005 MIU/L-ACNC: 0.94 UIU/ML (ref 0.4–4)

## 2023-05-23 PROCEDURE — 36415 COLL VENOUS BLD VENIPUNCTURE: CPT | Performed by: INTERNAL MEDICINE

## 2023-05-23 PROCEDURE — 84134 ASSAY OF PREALBUMIN: CPT | Performed by: INTERNAL MEDICINE

## 2023-05-23 PROCEDURE — 84443 ASSAY THYROID STIM HORMONE: CPT | Performed by: INTERNAL MEDICINE

## 2023-05-23 NOTE — TELEPHONE ENCOUNTER
Please call patient/wife: his thyroid blood test was normal. His lab for nutrition level shows it is a little low. I believe his weight loss is due to dietary habits. I recommend he see a nutritionist to discuss. I have placed a consult order.

## 2023-05-25 NOTE — DISCHARGE SUMMARY
[facility] - Cath Lab  Discharge Note  Short Stay    Procedure(s) (LRB):  TRANSESOPHAGEAL ECHOCARDIOGRAM WITH POSSIBLE CARDIOVERSION (ELENA W/ POSS CARDIOVERSION) (N/A)  ECHOCARDIOGRAM, TRANSESOPHAGEAL (N/A)  Cardioversion (N/A)      OUTCOME: Patient tolerated treatment/procedure well without complication and is now ready for discharge.    DISPOSITION: Home or Self Care    FINAL DIAGNOSIS:  A-fib    FOLLOWUP: In clinic    DISCHARGE INSTRUCTIONS:    Discharge Procedure Orders   Diet general        TIME SPENT ON DISCHARGE: 35 minutes

## 2023-06-12 ENCOUNTER — TELEPHONE (OUTPATIENT)
Dept: FAMILY MEDICINE | Facility: CLINIC | Age: 68
End: 2023-06-12
Payer: MEDICARE

## 2023-06-12 NOTE — TELEPHONE ENCOUNTER
----- Message from Olya Ayala sent at 6/12/2023  3:32 PM CDT -----  Regarding: pt wife called  Type: Patient Call Back         Who called: LELE TAYLOR JR. [[id number]] Doreen (spouse)         What is the request in detail: Pt was instructed to be seen the week of 7/10 for pre op/clearance  to have his cataract  surgery. Please Advise          Can the clinic reply by [facility]? No         Would the patient rather a call back or a response via [facility]? Call back         Best call back number: [phone]          Additional Information:         Thank you.

## 2023-06-13 ENCOUNTER — TELEPHONE (OUTPATIENT)
Dept: FAMILY MEDICINE | Facility: CLINIC | Age: 68
End: 2023-06-13
Payer: MEDICARE

## 2023-06-13 NOTE — TELEPHONE ENCOUNTER
----- Message from Emily Abbasi sent at 6/13/2023  8:15 AM CDT -----  Regarding: [id number]  Type:  Patient Returning Call    Who Called: wife- Doreen Kwesi     Who Left Message for Patient: Lamine    Does the patient know what this is regarding?:yes    Would the patient rather a call back or a response via [facility]? Call back     Best Call Back Number:[phone]        Additional Information: pre op appt

## 2023-06-23 ENCOUNTER — PATIENT OUTREACH (OUTPATIENT)
Dept: ADMINISTRATIVE | Facility: HOSPITAL | Age: 68
End: 2023-06-23
Payer: MEDICARE

## 2023-06-23 LAB
LEFT EYE DM RETINOPATHY: NEGATIVE
RIGHT EYE DM RETINOPATHY: NEGATIVE

## 2023-06-23 RX ORDER — FLUTICASONE FUROATE, UMECLIDINIUM BROMIDE AND VILANTEROL TRIFENATATE 100; 62.5; 25 UG/1; UG/1; UG/1
1 POWDER RESPIRATORY (INHALATION) DAILY
COMMUNITY
Start: 2023-06-06

## 2023-07-10 ENCOUNTER — OFFICE VISIT (OUTPATIENT)
Dept: FAMILY MEDICINE | Facility: CLINIC | Age: 68
End: 2023-07-10
Payer: MEDICARE

## 2023-07-10 VITALS
DIASTOLIC BLOOD PRESSURE: 60 MMHG | HEART RATE: 85 BPM | OXYGEN SATURATION: 97 % | BODY MASS INDEX: 25.22 KG/M2 | TEMPERATURE: 98 F | HEIGHT: 77 IN | SYSTOLIC BLOOD PRESSURE: 120 MMHG | WEIGHT: 213.63 LBS

## 2023-07-10 DIAGNOSIS — Z01.818 PREOPERATIVE EXAMINATION: Primary | ICD-10-CM

## 2023-07-10 DIAGNOSIS — H26.9 CATARACT OF BOTH EYES, UNSPECIFIED CATARACT TYPE: ICD-10-CM

## 2023-07-10 PROCEDURE — 99999 PR PBB SHADOW E&M-EST. PATIENT-LVL III: ICD-10-PCS | Mod: PBBFAC,,, | Performed by: FAMILY MEDICINE

## 2023-07-10 PROCEDURE — 99215 OFFICE O/P EST HI 40 MIN: CPT | Mod: S$GLB,,, | Performed by: FAMILY MEDICINE

## 2023-07-10 PROCEDURE — 1157F PR ADVANCE CARE PLAN OR EQUIV PRESENT IN MEDICAL RECORD: ICD-10-PCS | Mod: CPTII,S$GLB,, | Performed by: FAMILY MEDICINE

## 2023-07-10 PROCEDURE — 1126F AMNT PAIN NOTED NONE PRSNT: CPT | Mod: CPTII,S$GLB,, | Performed by: FAMILY MEDICINE

## 2023-07-10 PROCEDURE — 3074F PR MOST RECENT SYSTOLIC BLOOD PRESSURE < 130 MM HG: ICD-10-PCS | Mod: CPTII,S$GLB,, | Performed by: FAMILY MEDICINE

## 2023-07-10 PROCEDURE — 3008F BODY MASS INDEX DOCD: CPT | Mod: CPTII,S$GLB,, | Performed by: FAMILY MEDICINE

## 2023-07-10 PROCEDURE — 3288F PR FALLS RISK ASSESSMENT DOCUMENTED: ICD-10-PCS | Mod: CPTII,S$GLB,, | Performed by: FAMILY MEDICINE

## 2023-07-10 PROCEDURE — 1157F ADVNC CARE PLAN IN RCRD: CPT | Mod: CPTII,S$GLB,, | Performed by: FAMILY MEDICINE

## 2023-07-10 PROCEDURE — 1160F PR REVIEW ALL MEDS BY PRESCRIBER/CLIN PHARMACIST DOCUMENTED: ICD-10-PCS | Mod: CPTII,S$GLB,, | Performed by: FAMILY MEDICINE

## 2023-07-10 PROCEDURE — 3288F FALL RISK ASSESSMENT DOCD: CPT | Mod: CPTII,S$GLB,, | Performed by: FAMILY MEDICINE

## 2023-07-10 PROCEDURE — 1160F RVW MEDS BY RX/DR IN RCRD: CPT | Mod: CPTII,S$GLB,, | Performed by: FAMILY MEDICINE

## 2023-07-10 PROCEDURE — 3078F DIAST BP <80 MM HG: CPT | Mod: CPTII,S$GLB,, | Performed by: FAMILY MEDICINE

## 2023-07-10 PROCEDURE — 3078F PR MOST RECENT DIASTOLIC BLOOD PRESSURE < 80 MM HG: ICD-10-PCS | Mod: CPTII,S$GLB,, | Performed by: FAMILY MEDICINE

## 2023-07-10 PROCEDURE — 1159F MED LIST DOCD IN RCRD: CPT | Mod: CPTII,S$GLB,, | Performed by: FAMILY MEDICINE

## 2023-07-10 PROCEDURE — 1126F PR PAIN SEVERITY QUANTIFIED, NO PAIN PRESENT: ICD-10-PCS | Mod: CPTII,S$GLB,, | Performed by: FAMILY MEDICINE

## 2023-07-10 PROCEDURE — 1159F PR MEDICATION LIST DOCUMENTED IN MEDICAL RECORD: ICD-10-PCS | Mod: CPTII,S$GLB,, | Performed by: FAMILY MEDICINE

## 2023-07-10 PROCEDURE — 3044F HG A1C LEVEL LT 7.0%: CPT | Mod: CPTII,S$GLB,, | Performed by: FAMILY MEDICINE

## 2023-07-10 PROCEDURE — 4010F PR ACE/ARB THEARPY RXD/TAKEN: ICD-10-PCS | Mod: CPTII,S$GLB,, | Performed by: FAMILY MEDICINE

## 2023-07-10 PROCEDURE — 1101F PR PT FALLS ASSESS DOC 0-1 FALLS W/OUT INJ PAST YR: ICD-10-PCS | Mod: CPTII,S$GLB,, | Performed by: FAMILY MEDICINE

## 2023-07-10 PROCEDURE — 99999 PR PBB SHADOW E&M-EST. PATIENT-LVL III: CPT | Mod: PBBFAC,,, | Performed by: FAMILY MEDICINE

## 2023-07-10 PROCEDURE — 1101F PT FALLS ASSESS-DOCD LE1/YR: CPT | Mod: CPTII,S$GLB,, | Performed by: FAMILY MEDICINE

## 2023-07-10 PROCEDURE — 3044F PR MOST RECENT HEMOGLOBIN A1C LEVEL <7.0%: ICD-10-PCS | Mod: CPTII,S$GLB,, | Performed by: FAMILY MEDICINE

## 2023-07-10 PROCEDURE — 99215 PR OFFICE/OUTPT VISIT, EST, LEVL V, 40-54 MIN: ICD-10-PCS | Mod: S$GLB,,, | Performed by: FAMILY MEDICINE

## 2023-07-10 PROCEDURE — 3008F PR BODY MASS INDEX (BMI) DOCUMENTED: ICD-10-PCS | Mod: CPTII,S$GLB,, | Performed by: FAMILY MEDICINE

## 2023-07-10 PROCEDURE — 3074F SYST BP LT 130 MM HG: CPT | Mod: CPTII,S$GLB,, | Performed by: FAMILY MEDICINE

## 2023-07-10 PROCEDURE — 4010F ACE/ARB THERAPY RXD/TAKEN: CPT | Mod: CPTII,S$GLB,, | Performed by: FAMILY MEDICINE

## 2023-07-10 NOTE — PROGRESS NOTES
"  Physical Exam  /60   Pulse 85   Temp 98.3 °F (36.8 °C) (Oral)   Ht 6' 5" (1.956 m)   Wt 96.9 kg (213 lb 10 oz)   SpO2 97%   BMI 25.33 kg/m²      Office Visit    Patient Name: Josiah Orosco Jr.    : 1955  MRN: [id number]      Assessment/Plan:  Josiah Orosco Jr. is a 67 y.o. male who presents today for :    Preoperative examination  Cataract of both eyes    Patient is optimized for planned surgical procedure and is at low risk - may proceed with procedure. Continue current medication regimen.           [facility]ax [facility]      [phone], and  [phone]                F/u PRN        This note was created by combination of typed  and MModal dictation.  Transcription errors may be present.  If there are any questions, please contact me.        ----------------------------------------------------------------------------------------------------------------------      HPI:  Patient Care Team:  Elaine Landry MD as PCP - General (Internal Medicine)  Jennifer B. Scheuermann, PA as Physician Assistant (Hepatology)  Karen Johnson MD as Consulting Physician (Hematology)  Junior Torres OD (Optometry)  Meche Reyes LPN as Care Coordinator    Josiah is a 67 y.o. male with      Patient Active Problem List   Diagnosis    History of HCV, s/p successful treatment w/ SVR12 2015    Lung disease caused by breathing particles    Overweight (BMI 25.0-29.9)    GERD (gastroesophageal reflux disease)    Polycythemia    Former smoker    Hyperuricemia    ILD (interstitial lung disease)    Prediabetes    Arthritis    Allergic cough    HTN, goal below 140/90    Pulmonary interstitial fibrosis    Pulmonary hypertension    Marijuana user    Thoracic aorta atherosclerosis    A-fib     This patient is new to me      Josiah presents today for:  Pre-op Exam    Procedure to be performed: PHACO IOL w/ dr. Cole  Pt states that pt has had no limitations in activities. He has a history of " stable HTN/Afib/Pulmonary HTN and ILD - doing well on current medication regimen as managed by Cardiology and Pulmonology. His prior stress test did not show any significant ischemia.  Echo showed normal left ventricle systolic function.  He is able to climb one flight of stairs without getting CP, but does get YANG, which is chronic for him. Denies F/C/N/V/palpitations/claudication.        Additional ROS    Negative review of system  except per HPI             Patient Active Problem List   Diagnosis    History of HCV, s/p successful treatment w/ SVR12 5/2015    Lung disease caused by breathing particles    Overweight (BMI 25.0-29.9)    GERD (gastroesophageal reflux disease)    Polycythemia    Former smoker    Hyperuricemia    ILD (interstitial lung disease)    Prediabetes    Arthritis    Allergic cough    HTN, goal below 140/90    Pulmonary interstitial fibrosis    Pulmonary hypertension    Marijuana user    Thoracic aorta atherosclerosis    A-fib       Current Medications  Medications reviewed and updated.       Current Outpatient Medications:     allopurinoL (ZYLOPRIM) 100 MG tablet, Take 2 tablets (200 mg total) by mouth once daily., Disp: 180 tablet, Rfl: 3    apixaban (ELIQUIS) 5 mg Tab, Take 1 tablet (5 mg total) by mouth 2 (two) times daily., Disp: 180 tablet, Rfl: 3    fluticasone propionate (FLONASE) 50 mcg/actuation nasal spray, SHAKE LIQUID AND USE 1 SPRAY(50 MCG) IN EACH NOSTRIL EVERY DAY, Disp: 16 g, Rfl: 0    fluticasone/umeclidin/vilanter (TRELEGY ELLIPTA INHL), Inhale into the lungs., Disp: , Rfl:     levocetirizine (XYZAL) 5 MG tablet, Take 1 tablet (5 mg total) by mouth every evening., Disp: 30 tablet, Rfl: 2    losartan (COZAAR) 50 MG tablet, TK 1 T PO D, Disp: 90 tablet, Rfl: 2    metoprolol succinate (TOPROL-XL) 50 MG 24 hr tablet, Take 1.5 tablets (75 mg total) by mouth once daily., Disp: 90 tablet, Rfl: 3    naproxen (NAPROSYN) 500 MG tablet, Take 500 mg by mouth every 12 (twelve) hours as  needed., Disp: , Rfl:     TRELEGY ELLIPTA 100-62.5-25 mcg DsDv, Take 1 puff by mouth., Disp: , Rfl:     aspirin (ECOTRIN) 81 MG EC tablet, Take 81 mg by mouth once daily., Disp: , Rfl:     Past Surgical History:   Procedure Laterality Date    BUNIONECTOMY      bilateral    CARDIOVERSION N/A 5/8/2023    Procedure: Cardioversion;  Surgeon: David Cueva MD;  Location: [facility] CATH LAB;  Service: Cardiology;  Laterality: N/A;    COLONOSCOPY N/A 12/9/2015    Procedure: COLONOSCOPY;  Surgeon: Conrad Iqbal MD;  Location: [facility] ENDO;  Service: Endoscopy;  Laterality: N/A;    Liver biopsy x2      rotator cuff Right     TRANSESOPHAGEAL ECHOCARDIOGRAM WITH POSSIBLE CARDIOVERSION (ELENA W/ POSS CARDIOVERSION) N/A 5/8/2023    Procedure: TRANSESOPHAGEAL ECHOCARDIOGRAM WITH POSSIBLE CARDIOVERSION (ELENA W/ POSS CARDIOVERSION);  Surgeon: David Cueva MD;  Location: [facility] CATH LAB;  Service: Cardiology;  Laterality: N/A;  12:30PM    RN PREOP 5/4/2023---EKG ON ARRIVAL    TRANSESOPHAGEAL ECHOCARDIOGRAPHY N/A 5/8/2023    Procedure: ECHOCARDIOGRAM, TRANSESOPHAGEAL;  Surgeon: David Cueva MD;  Location: [facility] CATH LAB;  Service: Cardiology;  Laterality: N/A;       Family History   Problem Relation Age of Onset    Heart disease Mother     Kidney disease Mother     Parkinsonism Father     Prostate cancer Cousin         maternal side    Heart attack Sister         CABG    Deep vein thrombosis Sister     Pulmonary embolism Sister     Osteoarthritis Sister         s/p knee replacement    Chronic back pain Brother     Liver disease Neg Hx     Diabetes Neg Hx     Melanoma Neg Hx        Social History     Socioeconomic History    Marital status:     Number of children: 0    Highest education level: Some college, no degree   Occupational History    Occupation: Self-employed     Employer: [employer]   Tobacco Use    Smoking status: Former    Smokeless tobacco: Never    Tobacco comments:     pt. smokes 10 cigars per day.   Substance  "and Sexual Activity    Alcohol use: No     Alcohol/week: 0.0 standard drinks     Comment: Rarely    Drug use: Yes     Types: Marijuana     Comment: daily    Sexual activity: Yes     Partners: Female           Allergies   Review of patient's allergies indicates:   Allergen Reactions    Ace inhibitors Other (See Comments)     cough             Review of Systems  See HPI      [unfilled]  /60   Pulse 85   Temp 98.3 °F (36.8 °C) (Oral)   Ht 6' 5" (1.956 m)   Wt 96.9 kg (213 lb 10 oz)   SpO2 97%   BMI 25.33 kg/m²     GEN: NAD, well developed, pleasant, well nourished  HEENT: NCAT, PERRLA, EOMI, sclera clear, anicteric, O/P clear, MMM with no lesions  NECK: normal, supple with midline trachea, no LAD, no thyromegaly  LUNGS: CTAB, no w/r/r, no increased work of breathing   HEART: RRR, normal S1 and S2, no m/r/g, no edema  ABD: s/nt/nd, NABS  SKIN: normal turgor, no rashes  PSYCH: AOx3, appropriate mood and affect  MSK: warm/well perfused, normal ROM in all extremities, no c/c/e.            "

## 2023-07-27 ENCOUNTER — OFFICE VISIT (OUTPATIENT)
Dept: URGENT CARE | Facility: CLINIC | Age: 68
End: 2023-07-27
Payer: MEDICARE

## 2023-07-27 ENCOUNTER — HOSPITAL ENCOUNTER (OUTPATIENT)
Facility: HOSPITAL | Age: 68
LOS: 1 days | Discharge: HOME | End: 2023-07-29
Attending: EMERGENCY MEDICINE | Admitting: STUDENT IN AN ORGANIZED HEALTH CARE EDUCATION/TRAINING PROGRAM
Payer: MEDICARE

## 2023-07-27 VITALS
HEART RATE: 128 BPM | WEIGHT: 213 LBS | TEMPERATURE: 99 F | HEIGHT: 77 IN | OXYGEN SATURATION: 92 % | SYSTOLIC BLOOD PRESSURE: 118 MMHG | BODY MASS INDEX: 25.15 KG/M2 | RESPIRATION RATE: 18 BRPM | DIASTOLIC BLOOD PRESSURE: 78 MMHG

## 2023-07-27 DIAGNOSIS — I48.91 ATRIAL FIBRILLATION, UNSPECIFIED TYPE: ICD-10-CM

## 2023-07-27 DIAGNOSIS — R06.02 SOB (SHORTNESS OF BREATH) ON EXERTION: ICD-10-CM

## 2023-07-27 DIAGNOSIS — I25.10 CAD (CORONARY ARTERY DISEASE): ICD-10-CM

## 2023-07-27 DIAGNOSIS — I48.91 A-FIB: ICD-10-CM

## 2023-07-27 DIAGNOSIS — R06.02 SOB (SHORTNESS OF BREATH): ICD-10-CM

## 2023-07-27 DIAGNOSIS — I49.9 CARDIAC ARRHYTHMIA, UNSPECIFIED CARDIAC ARRHYTHMIA TYPE: Primary | ICD-10-CM

## 2023-07-27 DIAGNOSIS — R07.9 CHEST PAIN: ICD-10-CM

## 2023-07-27 DIAGNOSIS — R00.0 RAPID HEART RATE: ICD-10-CM

## 2023-07-27 DIAGNOSIS — I48.92 ATRIAL FLUTTER WITH RAPID VENTRICULAR RESPONSE: ICD-10-CM

## 2023-07-27 DIAGNOSIS — R94.31 ABNORMAL EKG: ICD-10-CM

## 2023-07-27 DIAGNOSIS — I48.3 TYPICAL ATRIAL FLUTTER: Primary | ICD-10-CM

## 2023-07-27 PROBLEM — H26.9 CATARACT: Status: ACTIVE | Noted: 2023-07-27

## 2023-07-27 LAB
ALBUMIN SERPL BCP-MCNC: 3.1 G/DL (ref 3.5–5.2)
ALP SERPL-CCNC: 97 U/L (ref 55–135)
ALT SERPL W/O P-5'-P-CCNC: 32 U/L (ref 10–44)
ANION GAP SERPL CALC-SCNC: 6 MMOL/L (ref 8–16)
AST SERPL-CCNC: 27 U/L (ref 10–40)
BASOPHILS # BLD AUTO: 0.06 K/UL (ref 0–0.2)
BASOPHILS NFR BLD: 0.7 % (ref 0–1.9)
BILIRUB SERPL-MCNC: 0.7 MG/DL (ref 0.1–1)
BNP SERPL-MCNC: 954 PG/ML (ref 0–99)
BUN SERPL-MCNC: 11 MG/DL (ref 8–23)
CALCIUM SERPL-MCNC: 9.2 MG/DL (ref 8.7–10.5)
CHLORIDE SERPL-SCNC: 107 MMOL/L (ref 95–110)
CO2 SERPL-SCNC: 25 MMOL/L (ref 23–29)
CREAT SERPL-MCNC: 0.9 MG/DL (ref 0.5–1.4)
DIFFERENTIAL METHOD: ABNORMAL
EOSINOPHIL # BLD AUTO: 0.1 K/UL (ref 0–0.5)
EOSINOPHIL NFR BLD: 0.7 % (ref 0–8)
ERYTHROCYTE [DISTWIDTH] IN BLOOD BY AUTOMATED COUNT: 21.1 % (ref 11.5–14.5)
EST. GFR  (NO RACE VARIABLE): >60 ML/MIN/1.73 M^2
GLUCOSE SERPL-MCNC: 129 MG/DL (ref 70–110)
HCT VFR BLD AUTO: 48.7 % (ref 40–54)
HGB BLD-MCNC: 14.4 G/DL (ref 14–18)
IMM GRANULOCYTES # BLD AUTO: 0.02 K/UL (ref 0–0.04)
IMM GRANULOCYTES NFR BLD AUTO: 0.2 % (ref 0–0.5)
INR PPP: 1.2 (ref 0.8–1.2)
LYMPHOCYTES # BLD AUTO: 1.6 K/UL (ref 1–4.8)
LYMPHOCYTES NFR BLD: 20.3 % (ref 18–48)
MCH RBC QN AUTO: 20.3 PG (ref 27–31)
MCHC RBC AUTO-ENTMCNC: 29.6 G/DL (ref 32–36)
MCV RBC AUTO: 69 FL (ref 82–98)
MONOCYTES # BLD AUTO: 0.6 K/UL (ref 0.3–1)
MONOCYTES NFR BLD: 7.5 % (ref 4–15)
NEUTROPHILS # BLD AUTO: 5.7 K/UL (ref 1.8–7.7)
NEUTROPHILS NFR BLD: 70.6 % (ref 38–73)
NRBC BLD-RTO: 0 /100 WBC
PLATELET # BLD AUTO: 341 K/UL (ref 150–450)
PMV BLD AUTO: 9.2 FL (ref 9.2–12.9)
POTASSIUM SERPL-SCNC: 4.3 MMOL/L (ref 3.5–5.1)
PROT SERPL-MCNC: 8.2 G/DL (ref 6–8.4)
PROTHROMBIN TIME: 12.2 SEC (ref 9–12.5)
RBC # BLD AUTO: 7.08 M/UL (ref 4.6–6.2)
SODIUM SERPL-SCNC: 138 MMOL/L (ref 136–145)
TROPONIN I SERPL DL<=0.01 NG/ML-MCNC: 0.01 NG/ML (ref 0–0.03)
TSH SERPL DL<=0.005 MIU/L-ACNC: 1.01 UIU/ML (ref 0.4–4)
WBC # BLD AUTO: 8.02 K/UL (ref 3.9–12.7)

## 2023-07-27 PROCEDURE — G0378 HOSPITAL OBSERVATION PER HR: HCPCS

## 2023-07-27 PROCEDURE — 85610 PROTHROMBIN TIME: CPT | Performed by: PHYSICIAN ASSISTANT

## 2023-07-27 PROCEDURE — 93010 ELECTROCARDIOGRAM REPORT: CPT | Mod: 76,,, | Performed by: INTERNAL MEDICINE

## 2023-07-27 PROCEDURE — 80053 COMPREHEN METABOLIC PANEL: CPT | Performed by: PHYSICIAN ASSISTANT

## 2023-07-27 PROCEDURE — 85025 COMPLETE CBC W/AUTO DIFF WBC: CPT | Performed by: PHYSICIAN ASSISTANT

## 2023-07-27 PROCEDURE — 84484 ASSAY OF TROPONIN QUANT: CPT | Performed by: PHYSICIAN ASSISTANT

## 2023-07-27 PROCEDURE — 99203 PR OFFICE/OUTPT VISIT, NEW, LEVL III, 30-44 MIN: ICD-10-PCS | Mod: S$GLB,,, | Performed by: NURSE PRACTITIONER

## 2023-07-27 PROCEDURE — 93005 ELECTROCARDIOGRAM TRACING: CPT | Mod: 59

## 2023-07-27 PROCEDURE — 93005 ELECTROCARDIOGRAM TRACING: CPT | Mod: S$GLB,,, | Performed by: NURSE PRACTITIONER

## 2023-07-27 PROCEDURE — 99285 EMERGENCY DEPT VISIT HI MDM: CPT | Mod: 25

## 2023-07-27 PROCEDURE — 96374 THER/PROPH/DIAG INJ IV PUSH: CPT

## 2023-07-27 PROCEDURE — 84443 ASSAY THYROID STIM HORMONE: CPT | Performed by: PHYSICIAN ASSISTANT

## 2023-07-27 PROCEDURE — 93010 EKG 12-LEAD: ICD-10-PCS | Mod: 76,,, | Performed by: INTERNAL MEDICINE

## 2023-07-27 PROCEDURE — 83880 ASSAY OF NATRIURETIC PEPTIDE: CPT | Performed by: PHYSICIAN ASSISTANT

## 2023-07-27 PROCEDURE — 93005 ELECTROCARDIOGRAM TRACING: CPT

## 2023-07-27 PROCEDURE — 96375 TX/PRO/DX INJ NEW DRUG ADDON: CPT

## 2023-07-27 PROCEDURE — 94640 AIRWAY INHALATION TREATMENT: CPT

## 2023-07-27 PROCEDURE — 25000003 PHARM REV CODE 250: Performed by: EMERGENCY MEDICINE

## 2023-07-27 PROCEDURE — 99203 OFFICE O/P NEW LOW 30 MIN: CPT | Mod: S$GLB,,, | Performed by: NURSE PRACTITIONER

## 2023-07-27 PROCEDURE — 25000003 PHARM REV CODE 250: Performed by: STUDENT IN AN ORGANIZED HEALTH CARE EDUCATION/TRAINING PROGRAM

## 2023-07-27 PROCEDURE — 94761 N-INVAS EAR/PLS OXIMETRY MLT: CPT

## 2023-07-27 PROCEDURE — 93010 ELECTROCARDIOGRAM REPORT: CPT | Mod: S$GLB,,, | Performed by: INTERNAL MEDICINE

## 2023-07-27 PROCEDURE — 93005 EKG 12-LEAD: ICD-10-PCS | Mod: S$GLB,,, | Performed by: NURSE PRACTITIONER

## 2023-07-27 PROCEDURE — 93010 EKG 12-LEAD: ICD-10-PCS | Mod: S$GLB,,, | Performed by: INTERNAL MEDICINE

## 2023-07-27 PROCEDURE — 25000242 PHARM REV CODE 250 ALT 637 W/ HCPCS: Performed by: STUDENT IN AN ORGANIZED HEALTH CARE EDUCATION/TRAINING PROGRAM

## 2023-07-27 RX ORDER — ACETAMINOPHEN 325 MG/1
650 TABLET ORAL EVERY 4 HOURS PRN
Status: DISCONTINUED | OUTPATIENT
Start: 2023-07-27 | End: 2023-07-28 | Stop reason: SDUPTHER

## 2023-07-27 RX ORDER — ONDANSETRON 8 MG/1
8 TABLET, ORALLY DISINTEGRATING ORAL EVERY 8 HOURS PRN
Status: DISCONTINUED | OUTPATIENT
Start: 2023-07-27 | End: 2023-07-28

## 2023-07-27 RX ORDER — ARFORMOTEROL TARTRATE 15 UG/2ML
15 SOLUTION RESPIRATORY (INHALATION) 2 TIMES DAILY
Status: DISCONTINUED | OUTPATIENT
Start: 2023-07-27 | End: 2023-07-29 | Stop reason: HOSPADM

## 2023-07-27 RX ORDER — AZITHROMYCIN 500 MG/1
500 TABLET, FILM COATED ORAL
COMMUNITY
Start: 2023-07-19 | End: 2023-07-27 | Stop reason: CLARIF

## 2023-07-27 RX ORDER — DILTIAZEM HYDROCHLORIDE 5 MG/ML
20 INJECTION INTRAVENOUS
Status: COMPLETED | OUTPATIENT
Start: 2023-07-27 | End: 2023-07-27

## 2023-07-27 RX ORDER — ALLOPURINOL 100 MG/1
200 TABLET ORAL DAILY
Status: DISCONTINUED | OUTPATIENT
Start: 2023-07-28 | End: 2023-07-29 | Stop reason: HOSPADM

## 2023-07-27 RX ORDER — OFLOXACIN 3 MG/ML
1 SOLUTION/ DROPS OPHTHALMIC 4 TIMES DAILY
Status: DISCONTINUED | OUTPATIENT
Start: 2023-07-27 | End: 2023-07-28

## 2023-07-27 RX ORDER — BENZONATATE 200 MG/1
200 CAPSULE ORAL 3 TIMES DAILY
COMMUNITY
Start: 2023-07-19 | End: 2023-07-27 | Stop reason: CLARIF

## 2023-07-27 RX ORDER — GLUCAGON 1 MG
1 KIT INJECTION
Status: DISCONTINUED | OUTPATIENT
Start: 2023-07-27 | End: 2023-07-29 | Stop reason: HOSPADM

## 2023-07-27 RX ORDER — IBUPROFEN 200 MG
16 TABLET ORAL
Status: DISCONTINUED | OUTPATIENT
Start: 2023-07-27 | End: 2023-07-29 | Stop reason: HOSPADM

## 2023-07-27 RX ORDER — METHYLPREDNISOLONE 4 MG/1
TABLET ORAL
COMMUNITY
Start: 2023-07-19 | End: 2023-07-27 | Stop reason: CLARIF

## 2023-07-27 RX ORDER — IPRATROPIUM BROMIDE 0.5 MG/2.5ML
0.5 SOLUTION RESPIRATORY (INHALATION) EVERY 6 HOURS PRN
Status: DISCONTINUED | OUTPATIENT
Start: 2023-07-27 | End: 2023-07-29 | Stop reason: HOSPADM

## 2023-07-27 RX ORDER — METOPROLOL TARTRATE 25 MG/1
25 TABLET, FILM COATED ORAL ONCE
Status: COMPLETED | OUTPATIENT
Start: 2023-07-27 | End: 2023-07-27

## 2023-07-27 RX ORDER — OFLOXACIN 3 MG/ML
1 SOLUTION/ DROPS OPHTHALMIC 4 TIMES DAILY
COMMUNITY
Start: 2023-07-14 | End: 2024-01-10

## 2023-07-27 RX ORDER — IBUPROFEN 200 MG
24 TABLET ORAL
Status: DISCONTINUED | OUTPATIENT
Start: 2023-07-27 | End: 2023-07-29 | Stop reason: HOSPADM

## 2023-07-27 RX ORDER — PREDNISOLONE ACETATE 10 MG/ML
1 SUSPENSION/ DROPS OPHTHALMIC 4 TIMES DAILY
Status: DISCONTINUED | OUTPATIENT
Start: 2023-07-27 | End: 2023-07-28

## 2023-07-27 RX ORDER — PREDNISOLONE ACETATE 10 MG/ML
1 SUSPENSION/ DROPS OPHTHALMIC 3 TIMES DAILY
COMMUNITY
Start: 2023-07-14 | End: 2024-01-10

## 2023-07-27 RX ORDER — SODIUM CHLORIDE 0.9 % (FLUSH) 0.9 %
10 SYRINGE (ML) INJECTION EVERY 12 HOURS PRN
Status: DISCONTINUED | OUTPATIENT
Start: 2023-07-27 | End: 2023-07-29 | Stop reason: HOSPADM

## 2023-07-27 RX ORDER — NALOXONE HCL 0.4 MG/ML
0.02 VIAL (ML) INJECTION
Status: DISCONTINUED | OUTPATIENT
Start: 2023-07-27 | End: 2023-07-29 | Stop reason: HOSPADM

## 2023-07-27 RX ORDER — METOPROLOL TARTRATE 1 MG/ML
5 INJECTION, SOLUTION INTRAVENOUS EVERY 6 HOURS PRN
Status: DISCONTINUED | OUTPATIENT
Start: 2023-07-27 | End: 2023-07-29 | Stop reason: HOSPADM

## 2023-07-27 RX ORDER — BUDESONIDE 0.5 MG/2ML
0.5 INHALANT ORAL EVERY 12 HOURS
Status: DISCONTINUED | OUTPATIENT
Start: 2023-07-27 | End: 2023-07-29 | Stop reason: HOSPADM

## 2023-07-27 RX ADMIN — OFLOXACIN 1 DROP: 3 SOLUTION/ DROPS OPHTHALMIC at 05:07

## 2023-07-27 RX ADMIN — METOROPROLOL TARTRATE 5 MG: 5 INJECTION, SOLUTION INTRAVENOUS at 11:07

## 2023-07-27 RX ADMIN — APIXABAN 5 MG: 5 TABLET, FILM COATED ORAL at 09:07

## 2023-07-27 RX ADMIN — OFLOXACIN 1 DROP: 3 SOLUTION/ DROPS OPHTHALMIC at 09:07

## 2023-07-27 RX ADMIN — METOPROLOL TARTRATE 25 MG: 25 TABLET, FILM COATED ORAL at 09:07

## 2023-07-27 RX ADMIN — BUDESONIDE 0.5 MG: 0.5 INHALANT RESPIRATORY (INHALATION) at 08:07

## 2023-07-27 RX ADMIN — ARFORMOTEROL TARTRATE 15 MCG: 15 SOLUTION RESPIRATORY (INHALATION) at 08:07

## 2023-07-27 RX ADMIN — PREDNISOLONE ACETATE 1 DROP: 10 SUSPENSION/ DROPS OPHTHALMIC at 09:07

## 2023-07-27 RX ADMIN — DILTIAZEM HYDROCHLORIDE 20 MG: 5 INJECTION INTRAVENOUS at 03:07

## 2023-07-27 RX ADMIN — PREDNISOLONE ACETATE 1 DROP: 10 SUSPENSION/ DROPS OPHTHALMIC at 05:07

## 2023-07-27 NOTE — PATIENT INSTRUCTIONS
Please proceed to the emergency department for further evaluation.    - Follow up with your PCP or specialty clinic as directed in the next 1-2 weeks if not improved or as needed.  You can call [phone] to schedule an appointment with the appropriate provider.    - Go to the ER or seek medical attention immediately if you develop new or worsening symptoms.    - You must understand that you have received an Urgent Care treatment only and that you may be released before all of your medical problems are known or treated.   - You, the patient, will arrange for follow up care as instructed.   - If your condition worsens or fails to improve we recommend that you receive another evaluation at the ER immediately or contact your PCP to discuss your concerns or return here.

## 2023-07-27 NOTE — NURSING
Patient arrived to floor via wheelchair/stretcher via transporter from ED.  Patient transferred to bed via x1 person assist.  AAOX4.  Patient was oriented to room, information on whiteboard, and medication regimen.  Bed low, adequate lighting provided, side rails x2 up, call bell within reach.  Admission assessment completed. VSS. Patient denied having any acute distress azt this time.  None observed.  Will continue to monitor and follow treatment plan.  Family at bedside.       [facility]  Nurses Note -- 4 Eyes      7/27/2023       Skin assessed on: Q Shift      [x] No Pressure Injuries Present    [x]Prevention Measures Documented    [] Yes LDA  for Pressure Injury Previously documented     [] Yes New Pressure Injury Discovered   [] LDA for New Pressure Injury Added      Attending RN:  aPula Villaseñor LPN     Second RN:  Shivani Niño RN

## 2023-07-27 NOTE — SUBJECTIVE & OBJECTIVE
Past Medical History:   Diagnosis Date    A-fib 5/8/2023    Arthritis     GERD (gastroesophageal reflux disease)     History of HCV, s/p successful treatment w/ SVR12 5/2015     Failed treatment (stage I/II) - followed by hepatology     Joint pain     Lung disease caused by breathing particles     Widespread essentially symmetric interstitial lung disease    Obesity     Polycythemia vera     Prediabetes        Past Surgical History:   Procedure Laterality Date    BUNIONECTOMY      bilateral    CARDIOVERSION N/A 05/08/2023    Procedure: Cardioversion;  Surgeon: David Cueva MD;  Location: [facility] CATH LAB;  Service: Cardiology;  Laterality: N/A;    CATARACT EXTRACTION Left 07/27/2023    COLONOSCOPY N/A 12/09/2015    Procedure: COLONOSCOPY;  Surgeon: Conrad Iqbal MD;  Location: [facility] ENDO;  Service: Endoscopy;  Laterality: N/A;    Liver biopsy x2      rotator cuff Right     TRANSESOPHAGEAL ECHOCARDIOGRAM WITH POSSIBLE CARDIOVERSION (ELENA W/ POSS CARDIOVERSION) N/A 05/08/2023    Procedure: TRANSESOPHAGEAL ECHOCARDIOGRAM WITH POSSIBLE CARDIOVERSION (ELENA W/ POSS CARDIOVERSION);  Surgeon: David Cueva MD;  Location: [facility] CATH LAB;  Service: Cardiology;  Laterality: N/A;  12:30PM    RN PREOP 5/4/2023---EKG ON ARRIVAL    TRANSESOPHAGEAL ECHOCARDIOGRAPHY N/A 05/08/2023    Procedure: ECHOCARDIOGRAM, TRANSESOPHAGEAL;  Surgeon: David Cueva MD;  Location: [facility] CATH LAB;  Service: Cardiology;  Laterality: N/A;       Review of patient's allergies indicates:   Allergen Reactions    Ace inhibitors Other (See Comments)     cough       No current facility-administered medications on file prior to encounter.     Current Outpatient Medications on File Prior to Encounter   Medication Sig    allopurinoL (ZYLOPRIM) 100 MG tablet Take 2 tablets (200 mg total) by mouth once daily.    apixaban (ELIQUIS) 5 mg Tab Take 1 tablet (5 mg total) by mouth 2 (two) times daily.    fluticasone propionate (FLONASE) 50 mcg/actuation nasal spray  SHAKE LIQUID AND USE 1 SPRAY(50 MCG) IN EACH NOSTRIL EVERY DAY    losartan (COZAAR) 50 MG tablet TK 1 T PO D    metoprolol succinate (TOPROL-XL) 50 MG 24 hr tablet Take 1.5 tablets (75 mg total) by mouth once daily.    multivitamin capsule Take 1 capsule by mouth once daily.    naproxen (NAPROSYN) 500 MG tablet Take 500 mg by mouth every 12 (twelve) hours as needed.    ofloxacin (OCUFLOX) 0.3 % ophthalmic solution Place 1 drop into both eyes 4 (four) times daily.    prednisoLONE acetate (PRED FORTE) 1 % DrpS Place 1 drop into both eyes 4 (four) times daily.    TRELEGY ELLIPTA 100-62.5-25 mcg DsDv Inhale 1 puff into the lungs Daily.    [DISCONTINUED] aspirin (ECOTRIN) 81 MG EC tablet Take 81 mg by mouth once daily.    [DISCONTINUED] azithromycin (ZITHROMAX) 500 MG tablet Take 500 mg by mouth.    [DISCONTINUED] benzonatate (TESSALON) 200 MG capsule Take 200 mg by mouth 3 (three) times daily.    [DISCONTINUED] fluticasone/umeclidin/vilanter (TRELEGY ELLIPTA INHL) Inhale into the lungs.    [DISCONTINUED] levocetirizine (XYZAL) 5 MG tablet Take 1 tablet (5 mg total) by mouth every evening.    [DISCONTINUED] methylPREDNISolone (MEDROL DOSEPACK) 4 mg tablet Take by mouth.     Family History       Problem Relation (Age of Onset)    Chronic back pain Brother    Deep vein thrombosis Sister    Heart attack Sister    Heart disease Mother    Kidney disease Mother    Osteoarthritis Sister    Parkinsonism Father    Prostate cancer Cousin    Pulmonary embolism Sister          Tobacco Use    Smoking status: Former    Smokeless tobacco: Never    Tobacco comments:     pt. smokes 10 cigars per day.   Substance and Sexual Activity    Alcohol use: Not Currently     Comment: Rarely    Drug use: Yes     Types: Marijuana     Comment: daily    Sexual activity: Yes     Partners: Female     Review of Systems  Objective:     Vital Signs (Most Recent):  Temp: 98 °F (36.7 °C) (07/27/23 1735)  Pulse: (!) 42 (07/27/23 1735)  Resp: 15 (07/27/23  1735)  BP: (!) 144/86 (07/27/23 1735)  SpO2: 95 % (07/27/23 1735) Vital Signs (24h Range):  Temp:  [97.8 °F (36.6 °C)-98.6 °F (37 °C)] 98 °F (36.7 °C)  Pulse:  [] 42  Resp:  [11-24] 15  SpO2:  [92 %-97 %] 95 %  BP: (118-151)/(69-91) 144/86     Weight: 99.2 kg (218 lb 9.6 oz)  Body mass index is 25.92 kg/m².     Physical Exam  Vitals and nursing note reviewed.   Eyes:      Comments: Left eye patch   Cardiovascular:      Rate and Rhythm: Normal rate. Rhythm irregular.      Pulses: Normal pulses.   Musculoskeletal:         General: No swelling. Normal range of motion.   Skin:     General: Skin is warm and dry.   Neurological:      Mental Status: He is alert and oriented to person, place, and time.   Psychiatric:         Mood and Affect: Mood normal.         Thought Content: Thought content normal.              Significant Labs: All pertinent labs within the past 24 hours have been reviewed.    Significant Imaging: I have reviewed all pertinent imaging results/findings within the past 24 hours.

## 2023-07-27 NOTE — PROGRESS NOTES
"Subjective:      Patient ID: Josiah Orosco Jr. is a 67 y.o. male.    Vitals:  height is 6' 5" (1.956 m) and weight is 96.6 kg (213 lb). His oral temperature is 98.6 °F (37 °C). His blood pressure is 118/78 and his pulse is 128 (abnormal). His respiration is 18 and oxygen saturation is 92% (abnormal).     Chief Complaint: Hypertension    67-year-old male with medical history as listed below presents to clinic for evaluation of elevated blood pressure and heart rate post cataract surgery today.  He does report palpitations, and shortness a breath on exertion.  He does report a history of AFib, however to his knowledge, he was "shocked" out of the rhythm.  He does report holding blood thinner for the last 4 days due to cataract surgery.  He denies chest pain.  His wife is present on exam.  He is awake and alert, answers questions appropriately, no acute distress noted on today's visit.    Past Medical History:  5/8/2023: A-fib  No date: Arthritis  No date: GERD (gastroesophageal reflux disease)  No date: History of HCV, s/p successful treatment w/ SVR12 5/2015      Comment:  Failed treatment (stage I/II) - followed by hepatology   No date: Joint pain  No date: Lung disease caused by breathing particles      Comment:  Widespread essentially symmetric interstitial lung                disease  No date: Obesity  No date: Polycythemia vera  No date: Prediabetes      Hypertension  This is a new problem. The current episode started today. The problem is unchanged. The problem is uncontrolled. Associated symptoms include palpitations and shortness of breath. Pertinent negatives include no chest pain. Past treatments include nothing. The current treatment provides no improvement.     Constitution: Negative for activity change, appetite change, chills, sweating, fatigue and fever.   Cardiovascular:  Positive for palpitations and sob on exertion. Negative for chest pain.   Respiratory:  Positive for shortness of breath. Negative " for cough.    Neurological:  Negative for dizziness.    Objective:     Physical Exam   Constitutional: He is oriented to person, place, and time. He appears well-developed.  Non-toxic appearance. He does not appear ill. No distress.   HENT:   Head: Normocephalic and atraumatic. Head is without abrasion, without contusion and without laceration.   Ears:   Right Ear: External ear normal.   Left Ear: External ear normal.   Nose: Nose normal.   Mouth/Throat: Oropharynx is clear and moist and mucous membranes are normal.   Eyes: EOM and lids are normal.   Neck: Trachea normal and phonation normal.   Cardiovascular: Normal heart sounds. An irregular rhythm present. Tachycardia present.   Pulmonary/Chest: Effort normal and breath sounds normal. No stridor. No respiratory distress.   Abdominal: Normal appearance.   Neurological: He is alert and oriented to person, place, and time.   Skin: Skin is warm, dry, intact, not diaphoretic and not pale. No abrasion, No burn and No ecchymosis   Psychiatric: His speech is normal and behavior is normal. Mood, judgment and thought content normal.   Nursing note and vitals reviewed.    EKG:  Atrial arrhythmia at a rate of 121 beats per minute.    Assessment:     1. Cardiac arrhythmia, unspecified cardiac arrhythmia type    2. Rapid heart rate    3. Abnormal EKG    4. SOB (shortness of breath) on exertion    5. Atrial fibrillation, unspecified type        Plan:       Cardiac arrhythmia, unspecified cardiac arrhythmia type    Rapid heart rate  -     IN OFFICE EKG 12-LEAD (to Muse)    Abnormal EKG  -     Refer to Emergency Dept.    SOB (shortness of breath) on exertion  -     Refer to Emergency Dept.    Atrial fibrillation, unspecified type      - given patient's history of AFib, current complaint of palpitations, shortness a breath on exertion, and off blood thinner for the past 4 days, recommend further evaluation in the emergency department.  EKG with atrial arrhythmia, change from most  recent in system.  Case discussed with supervising physician, who agrees with ER evaluation.  Offered EMS transport, however patient adamantly declined, wife present on visit, states will bring to [facility] emergency department.  Brief report given to charge nurse.  Patient discharged from urgent care setting in stable condition.    Patient Instructions   Please proceed to the emergency department for further evaluation.    - Follow up with your PCP or specialty clinic as directed in the next 1-2 weeks if not improved or as needed.  You can call [phone] to schedule an appointment with the appropriate provider.    - Go to the ER or seek medical attention immediately if you develop new or worsening symptoms.    - You must understand that you have received an Urgent Care treatment only and that you may be released before all of your medical problems are known or treated.   - You, the patient, will arrange for follow up care as instructed.   - If your condition worsens or fails to improve we recommend that you receive another evaluation at the ER immediately or contact your PCP to discuss your concerns or return here.

## 2023-07-27 NOTE — H&P
"[facility]  History & Physical    Patient Name: Josiah Orosco Jr.  MRN: [id number]  Patient Class: OP- Observation  Admission Date: 7/27/2023  Attending Physician: Alexi Dunn MD   Primary Care Provider: Elaine Landry MD         Patient information was obtained from patient, spouse/SO and ER records.     Subjective:     Principal Problem:Atrial flutter    Chief Complaint:   Chief Complaint   Patient presents with    Abnormal ECG     Pt had cataract surgery today and was told his BP and heart rate was high and need to go to the ER. Pt went to urgent care and was told to come to ED for further evaluation. Pt denies any CP or new SOB. Pt states "I have lung issues and I am always SOB"         HPI: Mr. Orosco is a 66 yo M who presents to the ED for evaluation for elevated heart rate. Patient had right cataract eye surgery today. He was unaware previously that his heart rate was elevated, he states he has "debris" in his lungs and is always short of breath. Denies any palpitations, otherwise doing well.   In ED, he was given diltiazem with better rate control, found to have atrial flutter. Cardiology consulted with plans for possible cardioversion tomorrow. Dr. Boothe discussed with Ophthalmology and ok to restart eliquis. Placed in observation.      Past Medical History:   Diagnosis Date    A-fib 5/8/2023    Arthritis     GERD (gastroesophageal reflux disease)     History of HCV, s/p successful treatment w/ SVR12 5/2015     Failed treatment (stage I/II) - followed by hepatology     Joint pain     Lung disease caused by breathing particles     Widespread essentially symmetric interstitial lung disease    Obesity     Polycythemia vera     Prediabetes        Past Surgical History:   Procedure Laterality Date    BUNIONECTOMY      bilateral    CARDIOVERSION N/A 05/08/2023    Procedure: Cardioversion;  Surgeon: David Cueva MD;  Location: [facility] CATH LAB;  Service: Cardiology; "  Laterality: N/A;    CATARACT EXTRACTION Left 07/27/2023    COLONOSCOPY N/A 12/09/2015    Procedure: COLONOSCOPY;  Surgeon: Conrad Iqbal MD;  Location: [facility] ENDO;  Service: Endoscopy;  Laterality: N/A;    Liver biopsy x2      rotator cuff Right     TRANSESOPHAGEAL ECHOCARDIOGRAM WITH POSSIBLE CARDIOVERSION (ELENA W/ POSS CARDIOVERSION) N/A 05/08/2023    Procedure: TRANSESOPHAGEAL ECHOCARDIOGRAM WITH POSSIBLE CARDIOVERSION (ELENA W/ POSS CARDIOVERSION);  Surgeon: David Cueva MD;  Location: [facility] CATH LAB;  Service: Cardiology;  Laterality: N/A;  12:30PM    RN PREOP 5/4/2023---EKG ON ARRIVAL    TRANSESOPHAGEAL ECHOCARDIOGRAPHY N/A 05/08/2023    Procedure: ECHOCARDIOGRAM, TRANSESOPHAGEAL;  Surgeon: David Cueva MD;  Location: [facility] CATH LAB;  Service: Cardiology;  Laterality: N/A;       Review of patient's allergies indicates:   Allergen Reactions    Ace inhibitors Other (See Comments)     cough       No current facility-administered medications on file prior to encounter.     Current Outpatient Medications on File Prior to Encounter   Medication Sig    allopurinoL (ZYLOPRIM) 100 MG tablet Take 2 tablets (200 mg total) by mouth once daily.    apixaban (ELIQUIS) 5 mg Tab Take 1 tablet (5 mg total) by mouth 2 (two) times daily.    fluticasone propionate (FLONASE) 50 mcg/actuation nasal spray SHAKE LIQUID AND USE 1 SPRAY(50 MCG) IN EACH NOSTRIL EVERY DAY    losartan (COZAAR) 50 MG tablet TK 1 T PO D    metoprolol succinate (TOPROL-XL) 50 MG 24 hr tablet Take 1.5 tablets (75 mg total) by mouth once daily.    multivitamin capsule Take 1 capsule by mouth once daily.    naproxen (NAPROSYN) 500 MG tablet Take 500 mg by mouth every 12 (twelve) hours as needed.    ofloxacin (OCUFLOX) 0.3 % ophthalmic solution Place 1 drop into both eyes 4 (four) times daily.    prednisoLONE acetate (PRED FORTE) 1 % DrpS Place 1 drop into both eyes 4 (four) times daily.    TRELEGY ELLIPTA 100-62.5-25 mcg DsDv Inhale 1 puff  into the lungs Daily.    [DISCONTINUED] aspirin (ECOTRIN) 81 MG EC tablet Take 81 mg by mouth once daily.    [DISCONTINUED] azithromycin (ZITHROMAX) 500 MG tablet Take 500 mg by mouth.    [DISCONTINUED] benzonatate (TESSALON) 200 MG capsule Take 200 mg by mouth 3 (three) times daily.    [DISCONTINUED] fluticasone/umeclidin/vilanter (TRELEGY ELLIPTA INHL) Inhale into the lungs.    [DISCONTINUED] levocetirizine (XYZAL) 5 MG tablet Take 1 tablet (5 mg total) by mouth every evening.    [DISCONTINUED] methylPREDNISolone (MEDROL DOSEPACK) 4 mg tablet Take by mouth.     Family History       Problem Relation (Age of Onset)    Chronic back pain Brother    Deep vein thrombosis Sister    Heart attack Sister    Heart disease Mother    Kidney disease Mother    Osteoarthritis Sister    Parkinsonism Father    Prostate cancer Cousin    Pulmonary embolism Sister          Tobacco Use    Smoking status: Former    Smokeless tobacco: Never    Tobacco comments:     pt. smokes 10 cigars per day.   Substance and Sexual Activity    Alcohol use: Not Currently     Comment: Rarely    Drug use: Yes     Types: Marijuana     Comment: daily    Sexual activity: Yes     Partners: Female     Review of Systems  Objective:     Vital Signs (Most Recent):  Temp: 98 °F (36.7 °C) (07/27/23 1735)  Pulse: (!) 42 (07/27/23 1735)  Resp: 15 (07/27/23 1735)  BP: (!) 144/86 (07/27/23 1735)  SpO2: 95 % (07/27/23 1735) Vital Signs (24h Range):  Temp:  [97.8 °F (36.6 °C)-98.6 °F (37 °C)] 98 °F (36.7 °C)  Pulse:  [] 42  Resp:  [11-24] 15  SpO2:  [92 %-97 %] 95 %  BP: (118-151)/(69-91) 144/86     Weight: 99.2 kg (218 lb 9.6 oz)  Body mass index is 25.92 kg/m².     Physical Exam  Vitals and nursing note reviewed.   Eyes:      Comments: Left eye patch   Cardiovascular:      Rate and Rhythm: Normal rate. Rhythm irregular.      Pulses: Normal pulses.   Musculoskeletal:         General: No swelling. Normal range of motion.   Skin:     General: Skin is  warm and dry.   Neurological:      Mental Status: He is alert and oriented to person, place, and time.   Psychiatric:         Mood and Affect: Mood normal.         Thought Content: Thought content normal.              Significant Labs: All pertinent labs within the past 24 hours have been reviewed.    Significant Imaging: I have reviewed all pertinent imaging results/findings within the past 24 hours.    Assessment/Plan:     * Atrial flutter  Patient with Long standing persistent (>12 months) atrial fibrillation which is uncontrolled currently with Beta Blocker. Patient is currently in atrial fibrillation.[id number] Score: 1. Anticoagulation indicated. Anticoagulation done with eliquis.    - monitor overnight, if remains in atrial flutter likely CV with Cardiology tomorrow  - NPO at midnight      Cataract  S/p left cataract surgery  - resume eye drops      A-fib        VTE Risk Mitigation (From admission, onward)         Ordered     apixaban tablet 5 mg  2 times daily         07/27/23 1643     IP VTE LOW RISK PATIENT  Once         07/27/23 1643     Place sequential compression device  Until discontinued         07/27/23 1643                   On 07/27/2023, patient should be placed in hospital observation services under my care.        Alexi Dunn MD  Department of Hospital Medicine  [facility]

## 2023-07-27 NOTE — ED TRIAGE NOTES
Pt sent to ED due to abnormal EKG and tachycardia after left eye cataract surgery this morning. Pt denies any s/s at this time

## 2023-07-27 NOTE — PHARMACY MED REC
"Admission Medication History     The home medication history was taken by Dafne Michel.    You may go to "Admission" then "Reconcile Home Medications" tabs to review and/or act upon these items.     The home medication list has been updated by the Pharmacy department.   Please read ALL comments highlighted in yellow.   Please address this information as you see fit.    Feel free to contact us if you have any questions or require assistance.      Medications listed below were obtained from: Patient/family and Analytic software- [employer]  (Not in a hospital admission)          Dafne Michel  [phone]                 .        "

## 2023-07-27 NOTE — ASSESSMENT & PLAN NOTE
Patient with Long standing persistent (>12 months) atrial fibrillation which is uncontrolled currently with Beta Blocker. Patient is currently in atrial fibrillation.[id number] Score: 1. Anticoagulation indicated. Anticoagulation done with eliquis.    - monitor overnight, if remains in atrial flutter likely CV with Cardiology tomorrow  - NPO at midnight

## 2023-07-27 NOTE — ED PROVIDER NOTES
"Encounter Date: 7/27/2023       History     Chief Complaint   Patient presents with    Abnormal ECG     Pt had cataract surgery today and was told his BP and heart rate was high and need to go to the ER. Pt went to urgent care and was told to come to ED for further evaluation. Pt denies any CP or new SOB. Pt states "I have lung issues and I am always SOB"      HPI    67-year-old male with past medical history of AFib, arthritis, GERD, obesity, polycythemia vera presents with tachycardia and hypertension from cataract surgery center.  Reports having cataract surgery earlier today.  Has been known to be in AFib as well as atrial flutter and was on Eliquis that he was advised to stop 3 days prior to his cataract surgery.  He reports being shocked previously by Dr. Cueva, reports that he is not needed any further intervention.  He reports that if this happened again with discussed other options for treatment.  Reports stating that he always has some shortness of breath as he has lung issues.  He reports no fatigue, dizziness, chest pain, palpitations, or any complaints.  Reports taking all of his other medications.    Review of patient's allergies indicates:   Allergen Reactions    Ace inhibitors Other (See Comments)     cough     Past Medical History:   Diagnosis Date    A-fib 5/8/2023    Arthritis     GERD (gastroesophageal reflux disease)     History of HCV, s/p successful treatment w/ SVR12 5/2015     Failed treatment (stage I/II) - followed by hepatology     Joint pain     Lung disease caused by breathing particles     Widespread essentially symmetric interstitial lung disease    Obesity     Polycythemia vera     Prediabetes      Past Surgical History:   Procedure Laterality Date    BUNIONECTOMY      bilateral    CARDIOVERSION N/A 05/08/2023    Procedure: Cardioversion;  Surgeon: David Cueva MD;  Location: [facility] CATH LAB;  Service: Cardiology;  Laterality: N/A;    CATARACT EXTRACTION Left 07/27/2023    " COLONOSCOPY N/A 12/09/2015    Procedure: COLONOSCOPY;  Surgeon: Conrad Iqbal MD;  Location: [facility] ENDO;  Service: Endoscopy;  Laterality: N/A;    Liver biopsy x2      rotator cuff Right     TRANSESOPHAGEAL ECHOCARDIOGRAM WITH POSSIBLE CARDIOVERSION (ELENA W/ POSS CARDIOVERSION) N/A 05/08/2023    Procedure: TRANSESOPHAGEAL ECHOCARDIOGRAM WITH POSSIBLE CARDIOVERSION (ELENA W/ POSS CARDIOVERSION);  Surgeon: David Cueva MD;  Location: [facility] CATH LAB;  Service: Cardiology;  Laterality: N/A;  12:30PM    RN PREOP 5/4/2023---EKG ON ARRIVAL    TRANSESOPHAGEAL ECHOCARDIOGRAPHY N/A 05/08/2023    Procedure: ECHOCARDIOGRAM, TRANSESOPHAGEAL;  Surgeon: David Cueva MD;  Location: [facility] CATH LAB;  Service: Cardiology;  Laterality: N/A;     Family History   Problem Relation Age of Onset    Heart disease Mother     Kidney disease Mother     Parkinsonism Father     Prostate cancer Cousin         maternal side    Heart attack Sister         CABG    Deep vein thrombosis Sister     Pulmonary embolism Sister     Osteoarthritis Sister         s/p knee replacement    Chronic back pain Brother     Liver disease Neg Hx     Diabetes Neg Hx     Melanoma Neg Hx      Social History     Tobacco Use    Smoking status: Former     Current packs/day: 0.00    Smokeless tobacco: Never    Tobacco comments:     pt. smokes 10 cigars per day.   Substance Use Topics    Alcohol use: Not Currently     Comment: Rarely    Drug use: Yes     Types: Marijuana     Comment: daily     Review of Systems   Constitutional: Negative.    HENT: Negative.     Eyes: Negative.    Respiratory:  Positive for shortness of breath.    Cardiovascular: Negative.    Gastrointestinal: Negative.    Genitourinary: Negative.    Musculoskeletal: Negative.    Skin: Negative.    Neurological: Negative.        Physical Exam     Initial Vitals [07/27/23 1422]   BP Pulse Resp Temp SpO2   (!) 140/83 (!) 129 18 97.8 °F (36.6 °C) 95 %      MAP       --         Physical Exam    Nursing note and  vitals reviewed.  Constitutional: He appears well-developed and well-nourished. He is not diaphoretic. No distress.   HENT:   Head: Normocephalic and atraumatic.   Nose: Nose normal.   Mouth/Throat: No oropharyngeal exudate.   Eyes: EOM are normal. Pupils are equal, round, and reactive to light.   Pupils dilated bilaterally, minimally reactive to light.   Neck: Neck supple. No tracheal deviation present. No JVD present.   Normal range of motion.  Cardiovascular:  Intact distal pulses.           Irregularly irregular rate and rhythm   Pulmonary/Chest: Breath sounds normal. No respiratory distress. He has no wheezes. He has no rhonchi. He has no rales.   Abdominal: Abdomen is soft. Bowel sounds are normal. He exhibits no distension. There is no abdominal tenderness. There is no rebound and no guarding.   Musculoskeletal:         General: No tenderness or edema. Normal range of motion.      Cervical back: Normal range of motion and neck supple.     Neurological: He is alert and oriented to person, place, and time. He has normal strength.   Skin: Skin is warm and dry. Capillary refill takes less than 2 seconds. No rash noted. No erythema.         ED Course   Procedures  Labs Reviewed   CBC W/ AUTO DIFFERENTIAL - Abnormal; Notable for the following components:       Result Value    RBC 7.08 (*)     MCV 69 (*)     MCH 20.3 (*)     MCHC 29.6 (*)     RDW 21.1 (*)     All other components within normal limits   COMPREHENSIVE METABOLIC PANEL - Abnormal; Notable for the following components:    Glucose 129 (*)     Albumin 3.1 (*)     Anion Gap 6 (*)     All other components within normal limits   B-TYPE NATRIURETIC PEPTIDE - Abnormal; Notable for the following components:     (*)     All other components within normal limits   TROPONIN I   TSH   PROTIME-INR        ECG Results              EKG 12-lead (Final result)  Result time 07/29/23 22:32:07      Final result by Interface, Lab In [facility] (07/29/23 22:32:07)                    Narrative:    Test Reason : I48.91,    Vent. Rate : 108 BPM     Atrial Rate : 120 BPM     P-R Int : 160 ms          QRS Dur : 142 ms      QT Int : 308 ms       P-R-T Axes : 000 -71 -17 degrees     QTc Int : 412 ms    Atrial fibrillation with rapid ventricular response  Right bundle branch block  Left anterior fascicular block   Bifascicular block   Minimal voltage criteria for LVH, may be normal variant ( R in aVL )  Possible Lateral infarct ,age undetermined  Abnormal ECG  When compared with ECG of 27-JUL-2023 15:04,  Significant changes have occurred  Confirmed by David Cueva MD ([id number]) on 7/29/2023 10:31:56 PM    Referred By: [email or web address]   SELF           Confirmed By:David Cueva MD                                     EKG 12-lead (Final result)  Result time 07/29/23 22:28:44      Final result by Interface, Lab In [facility] (07/29/23 22:28:44)                   Narrative:    Test Reason : R06.02,    Vent. Rate : 129 BPM     Atrial Rate : 129 BPM     P-R Int : 150 ms          QRS Dur : 156 ms      QT Int : 306 ms       P-R-T Axes : 240 -79 069 degrees     QTc Int : 448 ms    Atrial fibrillation with rapid ventricular response  Right bundle branch block  Left anterior fascicular block   Bifascicular block   Minimal voltage criteria for LVH, may be normal variant ( R in aVL )  Abnormal ECG  When compared with ECG of 27-JUL-2023 13:39,  Significant changes have occurred  Confirmed by David Cueva MD ([id number]) on 7/29/2023 10:28:37 PM    Referred By: [email or web address]   SELF           Confirmed By:David Cueva MD                                  Imaging Results              X-Ray Chest AP Portable (Final result)  Result time 07/27/23 16:09:04      Final result by Yousif Morrison MD (07/27/23 16:09:04)                   Impression:      Diffuse interstitial lung disease, similar to prior exam      Electronically signed by: Yousif Morrison MD  Date:    07/27/2023  Time:    16:09               Narrative:    EXAMINATION:  XR  CHEST AP PORTABLE    CLINICAL HISTORY:  SOB;    TECHNIQUE:  Single frontal view of the chest was performed.    COMPARISON:  02/01/2023    FINDINGS:  Cardiac silhouette is at the upper limits of normal size.  Atherosclerotic calcification in the wall of the aortic arch.  Pulmonary vascularity appears stable.  Lungs are satisfactorily expanded.  Diffuse interstitial lung disease appears similar to prior exam.  Small patchy opacities are again seen at the right upper lobe.  No new areas of acute consolidation or pleural effusion.  No pneumothorax.  Skeletal structures appear intact.                                       Medications   diltiaZEM injection 20 mg (20 mg Intravenous Given 7/27/23 1502)   metoprolol tartrate (LOPRESSOR) tablet 25 mg (25 mg Oral Given 7/27/23 2109)   diltiaZEM injection 15 mg (15 mg Intravenous Given 7/28/23 0039)   diltiaZEM injection 10 mg (10 mg Intravenous Given 7/28/23 2337)   diltiaZEM injection 10 mg (10 mg Intravenous Given 7/29/23 0352)   ondansetron 4 mg/5 mL solution 4 mg (4 mg Oral Given 7/29/23 1205)   metoprolol succinate (TOPROL-XL) 24 hr tablet 25 mg (25 mg Oral Given 7/29/23 1439)                      MDM:    67-year-old male with past medical history of AFib, arthritis, GERD, obesity, polycythemia vera presents with tachycardia and hypertension from cataract surgery center.  Physical exam as noted above.  ED workup notable for CBC/CMP within normal limits, troponin 0.010, BNP 9 5 4, TSH within normal limits, INR 1.2, chest x-ray unremarkable, EKG shows what appears to be atrial flutter with right bundle-branch block, left anterior fascicular block, rate of 129 beats per minute, different when compared to prior, no STEMI.  Patient presentation consistent with atrial flutter with RVR.  Given push of diltiazem with significant improvement in rate control, discussed with Cardiology recommends observation with anticoagulation for cardioversion tomorrow. Discussed diagnosis and  further treatment with patient and family at bedside. All questions answered, patient transferred to floor improved and stable.  Cleared by Ophthalmology for resumption of anticoagulation.              Clinical Impression:   Final diagnoses:  [R06.02] SOB (shortness of breath)  [I48.91] A-fib  [I48.92] Atrial flutter with rapid ventricular response        ED Disposition Condition    Admit                 Carroll Boothe MD  07/30/23 1115

## 2023-07-27 NOTE — HPI
"Mr. Orosco is a 68 yo M who presents to the ED for evaluation for elevated heart rate. Patient had right cataract eye surgery today. He was unaware previously that his heart rate was elevated, he states he has "debris" in his lungs and is always short of breath. Denies any palpitations, otherwise doing well.   In ED, he was given diltiazem with better rate control, found to have atrial flutter. Cardiology consulted with plans for possible cardioversion tomorrow. Dr. Boothe discussed with Ophthalmology and ok to restart eliquis. Placed in observation.  "

## 2023-07-28 ENCOUNTER — ANESTHESIA EVENT (OUTPATIENT)
Dept: CARDIOLOGY | Facility: HOSPITAL | Age: 68
End: 2023-07-28
Payer: MEDICARE

## 2023-07-28 ENCOUNTER — ANESTHESIA (OUTPATIENT)
Dept: CARDIOLOGY | Facility: HOSPITAL | Age: 68
End: 2023-07-28
Payer: MEDICARE

## 2023-07-28 LAB
AORTIC ROOT ANNULUS: 3.32 CM
AORTIC VALVE CUSP SEPERATION: 1.9 CM
ASCENDING AORTA: 3.34 CM
AV INDEX (PROSTH): 0.63
AV MEAN GRADIENT: 2 MMHG
AV PEAK GRADIENT: 4 MMHG
AV VALVE AREA: 2.29 CM2
AV VELOCITY RATIO: 0.64
BSA FOR ECHO PROCEDURE: 2.31 M2
CV ECHO LV RWT: 0.58 CM
DOP CALC AO PEAK VEL: 1.03 M/S
DOP CALC AO VTI: 15.8 CM
DOP CALC LVOT AREA: 3.7 CM2
DOP CALC LVOT DIAMETER: 2.16 CM
DOP CALC LVOT PEAK VEL: 0.66 M/S
DOP CALC LVOT STROKE VOLUME: 36.26 CM3
DOP CALCLVOT PEAK VEL VTI: 9.9 CM
E/E' RATIO: 14 M/S
ECHO LV POSTERIOR WALL: 1.24 CM (ref 0.6–1.1)
EJECTION FRACTION: 50 %
FRACTIONAL SHORTENING: 36 % (ref 28–44)
INTERVENTRICULAR SEPTUM: 1.21 CM (ref 0.6–1.1)
IVC DIAMETER: 2.17 CM
LA MAJOR: 6.38 CM
LA MINOR: 5.37 CM
LA WIDTH: 5.4 CM
LEFT ATRIUM SIZE: 4.4 CM
LEFT ATRIUM VOLUME INDEX: 50.8 ML/M2
LEFT ATRIUM VOLUME: 117.77 CM3
LEFT INTERNAL DIMENSION IN SYSTOLE: 2.71 CM (ref 2.1–4)
LEFT VENTRICLE DIASTOLIC VOLUME INDEX: 35.08 ML/M2
LEFT VENTRICLE DIASTOLIC VOLUME: 81.39 ML
LEFT VENTRICLE MASS INDEX: 81 G/M2
LEFT VENTRICLE SYSTOLIC VOLUME INDEX: 11.8 ML/M2
LEFT VENTRICLE SYSTOLIC VOLUME: 27.37 ML
LEFT VENTRICULAR INTERNAL DIMENSION IN DIASTOLE: 4.26 CM (ref 3.5–6)
LEFT VENTRICULAR MASS: 187.64 G
LV LATERAL E/E' RATIO: 14 M/S
LV SEPTAL E/E' RATIO: 14 M/S
LVOT MG: 0.89 MMHG
LVOT MV: 0.43 CM/S
MV PEAK E VEL: 0.84 M/S
OHS LV EJECTION FRACTION SIMPSONS BIPLANE MOD: 3 %
PISA TR MAX VEL: 3.42 M/S
POCT GLUCOSE: 114 MG/DL (ref 70–110)
POCT GLUCOSE: 137 MG/DL (ref 70–110)
RA MAJOR: 5.74 CM
RA PRESSURE: 15 MMHG
RA WIDTH: 6.4 CM
RIGHT VENTRICULAR END-DIASTOLIC DIMENSION: 5.3 CM
RV TISSUE DOPPLER FREE WALL SYSTOLIC VELOCITY 1 (APICAL 4 CHAMBER VIEW): 14.07 CM/S
SINUS: 3.36 CM
STJ: 2.74 CM
TDI LATERAL: 0.06 M/S
TDI SEPTAL: 0.06 M/S
TDI: 0.06 M/S
TR MAX PG: 47 MMHG
TRICUSPID ANNULAR PLANE SYSTOLIC EXCURSION: 1.34 CM
TV REST PULMONARY ARTERY PRESSURE: 62 MMHG

## 2023-07-28 PROCEDURE — 94761 N-INVAS EAR/PLS OXIMETRY MLT: CPT

## 2023-07-28 PROCEDURE — G0378 HOSPITAL OBSERVATION PER HR: HCPCS

## 2023-07-28 PROCEDURE — 96376 TX/PRO/DX INJ SAME DRUG ADON: CPT

## 2023-07-28 PROCEDURE — 25000242 PHARM REV CODE 250 ALT 637 W/ HCPCS: Performed by: STUDENT IN AN ORGANIZED HEALTH CARE EDUCATION/TRAINING PROGRAM

## 2023-07-28 PROCEDURE — 25000003 PHARM REV CODE 250: Performed by: INTERNAL MEDICINE

## 2023-07-28 PROCEDURE — 99223 1ST HOSP IP/OBS HIGH 75: CPT | Mod: 25,,, | Performed by: INTERNAL MEDICINE

## 2023-07-28 PROCEDURE — 25000003 PHARM REV CODE 250: Performed by: STUDENT IN AN ORGANIZED HEALTH CARE EDUCATION/TRAINING PROGRAM

## 2023-07-28 PROCEDURE — 99223 PR INITIAL HOSPITAL CARE,LEVL III: ICD-10-PCS | Mod: 25,,, | Performed by: INTERNAL MEDICINE

## 2023-07-28 PROCEDURE — 94640 AIRWAY INHALATION TREATMENT: CPT | Mod: XB

## 2023-07-28 PROCEDURE — 96361 HYDRATE IV INFUSION ADD-ON: CPT

## 2023-07-28 RX ORDER — DILTIAZEM HYDROCHLORIDE 5 MG/ML
10 INJECTION INTRAVENOUS ONCE
Status: COMPLETED | OUTPATIENT
Start: 2023-07-28 | End: 2023-07-28

## 2023-07-28 RX ORDER — ACETAMINOPHEN 325 MG/1
650 TABLET ORAL EVERY 4 HOURS PRN
Status: DISCONTINUED | OUTPATIENT
Start: 2023-07-28 | End: 2023-07-29 | Stop reason: HOSPADM

## 2023-07-28 RX ORDER — SODIUM CHLORIDE 9 MG/ML
INJECTION, SOLUTION INTRAVENOUS CONTINUOUS
Status: DISCONTINUED | OUTPATIENT
Start: 2023-07-28 | End: 2023-07-28

## 2023-07-28 RX ORDER — ONDANSETRON 8 MG/1
8 TABLET, ORALLY DISINTEGRATING ORAL EVERY 8 HOURS PRN
Status: DISCONTINUED | OUTPATIENT
Start: 2023-07-28 | End: 2023-07-29 | Stop reason: HOSPADM

## 2023-07-28 RX ORDER — PREDNISOLONE ACETATE 10 MG/ML
1 SUSPENSION/ DROPS OPHTHALMIC 4 TIMES DAILY
Status: DISCONTINUED | OUTPATIENT
Start: 2023-07-28 | End: 2023-07-29 | Stop reason: HOSPADM

## 2023-07-28 RX ORDER — MORPHINE SULFATE 4 MG/ML
3 INJECTION, SOLUTION INTRAMUSCULAR; INTRAVENOUS
Status: DISCONTINUED | OUTPATIENT
Start: 2023-07-28 | End: 2023-07-29 | Stop reason: HOSPADM

## 2023-07-28 RX ORDER — DILTIAZEM HYDROCHLORIDE 5 MG/ML
0.15 INJECTION INTRAVENOUS ONCE
Status: COMPLETED | OUTPATIENT
Start: 2023-07-28 | End: 2023-07-28

## 2023-07-28 RX ORDER — DILTIAZEM HYDROCHLORIDE 5 MG/ML
0.2 INJECTION INTRAVENOUS ONCE
Status: DISCONTINUED | OUTPATIENT
Start: 2023-07-28 | End: 2023-07-28

## 2023-07-28 RX ORDER — OXYCODONE HYDROCHLORIDE 5 MG/1
5 TABLET ORAL EVERY 4 HOURS PRN
Status: DISCONTINUED | OUTPATIENT
Start: 2023-07-28 | End: 2023-07-29 | Stop reason: HOSPADM

## 2023-07-28 RX ORDER — OFLOXACIN 3 MG/ML
1 SOLUTION/ DROPS OPHTHALMIC 4 TIMES DAILY
Status: DISCONTINUED | OUTPATIENT
Start: 2023-07-28 | End: 2023-07-29 | Stop reason: HOSPADM

## 2023-07-28 RX ADMIN — PREDNISOLONE ACETATE 1 DROP: 10 SUSPENSION/ DROPS OPHTHALMIC at 08:07

## 2023-07-28 RX ADMIN — DILTIAZEM HYDROCHLORIDE 10 MG: 5 INJECTION, SOLUTION INTRAVENOUS at 11:07

## 2023-07-28 RX ADMIN — OFLOXACIN 1 DROP: 3 SOLUTION/ DROPS OPHTHALMIC at 04:07

## 2023-07-28 RX ADMIN — ARFORMOTEROL TARTRATE 15 MCG: 15 SOLUTION RESPIRATORY (INHALATION) at 08:07

## 2023-07-28 RX ADMIN — METOROPROLOL TARTRATE 5 MG: 5 INJECTION, SOLUTION INTRAVENOUS at 08:07

## 2023-07-28 RX ADMIN — OFLOXACIN 1 DROP: 3 SOLUTION/ DROPS OPHTHALMIC at 08:07

## 2023-07-28 RX ADMIN — PREDNISOLONE ACETATE 1 DROP: 10 SUSPENSION/ DROPS OPHTHALMIC at 04:07

## 2023-07-28 RX ADMIN — DILTIAZEM HYDROCHLORIDE 15 MG: 5 INJECTION INTRAVENOUS at 12:07

## 2023-07-28 RX ADMIN — APIXABAN 5 MG: 5 TABLET, FILM COATED ORAL at 08:07

## 2023-07-28 RX ADMIN — ARFORMOTEROL TARTRATE 15 MCG: 15 SOLUTION RESPIRATORY (INHALATION) at 09:07

## 2023-07-28 RX ADMIN — METOPROLOL SUCCINATE 75 MG: 50 TABLET, EXTENDED RELEASE ORAL at 08:07

## 2023-07-28 RX ADMIN — IPRATROPIUM BROMIDE 0.5 MG: 0.5 SOLUTION RESPIRATORY (INHALATION) at 08:07

## 2023-07-28 RX ADMIN — SODIUM CHLORIDE: 9 INJECTION, SOLUTION INTRAVENOUS at 12:07

## 2023-07-28 RX ADMIN — BUDESONIDE 0.5 MG: 0.5 INHALANT RESPIRATORY (INHALATION) at 08:07

## 2023-07-28 RX ADMIN — METOROPROLOL TARTRATE 5 MG: 5 INJECTION, SOLUTION INTRAVENOUS at 03:07

## 2023-07-28 RX ADMIN — ALLOPURINOL 200 MG: 100 TABLET ORAL at 08:07

## 2023-07-28 RX ADMIN — OFLOXACIN 1 DROP: 3 SOLUTION/ DROPS OPHTHALMIC at 12:07

## 2023-07-28 RX ADMIN — PREDNISOLONE ACETATE 1 DROP: 10 SUSPENSION/ DROPS OPHTHALMIC at 12:07

## 2023-07-28 RX ADMIN — BUDESONIDE 0.5 MG: 0.5 INHALANT RESPIRATORY (INHALATION) at 09:07

## 2023-07-28 NOTE — NURSING
[facility], [facility]  Nurses Note -- 4 Eyes      7/28/2023       Skin assessed on: Q Shift      [x] No Pressure Injuries Present    []Prevention Measures Documented    [] Yes LDA  for Pressure Injury Previously documented     [] Yes New Pressure Injury Discovered   [] LDA for New Pressure Injury Added      Attending RN:  Edward Villanueva RN     Second RN:  lacresha lpn

## 2023-07-28 NOTE — NURSING
[facility] [place] [place]  Rapid Response Nurse Intervention/ Task    Date of Visit: 07/28/2023  Time of Visit: 0030       INTERVENTIONS/ TASK Completed:     This RN was called by primary ISAC Ayala. Primary RN had given PRN IV metoprolol, pt with continued HR in 120s. MD Montaño was notified and 15 mg IV cardizem push was ordered. Pt lying in bed, aroused to voice and then alert and oriented. NAD. Pt denied CP or SOB. Pre vitals checked. Pt on cardiac monitor. Primary RN Lucy alerted monitor tech about to push cardizem. 15 mg cardizem given by this RN. HR improved to 60s-70s, aflutter on cardiac monitor. BP checked immediately after admin and again 15 min after admin. See flowsheet. Pt with no complaints at this time. Informed patient to use call light for any needs or concerns. Pt expressed understanding. Care ongoing.

## 2023-07-28 NOTE — PLAN OF CARE
Problem: Fall Injury Risk  Goal: Absence of Fall and Fall-Related Injury  Intervention: Identify and Manage Contributors  Flowsheets (Taken 7/28/2023 0416)  Self-Care Promotion: BADL personal objects within reach  Medication Review/Management:   medications reviewed   high-risk medications identified  Intervention: Promote Injury-Free Environment  Flowsheets (Taken 7/28/2023 0416)  Safety Promotion/Fall Prevention:   assistive device/personal item within reach   bed alarm set   Fall Risk reviewed with patient/family   Fall Risk signage in place   lighting adjusted   medications reviewed   nonskid shoes/socks when out of bed   side rails raised x 2

## 2023-07-28 NOTE — NURSING
hi so pt is A fib -125 sustaining, Cards has MD informed via secure chat    0017  after Metoprolol 5mg, MD informed  MD orderd Cardiezem IV x1

## 2023-07-28 NOTE — ANESTHESIA PREPROCEDURE EVALUATION
07/28/2023  Josiah Orosco Jr. is a 67 y.o., male.  Pre-operative evaluation for Procedure(s) (LRB):  Transesophageal echo (ELENA) intra-procedure log documentation (N/A)  ECHOCARDIOGRAM, TRANSESOPHAGEAL (N/A)    NPO >8  METS >4    Vitals:    07/28/23 0624 07/28/23 0713 07/28/23 0801 07/28/23 1114   BP:  (!) 147/96  (!) 143/98   BP Location:  Right arm  Right arm   Patient Position:  Lying  Lying   Pulse: 110 (!) 121 (!) 111 (!) 120   Resp:  18 16 18   Temp:  36.9 °C (98.4 °F)  36.6 °C (97.9 °F)   TempSrc:  Oral  Oral   SpO2:  96% 96% (!) 94%   Weight:       Height:             Patient Active Problem List   Diagnosis    History of HCV, s/p successful treatment w/ SVR12 5/2015    Lung disease caused by breathing particles    Overweight (BMI 25.0-29.9)    GERD (gastroesophageal reflux disease)    Polycythemia    Former smoker    Hyperuricemia    ILD (interstitial lung disease)    Prediabetes    Arthritis    Allergic cough    HTN, goal below 140/90    Pulmonary interstitial fibrosis    Pulmonary hypertension    Marijuana user    Thoracic aorta atherosclerosis    A-fib    Cataract    Atrial flutter       Review of patient's allergies indicates:   Allergen Reactions    Ace inhibitors Other (See Comments)     cough       No current facility-administered medications on file prior to encounter.     Current Outpatient Medications on File Prior to Encounter   Medication Sig Dispense Refill    allopurinoL (ZYLOPRIM) 100 MG tablet Take 2 tablets (200 mg total) by mouth once daily. 180 tablet 3    apixaban (ELIQUIS) 5 mg Tab Take 1 tablet (5 mg total) by mouth 2 (two) times daily. 180 tablet 3    fluticasone propionate (FLONASE) 50 mcg/actuation nasal spray SHAKE LIQUID AND USE 1 SPRAY(50 MCG) IN EACH NOSTRIL EVERY DAY 16 g 0    losartan (COZAAR) 50 MG tablet TK 1 T PO D 90 tablet 2    metoprolol  succinate (TOPROL-XL) 50 MG 24 hr tablet Take 1.5 tablets (75 mg total) by mouth once daily. 90 tablet 3    multivitamin capsule Take 1 capsule by mouth once daily.      naproxen (NAPROSYN) 500 MG tablet Take 500 mg by mouth every 12 (twelve) hours as needed.      ofloxacin (OCUFLOX) 0.3 % ophthalmic solution Place 1 drop into both eyes 4 (four) times daily.      prednisoLONE acetate (PRED FORTE) 1 % DrpS Place 1 drop into both eyes 4 (four) times daily.      TRELEGY ELLIPTA 100-62.5-25 mcg DsDv Inhale 1 puff into the lungs Daily.         Past Surgical History:   Procedure Laterality Date    BUNIONECTOMY      bilateral    CARDIOVERSION N/A 05/08/2023    Procedure: Cardioversion;  Surgeon: David Cueva MD;  Location: [facility] CATH LAB;  Service: Cardiology;  Laterality: N/A;    CATARACT EXTRACTION Left 07/27/2023    COLONOSCOPY N/A 12/09/2015    Procedure: COLONOSCOPY;  Surgeon: Conrad Iqbal MD;  Location: [facility] ENDO;  Service: Endoscopy;  Laterality: N/A;    Liver biopsy x2      rotator cuff Right     TRANSESOPHAGEAL ECHOCARDIOGRAM WITH POSSIBLE CARDIOVERSION (ELENA W/ POSS CARDIOVERSION) N/A 05/08/2023    Procedure: TRANSESOPHAGEAL ECHOCARDIOGRAM WITH POSSIBLE CARDIOVERSION (ELENA W/ POSS CARDIOVERSION);  Surgeon: David Cueva MD;  Location: [facility] CATH LAB;  Service: Cardiology;  Laterality: N/A;  12:30PM    RN PREOP 5/4/2023---EKG ON ARRIVAL    TRANSESOPHAGEAL ECHOCARDIOGRAPHY N/A 05/08/2023    Procedure: ECHOCARDIOGRAM, TRANSESOPHAGEAL;  Surgeon: David Cueva MD;  Location: [facility] CATH LAB;  Service: Cardiology;  Laterality: N/A;       Social History     Socioeconomic History    Marital status:     Number of children: 0    Highest education level: Some college, no degree   Occupational History    Occupation: Self-employed     Employer: [employer]   Tobacco Use    Smoking status: Former    Smokeless tobacco: Never    Tobacco comments:     pt. smokes 10 cigars per day.   Substance and  Sexual Activity    Alcohol use: Not Currently     Comment: Rarely    Drug use: Yes     Types: Marijuana     Comment: daily    Sexual activity: Yes     Partners: Female         CBC:   Recent Labs     23  1435   WBC 8.02   RBC 7.08*   HGB 14.4   HCT 48.7      MCV 69*   MCH 20.3*   MCHC 29.6*       CMP:   Recent Labs     23  1435      K 4.3      CO2 25   BUN 11   CREATININE 0.9   *   CALCIUM 9.2   ALBUMIN 3.1*   PROT 8.2   ALKPHOS 97   ALT 32   AST 27   BILITOT 0.7       INR  Recent Labs     23  1435   INR 1.2           Diagnostic Studies:      EKD Echo:  Results for orders placed or performed during the hospital encounter of 08/31/15   2D Echo w/ Color Flow Doppler   Result Value Ref Range    EF + QEF 55 55 - 65    Mitral Valve Regurgitation TRIVIAL     Diastolic Dysfunction No     Est. PA Systolic Pressure 40.45 (A)     Tricuspid Valve Regurgitation MILD TO MODERATE          Pre-op Assessment    I have reviewed the Patient Summary Reports.     I have reviewed the Nursing Notes. I have reviewed the NPO Status.   I have reviewed the Medications.     Review of Systems  Anesthesia Hx:  No problems with previous Anesthesia  History of prior surgery of interest to airway management or planning: Denies Family Hx of Anesthesia complications.   Denies Personal Hx of Anesthesia complications.   Social:  Former Smoker    Cardiovascular:   Exercise tolerance: good Hypertension Dysrhythmias atrial fibrillation eliquis   Pulmonary:   Pulmonary Hypertension   Hepatic/GI:   GERD Liver Disease, Hepatitis, C    Neurological:  Neurology Normal    Endocrine:  Endocrine Normal                                                                                                                   2023  Josiah Orosco Jr. is a 67 y.o., male.  To undergo Procedure(s) (LRB):  TRANSESOPHAGEAL ECHOCARDIOGRAM WITH POSSIBLE CARDIOVERSION (ELENA W/ POSS CARDIOVERSION) (N/A)  ECHOCARDIOGRAM,  TRANSESOPHAGEAL (N/A)  Cardioversion (N/A)     Denies CP/SOB/GERD/MI/CVA/URI symptoms.  METS > 4  NPO > 8    Past Medical History:  Past Medical History:   Diagnosis Date    A-fib 5/8/2023    Arthritis     GERD (gastroesophageal reflux disease)     History of HCV, s/p successful treatment w/ SVR12 5/2015     Failed treatment (stage I/II) - followed by hepatology     Joint pain     Lung disease caused by breathing particles     Widespread essentially symmetric interstitial lung disease    Obesity     Polycythemia vera     Prediabetes        Past Surgical History:  Past Surgical History:   Procedure Laterality Date    BUNIONECTOMY      bilateral    CARDIOVERSION N/A 05/08/2023    Procedure: Cardioversion;  Surgeon: David Cueva MD;  Location: [facility] CATH LAB;  Service: Cardiology;  Laterality: N/A;    CATARACT EXTRACTION Left 07/27/2023    COLONOSCOPY N/A 12/09/2015    Procedure: COLONOSCOPY;  Surgeon: Conrad Iqbal MD;  Location: [facility] ENDO;  Service: Endoscopy;  Laterality: N/A;    Liver biopsy x2      rotator cuff Right     TRANSESOPHAGEAL ECHOCARDIOGRAM WITH POSSIBLE CARDIOVERSION (ELENA W/ POSS CARDIOVERSION) N/A 05/08/2023    Procedure: TRANSESOPHAGEAL ECHOCARDIOGRAM WITH POSSIBLE CARDIOVERSION (ELENA W/ POSS CARDIOVERSION);  Surgeon: David Cueva MD;  Location: [facility] CATH LAB;  Service: Cardiology;  Laterality: N/A;  12:30PM    RN PREOP 5/4/2023---EKG ON ARRIVAL    TRANSESOPHAGEAL ECHOCARDIOGRAPHY N/A 05/08/2023    Procedure: ECHOCARDIOGRAM, TRANSESOPHAGEAL;  Surgeon: David Cueva MD;  Location: [facility] CATH LAB;  Service: Cardiology;  Laterality: N/A;       Social History:  Social History     Socioeconomic History    Marital status:     Number of children: 0    Highest education level: Some college, no degree   Occupational History    Occupation: Self-employed     Employer: [employer]   Tobacco Use    Smoking status: Former     Current packs/day: 0.00    Smokeless tobacco:  Never    Tobacco comments:     pt. smokes 10 cigars per day.   Substance and Sexual Activity    Alcohol use: Not Currently     Comment: Rarely    Drug use: Yes     Types: Marijuana     Comment: daily    Sexual activity: Yes     Partners: Female     Social Determinants of Health     Social Connections: Moderately Isolated (12/3/2020)    Social Connection and Isolation Panel [NHANES]     Frequency of Communication with Friends and Family: Once a week     Frequency of Social Gatherings with Friends and Family: Once a week     Attends Restorationist Services: More than 4 times per year     Active Member of Clubs or Organizations: No     Attends Club or Organization Meetings: Never     Marital Status:        Medications:  Current Facility-Administered Medications on File Prior to Visit   Medication Dose Route Frequency Provider Last Rate Last Admin    acetaminophen tablet 650 mg  650 mg Oral Q4H PRN David Cueva MD        allopurinoL tablet 200 mg  200 mg Oral Daily Alexi Dunn MD   200 mg at 07/29/23 0937    apixaban tablet 5 mg  5 mg Oral BID Alexi Dunn MD   5 mg at 07/29/23 0937    arformoteroL nebulizer solution 15 mcg  15 mcg Nebulization BID Alexi Dunn MD   15 mcg at 07/29/23 0733    budesonide nebulizer solution 0.5 mg  0.5 mg Nebulization Q12H Alexi Dunn MD   0.5 mg at 07/29/23 0734    glucagon (human recombinant) injection 1 mg  1 mg Intramuscular PRN Alexi Dunn MD        glucose chewable tablet 16 g  16 g Oral PRN Alexi Dunn MD        glucose chewable tablet 24 g  24 g Oral PRN Alexi Dunn MD        ipratropium 0.02 % nebulizer solution 0.5 mg  0.5 mg Nebulization Q6H PRN Alexi Dunn MD   0.5 mg at 07/28/23 0801    metoprolol injection 5 mg  5 mg Intravenous Q6H PRN Adeel Montaño MD   5 mg at 07/28/23 2031    metoprolol succinate 24 hr tablet 75 mg  75 mg Oral Daily Alexi Dunn MD   75 mg at 07/29/23 0937    morphine injection 3 mg  3 mg  Intravenous Q1H PRN David Cueva MD        naloxone 0.4 mg/mL injection 0.02 mg  0.02 mg Intravenous PRN Alexi Dunn MD        ofloxacin 0.3 % ophthalmic solution 1 drop  1 drop Left Eye YONG Dunn MD   1 drop at 07/29/23 0938    ondansetron disintegrating tablet 8 mg  8 mg Oral Q8H PRN David Cueva MD        oxyCODONE immediate release tablet 5 mg  5 mg Oral Q4H PRN David Cueva MD        prednisoLONE acetate 1 % ophthalmic suspension 1 drop  1 drop Left Eye YONG Dunn MD   1 drop at 07/29/23 0938    sodium chloride 0.9% flush 10 mL  10 mL Intravenous Q12H PRN Alexi Dunn MD         Current Outpatient Medications on File Prior to Visit   Medication Sig Dispense Refill    allopurinoL (ZYLOPRIM) 100 MG tablet Take 2 tablets (200 mg total) by mouth once daily. 180 tablet 3    apixaban (ELIQUIS) 5 mg Tab Take 1 tablet (5 mg total) by mouth 2 (two) times daily. 180 tablet 3    fluticasone propionate (FLONASE) 50 mcg/actuation nasal spray SHAKE LIQUID AND USE 1 SPRAY(50 MCG) IN EACH NOSTRIL EVERY DAY 16 g 0    losartan (COZAAR) 50 MG tablet TK 1 T PO D 90 tablet 2    metoprolol succinate (TOPROL-XL) 50 MG 24 hr tablet Take 1.5 tablets (75 mg total) by mouth once daily. 90 tablet 3    multivitamin capsule Take 1 capsule by mouth once daily.      naproxen (NAPROSYN) 500 MG tablet Take 500 mg by mouth every 12 (twelve) hours as needed.      ofloxacin (OCUFLOX) 0.3 % ophthalmic solution Place 1 drop into both eyes 4 (four) times daily.      prednisoLONE acetate (PRED FORTE) 1 % DrpS Place 1 drop into both eyes 4 (four) times daily.      TRELEGY ELLIPTA 100-62.5-25 mcg DsDv Inhale 1 puff into the lungs Daily.         Allergies:  Review of patient's allergies indicates:   Allergen Reactions    Ace inhibitors Other (See Comments)     cough       Active Problems:  Patient Active Problem List   Diagnosis    History of HCV, s/p successful treatment w/ SVR12 5/2015    Lung  disease caused by breathing particles    Overweight (BMI 25.0-29.9)    GERD (gastroesophageal reflux disease)    Polycythemia    Former smoker    Hyperuricemia    ILD (interstitial lung disease)    Prediabetes    Arthritis    Allergic cough    HTN, goal below 140/90    Pulmonary interstitial fibrosis    Pulmonary hypertension    Marijuana user    Thoracic aorta atherosclerosis    A-fib    Cataract    Atrial flutter       Diagnostic Studies:    EKG (4/26/23):  Atrial flutter   Pulmonary disease pattern   Incomplete right bundle branch block   Left anterior fascicular block   Voltage criteria for left ventricular hypertrophy   Nonspecific ST and T wave abnormality     TTE (8/30/22):   The left ventricle is normal in size with mild concentric hypertrophy and normal systolic function.   The estimated ejection fraction is 60%.   Indeterminate left ventricular diastolic function.   Mild right atrial enlargement.   Normal right ventricular size with normal right ventricular systolic function.   Mild tricuspid regurgitation.   Normal central venous pressure (3 mmHg).   The estimated PA systolic pressure is 38 mmHg.   There is mild pulmonary hypertension.    24 Hour Vitals:  Temp:  [36.6 °C (97.9 °F)-37 °C (98.6 °F)] 36.9 °C (98.4 °F)  Pulse:  [] 121  Resp:  [16-20] 20  SpO2:  [93 %-100 %] 100 %  BP: (136-161)/() 136/89   See Nursing Charting For Additional Vitals    Pre-op Assessment    I have reviewed the Patient Summary Reports.     I have reviewed the Nursing Notes.       Review of Systems  Anesthesia Hx:  No problems with previous Anesthesia   Denies Personal Hx of Anesthesia complications.   Social:  Former Smoker, Social Alcohol Use    Hematology/Oncology:        Hematology Comments: Eliquis for Aflutter   Cardiovascular:   Exercise tolerance: good Hypertension Dysrhythmias YANG New-onset Aflutter with YANG    2022 stress: negative for ischemia    2022 Echo:  ? The left ventricle is  normal in size with mild concentric hypertrophy and normal systolic function.  ? The estimated ejection fraction is 60%.  ? Indeterminate left ventricular diastolic function.  ? Mild right atrial enlargement.  ? Normal right ventricular size with normal right ventricular systolic function.  ? Mild tricuspid regurgitation.  ? Normal central venous pressure (3 mmHg).  ? The estimated PA systolic pressure is 38 mmHg.  ? There is mild pulmonary hypertension.   Pulmonary:   Interstitial lung disease   Hepatic/GI:   GERD Liver Disease, Hepatitis H/o treated Hep C   Neurological:  Neurology Normal    Endocrine:  Endocrine Normal        Physical Exam  General: Cooperative, Alert, Oriented and Well nourished    Airway:  Mallampati: II   Mouth Opening: Normal  TM Distance: Normal      Dental:  Intact    Chest/Lungs:  Clear to auscultation, Normal Respiratory Rate    Heart:  Rate: Tachycardia        Anesthesia Plan  Type of Anesthesia, risks & benefits discussed:    Anesthesia Type: Gen Natural Airway, MAC  Intra-op Monitoring Plan: Standard ASA Monitors  Post Op Pain Control Plan: multimodal analgesia  Induction:  IV  Informed Consent: Informed consent signed with the Patient and all parties understand the risks and agree with anesthesia plan.  All questions answered.   ASA Score: 3    Ready For Surgery From Anesthesia Perspective.     .        Physical Exam  General: Cooperative, Alert and Oriented    Airway:  Mouth Opening: Normal  TM Distance: Normal  Tongue: Normal  Neck ROM: Normal ROM    Dental:  Caps / Implants        Anesthesia Plan  Type of Anesthesia, risks & benefits discussed:    Anesthesia Type: Gen Natural Airway  Intra-op Monitoring Plan: Standard ASA Monitors  Induction:  IV  Informed Consent: Informed consent signed with the Patient and all parties understand the risks and agree with anesthesia plan.  All questions answered. Patient consented to blood products? No  ASA Score: 3    Ready For Surgery From  Anesthesia Perspective.     .

## 2023-07-28 NOTE — NURSING
Cardioversion rescheduled for tomorrow at 11 am. Pt and wife instructed to not eat or drink anything starting at midnight and not until AFTER his procedure is done. Both verbalized understanding.    HR currently aflutter in 120s. Pt asymptomatic.

## 2023-07-28 NOTE — PROGRESS NOTES
"[facility] Medicine  Progress Note    Patient Name: Josiah Orosco Jr.  MRN: [id number]  Patient Class: OP- Observation   Admission Date: 7/27/2023  Length of Stay: 1 days  Attending Physician: Alexi Dunn MD  Primary Care Provider: Elaine Landry MD        Subjective:     Principal Problem:Atrial flutter        HPI:  Mr. Orosco is a 66 yo M who presents to the ED for evaluation for elevated heart rate. Patient had right cataract eye surgery today. He was unaware previously that his heart rate was elevated, he states he has "debris" in his lungs and is always short of breath. Denies any palpitations, otherwise doing well.   In ED, he was given diltiazem with better rate control, found to have atrial flutter. Cardiology consulted with plans for possible cardioversion tomorrow. Dr. Boothe discussed with Ophthalmology and ok to restart eliquis. Placed in observation.      Overview/Hospital Course:  No notes on file    Interval History: no acute issues, initially planned for ELENA/CV today but patient was brought lunch and ate so will be rescheduled for tomorrow    Review of Systems  Objective:     Vital Signs (Most Recent):  Temp: 97.9 °F (36.6 °C) (07/28/23 1114)  Pulse: (!) 120 (07/28/23 1114)  Resp: 18 (07/28/23 1114)  BP: (!) 143/98 (07/28/23 1114)  SpO2: (!) 94 % (07/28/23 1114) Vital Signs (24h Range):  Temp:  [97.9 °F (36.6 °C)-98.7 °F (37.1 °C)] 97.9 °F (36.6 °C)  Pulse:  [] 120  Resp:  [11-24] 18  SpO2:  [93 %-100 %] 94 %  BP: (119-157)/() 143/98     Weight: 99.2 kg (218 lb 9.6 oz)  Body mass index is 25.92 kg/m².    Intake/Output Summary (Last 24 hours) at 7/28/2023 1456  Last data filed at 7/27/2023 2000  Gross per 24 hour   Intake 240 ml   Output 0 ml   Net 240 ml         Physical Exam  Vitals and nursing note reviewed.   Eyes:      Comments: Left eye patch   Cardiovascular:      Rate and Rhythm: Normal rate. Rhythm irregular.      Pulses: Normal pulses. "   Musculoskeletal:         General: No swelling. Normal range of motion.   Skin:     General: Skin is warm and dry.   Neurological:      Mental Status: He is alert and oriented to person, place, and time.   Psychiatric:         Mood and Affect: Mood normal.         Thought Content: Thought content normal.           Significant Labs: All pertinent labs within the past 24 hours have been reviewed.    Significant Imaging: I have reviewed all pertinent imaging results/findings within the past 24 hours.      Assessment/Plan:      * Atrial flutter  Patient with Long standing persistent (>12 months) atrial fibrillation which is uncontrolled currently with Beta Blocker. Patient is currently in atrial fibrillation.[id number] Score: 1. Anticoagulation indicated. Anticoagulation done with eliquis.    - unfortunately ate today, plan for ELENA/CV tomorrow  - NPO at midnight      Cataract  S/p left cataract surgery  - resume eye drops      A-fib          VTE Risk Mitigation (From admission, onward)         Ordered     apixaban tablet 5 mg  2 times daily         07/27/23 1643     IP VTE LOW RISK PATIENT  Once         07/27/23 1643     Place sequential compression device  Until discontinued         07/27/23 1643                Discharge Planning   THADDEUS:      Code Status: Full Code   Is the patient medically ready for discharge?:     Reason for patient still in hospital (select all that apply): Treatment  Discharge Plan A: Home with family                  Alexi Dunn MD  Department of Hospital Medicine   [facility]

## 2023-07-28 NOTE — ASSESSMENT & PLAN NOTE
Patient with Long standing persistent (>12 months) atrial fibrillation which is uncontrolled currently with Beta Blocker. Patient is currently in atrial fibrillation.[id number] Score: 1. Anticoagulation indicated. Anticoagulation done with eliquis.    - unfortunately ate today, plan for ELENA/CV tomorrow  - NPO at midnight

## 2023-07-28 NOTE — SUBJECTIVE & OBJECTIVE
Interval History: no acute issues, initially planned for ELENA/CV today but patient was brought lunch and ate so will be rescheduled for tomorrow    Review of Systems  Objective:     Vital Signs (Most Recent):  Temp: 97.9 °F (36.6 °C) (07/28/23 1114)  Pulse: (!) 120 (07/28/23 1114)  Resp: 18 (07/28/23 1114)  BP: (!) 143/98 (07/28/23 1114)  SpO2: (!) 94 % (07/28/23 1114) Vital Signs (24h Range):  Temp:  [97.9 °F (36.6 °C)-98.7 °F (37.1 °C)] 97.9 °F (36.6 °C)  Pulse:  [] 120  Resp:  [11-24] 18  SpO2:  [93 %-100 %] 94 %  BP: (119-157)/() 143/98     Weight: 99.2 kg (218 lb 9.6 oz)  Body mass index is 25.92 kg/m².    Intake/Output Summary (Last 24 hours) at 7/28/2023 1456  Last data filed at 7/27/2023 2000  Gross per 24 hour   Intake 240 ml   Output 0 ml   Net 240 ml         Physical Exam  Vitals and nursing note reviewed.   Eyes:      Comments: Left eye patch   Cardiovascular:      Rate and Rhythm: Normal rate. Rhythm irregular.      Pulses: Normal pulses.   Musculoskeletal:         General: No swelling. Normal range of motion.   Skin:     General: Skin is warm and dry.   Neurological:      Mental Status: He is alert and oriented to person, place, and time.   Psychiatric:         Mood and Affect: Mood normal.         Thought Content: Thought content normal.           Significant Labs: All pertinent labs within the past 24 hours have been reviewed.    Significant Imaging: I have reviewed all pertinent imaging results/findings within the past 24 hours.

## 2023-07-28 NOTE — NURSING
Report received from ISAC Parra. Care assumed. Pt lying quietly in bed. No needs at this time. Per report, pt ate 2 spoonfuls of rice and fish at approx 12 pm.

## 2023-07-28 NOTE — PLAN OF CARE
07/28/23 0914   Discharge Planning   Assessment Type Discharge Planning Brief Assessment   Resource/Environmental Concerns none   Support Systems Spouse/significant other   Equipment Currently Used at Home none   Current Living Arrangements home   Patient/Family Anticipates Transition to home with family   Patient/Family Anticipated Services at Transition none   DME Needed Upon Discharge  none   Discharge Plan A Home with family       [id number] [place]  [address] [place]  [place] [address]  Phone: [phone] Fax: [phone]

## 2023-07-28 NOTE — NURSING
OM-WB MEWS TRIGGER FOLLOW UP       MEWS Monitoring, Score is: 3  Indication for review: HR 120s    Bedside NurseLucy contacted, MD aware/ following, instructed to call [phone] for further concerns or assistance.    MD ordered PO 25 mg metoprolol and PRN metoprolol for HR sustained >120. PO given, pt remains on cardiac monitor and HR 110s-120s. Primary RN to give PRN if HR not improved. Pt resting in bed, rise and fall of chest noted. NAD. Care ongoing.

## 2023-07-29 VITALS
OXYGEN SATURATION: 93 % | RESPIRATION RATE: 18 BRPM | BODY MASS INDEX: 26.55 KG/M2 | WEIGHT: 218 LBS | SYSTOLIC BLOOD PRESSURE: 136 MMHG | HEART RATE: 81 BPM | HEIGHT: 76 IN | TEMPERATURE: 98 F | DIASTOLIC BLOOD PRESSURE: 77 MMHG

## 2023-07-29 LAB
BSA FOR ECHO PROCEDURE: 2.3 M2
POCT GLUCOSE: 88 MG/DL (ref 70–110)

## 2023-07-29 PROCEDURE — 94761 N-INVAS EAR/PLS OXIMETRY MLT: CPT

## 2023-07-29 PROCEDURE — 93010 EKG 12-LEAD: ICD-10-PCS | Mod: 59,,, | Performed by: INTERNAL MEDICINE

## 2023-07-29 PROCEDURE — 37000009 HC ANESTHESIA EA ADD 15 MINS: Performed by: INTERNAL MEDICINE

## 2023-07-29 PROCEDURE — D9220A PRA ANESTHESIA: Mod: CRNA,,, | Performed by: NURSE ANESTHETIST, CERTIFIED REGISTERED

## 2023-07-29 PROCEDURE — 25000242 PHARM REV CODE 250 ALT 637 W/ HCPCS: Performed by: STUDENT IN AN ORGANIZED HEALTH CARE EDUCATION/TRAINING PROGRAM

## 2023-07-29 PROCEDURE — 96376 TX/PRO/DX INJ SAME DRUG ADON: CPT | Mod: 59

## 2023-07-29 PROCEDURE — 63600175 PHARM REV CODE 636 W HCPCS: Performed by: NURSE ANESTHETIST, CERTIFIED REGISTERED

## 2023-07-29 PROCEDURE — 93010 ELECTROCARDIOGRAM REPORT: CPT | Mod: 59,,, | Performed by: INTERNAL MEDICINE

## 2023-07-29 PROCEDURE — D9220A PRA ANESTHESIA: ICD-10-PCS | Mod: ANES,,, | Performed by: ANESTHESIOLOGY

## 2023-07-29 PROCEDURE — G0378 HOSPITAL OBSERVATION PER HR: HCPCS

## 2023-07-29 PROCEDURE — 94640 AIRWAY INHALATION TREATMENT: CPT

## 2023-07-29 PROCEDURE — 25000003 PHARM REV CODE 250: Performed by: INTERNAL MEDICINE

## 2023-07-29 PROCEDURE — 25000003 PHARM REV CODE 250: Performed by: NURSE ANESTHETIST, CERTIFIED REGISTERED

## 2023-07-29 PROCEDURE — 99214 OFFICE O/P EST MOD 30 MIN: CPT | Mod: 25,,, | Performed by: INTERNAL MEDICINE

## 2023-07-29 PROCEDURE — 93005 ELECTROCARDIOGRAM TRACING: CPT | Mod: 59

## 2023-07-29 PROCEDURE — 25000003 PHARM REV CODE 250: Performed by: STUDENT IN AN ORGANIZED HEALTH CARE EDUCATION/TRAINING PROGRAM

## 2023-07-29 PROCEDURE — 93005 ELECTROCARDIOGRAM TRACING: CPT

## 2023-07-29 PROCEDURE — D9220A PRA ANESTHESIA: Mod: ANES,,, | Performed by: ANESTHESIOLOGY

## 2023-07-29 PROCEDURE — 37000008 HC ANESTHESIA 1ST 15 MINUTES: Performed by: INTERNAL MEDICINE

## 2023-07-29 PROCEDURE — 99214 PR OFFICE/OUTPT VISIT, EST, LEVL IV, 30-39 MIN: ICD-10-PCS | Mod: 25,,, | Performed by: INTERNAL MEDICINE

## 2023-07-29 PROCEDURE — D9220A PRA ANESTHESIA: ICD-10-PCS | Mod: CRNA,,, | Performed by: NURSE ANESTHETIST, CERTIFIED REGISTERED

## 2023-07-29 RX ORDER — DILTIAZEM HYDROCHLORIDE 5 MG/ML
10 INJECTION INTRAVENOUS ONCE
Status: COMPLETED | OUTPATIENT
Start: 2023-07-29 | End: 2023-07-29

## 2023-07-29 RX ORDER — ONDANSETRON HYDROCHLORIDE 4 MG/5ML
4 SOLUTION ORAL ONCE
Status: COMPLETED | OUTPATIENT
Start: 2023-07-29 | End: 2023-07-29

## 2023-07-29 RX ORDER — FENTANYL CITRATE 50 UG/ML
INJECTION, SOLUTION INTRAMUSCULAR; INTRAVENOUS
Status: DISCONTINUED | OUTPATIENT
Start: 2023-07-29 | End: 2023-07-29

## 2023-07-29 RX ORDER — ETOMIDATE 2 MG/ML
INJECTION INTRAVENOUS
Status: DISCONTINUED | OUTPATIENT
Start: 2023-07-29 | End: 2023-07-29

## 2023-07-29 RX ORDER — METOPROLOL SUCCINATE 50 MG/1
100 TABLET, EXTENDED RELEASE ORAL DAILY
Status: DISCONTINUED | OUTPATIENT
Start: 2023-07-30 | End: 2023-07-29 | Stop reason: HOSPADM

## 2023-07-29 RX ORDER — LIDOCAINE HYDROCHLORIDE 20 MG/ML
INJECTION INTRAVENOUS
Status: DISCONTINUED | OUTPATIENT
Start: 2023-07-29 | End: 2023-07-29

## 2023-07-29 RX ORDER — METOPROLOL SUCCINATE 25 MG/1
25 TABLET, EXTENDED RELEASE ORAL ONCE
Status: COMPLETED | OUTPATIENT
Start: 2023-07-29 | End: 2023-07-29

## 2023-07-29 RX ORDER — METOPROLOL TARTRATE 1 MG/ML
INJECTION, SOLUTION INTRAVENOUS
Status: DISCONTINUED | OUTPATIENT
Start: 2023-07-29 | End: 2023-07-29

## 2023-07-29 RX ORDER — LIDOCAINE HYDROCHLORIDE 40 MG/ML
SOLUTION TOPICAL
Status: DISCONTINUED | OUTPATIENT
Start: 2023-07-29 | End: 2023-07-29

## 2023-07-29 RX ORDER — METOPROLOL SUCCINATE 100 MG/1
100 TABLET, EXTENDED RELEASE ORAL DAILY
Qty: 30 TABLET | Refills: 11 | Status: ON HOLD | OUTPATIENT
Start: 2023-07-30 | End: 2023-08-08 | Stop reason: HOSPADM

## 2023-07-29 RX ADMIN — METOPROLOL TARTRATE 2.5 MG: 5 INJECTION, SOLUTION INTRAVENOUS at 11:07

## 2023-07-29 RX ADMIN — BUDESONIDE 0.5 MG: 0.5 INHALANT RESPIRATORY (INHALATION) at 07:07

## 2023-07-29 RX ADMIN — ARFORMOTEROL TARTRATE 15 MCG: 15 SOLUTION RESPIRATORY (INHALATION) at 07:07

## 2023-07-29 RX ADMIN — OFLOXACIN 1 DROP: 3 SOLUTION/ DROPS OPHTHALMIC at 12:07

## 2023-07-29 RX ADMIN — APIXABAN 5 MG: 5 TABLET, FILM COATED ORAL at 09:07

## 2023-07-29 RX ADMIN — FENTANYL CITRATE 50 MCG: 0.05 INJECTION, SOLUTION INTRAMUSCULAR; INTRAVENOUS at 10:07

## 2023-07-29 RX ADMIN — DILTIAZEM HYDROCHLORIDE 10 MG: 5 INJECTION INTRAVENOUS at 03:07

## 2023-07-29 RX ADMIN — ONDANSETRON 4 MG: 4 SOLUTION ORAL at 12:07

## 2023-07-29 RX ADMIN — METOPROLOL SUCCINATE 75 MG: 50 TABLET, EXTENDED RELEASE ORAL at 09:07

## 2023-07-29 RX ADMIN — SODIUM CHLORIDE: 0.9 INJECTION, SOLUTION INTRAVENOUS at 10:07

## 2023-07-29 RX ADMIN — METOPROLOL SUCCINATE 25 MG: 25 TABLET, EXTENDED RELEASE ORAL at 02:07

## 2023-07-29 RX ADMIN — METOPROLOL TARTRATE 2.5 MG: 5 INJECTION, SOLUTION INTRAVENOUS at 10:07

## 2023-07-29 RX ADMIN — LIDOCAINE HYDROCHLORIDE 40 MG: 20 INJECTION, SOLUTION INTRAVENOUS at 10:07

## 2023-07-29 RX ADMIN — ETOMIDATE 14 MG: 2 INJECTION, SOLUTION INTRAVENOUS at 10:07

## 2023-07-29 RX ADMIN — PREDNISOLONE ACETATE 1 DROP: 10 SUSPENSION/ DROPS OPHTHALMIC at 09:07

## 2023-07-29 RX ADMIN — ETOMIDATE 2 MG: 2 INJECTION, SOLUTION INTRAVENOUS at 10:07

## 2023-07-29 RX ADMIN — OFLOXACIN 1 DROP: 3 SOLUTION/ DROPS OPHTHALMIC at 09:07

## 2023-07-29 RX ADMIN — PREDNISOLONE ACETATE 1 DROP: 10 SUSPENSION/ DROPS OPHTHALMIC at 12:07

## 2023-07-29 RX ADMIN — LIDOCAINE HYDROCHLORIDE 4 ML: 40 SOLUTION TOPICAL at 10:07

## 2023-07-29 RX ADMIN — ALLOPURINOL 200 MG: 100 TABLET ORAL at 09:07

## 2023-07-29 NOTE — SUBJECTIVE & OBJECTIVE
Interval History:  Patient underwent cardioversion today.  Two hundred joules x1.  Back in normal sinus rhythm.  Doing fine    Review of Systems   Constitutional: Negative.   HENT: Negative.     Eyes: Negative.    Cardiovascular: Negative.    Respiratory: Negative.     Endocrine: Negative.    Hematologic/Lymphatic: Negative.    Skin: Negative.    Musculoskeletal: Negative.    Gastrointestinal: Negative.    Genitourinary: Negative.    Neurological: Negative.    Psychiatric/Behavioral: Negative.     Allergic/Immunologic: Negative.      Objective:     Vital Signs (Most Recent):  Temp: 97.7 °F (36.5 °C) (07/29/23 1104)  Pulse: 72 (07/29/23 1104)  Resp: 18 (07/29/23 1104)  BP: (!) 169/91 (07/29/23 1104)  SpO2: 99 % (RA) (07/29/23 1104) Vital Signs (24h Range):  Temp:  [97.7 °F (36.5 °C)-98.6 °F (37 °C)] 97.7 °F (36.5 °C)  Pulse:  [] 72  Resp:  [16-20] 18  SpO2:  [93 %-100 %] 99 %  BP: (136-169)/() 169/91     Weight: 98.9 kg (218 lb)  Body mass index is 26 kg/m².     SpO2: 99 % (RA)         Intake/Output Summary (Last 24 hours) at 7/29/2023 1107  Last data filed at 7/29/2023 1105  Gross per 24 hour   Intake 440 ml   Output 400 ml   Net 40 ml       Lines/Drains/Airways       Peripheral Intravenous Line  Duration                  Peripheral IV - Single Lumen 07/27/23 1445 20 G Right Antecubital 1 day                       Physical Exam  Vitals reviewed.   Constitutional:       Appearance: He is well-developed.   HENT:      Head: Normocephalic.   Eyes:      Conjunctiva/sclera: Conjunctivae normal.      Pupils: Pupils are equal, round, and reactive to light.   Cardiovascular:      Rate and Rhythm: Normal rate and regular rhythm.      Heart sounds: Normal heart sounds.   Pulmonary:      Effort: Pulmonary effort is normal.      Breath sounds: Normal breath sounds.   Abdominal:      General: Bowel sounds are normal.      Palpations: Abdomen is soft.   Musculoskeletal:      Cervical back: Normal range of motion and  "neck supple.   Skin:     General: Skin is warm.   Neurological:      Mental Status: He is alert and oriented to person, place, and time.            Significant Labs: BMP:   Recent Labs   Lab 07/27/23  1435   *      K 4.3      CO2 25   BUN 11   CREATININE 0.9   CALCIUM 9.2   , CMP   Recent Labs   Lab 07/27/23  1435      K 4.3      CO2 25   *   BUN 11   CREATININE 0.9   CALCIUM 9.2   PROT 8.2   ALBUMIN 3.1*   BILITOT 0.7   ALKPHOS 97   AST 27   ALT 32   ANIONGAP 6*   , CBC   Recent Labs   Lab 07/27/23  1435   WBC 8.02   HGB 14.4   HCT 48.7      , INR   Recent Labs   Lab 07/27/23  1435   INR 1.2   , Lipid Panel No results for input(s): "CHOL", "HDL", "LDLCALC", "TRIG", "CHOLHDL" in the last 48 hours., Troponin   Recent Labs   Lab 07/27/23  1435   TROPONINI 0.010   , and All pertinent lab results from the last 24 hours have been reviewed.    Significant Imaging: Echocardiogram: Transthoracic echo (TTE) complete (Cupid Only):   Results for orders placed or performed during the hospital encounter of 07/27/23   Echo   Result Value Ref Range    BSA 2.31 m2    TDI SEPTAL 0.06 m/s    LV LATERAL E/E' RATIO 14.00 m/s    LV SEPTAL E/E' RATIO 14.00 m/s    LA WIDTH 5.40 cm    IVC diameter 2.17 cm    Left Ventricular Outflow Tract Mean Velocity 0.43 cm/s    Left Ventricular Outflow Tract Mean Gradient 0.89 mmHg    AORTIC VALVE CUSP SEPERATION 1.90 cm    TDI LATERAL 0.06 m/s    LVIDd 4.26 3.5 - 6.0 cm    IVS 1.21 (A) 0.6 - 1.1 cm    Posterior Wall 1.24 (A) 0.6 - 1.1 cm    Ao root annulus 3.32 cm    LVIDs 2.71 2.1 - 4.0 cm    FS 36 28 - 44 %    LA volume 117.77 cm3    Sinus 3.36 cm    STJ 2.74 cm    Ascending aorta 3.34 cm    LV mass 187.64 g    LA size 4.40 cm    RVDD 5.30 cm    TAPSE 1.34 cm    RV S' 14.07 cm/s    Left Ventricle Relative Wall Thickness 0.58 cm    AV mean gradient 2 mmHg    AV valve area 2.29 cm2    AV Velocity Ratio 0.64     AV index (prosthetic) 0.63     Mean e' 0.06 " m/s    LVOT diameter 2.16 cm    LVOT area 3.7 cm2    LVOT peak delio 0.66 m/s    LVOT peak VTI 9.90 cm    Ao peak delio 1.03 m/s    Ao VTI 15.8 cm    LVOT stroke volume 36.26 cm3    AV peak gradient 4 mmHg    E/E' ratio 14.00 m/s    MV Peak E Delio 0.84 m/s    TR Max Delio 3.42 m/s    LV Systolic Volume 27.37 mL    LV Systolic Volume Index 11.8 mL/m2    LV Diastolic Volume 81.39 mL    LV Diastolic Volume Index 35.08 mL/m2    LA Volume Index 50.8 mL/m2    LV Mass Index 81 g/m2    RA Major Axis 5.74 cm    Left Atrium Minor Axis 5.37 cm    Left Atrium Major Axis 6.38 cm    Triscuspid Valve Regurgitation Peak Gradient 47 mmHg    RA Width 6.40 cm    Covington's Biplane MOD Ejection Fraction 3 %    Right Atrial Pressure (from IVC) 15 mmHg    EF 50 %    TV resting pulmonary artery pressure 62 mmHg    Narrative    · The estimated ejection fraction is 50%.  · Concentric remodeling and low normal systolic function.  · Severe left atrial enlargement.  · Atrial fibrillation observed.  · There are segmental left ventricular wall motion abnormalities.  · Severe right ventricular enlargement.  · Severe right atrial enlargement.  · Moderate mitral regurgitation.  · Mild tricuspid regurgitation.  · Elevated central venous pressure (15 mmHg).  · The estimated PA systolic pressure is 62 mmHg.  · There is pulmonary hypertension.

## 2023-07-29 NOTE — ANESTHESIA POSTPROCEDURE EVALUATION
Anesthesia Post Evaluation    Patient: Josiah Orosco Jr.    Procedure(s) Performed: Procedure(s) (LRB):  Transesophageal echo (ELENA) intra-procedure log documentation (N/A)  ECHOCARDIOGRAM, TRANSESOPHAGEAL (N/A)    Final Anesthesia Type: general      Patient location during evaluation: floor  Level of consciousness: awake and alert  Post-procedure vital signs: reviewed and stable  Pain management: adequate  Airway patency: patent    PONV status at discharge: No PONV  Anesthetic complications: no      Cardiovascular status: hemodynamically stable and blood pressure returned to baseline  Respiratory status: unassisted and spontaneous ventilation  Hydration status: euvolemic  Follow-up not needed.          Vitals Value Taken Time   /90 07/29/23 1136   Temp 36.4 °C (97.6 °F) 07/29/23 1136   Pulse 70 07/29/23 1136   Resp 18 07/29/23 1136   SpO2 94 % 07/29/23 1136         No case tracking events are documented in the log.      Pain/Marycruz Score: No data recorded

## 2023-07-29 NOTE — ASSESSMENT & PLAN NOTE
Patient in AFib.  On Eliquis, but had to interrupt it recently because of recent surgery.  Will do ELENA and cardioversion    -No absolute contraindications of esophageal stricture, tumor, perforation, laceration,or diverticulum and/or active GI bleed  -The risks, benefits & alternatives of the procedure were explained to the patient.   -The risks of transesophageal echo include but are not limited to:  Dental trauma, esophageal trauma/perforation, bleeding, laryngospasm/brochospasm, aspiration, sore throat/hoarseness, & dislodgement of the endotracheal tube/nasogastric tube (where applicable).    -The risks of ANES monitored sedation include hypotension, respiratory depression, arrhythmias, bronchospasm, & death.    -Informed consent was obtained. The patient is agreeable to proceed with the procedure and all questions and concerns addressed.      Risks, benefits and alternatives of the ELENA/Cardioversion procedure were discussed with the patient including throat irritation, aspiration, anesthetic complications, esophageal irritation/perforation, skin irritation, arrhythmia, etc.  Patient understands and agrees to proceed.  Consent was placed on the chart.

## 2023-07-29 NOTE — CONSULTS
[facility] Surg  Cardiology  Consult Note    Patient Name: Josiah Orosco Jr.  MRN: [id number]  Admission Date: 7/27/2023  Hospital Length of Stay: 1 days  Code Status: Full Code   Attending Provider: Alexi Dunn MD   Consulting Provider: David Cueva MD  Primary Care Physician: Elaine Landry MD  Principal Problem:Atrial flutter    Patient information was obtained from patient and ER records.     Inpatient consult to Cardiology  Consult performed by: David Cueva MD  Consult ordered by: Carroll Boothe MD        Subjective:     Chief Complaint:  afib     HPI:   Patient well known to me from Cardiology Clinic.  Past history of atrial fibrillation status post cardioversion.  Was in sinus rhythm.  Yesterday went for cataract surgery for which he held his Eliquis for 2 days.  When he was in the clinic he was told he is in AFib with RVR.  Came to the ER.  Patient is asymptomatic but in AFib with RVR with heart rate around 110-120 beats per minute.  Denies orthopnea, PND, chest pains or tightness.  EKG personally reviewed and shows AFib      Past Medical History:   Diagnosis Date    A-fib 5/8/2023    Arthritis     GERD (gastroesophageal reflux disease)     History of HCV, s/p successful treatment w/ SVR12 5/2015     Failed treatment (stage I/II) - followed by hepatology     Joint pain     Lung disease caused by breathing particles     Widespread essentially symmetric interstitial lung disease    Obesity     Polycythemia vera     Prediabetes        Past Surgical History:   Procedure Laterality Date    BUNIONECTOMY      bilateral    CARDIOVERSION N/A 05/08/2023    Procedure: Cardioversion;  Surgeon: David Cueva MD;  Location: [facility] CATH LAB;  Service: Cardiology;  Laterality: N/A;    CATARACT EXTRACTION Left 07/27/2023    COLONOSCOPY N/A 12/09/2015    Procedure: COLONOSCOPY;  Surgeon: Conrad Iqbal MD;  Location: [facility] ENDO;  Service: Endoscopy;  Laterality: N/A;    Liver biopsy x2       rotator cuff Right     TRANSESOPHAGEAL ECHOCARDIOGRAM WITH POSSIBLE CARDIOVERSION (ELENA W/ POSS CARDIOVERSION) N/A 05/08/2023    Procedure: TRANSESOPHAGEAL ECHOCARDIOGRAM WITH POSSIBLE CARDIOVERSION (ELENA W/ POSS CARDIOVERSION);  Surgeon: David Cueva MD;  Location: [facility] CATH LAB;  Service: Cardiology;  Laterality: N/A;  12:30PM    RN PREOP 5/4/2023---EKG ON ARRIVAL    TRANSESOPHAGEAL ECHOCARDIOGRAPHY N/A 05/08/2023    Procedure: ECHOCARDIOGRAM, TRANSESOPHAGEAL;  Surgeon: David Cueva MD;  Location: [facility] CATH LAB;  Service: Cardiology;  Laterality: N/A;       Review of patient's allergies indicates:   Allergen Reactions    Ace inhibitors Other (See Comments)     cough       No current facility-administered medications on file prior to encounter.     Current Outpatient Medications on File Prior to Encounter   Medication Sig    allopurinoL (ZYLOPRIM) 100 MG tablet Take 2 tablets (200 mg total) by mouth once daily.    apixaban (ELIQUIS) 5 mg Tab Take 1 tablet (5 mg total) by mouth 2 (two) times daily.    fluticasone propionate (FLONASE) 50 mcg/actuation nasal spray SHAKE LIQUID AND USE 1 SPRAY(50 MCG) IN EACH NOSTRIL EVERY DAY    losartan (COZAAR) 50 MG tablet TK 1 T PO D    metoprolol succinate (TOPROL-XL) 50 MG 24 hr tablet Take 1.5 tablets (75 mg total) by mouth once daily.    multivitamin capsule Take 1 capsule by mouth once daily.    naproxen (NAPROSYN) 500 MG tablet Take 500 mg by mouth every 12 (twelve) hours as needed.    ofloxacin (OCUFLOX) 0.3 % ophthalmic solution Place 1 drop into both eyes 4 (four) times daily.    prednisoLONE acetate (PRED FORTE) 1 % DrpS Place 1 drop into both eyes 4 (four) times daily.    TRELEGY ELLIPTA 100-62.5-25 mcg DsDv Inhale 1 puff into the lungs Daily.     Family History       Problem Relation (Age of Onset)    Chronic back pain Brother    Deep vein thrombosis Sister    Heart attack Sister    Heart disease Mother    Kidney disease Mother    Osteoarthritis  Sister    Parkinsonism Father    Prostate cancer Cousin    Pulmonary embolism Sister          Tobacco Use    Smoking status: Former    Smokeless tobacco: Never    Tobacco comments:     pt. smokes 10 cigars per day.   Substance and Sexual Activity    Alcohol use: Not Currently     Comment: Rarely    Drug use: Yes     Types: Marijuana     Comment: daily    Sexual activity: Yes     Partners: Female     Review of Systems   Constitutional: Negative.   HENT: Negative.     Eyes: Negative.    Respiratory: Negative.     Endocrine: Negative.    Hematologic/Lymphatic: Negative.    Skin: Negative.    Musculoskeletal: Negative.    Gastrointestinal: Negative.    Genitourinary: Negative.    Neurological: Negative.    Psychiatric/Behavioral: Negative.     Allergic/Immunologic: Negative.    Objective:     Vital Signs (Most Recent):  Temp: 98.3 °F (36.8 °C) (07/28/23 1948)  Pulse: (!) 118 (07/28/23 2114)  Resp: 18 (07/28/23 2114)  BP: (!) 138/98 (07/28/23 2039)  SpO2: (!) 94 % (07/28/23 2114) Vital Signs (24h Range):  Temp:  [97.9 °F (36.6 °C)-98.7 °F (37.1 °C)] 98.3 °F (36.8 °C)  Pulse:  [] 118  Resp:  [16-18] 18  SpO2:  [93 %-96 %] 94 %  BP: (121-153)/() 138/98     Weight: 98.9 kg (218 lb)  Body mass index is 26 kg/m².    SpO2: (!) 94 %         Intake/Output Summary (Last 24 hours) at 7/28/2023 2155  Last data filed at 7/28/2023 1730  Gross per 24 hour   Intake 240 ml   Output 400 ml   Net -160 ml       Lines/Drains/Airways       Peripheral Intravenous Line  Duration                  Peripheral IV - Single Lumen 07/27/23 1445 20 G Right Antecubital 1 day                     Physical Exam  Vitals reviewed.   Constitutional:       Appearance: He is well-developed.   HENT:      Head: Normocephalic.   Eyes:      Conjunctiva/sclera: Conjunctivae normal.      Pupils: Pupils are equal, round, and reactive to light.   Cardiovascular:      Rate and Rhythm: Tachycardia present. Rhythm irregular.      Heart sounds: Normal  heart sounds.   Pulmonary:      Effort: Pulmonary effort is normal.      Breath sounds: Normal breath sounds.   Abdominal:      General: Bowel sounds are normal.      Palpations: Abdomen is soft.   Musculoskeletal:      Cervical back: Normal range of motion and neck supple.   Skin:     General: Skin is warm.   Neurological:      Mental Status: He is alert and oriented to person, place, and time.        Significant Labs: BMP:   Recent Labs   Lab 07/27/23  1435   *      K 4.3      CO2 25   BUN 11   CREATININE 0.9   CALCIUM 9.2   , CMP   Recent Labs   Lab 07/27/23  1435      K 4.3      CO2 25   *   BUN 11   CREATININE 0.9   CALCIUM 9.2   PROT 8.2   ALBUMIN 3.1*   BILITOT 0.7   ALKPHOS 97   AST 27   ALT 32   ANIONGAP 6*   , CBC   Recent Labs   Lab 07/27/23  1435   WBC 8.02   HGB 14.4   HCT 48.7      , INR   Recent Labs   Lab 07/27/23  1435   INR 1.2   , Lipid Panel No results for input(s): CHOL, HDL, LDLCALC, TRIG, CHOLHDL in the last 48 hours., Troponin   Recent Labs   Lab 07/27/23  1435   TROPONINI 0.010   , and All pertinent lab results from the last 24 hours have been reviewed.    Significant Imaging: Echocardiogram: Transthoracic echo (TTE) complete (Cupid Only):   Results for orders placed or performed during the hospital encounter of 07/27/23   Echo   Result Value Ref Range    BSA 2.31 m2    TDI SEPTAL 0.06 m/s    LV LATERAL E/E' RATIO 14.00 m/s    LV SEPTAL E/E' RATIO 14.00 m/s    LA WIDTH 5.40 cm    IVC diameter 2.17 cm    Left Ventricular Outflow Tract Mean Velocity 0.43 cm/s    Left Ventricular Outflow Tract Mean Gradient 0.89 mmHg    AORTIC VALVE CUSP SEPERATION 1.90 cm    TDI LATERAL 0.06 m/s    LVIDd 4.26 3.5 - 6.0 cm    IVS 1.21 (A) 0.6 - 1.1 cm    Posterior Wall 1.24 (A) 0.6 - 1.1 cm    Ao root annulus 3.32 cm    LVIDs 2.71 2.1 - 4.0 cm    FS 36 28 - 44 %    LA volume 117.77 cm3    Sinus 3.36 cm    STJ 2.74 cm    Ascending aorta 3.34 cm    LV mass 187.64 g     LA size 4.40 cm    RVDD 5.30 cm    TAPSE 1.34 cm    RV S' 14.07 cm/s    Left Ventricle Relative Wall Thickness 0.58 cm    AV mean gradient 2 mmHg    AV valve area 2.29 cm2    AV Velocity Ratio 0.64     AV index (prosthetic) 0.63     Mean e' 0.06 m/s    LVOT diameter 2.16 cm    LVOT area 3.7 cm2    LVOT peak delio 0.66 m/s    LVOT peak VTI 9.90 cm    Ao peak delio 1.03 m/s    Ao VTI 15.8 cm    LVOT stroke volume 36.26 cm3    AV peak gradient 4 mmHg    E/E' ratio 14.00 m/s    MV Peak E Delio 0.84 m/s    TR Max Delio 3.42 m/s    LV Systolic Volume 27.37 mL    LV Systolic Volume Index 11.8 mL/m2    LV Diastolic Volume 81.39 mL    LV Diastolic Volume Index 35.08 mL/m2    LA Volume Index 50.8 mL/m2    LV Mass Index 81 g/m2    RA Major Axis 5.74 cm    Left Atrium Minor Axis 5.37 cm    Left Atrium Major Axis 6.38 cm    Triscuspid Valve Regurgitation Peak Gradient 47 mmHg    RA Width 6.40 cm    Covington's Biplane MOD Ejection Fraction 3 %    Right Atrial Pressure (from IVC) 15 mmHg    EF 50 %    TV rest pulmonary artery pressure 62 mmHg    Narrative    · The estimated ejection fraction is 50%.  · Concentric remodeling and low normal systolic function.  · Severe left atrial enlargement.  · Atrial fibrillation observed.  · There are segmental left ventricular wall motion abnormalities.  · Severe right ventricular enlargement.  · Severe right atrial enlargement.  · Moderate mitral regurgitation.  · Mild tricuspid regurgitation.  · Elevated central venous pressure (15 mmHg).  · The estimated PA systolic pressure is 62 mmHg.  · There is pulmonary hypertension.        Assessment and Plan:     A-fib  Patient in AFib.  On Eliquis, but had to interrupt it recently because of recent surgery.  Will do ELENA and cardioversion    -No absolute contraindications of esophageal stricture, tumor, perforation, laceration,or diverticulum and/or active GI bleed  -The risks, benefits & alternatives of the procedure were explained to the patient.   -The  risks of transesophageal echo include but are not limited to:  Dental trauma, esophageal trauma/perforation, bleeding, laryngospasm/brochospasm, aspiration, sore throat/hoarseness, & dislodgement of the endotracheal tube/nasogastric tube (where applicable).    -The risks of ANES monitored sedation include hypotension, respiratory depression, arrhythmias, bronchospasm, & death.    -Informed consent was obtained. The patient is agreeable to proceed with the procedure and all questions and concerns addressed.      Risks, benefits and alternatives of the ELENA/Cardioversion procedure were discussed with the patient including throat irritation, aspiration, anesthetic complications, esophageal irritation/perforation, skin irritation, arrhythmia, etc.  Patient understands and agrees to proceed.  Consent was placed on the chart.          VTE Risk Mitigation (From admission, onward)         Ordered     apixaban tablet 5 mg  2 times daily         07/27/23 1643     IP VTE LOW RISK PATIENT  Once         07/27/23 1643     Place sequential compression device  Until discontinued         07/27/23 1643                Thank you for your consult. I will follow-up with patient. Please contact us if you have any additional questions.    David Cueva MD  Cardiology   [facility] [place] [facility] [place] [facility]

## 2023-07-29 NOTE — PROGRESS NOTES
[facility]                    Pharmacy       Discharge Medication Education    Patient ACCEPTED medication education. Pharmacy has provided education on the name, indication, and possible side effects of the medication(s) prescribed, using teach-back method.     The following medications have also been discussed, during this admission.        Medication List        CHANGE how you take these medications      metoprolol succinate 100 MG 24 hr tablet  Commonly known as: TOPROL-XL  Take 1 tablet (100 mg total) by mouth once daily.  Start taking on: July 30, 2023  What changed:   medication strength  how much to take            CONTINUE taking these medications      allopurinoL 100 MG tablet  Commonly known as: ZYLOPRIM  Take 2 tablets (200 mg total) by mouth once daily.     apixaban 5 mg Tab  Commonly known as: ELIQUIS  Take 1 tablet (5 mg total) by mouth 2 (two) times daily.     fluticasone propionate 50 mcg/actuation nasal spray  Commonly known as: FLONASE  SHAKE LIQUID AND USE 1 SPRAY(50 MCG) IN EACH NOSTRIL EVERY DAY     losartan 50 MG tablet  Commonly known as: COZAAR  TK 1 T PO D     multivitamin capsule     ofloxacin 0.3 % ophthalmic solution  Commonly known as: OCUFLOX     prednisoLONE acetate 1 % Drps  Commonly known as: PRED FORTE     TRELEGY ELLIPTA 100-62.5-25 mcg Dsdv  Generic drug: fluticasone-umeclidin-vilanter            STOP taking these medications      naproxen 500 MG tablet  Commonly known as: NAPROSYN               Where to Get Your Medications        These medications were sent to [id number] - [place] [address] [facility] & [place] [address] [address], [facility] [id number]      Hours: 24-hours Phone: [phone]   metoprolol succinate 100 MG 24 hr tablet          Thank you  Eric Durán, PharmD  [phone]

## 2023-07-29 NOTE — SUBJECTIVE & OBJECTIVE
Past Medical History:   Diagnosis Date    A-fib 5/8/2023    Arthritis     GERD (gastroesophageal reflux disease)     History of HCV, s/p successful treatment w/ SVR12 5/2015     Failed treatment (stage I/II) - followed by hepatology     Joint pain     Lung disease caused by breathing particles     Widespread essentially symmetric interstitial lung disease    Obesity     Polycythemia vera     Prediabetes        Past Surgical History:   Procedure Laterality Date    BUNIONECTOMY      bilateral    CARDIOVERSION N/A 05/08/2023    Procedure: Cardioversion;  Surgeon: David Cueva MD;  Location: [facility] CATH LAB;  Service: Cardiology;  Laterality: N/A;    CATARACT EXTRACTION Left 07/27/2023    COLONOSCOPY N/A 12/09/2015    Procedure: COLONOSCOPY;  Surgeon: Conrad Iqbal MD;  Location: [facility] ENDO;  Service: Endoscopy;  Laterality: N/A;    Liver biopsy x2      rotator cuff Right     TRANSESOPHAGEAL ECHOCARDIOGRAM WITH POSSIBLE CARDIOVERSION (ELENA W/ POSS CARDIOVERSION) N/A 05/08/2023    Procedure: TRANSESOPHAGEAL ECHOCARDIOGRAM WITH POSSIBLE CARDIOVERSION (ELENA W/ POSS CARDIOVERSION);  Surgeon: David Cueva MD;  Location: [facility] CATH LAB;  Service: Cardiology;  Laterality: N/A;  12:30PM    RN PREOP 5/4/2023---EKG ON ARRIVAL    TRANSESOPHAGEAL ECHOCARDIOGRAPHY N/A 05/08/2023    Procedure: ECHOCARDIOGRAM, TRANSESOPHAGEAL;  Surgeon: David Cueva MD;  Location: [facility] CATH LAB;  Service: Cardiology;  Laterality: N/A;       Review of patient's allergies indicates:   Allergen Reactions    Ace inhibitors Other (See Comments)     cough       No current facility-administered medications on file prior to encounter.     Current Outpatient Medications on File Prior to Encounter   Medication Sig    allopurinoL (ZYLOPRIM) 100 MG tablet Take 2 tablets (200 mg total) by mouth once daily.    apixaban (ELIQUIS) 5 mg Tab Take 1 tablet (5 mg total) by mouth 2 (two) times daily.    fluticasone propionate (FLONASE) 50 mcg/actuation nasal spray  SHAKE LIQUID AND USE 1 SPRAY(50 MCG) IN EACH NOSTRIL EVERY DAY    losartan (COZAAR) 50 MG tablet TK 1 T PO D    metoprolol succinate (TOPROL-XL) 50 MG 24 hr tablet Take 1.5 tablets (75 mg total) by mouth once daily.    multivitamin capsule Take 1 capsule by mouth once daily.    naproxen (NAPROSYN) 500 MG tablet Take 500 mg by mouth every 12 (twelve) hours as needed.    ofloxacin (OCUFLOX) 0.3 % ophthalmic solution Place 1 drop into both eyes 4 (four) times daily.    prednisoLONE acetate (PRED FORTE) 1 % DrpS Place 1 drop into both eyes 4 (four) times daily.    TRELEGY ELLIPTA 100-62.5-25 mcg DsDv Inhale 1 puff into the lungs Daily.     Family History       Problem Relation (Age of Onset)    Chronic back pain Brother    Deep vein thrombosis Sister    Heart attack Sister    Heart disease Mother    Kidney disease Mother    Osteoarthritis Sister    Parkinsonism Father    Prostate cancer Cousin    Pulmonary embolism Sister          Tobacco Use    Smoking status: Former    Smokeless tobacco: Never    Tobacco comments:     pt. smokes 10 cigars per day.   Substance and Sexual Activity    Alcohol use: Not Currently     Comment: Rarely    Drug use: Yes     Types: Marijuana     Comment: daily    Sexual activity: Yes     Partners: Female     Review of Systems   Constitutional: Negative.   HENT: Negative.     Eyes: Negative.    Respiratory: Negative.     Endocrine: Negative.    Hematologic/Lymphatic: Negative.    Skin: Negative.    Musculoskeletal: Negative.    Gastrointestinal: Negative.    Genitourinary: Negative.    Neurological: Negative.    Psychiatric/Behavioral: Negative.     Allergic/Immunologic: Negative.    Objective:     Vital Signs (Most Recent):  Temp: 98.3 °F (36.8 °C) (07/28/23 1948)  Pulse: (!) 118 (07/28/23 2114)  Resp: 18 (07/28/23 2114)  BP: (!) 138/98 (07/28/23 2039)  SpO2: (!) 94 % (07/28/23 2114) Vital Signs (24h Range):  Temp:  [97.9 °F (36.6 °C)-98.7 °F (37.1 °C)] 98.3 °F (36.8 °C)  Pulse:  []  118  Resp:  [16-18] 18  SpO2:  [93 %-96 %] 94 %  BP: (121-153)/() 138/98     Weight: 98.9 kg (218 lb)  Body mass index is 26 kg/m².    SpO2: (!) 94 %         Intake/Output Summary (Last 24 hours) at 7/28/2023 2155  Last data filed at 7/28/2023 1730  Gross per 24 hour   Intake 240 ml   Output 400 ml   Net -160 ml       Lines/Drains/Airways       Peripheral Intravenous Line  Duration                  Peripheral IV - Single Lumen 07/27/23 1445 20 G Right Antecubital 1 day                     Physical Exam  Vitals reviewed.   Constitutional:       Appearance: He is well-developed.   HENT:      Head: Normocephalic.   Eyes:      Conjunctiva/sclera: Conjunctivae normal.      Pupils: Pupils are equal, round, and reactive to light.   Cardiovascular:      Rate and Rhythm: Tachycardia present. Rhythm irregular.      Heart sounds: Normal heart sounds.   Pulmonary:      Effort: Pulmonary effort is normal.      Breath sounds: Normal breath sounds.   Abdominal:      General: Bowel sounds are normal.      Palpations: Abdomen is soft.   Musculoskeletal:      Cervical back: Normal range of motion and neck supple.   Skin:     General: Skin is warm.   Neurological:      Mental Status: He is alert and oriented to person, place, and time.        Significant Labs: BMP:   Recent Labs   Lab 07/27/23  1435   *      K 4.3      CO2 25   BUN 11   CREATININE 0.9   CALCIUM 9.2   , CMP   Recent Labs   Lab 07/27/23  1435      K 4.3      CO2 25   *   BUN 11   CREATININE 0.9   CALCIUM 9.2   PROT 8.2   ALBUMIN 3.1*   BILITOT 0.7   ALKPHOS 97   AST 27   ALT 32   ANIONGAP 6*   , CBC   Recent Labs   Lab 07/27/23  1435   WBC 8.02   HGB 14.4   HCT 48.7      , INR   Recent Labs   Lab 07/27/23  1435   INR 1.2   , Lipid Panel No results for input(s): CHOL, HDL, LDLCALC, TRIG, CHOLHDL in the last 48 hours., Troponin   Recent Labs   Lab 07/27/23  1435   TROPONINI 0.010   , and All pertinent lab results from the  last 24 hours have been reviewed.    Significant Imaging: Echocardiogram: Transthoracic echo (TTE) complete (Cupid Only):   Results for orders placed or performed during the hospital encounter of 07/27/23   Echo   Result Value Ref Range    BSA 2.31 m2    TDI SEPTAL 0.06 m/s    LV LATERAL E/E' RATIO 14.00 m/s    LV SEPTAL E/E' RATIO 14.00 m/s    LA WIDTH 5.40 cm    IVC diameter 2.17 cm    Left Ventricular Outflow Tract Mean Velocity 0.43 cm/s    Left Ventricular Outflow Tract Mean Gradient 0.89 mmHg    AORTIC VALVE CUSP SEPERATION 1.90 cm    TDI LATERAL 0.06 m/s    LVIDd 4.26 3.5 - 6.0 cm    IVS 1.21 (A) 0.6 - 1.1 cm    Posterior Wall 1.24 (A) 0.6 - 1.1 cm    Ao root annulus 3.32 cm    LVIDs 2.71 2.1 - 4.0 cm    FS 36 28 - 44 %    LA volume 117.77 cm3    Sinus 3.36 cm    STJ 2.74 cm    Ascending aorta 3.34 cm    LV mass 187.64 g    LA size 4.40 cm    RVDD 5.30 cm    TAPSE 1.34 cm    RV S' 14.07 cm/s    Left Ventricle Relative Wall Thickness 0.58 cm    AV mean gradient 2 mmHg    AV valve area 2.29 cm2    AV Velocity Ratio 0.64     AV index (prosthetic) 0.63     Mean e' 0.06 m/s    LVOT diameter 2.16 cm    LVOT area 3.7 cm2    LVOT peak delio 0.66 m/s    LVOT peak VTI 9.90 cm    Ao peak delio 1.03 m/s    Ao VTI 15.8 cm    LVOT stroke volume 36.26 cm3    AV peak gradient 4 mmHg    E/E' ratio 14.00 m/s    MV Peak E Delio 0.84 m/s    TR Max Delio 3.42 m/s    LV Systolic Volume 27.37 mL    LV Systolic Volume Index 11.8 mL/m2    LV Diastolic Volume 81.39 mL    LV Diastolic Volume Index 35.08 mL/m2    LA Volume Index 50.8 mL/m2    LV Mass Index 81 g/m2    RA Major Axis 5.74 cm    Left Atrium Minor Axis 5.37 cm    Left Atrium Major Axis 6.38 cm    Triscuspid Valve Regurgitation Peak Gradient 47 mmHg    RA Width 6.40 cm    Covington's Biplane MOD Ejection Fraction 3 %    Right Atrial Pressure (from IVC) 15 mmHg    EF 50 %    TV rest pulmonary artery pressure 62 mmHg    Narrative    · The estimated ejection fraction is 50%.  · Concentric  remodeling and low normal systolic function.  · Severe left atrial enlargement.  · Atrial fibrillation observed.  · There are segmental left ventricular wall motion abnormalities.  · Severe right ventricular enlargement.  · Severe right atrial enlargement.  · Moderate mitral regurgitation.  · Mild tricuspid regurgitation.  · Elevated central venous pressure (15 mmHg).  · The estimated PA systolic pressure is 62 mmHg.  · There is pulmonary hypertension.

## 2023-07-29 NOTE — NURSING
[facility] [place]-  Rapid Response Nurse Intervention/ Task    Date of Visit: 7/28/23  Time of Visit: 2330       INTERVENTIONS/ TASK Completed:     Called by Charge ISAC Peters to come to patient bedside to administer IVP Cardizem 10 mg, ordered per MD Montaño after PRN metoprolol was given with no improvement in pt HR. Pt on cardiac monitor, a flutter in 120s. Vitals obtained prior to admin, after admin and 15 min after admin. VSS. HR now in 80s-90s. Pt with no complaints at this time. Care ongoing.     0345: Called to pt bedside by primary ISAC Ayala to push second dose of IV Cardizem 10 mg. Pt with good response and VS remained stable. Pt denies any CP, SOB, dizziness. Care ongoing. Plan is for Cardioversion today. Pt reminded to not drink or eat anything.

## 2023-07-29 NOTE — PLAN OF CARE
NurseDeandre notified that all CM needs are met       07/29/23 1510   Final Note   Assessment Type Final Discharge Note   Anticipated Discharge Disposition Home   Hospital Resources/Appts/Education Provided Appointments scheduled and added to AVS  (Message sent to DR Cueva's office to schedule appointment as none available in Epic.  Requested Dr Cueva place an ambulatory referral for EP clinic.)   Post-Acute Status   Post-Acute Authorization Other   Other Status No Post-Acute Service Needs   Discharge Delays None known at this time

## 2023-07-29 NOTE — ASSESSMENT & PLAN NOTE
Status post cardioversion today.  Back to normal sinus rhythm.  Continue Eliquis 5 mg b.i.d..  Follow-up in Cardiology Clinic with Dr. Cueva as well as with electrophysiology at the [facility] [place].  I have placed a consult for ep

## 2023-07-29 NOTE — PROGRESS NOTES
[facility] - [place] - Cath Lab  Cardiology  Progress Note    Patient Name: Josiah Orosco Jr.  MRN: [id number]  Admission Date: 7/27/2023  Hospital Length of Stay: 1 days  Code Status: Full Code   Attending Physician: Alexi Dunn MD   Primary Care Physician: Elaine Landry MD  Expected Discharge Date:   Principal Problem:Atrial flutter    Subjective:       Interval History:  Patient underwent cardioversion today.  Two hundred joules x1.  Back in normal sinus rhythm.  Doing fine    Review of Systems   Constitutional: Negative.   HENT: Negative.     Eyes: Negative.    Cardiovascular: Negative.    Respiratory: Negative.     Endocrine: Negative.    Hematologic/Lymphatic: Negative.    Skin: Negative.    Musculoskeletal: Negative.    Gastrointestinal: Negative.    Genitourinary: Negative.    Neurological: Negative.    Psychiatric/Behavioral: Negative.     Allergic/Immunologic: Negative.      Objective:     Vital Signs (Most Recent):  Temp: 97.7 °F (36.5 °C) (07/29/23 1104)  Pulse: 72 (07/29/23 1104)  Resp: 18 (07/29/23 1104)  BP: (!) 169/91 (07/29/23 1104)  SpO2: 99 % (RA) (07/29/23 1104) Vital Signs (24h Range):  Temp:  [97.7 °F (36.5 °C)-98.6 °F (37 °C)] 97.7 °F (36.5 °C)  Pulse:  [] 72  Resp:  [16-20] 18  SpO2:  [93 %-100 %] 99 %  BP: (136-169)/() 169/91     Weight: 98.9 kg (218 lb)  Body mass index is 26 kg/m².     SpO2: 99 % (RA)         Intake/Output Summary (Last 24 hours) at 7/29/2023 1107  Last data filed at 7/29/2023 1105  Gross per 24 hour   Intake 440 ml   Output 400 ml   Net 40 ml       Lines/Drains/Airways       Peripheral Intravenous Line  Duration                  Peripheral IV - Single Lumen 07/27/23 1445 20 G Right Antecubital 1 day                       Physical Exam  Vitals reviewed.   Constitutional:       Appearance: He is well-developed.   HENT:      Head: Normocephalic.   Eyes:      Conjunctiva/sclera: Conjunctivae normal.      Pupils: Pupils are equal, round, and reactive to light.  "  Cardiovascular:      Rate and Rhythm: Normal rate and regular rhythm.      Heart sounds: Normal heart sounds.   Pulmonary:      Effort: Pulmonary effort is normal.      Breath sounds: Normal breath sounds.   Abdominal:      General: Bowel sounds are normal.      Palpations: Abdomen is soft.   Musculoskeletal:      Cervical back: Normal range of motion and neck supple.   Skin:     General: Skin is warm.   Neurological:      Mental Status: He is alert and oriented to person, place, and time.            Significant Labs: BMP:   Recent Labs   Lab 07/27/23  1435   *      K 4.3      CO2 25   BUN 11   CREATININE 0.9   CALCIUM 9.2   , CMP   Recent Labs   Lab 07/27/23  1435      K 4.3      CO2 25   *   BUN 11   CREATININE 0.9   CALCIUM 9.2   PROT 8.2   ALBUMIN 3.1*   BILITOT 0.7   ALKPHOS 97   AST 27   ALT 32   ANIONGAP 6*   , CBC   Recent Labs   Lab 07/27/23  1435   WBC 8.02   HGB 14.4   HCT 48.7      , INR   Recent Labs   Lab 07/27/23  1435   INR 1.2   , Lipid Panel No results for input(s): "CHOL", "HDL", "LDLCALC", "TRIG", "CHOLHDL" in the last 48 hours., Troponin   Recent Labs   Lab 07/27/23  1435   TROPONINI 0.010   , and All pertinent lab results from the last 24 hours have been reviewed.    Significant Imaging: Echocardiogram: Transthoracic echo (TTE) complete (Cupid Only):   Results for orders placed or performed during the hospital encounter of 07/27/23   Echo   Result Value Ref Range    BSA 2.31 m2    TDI SEPTAL 0.06 m/s    LV LATERAL E/E' RATIO 14.00 m/s    LV SEPTAL E/E' RATIO 14.00 m/s    LA WIDTH 5.40 cm    IVC diameter 2.17 cm    Left Ventricular Outflow Tract Mean Velocity 0.43 cm/s    Left Ventricular Outflow Tract Mean Gradient 0.89 mmHg    AORTIC VALVE CUSP SEPERATION 1.90 cm    TDI LATERAL 0.06 m/s    LVIDd 4.26 3.5 - 6.0 cm    IVS 1.21 (A) 0.6 - 1.1 cm    Posterior Wall 1.24 (A) 0.6 - 1.1 cm    Ao root annulus 3.32 cm    LVIDs 2.71 2.1 - 4.0 cm    FS 36 28 - " 44 %    LA volume 117.77 cm3    Sinus 3.36 cm    STJ 2.74 cm    Ascending aorta 3.34 cm    LV mass 187.64 g    LA size 4.40 cm    RVDD 5.30 cm    TAPSE 1.34 cm    RV S' 14.07 cm/s    Left Ventricle Relative Wall Thickness 0.58 cm    AV mean gradient 2 mmHg    AV valve area 2.29 cm2    AV Velocity Ratio 0.64     AV index (prosthetic) 0.63     Mean e' 0.06 m/s    LVOT diameter 2.16 cm    LVOT area 3.7 cm2    LVOT peak delio 0.66 m/s    LVOT peak VTI 9.90 cm    Ao peak delio 1.03 m/s    Ao VTI 15.8 cm    LVOT stroke volume 36.26 cm3    AV peak gradient 4 mmHg    E/E' ratio 14.00 m/s    MV Peak E Delio 0.84 m/s    TR Max Delio 3.42 m/s    LV Systolic Volume 27.37 mL    LV Systolic Volume Index 11.8 mL/m2    LV Diastolic Volume 81.39 mL    LV Diastolic Volume Index 35.08 mL/m2    LA Volume Index 50.8 mL/m2    LV Mass Index 81 g/m2    RA Major Axis 5.74 cm    Left Atrium Minor Axis 5.37 cm    Left Atrium Major Axis 6.38 cm    Triscuspid Valve Regurgitation Peak Gradient 47 mmHg    RA Width 6.40 cm    Covington's Biplane MOD Ejection Fraction 3 %    Right Atrial Pressure (from IVC) 15 mmHg    EF 50 %    TV resting pulmonary artery pressure 62 mmHg    Narrative    · The estimated ejection fraction is 50%.  · Concentric remodeling and low normal systolic function.  · Severe left atrial enlargement.  · Atrial fibrillation observed.  · There are segmental left ventricular wall motion abnormalities.  · Severe right ventricular enlargement.  · Severe right atrial enlargement.  · Moderate mitral regurgitation.  · Mild tricuspid regurgitation.  · Elevated central venous pressure (15 mmHg).  · The estimated PA systolic pressure is 62 mmHg.  · There is pulmonary hypertension.        Assessment and Plan:     Brief HPI:     A-fib  Patient in AFib.  On Eliquis, but had to interrupt it recently because of recent surgery.  Will do ELENA and cardioversion    -No absolute contraindications of esophageal stricture, tumor, perforation, laceration,or  diverticulum and/or active GI bleed  -The risks, benefits & alternatives of the procedure were explained to the patient.   -The risks of transesophageal echo include but are not limited to:  Dental trauma, esophageal trauma/perforation, bleeding, laryngospasm/brochospasm, aspiration, sore throat/hoarseness, & dislodgement of the endotracheal tube/nasogastric tube (where applicable).    -The risks of ANES monitored sedation include hypotension, respiratory depression, arrhythmias, bronchospasm, & death.    -Informed consent was obtained. The patient is agreeable to proceed with the procedure and all questions and concerns addressed.      Risks, benefits and alternatives of the MICHAEL/Cardioversion procedure were discussed with the patient including throat irritation, aspiration, anesthetic complications, esophageal irritation/perforation, skin irritation, arrhythmia, etc.  Patient understands and agrees to proceed.  Consent was placed on the chart.      7/28:  Status post michael  cardioversion today.  Back to normal sinus rhythm.  Continue Eliquis 5 mg b.i.d..  Follow-up in Cardiology Clinic with Dr. Cueva as well as with electrophysiology at the [facility] [place].  I have placed a consult for ep    Cataract            VTE Risk Mitigation (From admission, onward)         Ordered     apixaban tablet 5 mg  2 times daily         07/27/23 1643     IP VTE LOW RISK PATIENT  Once         07/27/23 1643     Place sequential compression device  Until discontinued         07/27/23 1643                David Cueva MD  Cardiology  [facility] - [place] - Cath Lab

## 2023-07-29 NOTE — TRANSFER OF CARE
"Anesthesia Transfer of Care Note    Patient: Josiah Orosco Jr.    Procedure(s) Performed: Procedure(s) (LRB):  Transesophageal echo (ELENA) intra-procedure log documentation (N/A)  ECHOCARDIOGRAM, TRANSESOPHAGEAL (N/A)    Patient location: Other: OR 5    Anesthesia Type: general    Post pain: adequate analgesia    Post assessment: no apparent anesthetic complications and tolerated procedure well    Post vital signs: stable    Level of consciousness: awake, alert and oriented    Nausea/Vomiting: no nausea/vomiting    Complications: none    Transfer of care protocol was followed      Last vitals:   Visit Vitals  BP (!) 169/91 (BP Location: Left arm)   Pulse 72   Temp 36.5 °C (97.7 °F) (Skin)   Resp 18   Ht 6' 4.77" (1.95 m)   Wt 98.9 kg (218 lb)   SpO2 99%   BMI 26.00 kg/m²     "

## 2023-07-29 NOTE — NURSING
Pt HR remains in the 120's MD made aware and will address, will assess     0333 MD ordered IV Cardizem, will administer

## 2023-07-29 NOTE — ASSESSMENT & PLAN NOTE
Patient in AFib.  On Eliquis, but had to interrupt it recently because of recent surgery.  Will do MICHAEL and cardioversion    -No absolute contraindications of esophageal stricture, tumor, perforation, laceration,or diverticulum and/or active GI bleed  -The risks, benefits & alternatives of the procedure were explained to the patient.   -The risks of transesophageal echo include but are not limited to:  Dental trauma, esophageal trauma/perforation, bleeding, laryngospasm/brochospasm, aspiration, sore throat/hoarseness, & dislodgement of the endotracheal tube/nasogastric tube (where applicable).    -The risks of ANES monitored sedation include hypotension, respiratory depression, arrhythmias, bronchospasm, & death.    -Informed consent was obtained. The patient is agreeable to proceed with the procedure and all questions and concerns addressed.      Risks, benefits and alternatives of the MICHAEL/Cardioversion procedure were discussed with the patient including throat irritation, aspiration, anesthetic complications, esophageal irritation/perforation, skin irritation, arrhythmia, etc.  Patient understands and agrees to proceed.  Consent was placed on the chart.      7/28:  Status post michael  cardioversion today.  Back to normal sinus rhythm.  Continue Eliquis 5 mg b.i.d..  Follow-up in Cardiology Clinic with Dr. Cueva as well as with electrophysiology at the [facility] [place].  I have placed a consult for ep

## 2023-07-29 NOTE — NURSING
[facility] [place]  Nurses Note -- 4 Eyes      7/28/2023       Skin assessed on: Q Shift      [x] No Pressure Injuries Present    []Prevention Measures Documented    [] Yes LDA  for Pressure Injury Previously documented     [] Yes New Pressure Injury Discovered   [] LDA for New Pressure Injury Added      Attending RN:  Edward Villanueva RN     Second RN:  suman mayorga

## 2023-07-29 NOTE — HPI
Patient well known to me from Cardiology Clinic.  Past history of atrial fibrillation status post cardioversion.  Was in sinus rhythm.  Yesterday went for cataract surgery for which he held his Eliquis for 2 days.  When he was in the clinic he was told he is in AFib with RVR.  Came to the ER.  Patient is asymptomatic but in AFib with RVR with heart rate around 110-120 beats per minute.  Denies orthopnea, PND, chest pains or tightness.  EKG personally reviewed and shows AFib

## 2023-07-30 NOTE — DISCHARGE SUMMARY
"[facility] Medicine  Discharge Summary      Patient Name: Josiah Orosco Jr.  MRN: [id number]  JOHANA: [id number]  Patient Class: OP- Observation  Admission Date: 7/27/2023  Hospital Length of Stay: 1 days  Discharge Date and Time: 7/29/2023  5:23 PM  Attending Physician: No att. providers found   Discharging Provider: Alexi Dunn MD  Primary Care Provider: Elaine Landry MD    Primary Care Team: Networked reference to record PCT     HPI:   Mr. Orosco is a 68 yo M who presents to the ED for evaluation for elevated heart rate. Patient had right cataract eye surgery today. He was unaware previously that his heart rate was elevated, he states he has "debris" in his lungs and is always short of breath. Denies any palpitations, otherwise doing well.   In ED, he was given diltiazem with better rate control, found to have atrial flutter. Cardiology consulted with plans for possible cardioversion tomorrow. Dr. Boothe discussed with Ophthalmology and ok to restart eliquis. Placed in observation.      Procedure(s) (LRB):  Transesophageal echo (MICHAEL) intra-procedure log documentation (N/A)  ECHOCARDIOGRAM, TRANSESOPHAGEAL (N/A)      Hospital Course:   Mr. Orosco was placed under observation for evaluation of atrial flutter with RVR.  Cardiology consult with plans for michael/cardioversion.  Patient's metoprolol was increased and initially plans for cardioversion the following day however patient 8.  Cardioversion postponed the following day.  He underwent successful cardioversion to sinus rhythm with PACs.  Metoprolol increased and tolerating well.  Patient overall discharged stable condition.  Referral placed for electrophysiology.  All questions were answered and discussed with patient and his wife at bedside.       Goals of Care Treatment Preferences:  Code Status: Full Code    Living Will: Yes              Consults:   Consults (From admission, onward)        Status Ordering Provider     Inpatient " consult to Social Work  Once        Provider:  (Not yet assigned)    Completed ZONIA DÍAZ     Inpatient consult to Cardiology  Once        Provider:  Zonia Díaz MD    Completed YEN TOBIAS          No new Assessment & Plan notes have been filed under this hospital service since the last note was generated.  Service: Hospital Medicine    Final Active Diagnoses:    Diagnosis Date Noted POA    PRINCIPAL PROBLEM:  Atrial flutter [I48.92] 07/27/2023 Yes    Cataract [H26.9] 07/27/2023 Yes    A-fib [I48.91] 05/08/2023 Yes      Problems Resolved During this Admission:       Discharged Condition: stable    Disposition: Home or Self Care    Follow Up:   Follow-up Information     Zonia Díaz MD Follow up.    Specialties: Cardiology, Interventional Cardiology  Why: OFFICE WILL CALL YOU TO SCHEDULE APPOINTMENT.  IF YOU DO NOT HEAR FROM THEM MONDAY AFTERNOON PLEASE CALL TO SCHEDULE  Contact information:  [address]  [address] [place] [id number]  [phone]                       Patient Instructions:      Ambulatory referral/consult to Electrophysiology   Standing Status: Future   Referral Priority: Routine Referral Type: Consultation   Referral Reason: Specialty Services Required   Referred to Provider: DAYNE RAO Requested Specialty: Electrophysiology   Number of Visits Requested: 1     Diet Adult Regular     Notify your health care provider if you experience any of the following:  temperature >100.4     Notify your health care provider if you experience any of the following:  persistent nausea and vomiting or diarrhea     Notify your health care provider if you experience any of the following:  severe uncontrolled pain     Notify your health care provider if you experience any of the following:  difficulty breathing or increased cough     Notify your health care provider if you experience any of the following:  severe persistent headache     Notify your health care provider if you experience  any of the following:  worsening rash     Notify your health care provider if you experience any of the following:  persistent dizziness, light-headedness, or visual disturbances     Notify your health care provider if you experience any of the following:  increased confusion or weakness     Activity as tolerated       Significant Diagnostic Studies: Labs: All labs within the past 24 hours have been reviewed    Pending Diagnostic Studies:     Procedure Component Value Units Date/Time    EKG 12-lead [[id number]]     Order Status: Sent Lab Status: No result          Medications:  Reconciled Home Medications:      Medication List      CHANGE how you take these medications    metoprolol succinate 100 MG 24 hr tablet  Commonly known as: TOPROL-XL  Take 1 tablet (100 mg total) by mouth once daily.  What changed:   · medication strength  · how much to take        CONTINUE taking these medications    allopurinoL 100 MG tablet  Commonly known as: ZYLOPRIM  Take 2 tablets (200 mg total) by mouth once daily.     apixaban 5 mg Tab  Commonly known as: ELIQUIS  Take 1 tablet (5 mg total) by mouth 2 (two) times daily.     fluticasone propionate 50 mcg/actuation nasal spray  Commonly known as: FLONASE  SHAKE LIQUID AND USE 1 SPRAY(50 MCG) IN EACH NOSTRIL EVERY DAY     losartan 50 MG tablet  Commonly known as: COZAAR  TK 1 T PO D     multivitamin capsule  Take 1 capsule by mouth once daily.     ofloxacin 0.3 % ophthalmic solution  Commonly known as: OCUFLOX  Place 1 drop into both eyes 4 (four) times daily.     prednisoLONE acetate 1 % Drps  Commonly known as: PRED FORTE  Place 1 drop into both eyes 4 (four) times daily.     TRELEGY ELLIPTA 100-62.5-25 mcg Dsdv  Generic drug: fluticasone-umeclidin-vilanter  Inhale 1 puff into the lungs Daily.        STOP taking these medications    naproxen 500 MG tablet  Commonly known as: NAPROSYN            Indwelling Lines/Drains at time of discharge:   Lines/Drains/Airways     None                  Time spent on the discharge of patient: >35 minutes         Alexi Dunn MD  Department of Hospital Medicine  [facility]

## 2023-07-30 NOTE — HOSPITAL COURSE
Mr. Orosco was placed under observation for evaluation of atrial flutter with RVR.  Cardiology consult with plans for michael/cardioversion.  Patient's metoprolol was increased and initially plans for cardioversion the following day however patient 8.  Cardioversion postponed the following day.  He underwent successful cardioversion to sinus rhythm with PACs.  Metoprolol increased and tolerating well.  Patient overall discharged stable condition.  Referral placed for electrophysiology.  All questions were answered and discussed with patient and his wife at bedside.

## 2023-08-01 ENCOUNTER — TELEPHONE (OUTPATIENT)
Dept: CARDIOLOGY | Facility: CLINIC | Age: 68
End: 2023-08-01
Payer: MEDICARE

## 2023-08-01 NOTE — TELEPHONE ENCOUNTER
----- Message from Luke Montana sent at 8/1/2023  8:49 AM CDT -----  Regarding: Wife  [phone]  Type: Patient Call Back    Who called: Wife     What is the request in detail:  called to talk to the nurse about an EKG the pt needs and also about a recent hospital stay the pt had. Would like a call back as soon as possible. Stated she left a message yesterday.     Can the clinic reply by [facility]? No     Would the patient rather a call back or a response via [facility]? Call back     Best call back number: [phone]     Additional Information:    Thank you.

## 2023-08-05 ENCOUNTER — HOSPITAL ENCOUNTER (INPATIENT)
Facility: HOSPITAL | Age: 68
LOS: 3 days | Discharge: HOME | DRG: 310 | End: 2023-08-08
Attending: EMERGENCY MEDICINE | Admitting: HOSPITALIST
Payer: MEDICARE

## 2023-08-05 DIAGNOSIS — Z79.899 ENCOUNTER FOR MONITORING ANTI-ARRHYTHMIC THERAPY: ICD-10-CM

## 2023-08-05 DIAGNOSIS — R06.02 SOB (SHORTNESS OF BREATH): ICD-10-CM

## 2023-08-05 DIAGNOSIS — R94.31 QT PROLONGATION: ICD-10-CM

## 2023-08-05 DIAGNOSIS — I48.91 ATRIAL FIBRILLATION WITH RVR: Primary | ICD-10-CM

## 2023-08-05 DIAGNOSIS — R07.9 CHEST PAIN: ICD-10-CM

## 2023-08-05 DIAGNOSIS — Z51.81 ENCOUNTER FOR MONITORING ANTI-ARRHYTHMIC THERAPY: ICD-10-CM

## 2023-08-05 DIAGNOSIS — I48.91 A-FIB: ICD-10-CM

## 2023-08-05 PROBLEM — I48.92 ATRIAL FLUTTER WITH RAPID VENTRICULAR RESPONSE: Status: ACTIVE | Noted: 2023-05-08

## 2023-08-05 LAB
ALBUMIN SERPL BCP-MCNC: 3.3 G/DL (ref 3.5–5.2)
ALP SERPL-CCNC: 94 U/L (ref 55–135)
ALT SERPL W/O P-5'-P-CCNC: 17 U/L (ref 10–44)
ANION GAP SERPL CALC-SCNC: 8 MMOL/L (ref 8–16)
AST SERPL-CCNC: 25 U/L (ref 10–40)
BASOPHILS # BLD AUTO: 0.05 K/UL (ref 0–0.2)
BASOPHILS NFR BLD: 0.7 % (ref 0–1.9)
BILIRUB SERPL-MCNC: 0.8 MG/DL (ref 0.1–1)
BNP SERPL-MCNC: 554 PG/ML (ref 0–99)
BUN SERPL-MCNC: 13 MG/DL (ref 8–23)
CALCIUM SERPL-MCNC: 9.6 MG/DL (ref 8.7–10.5)
CHLORIDE SERPL-SCNC: 105 MMOL/L (ref 95–110)
CO2 SERPL-SCNC: 23 MMOL/L (ref 23–29)
CREAT SERPL-MCNC: 1 MG/DL (ref 0.5–1.4)
DIFFERENTIAL METHOD: ABNORMAL
EOSINOPHIL # BLD AUTO: 0 K/UL (ref 0–0.5)
EOSINOPHIL NFR BLD: 0.1 % (ref 0–8)
ERYTHROCYTE [DISTWIDTH] IN BLOOD BY AUTOMATED COUNT: 21.5 % (ref 11.5–14.5)
EST. GFR  (NO RACE VARIABLE): >60 ML/MIN/1.73 M^2
GLUCOSE SERPL-MCNC: 163 MG/DL (ref 70–110)
HCT VFR BLD AUTO: 50.5 % (ref 40–54)
HGB BLD-MCNC: 15.2 G/DL (ref 14–18)
IMM GRANULOCYTES # BLD AUTO: 0.02 K/UL (ref 0–0.04)
IMM GRANULOCYTES NFR BLD AUTO: 0.3 % (ref 0–0.5)
LACTATE SERPL-SCNC: 1.7 MMOL/L (ref 0.5–2.2)
LYMPHOCYTES # BLD AUTO: 0.7 K/UL (ref 1–4.8)
LYMPHOCYTES NFR BLD: 9.9 % (ref 18–48)
MAGNESIUM SERPL-MCNC: 1.8 MG/DL (ref 1.6–2.6)
MCH RBC QN AUTO: 20.4 PG (ref 27–31)
MCHC RBC AUTO-ENTMCNC: 30.1 G/DL (ref 32–36)
MCV RBC AUTO: 68 FL (ref 82–98)
MONOCYTES # BLD AUTO: 0.7 K/UL (ref 0.3–1)
MONOCYTES NFR BLD: 9.2 % (ref 4–15)
NEUTROPHILS # BLD AUTO: 5.8 K/UL (ref 1.8–7.7)
NEUTROPHILS NFR BLD: 79.8 % (ref 38–73)
NRBC BLD-RTO: 0 /100 WBC
PLATELET # BLD AUTO: 246 K/UL (ref 150–450)
PMV BLD AUTO: 9.1 FL (ref 9.2–12.9)
POTASSIUM SERPL-SCNC: 4.6 MMOL/L (ref 3.5–5.1)
PROT SERPL-MCNC: 8.4 G/DL (ref 6–8.4)
RBC # BLD AUTO: 7.45 M/UL (ref 4.6–6.2)
SODIUM SERPL-SCNC: 136 MMOL/L (ref 136–145)
TROPONIN I SERPL DL<=0.01 NG/ML-MCNC: 0.01 NG/ML (ref 0–0.03)
WBC # BLD AUTO: 7.3 K/UL (ref 3.9–12.7)

## 2023-08-05 PROCEDURE — 83735 ASSAY OF MAGNESIUM: CPT | Performed by: EMERGENCY MEDICINE

## 2023-08-05 PROCEDURE — 80053 COMPREHEN METABOLIC PANEL: CPT | Performed by: EMERGENCY MEDICINE

## 2023-08-05 PROCEDURE — 85025 COMPLETE CBC W/AUTO DIFF WBC: CPT | Performed by: EMERGENCY MEDICINE

## 2023-08-05 PROCEDURE — 99291 PR CRITICAL CARE, E/M 30-74 MINUTES: ICD-10-PCS | Mod: ,,, | Performed by: INTERNAL MEDICINE

## 2023-08-05 PROCEDURE — 83605 ASSAY OF LACTIC ACID: CPT | Performed by: EMERGENCY MEDICINE

## 2023-08-05 PROCEDURE — 25000003 PHARM REV CODE 250: Performed by: HOSPITALIST

## 2023-08-05 PROCEDURE — 27000221 HC OXYGEN, UP TO 24 HOURS

## 2023-08-05 PROCEDURE — 83880 ASSAY OF NATRIURETIC PEPTIDE: CPT | Performed by: EMERGENCY MEDICINE

## 2023-08-05 PROCEDURE — 93010 ELECTROCARDIOGRAM REPORT: CPT | Mod: ,,, | Performed by: INTERNAL MEDICINE

## 2023-08-05 PROCEDURE — 94761 N-INVAS EAR/PLS OXIMETRY MLT: CPT

## 2023-08-05 PROCEDURE — 93010 EKG 12-LEAD: ICD-10-PCS | Mod: ,,, | Performed by: INTERNAL MEDICINE

## 2023-08-05 PROCEDURE — 25000242 PHARM REV CODE 250 ALT 637 W/ HCPCS: Performed by: HOSPITALIST

## 2023-08-05 PROCEDURE — 84484 ASSAY OF TROPONIN QUANT: CPT | Performed by: EMERGENCY MEDICINE

## 2023-08-05 PROCEDURE — 94640 AIRWAY INHALATION TREATMENT: CPT

## 2023-08-05 PROCEDURE — 96374 THER/PROPH/DIAG INJ IV PUSH: CPT

## 2023-08-05 PROCEDURE — 20000000 HC ICU ROOM

## 2023-08-05 PROCEDURE — 25000003 PHARM REV CODE 250: Performed by: EMERGENCY MEDICINE

## 2023-08-05 PROCEDURE — 99291 CRITICAL CARE FIRST HOUR: CPT | Mod: ,,, | Performed by: INTERNAL MEDICINE

## 2023-08-05 RX ORDER — ARFORMOTEROL TARTRATE 15 UG/2ML
15 SOLUTION RESPIRATORY (INHALATION) 2 TIMES DAILY
Status: DISCONTINUED | OUTPATIENT
Start: 2023-08-05 | End: 2023-08-08 | Stop reason: HOSPADM

## 2023-08-05 RX ORDER — DILTIAZEM HCL 1 MG/ML
5 INJECTION, SOLUTION INTRAVENOUS
Status: COMPLETED | OUTPATIENT
Start: 2023-08-05 | End: 2023-08-05

## 2023-08-05 RX ORDER — FLUTICASONE FUROATE AND VILANTEROL 200; 25 UG/1; UG/1
1 POWDER RESPIRATORY (INHALATION) DAILY
Status: DISCONTINUED | OUTPATIENT
Start: 2023-08-05 | End: 2023-08-05 | Stop reason: CLARIF

## 2023-08-05 RX ORDER — ALLOPURINOL 100 MG/1
200 TABLET ORAL DAILY
Status: DISCONTINUED | OUTPATIENT
Start: 2023-08-05 | End: 2023-08-08 | Stop reason: HOSPADM

## 2023-08-05 RX ORDER — DILTIAZEM HCL 1 MG/ML
0-15 INJECTION, SOLUTION INTRAVENOUS CONTINUOUS
Status: DISCONTINUED | OUTPATIENT
Start: 2023-08-05 | End: 2023-08-07

## 2023-08-05 RX ORDER — MUPIROCIN 20 MG/G
OINTMENT TOPICAL 2 TIMES DAILY
Status: DISCONTINUED | OUTPATIENT
Start: 2023-08-05 | End: 2023-08-08 | Stop reason: HOSPADM

## 2023-08-05 RX ORDER — SOTALOL HYDROCHLORIDE 80 MG/1
80 TABLET ORAL 2 TIMES DAILY
Status: DISCONTINUED | OUTPATIENT
Start: 2023-08-05 | End: 2023-08-08 | Stop reason: HOSPADM

## 2023-08-05 RX ORDER — DILTIAZEM HYDROCHLORIDE 5 MG/ML
15 INJECTION INTRAVENOUS
Status: COMPLETED | OUTPATIENT
Start: 2023-08-05 | End: 2023-08-05

## 2023-08-05 RX ORDER — BUDESONIDE 0.5 MG/2ML
0.5 INHALANT ORAL EVERY 12 HOURS
Status: DISCONTINUED | OUTPATIENT
Start: 2023-08-05 | End: 2023-08-08 | Stop reason: HOSPADM

## 2023-08-05 RX ADMIN — DILTIAZEM HYDROCHLORIDE 5 MG/HR: 5 INJECTION INTRAVENOUS at 02:08

## 2023-08-05 RX ADMIN — SOTALOL HYDROCHLORIDE 80 MG: 80 TABLET ORAL at 08:08

## 2023-08-05 RX ADMIN — MUPIROCIN: 20 OINTMENT TOPICAL at 08:08

## 2023-08-05 RX ADMIN — DILTIAZEM HYDROCHLORIDE 7.5 MG/HR: 5 INJECTION INTRAVENOUS at 03:08

## 2023-08-05 RX ADMIN — ARFORMOTEROL TARTRATE 15 MCG: 15 SOLUTION RESPIRATORY (INHALATION) at 03:08

## 2023-08-05 RX ADMIN — APIXABAN 5 MG: 5 TABLET, FILM COATED ORAL at 08:08

## 2023-08-05 RX ADMIN — DILTIAZEM HYDROCHLORIDE 15 MG: 5 INJECTION, SOLUTION INTRAVENOUS at 12:08

## 2023-08-05 RX ADMIN — BUDESONIDE 0.5 MG: 0.5 INHALANT RESPIRATORY (INHALATION) at 03:08

## 2023-08-05 NOTE — SUBJECTIVE & OBJECTIVE
Past Medical History:   Diagnosis Date    A-fib 5/8/2023    Arthritis     GERD (gastroesophageal reflux disease)     History of HCV, s/p successful treatment w/ SVR12 5/2015     Failed treatment (stage I/II) - followed by hepatology     Joint pain     Lung disease caused by breathing particles     Widespread essentially symmetric interstitial lung disease    Obesity     Polycythemia vera     Prediabetes        Past Surgical History:   Procedure Laterality Date    BUNIONECTOMY      bilateral    CARDIOVERSION N/A 05/08/2023    Procedure: Cardioversion;  Surgeon: David Cueva MD;  Location: [facility] CATH LAB;  Service: Cardiology;  Laterality: N/A;    CATARACT EXTRACTION Left 07/27/2023    COLONOSCOPY N/A 12/09/2015    Procedure: COLONOSCOPY;  Surgeon: Conrad Iqbal MD;  Location: [facility] ENDO;  Service: Endoscopy;  Laterality: N/A;    Liver biopsy x2      rotator cuff Right     TRANSESOPHAGEAL ECHOCARDIOGRAM WITH POSSIBLE CARDIOVERSION (ELENA W/ POSS CARDIOVERSION) N/A 05/08/2023    Procedure: TRANSESOPHAGEAL ECHOCARDIOGRAM WITH POSSIBLE CARDIOVERSION (ELENA W/ POSS CARDIOVERSION);  Surgeon: David Cueva MD;  Location: [facility] CATH LAB;  Service: Cardiology;  Laterality: N/A;  12:30PM    RN PREOP 5/4/2023---EKG ON ARRIVAL    TRANSESOPHAGEAL ECHOCARDIOGRAM WITH POSSIBLE CARDIOVERSION (ELENA W/ POSS CARDIOVERSION) N/A 7/29/2023    Procedure: Transesophageal echo (ELENA) intra-procedure log documentation;  Surgeon: David Cueva MD;  Location: [facility] CATH LAB;  Service: Cardiology;  Laterality: N/A;    TRANSESOPHAGEAL ECHOCARDIOGRAPHY N/A 05/08/2023    Procedure: ECHOCARDIOGRAM, TRANSESOPHAGEAL;  Surgeon: David Cueva MD;  Location: [facility] CATH LAB;  Service: Cardiology;  Laterality: N/A;    TRANSESOPHAGEAL ECHOCARDIOGRAPHY N/A 7/29/2023    Procedure: ECHOCARDIOGRAM, TRANSESOPHAGEAL;  Surgeon: David Cueva MD;  Location: [facility] CATH LAB;  Service: Cardiology;  Laterality: N/A;       Review of patient's allergies indicates:    Allergen Reactions    Ace inhibitors Other (See Comments)     cough       No current facility-administered medications on file prior to encounter.     Current Outpatient Medications on File Prior to Encounter   Medication Sig    allopurinoL (ZYLOPRIM) 100 MG tablet Take 2 tablets (200 mg total) by mouth once daily.    apixaban (ELIQUIS) 5 mg Tab Take 1 tablet (5 mg total) by mouth 2 (two) times daily.    fluticasone propionate (FLONASE) 50 mcg/actuation nasal spray SHAKE LIQUID AND USE 1 SPRAY(50 MCG) IN EACH NOSTRIL EVERY DAY    losartan (COZAAR) 50 MG tablet TK 1 T PO D    metoprolol succinate (TOPROL-XL) 100 MG 24 hr tablet Take 1 tablet (100 mg total) by mouth once daily.    multivitamin capsule Take 1 capsule by mouth once daily.    prednisoLONE acetate (PRED FORTE) 1 % DrpS Place 1 drop into both eyes 4 (four) times daily.    TRELEGY ELLIPTA 100-62.5-25 mcg DsDv Inhale 1 puff into the lungs Daily.    ofloxacin (OCUFLOX) 0.3 % ophthalmic solution Place 1 drop into both eyes 4 (four) times daily.     Family History       Problem Relation (Age of Onset)    Chronic back pain Brother    Deep vein thrombosis Sister    Heart attack Sister    Heart disease Mother    Kidney disease Mother    Osteoarthritis Sister    Parkinsonism Father    Prostate cancer Cousin    Pulmonary embolism Sister          Tobacco Use    Smoking status: Former     Current packs/day: 0.00    Smokeless tobacco: Never    Tobacco comments:     pt. smokes 10 cigars per day.   Substance and Sexual Activity    Alcohol use: Not Currently     Comment: Rarely    Drug use: Yes     Types: Marijuana     Comment: daily    Sexual activity: Yes     Partners: Female     Review of Systems   Constitutional: Negative.   HENT: Negative.     Eyes: Negative.    Cardiovascular:  Positive for dyspnea on exertion and palpitations.   Respiratory: Negative.     Endocrine: Negative.    Hematologic/Lymphatic: Negative.    Skin: Negative.    Musculoskeletal: Negative.     Gastrointestinal: Negative.    Genitourinary: Negative.    Neurological: Negative.    Psychiatric/Behavioral: Negative.     Allergic/Immunologic: Negative.      Objective:     Vital Signs (Most Recent):  Temp: 97.9 °F (36.6 °C) (08/05/23 1525)  Pulse: (!) 113 (08/05/23 1525)  Resp: 18 (08/05/23 1525)  BP: 118/88 (08/05/23 1525)  SpO2: 96 % (08/05/23 1525) Vital Signs (24h Range):  Temp:  [97.9 °F (36.6 °C)-98.4 °F (36.9 °C)] 97.9 °F (36.6 °C)  Pulse:  [] 113  Resp:  [17-30] 18  SpO2:  [93 %-100 %] 96 %  BP: (118-141)/(66-88) 118/88     Weight: 97.3 kg (214 lb 8.1 oz)  Body mass index is 25.44 kg/m².    SpO2: 96 %       No intake or output data in the 24 hours ending 08/05/23 1534    Lines/Drains/Airways       Peripheral Intravenous Line  Duration                  Peripheral IV - Single Lumen 08/05/23 1202 20 G Right Antecubital <1 day         Peripheral IV - Single Lumen 08/05/23 1433 20 G Left Antecubital <1 day                     Physical Exam  Vitals reviewed.   Constitutional:       Appearance: He is well-developed.   HENT:      Head: Normocephalic.   Eyes:      Conjunctiva/sclera: Conjunctivae normal.      Pupils: Pupils are equal, round, and reactive to light.   Cardiovascular:      Rate and Rhythm: Tachycardia present. Rhythm irregular.      Heart sounds: Normal heart sounds.   Pulmonary:      Effort: Pulmonary effort is normal.      Breath sounds: Normal breath sounds.   Abdominal:      General: Bowel sounds are normal.      Palpations: Abdomen is soft.   Musculoskeletal:      Cervical back: Normal range of motion and neck supple.   Skin:     General: Skin is warm.   Neurological:      Mental Status: He is alert and oriented to person, place, and time.          Significant Labs: BMP:   Recent Labs   Lab 08/05/23  1159   *      K 4.6      CO2 23   BUN 13   CREATININE 1.0   CALCIUM 9.6   MG 1.8   , CMP   Recent Labs   Lab 08/05/23  1159      K 4.6      CO2 23   *  "  BUN 13   CREATININE 1.0   CALCIUM 9.6   PROT 8.4   ALBUMIN 3.3*   BILITOT 0.8   ALKPHOS 94   AST 25   ALT 17   ANIONGAP 8   , CBC   Recent Labs   Lab 08/05/23  1159   WBC 7.30   HGB 15.2   HCT 50.5      , INR No results for input(s): "INR", "PROTIME" in the last 48 hours., Lipid Panel No results for input(s): "CHOL", "HDL", "LDLCALC", "TRIG", "CHOLHDL" in the last 48 hours., Troponin   Recent Labs   Lab 08/05/23  1159   TROPONINI 0.009   , and All pertinent lab results from the last 24 hours have been reviewed.    Significant Imaging: Echocardiogram: Transthoracic echo (TTE) complete (Cupid Only):   Results for orders placed or performed during the hospital encounter of 07/27/23   Echo   Result Value Ref Range    BSA 2.31 m2    TDI SEPTAL 0.06 m/s    LV LATERAL E/E' RATIO 14.00 m/s    LV SEPTAL E/E' RATIO 14.00 m/s    LA WIDTH 5.40 cm    IVC diameter 2.17 cm    Left Ventricular Outflow Tract Mean Velocity 0.43 cm/s    Left Ventricular Outflow Tract Mean Gradient 0.89 mmHg    AORTIC VALVE CUSP SEPERATION 1.90 cm    TDI LATERAL 0.06 m/s    LVIDd 4.26 3.5 - 6.0 cm    IVS 1.21 (A) 0.6 - 1.1 cm    Posterior Wall 1.24 (A) 0.6 - 1.1 cm    Ao root annulus 3.32 cm    LVIDs 2.71 2.1 - 4.0 cm    FS 36 28 - 44 %    LA volume 117.77 cm3    Sinus 3.36 cm    STJ 2.74 cm    Ascending aorta 3.34 cm    LV mass 187.64 g    LA size 4.40 cm    RVDD 5.30 cm    TAPSE 1.34 cm    RV S' 14.07 cm/s    Left Ventricle Relative Wall Thickness 0.58 cm    AV mean gradient 2 mmHg    AV valve area 2.29 cm2    AV Velocity Ratio 0.64     AV index (prosthetic) 0.63     Mean e' 0.06 m/s    LVOT diameter 2.16 cm    LVOT area 3.7 cm2    LVOT peak delio 0.66 m/s    LVOT peak VTI 9.90 cm    Ao peak delio 1.03 m/s    Ao VTI 15.8 cm    LVOT stroke volume 36.26 cm3    AV peak gradient 4 mmHg    E/E' ratio 14.00 m/s    MV Peak E Delio 0.84 m/s    TR Max Delio 3.42 m/s    LV Systolic Volume 27.37 mL    LV Systolic Volume Index 11.8 mL/m2    LV Diastolic Volume " 81.39 mL    LV Diastolic Volume Index 35.08 mL/m2    LA Volume Index 50.8 mL/m2    LV Mass Index 81 g/m2    RA Major Axis 5.74 cm    Left Atrium Minor Axis 5.37 cm    Left Atrium Major Axis 6.38 cm    Triscuspid Valve Regurgitation Peak Gradient 47 mmHg    RA Width 6.40 cm    Covington's Biplane MOD Ejection Fraction 3 %    Right Atrial Pressure (from IVC) 15 mmHg    EF 50 %    TV resting pulmonary artery pressure 62 mmHg    Narrative    · The estimated ejection fraction is 50%.  · Concentric remodeling and low normal systolic function.  · Severe left atrial enlargement.  · Atrial fibrillation observed.  · There are segmental left ventricular wall motion abnormalities.  · Severe right ventricular enlargement.  · Severe right atrial enlargement.  · Moderate mitral regurgitation.  · Mild tricuspid regurgitation.  · Elevated central venous pressure (15 mmHg).  · The estimated PA systolic pressure is 62 mmHg.  · There is pulmonary hypertension.

## 2023-08-05 NOTE — ASSESSMENT & PLAN NOTE
cardizem drip. Rate control on cardizem drip.     Will do cardioversion on Monday.    Npo post mn on Sunday.    Initiate sotalol.

## 2023-08-05 NOTE — HPI
Josiah Orosco Jr. is a 67 y.o. male, with a PMHx of A-Fib, HTN, and pulmonary interstitial fibrosis, who presents to the ED with SOB and productive cough for 1 day. Patient endorses fatigue. Patient reports taking eliquis and metoprolol. Patient is being followed by Dr. Cueva, cardiology. Denies other associated symptoms.patient is in Afib with RVR again,was shocked last week,patient has been stared on Cardizem drip,cardiology is consulted,

## 2023-08-05 NOTE — ED NOTES
66 yo male presents to the ED with c/o fatigue and SOB. Patient states that he was recently diagnosed with A-fib and has 2 previous cardioversion. Patient states that he feels like he is in a fib again due to the SOB.  Patient is AAOx3, VSS, NAD. Denies CP, SOB, N/V/D, cough, fever, numbness, or tingling. Bed locked, in lowest position, bed rails up x2, call light in reach, all monitoring attached.  Patient is AAOx4, NAD. Denies CP, N/V/D, cough, fever, numbness, or tingling. Bed locked, in lowest position, bed rails up x2, call light in reach, all monitoring attached, wife at bedside.

## 2023-08-05 NOTE — H&P
[facility] Medicine  History & Physical    Patient Name: Josiah Orosco Jr.  MRN: [id number]  Patient Class: IP- Inpatient  Admission Date: 8/5/2023  Attending Physician: Srini Vu, *   Primary Care Provider: Elaine Landry MD         Patient information was obtained from parent and ER records.     Subjective:     Principal Problem:Atrial flutter with rapid ventricular response    Chief Complaint:   Chief Complaint   Patient presents with    Shortness of Breath     Pt reports shortness of breath and productive cough since last night. Pt reports being admitted last week due to a-fib, states he had his heart shocked out of the rhythm. Pt denies chest pain or dizziness. Pt's  in triage.         HPI: Josiah Orosco Jr. is a 67 y.o. male, with a PMHx of A-Fib, HTN, and pulmonary interstitial fibrosis, who presents to the ED with SOB and productive cough for 1 day. Patient endorses fatigue. Patient reports taking eliquis and metoprolol. Patient is being followed by Dr. Cueva, cardiology. Denies other associated symptoms.patient is in Afib with RVR again,was shocked last week,patient has been stared on Cardizem drip,cardiology is consulted,      Past Medical History:   Diagnosis Date    A-fib 5/8/2023    Arthritis     GERD (gastroesophageal reflux disease)     History of HCV, s/p successful treatment w/ SVR12 5/2015     Failed treatment (stage I/II) - followed by hepatology     Joint pain     Lung disease caused by breathing particles     Widespread essentially symmetric interstitial lung disease    Obesity     Polycythemia vera     Prediabetes        Past Surgical History:   Procedure Laterality Date    BUNIONECTOMY      bilateral    CARDIOVERSION N/A 05/08/2023    Procedure: Cardioversion;  Surgeon: David Cueva MD;  Location: [facility] CATH LAB;  Service: Cardiology;  Laterality: N/A;    CATARACT EXTRACTION Left 07/27/2023    COLONOSCOPY N/A 12/09/2015     Procedure: COLONOSCOPY;  Surgeon: Conrad Iqbal MD;  Location: [facility] ENDO;  Service: Endoscopy;  Laterality: N/A;    Liver biopsy x2      rotator cuff Right     TRANSESOPHAGEAL ECHOCARDIOGRAM WITH POSSIBLE CARDIOVERSION (ELENA W/ POSS CARDIOVERSION) N/A 05/08/2023    Procedure: TRANSESOPHAGEAL ECHOCARDIOGRAM WITH POSSIBLE CARDIOVERSION (ELENA W/ POSS CARDIOVERSION);  Surgeon: David Cueva MD;  Location: [facility] CATH LAB;  Service: Cardiology;  Laterality: N/A;  12:30PM    RN PREOP 5/4/2023---EKG ON ARRIVAL    TRANSESOPHAGEAL ECHOCARDIOGRAM WITH POSSIBLE CARDIOVERSION (ELENA W/ POSS CARDIOVERSION) N/A 7/29/2023    Procedure: Transesophageal echo (ELENA) intra-procedure log documentation;  Surgeon: David Cueva MD;  Location: [facility] CATH LAB;  Service: Cardiology;  Laterality: N/A;    TRANSESOPHAGEAL ECHOCARDIOGRAPHY N/A 05/08/2023    Procedure: ECHOCARDIOGRAM, TRANSESOPHAGEAL;  Surgeon: David Cueva MD;  Location: [facility] CATH LAB;  Service: Cardiology;  Laterality: N/A;    TRANSESOPHAGEAL ECHOCARDIOGRAPHY N/A 7/29/2023    Procedure: ECHOCARDIOGRAM, TRANSESOPHAGEAL;  Surgeon: David Cueva MD;  Location: [facility] CATH LAB;  Service: Cardiology;  Laterality: N/A;       Review of patient's allergies indicates:   Allergen Reactions    Ace inhibitors Other (See Comments)     cough       No current facility-administered medications on file prior to encounter.     Current Outpatient Medications on File Prior to Encounter   Medication Sig    allopurinoL (ZYLOPRIM) 100 MG tablet Take 2 tablets (200 mg total) by mouth once daily.    apixaban (ELIQUIS) 5 mg Tab Take 1 tablet (5 mg total) by mouth 2 (two) times daily.    fluticasone propionate (FLONASE) 50 mcg/actuation nasal spray SHAKE LIQUID AND USE 1 SPRAY(50 MCG) IN EACH NOSTRIL EVERY DAY    losartan (COZAAR) 50 MG tablet TK 1 T PO D    metoprolol succinate (TOPROL-XL) 100 MG 24 hr tablet Take 1 tablet (100 mg total) by mouth once daily.    multivitamin capsule Take  1 capsule by mouth once daily.    ofloxacin (OCUFLOX) 0.3 % ophthalmic solution Place 1 drop into both eyes 4 (four) times daily.    prednisoLONE acetate (PRED FORTE) 1 % DrpS Place 1 drop into both eyes 4 (four) times daily.    TRELEGY ELLIPTA 100-62.5-25 mcg DsDv Inhale 1 puff into the lungs Daily.     Family History       Problem Relation (Age of Onset)    Chronic back pain Brother    Deep vein thrombosis Sister    Heart attack Sister    Heart disease Mother    Kidney disease Mother    Osteoarthritis Sister    Parkinsonism Father    Prostate cancer Cousin    Pulmonary embolism Sister          Tobacco Use    Smoking status: Former     Current packs/day: 0.00    Smokeless tobacco: Never    Tobacco comments:     pt. smokes 10 cigars per day.   Substance and Sexual Activity    Alcohol use: Not Currently     Comment: Rarely    Drug use: Yes     Types: Marijuana     Comment: daily    Sexual activity: Yes     Partners: Female     Review of Systems   Constitutional:  Positive for activity change and appetite change.   HENT:  Negative for congestion and dental problem.    Eyes:  Negative for discharge.   Respiratory:  Positive for shortness of breath. Negative for apnea and chest tightness.    Cardiovascular:  Negative for chest pain and leg swelling.   Gastrointestinal:  Negative for abdominal distention and abdominal pain.   Endocrine: Negative for cold intolerance and heat intolerance.   Genitourinary:  Negative for difficulty urinating and dysuria.   Musculoskeletal:  Negative for arthralgias.   Skin:  Negative for color change and pallor.   Allergic/Immunologic: Negative for environmental allergies.   Neurological:  Negative for dizziness and facial asymmetry.   Hematological:  Negative for adenopathy. Does not bruise/bleed easily.   Psychiatric/Behavioral:  Negative for agitation and behavioral problems.      Objective:     Vital Signs (Most Recent):  Temp: 98 °F (36.7 °C) (08/05/23 1122)  Pulse: (S) 63  (08/05/23 1244)  Resp: (!) 30 (08/05/23 1244)  BP: (S) 130/66 (08/05/23 1244)  SpO2: 97 % (08/05/23 1244) Vital Signs (24h Range):  Temp:  [98 °F (36.7 °C)] 98 °F (36.7 °C)  Pulse:  [] 63  Resp:  [20-30] 30  SpO2:  [93 %-99 %] 97 %  BP: (118-140)/(66-84) 130/66     Weight: 97.5 kg (215 lb)  Body mass index is 25.5 kg/m².     Physical Exam  HENT:      Head: Atraumatic.      Nose: Nose normal. No congestion.      Mouth/Throat:      Mouth: Mucous membranes are dry.   Eyes:      Extraocular Movements: Extraocular movements intact.      Pupils: Pupils are equal, round, and reactive to light.   Cardiovascular:      Rate and Rhythm: Tachycardia present. Rhythm irregular.   Pulmonary:      Effort: No respiratory distress.      Breath sounds: No wheezing.   Abdominal:      General: There is no distension.      Tenderness: There is no abdominal tenderness.   Musculoskeletal:         General: No swelling or deformity.      Cervical back: Normal range of motion.   Skin:     Findings: No bruising.   Neurological:      Mental Status: He is alert and oriented to person, place, and time.      Cranial Nerves: No cranial nerve deficit.   Psychiatric:         Mood and Affect: Mood normal.         Behavior: Behavior normal.              CRANIAL NERVES     CN III, IV, VI   Pupils are equal, round, and reactive to light.       Significant Labs: All pertinent labs within the past 24 hours have been reviewed.  BMP:   Recent Labs   Lab 08/05/23  1159   *      K 4.6      CO2 23   BUN 13   CREATININE 1.0   CALCIUM 9.6   MG 1.8     CBC:   Recent Labs   Lab 08/05/23  1159   WBC 7.30   HGB 15.2   HCT 50.5        CMP:   Recent Labs   Lab 08/05/23  1159      K 4.6      CO2 23   *   BUN 13   CREATININE 1.0   CALCIUM 9.6   PROT 8.4   ALBUMIN 3.3*   BILITOT 0.8   ALKPHOS 94   AST 25   ALT 17   ANIONGAP 8       Significant Imaging: I have reviewed all pertinent imaging results/findings within the past  24 hours.    Assessment/Plan:     * Atrial flutter with rapid ventricular response    patient is in Afib with RVR again,was shocked last week,patient has been stared on Cardizem drip,cardiology is consulted,plan for DCCV in AM.    Thoracic aorta atherosclerosis  On medical treatment.      Pulmonary interstitial fibrosis    On inhaler.    ILD (interstitial lung disease)  On inhaler.      Former smoker  Per history.      History of HCV, s/p successful treatment w/ SVR12 5/2015  Per history.        VTE Risk Mitigation (From admission, onward)         Ordered     apixaban tablet 5 mg  2 times daily         08/05/23 1421                           Srini Vu MD  Department of Hospital Medicine  [facility] - Emergency Dept

## 2023-08-05 NOTE — CONSULTS
[place] - Intensive Care  Cardiology  Consult Note    Patient Name: Josiah Orosco Jr.  MRN: [id number]  Admission Date: 8/5/2023  Hospital Length of Stay: 0 days  Code Status: Prior   Attending Provider: Srini Vu, *   Consulting Provider: David Cueva MD  Primary Care Physician: Elaine Landry MD  Principal Problem:Atrial flutter with rapid ventricular response    Patient information was obtained from patient and ER records.     Inpatient consult to Cardiology  Consult performed by: David Cueva MD  Consult ordered by: Chad Mueller MD        Subjective:     Chief Complaint:  afib     HPI:   Pt well known to me from cardiology clinic. H/o ild with afib. Recently admited with afib and underwent cardioversion. Now back in afib. Has been compliant with his eliquis      HPI: Josiah Orosco Jr. is a 67 y.o. male, with a PMHx of A-Fib, HTN, and pulmonary interstitial fibrosis, who presents to the ED with SOB and productive cough for 1 day. Patient endorses fatigue. Patient reports taking eliquis and metoprolol. Patient is being followed by Dr. Cueva, cardiology. Denies other associated symptoms.patient is in Afib with RVR again,was shocked last week,patient has been stared on Cardizem drip,cardiology is consulted.      7/28:        Left Atrium: Left atrium is moderately dilated. There is no thrombus in the left atrial appendage.    Right Ventricle: Systolic function is normal.    Right Atrium: Right atrium is mildly dilated.    Aortic Valve: The aortic valve is a trileaflet valve. There is trace aortic regurgitation.    Mitral Valve: There is mild to moderate regurgitation with a centrally directed jet.    Tricuspid Valve: There is mild transvalvular regurgitation.    Pericardium: There is no pericardial effusion.    cardioversion Was successfully performed with 200 joules x1 with successful cardioversion           The estimated ejection fraction is 50%.   Concentric remodeling and  low normal systolic function.   Severe left atrial enlargement.   Atrial fibrillation observed.   There are segmental left ventricular wall motion abnormalities.   Severe right ventricular enlargement.   Severe right atrial enlargement.   Moderate mitral regurgitation.   Mild tricuspid regurgitation.   Elevated central venous pressure (15 mmHg).   The estimated PA systolic pressure is 62 mmHg.   There is pulmonary hypertension.                    Past Medical History:   Diagnosis Date    A-fib 5/8/2023    Arthritis     GERD (gastroesophageal reflux disease)     History of HCV, s/p successful treatment w/ SVR12 5/2015     Failed treatment (stage I/II) - followed by hepatology     Joint pain     Lung disease caused by breathing particles     Widespread essentially symmetric interstitial lung disease    Obesity     Polycythemia vera     Prediabetes        Past Surgical History:   Procedure Laterality Date    BUNIONECTOMY      bilateral    CARDIOVERSION N/A 05/08/2023    Procedure: Cardioversion;  Surgeon: David Cueva MD;  Location: [facility] CATH LAB;  Service: Cardiology;  Laterality: N/A;    CATARACT EXTRACTION Left 07/27/2023    COLONOSCOPY N/A 12/09/2015    Procedure: COLONOSCOPY;  Surgeon: Conrad Iqbal MD;  Location: [facility] ENDO;  Service: Endoscopy;  Laterality: N/A;    Liver biopsy x2      rotator cuff Right     TRANSESOPHAGEAL ECHOCARDIOGRAM WITH POSSIBLE CARDIOVERSION (ELENA W/ POSS CARDIOVERSION) N/A 05/08/2023    Procedure: TRANSESOPHAGEAL ECHOCARDIOGRAM WITH POSSIBLE CARDIOVERSION (ELENA W/ POSS CARDIOVERSION);  Surgeon: David Cueva MD;  Location: [facility] CATH LAB;  Service: Cardiology;  Laterality: N/A;  12:30PM    RN PREOP 5/4/2023---EKG ON ARRIVAL    TRANSESOPHAGEAL ECHOCARDIOGRAM WITH POSSIBLE CARDIOVERSION (ELENA W/ POSS CARDIOVERSION) N/A 7/29/2023    Procedure: Transesophageal echo (ELENA) intra-procedure log documentation;  Surgeon: David Cueva MD;  Location: [facility] CATH LAB;   Service: Cardiology;  Laterality: N/A;    TRANSESOPHAGEAL ECHOCARDIOGRAPHY N/A 05/08/2023    Procedure: ECHOCARDIOGRAM, TRANSESOPHAGEAL;  Surgeon: David Cueva MD;  Location: [facility] CATH LAB;  Service: Cardiology;  Laterality: N/A;    TRANSESOPHAGEAL ECHOCARDIOGRAPHY N/A 7/29/2023    Procedure: ECHOCARDIOGRAM, TRANSESOPHAGEAL;  Surgeon: David Cueva MD;  Location: [facility] CATH LAB;  Service: Cardiology;  Laterality: N/A;       Review of patient's allergies indicates:   Allergen Reactions    Ace inhibitors Other (See Comments)     cough       No current facility-administered medications on file prior to encounter.     Current Outpatient Medications on File Prior to Encounter   Medication Sig    allopurinoL (ZYLOPRIM) 100 MG tablet Take 2 tablets (200 mg total) by mouth once daily.    apixaban (ELIQUIS) 5 mg Tab Take 1 tablet (5 mg total) by mouth 2 (two) times daily.    fluticasone propionate (FLONASE) 50 mcg/actuation nasal spray SHAKE LIQUID AND USE 1 SPRAY(50 MCG) IN EACH NOSTRIL EVERY DAY    losartan (COZAAR) 50 MG tablet TK 1 T PO D    metoprolol succinate (TOPROL-XL) 100 MG 24 hr tablet Take 1 tablet (100 mg total) by mouth once daily.    multivitamin capsule Take 1 capsule by mouth once daily.    prednisoLONE acetate (PRED FORTE) 1 % DrpS Place 1 drop into both eyes 4 (four) times daily.    TRELEGY ELLIPTA 100-62.5-25 mcg DsDv Inhale 1 puff into the lungs Daily.    ofloxacin (OCUFLOX) 0.3 % ophthalmic solution Place 1 drop into both eyes 4 (four) times daily.     Family History       Problem Relation (Age of Onset)    Chronic back pain Brother    Deep vein thrombosis Sister    Heart attack Sister    Heart disease Mother    Kidney disease Mother    Osteoarthritis Sister    Parkinsonism Father    Prostate cancer Cousin    Pulmonary embolism Sister          Tobacco Use    Smoking status: Former     Current packs/day: 0.00    Smokeless tobacco: Never    Tobacco comments:     pt. smokes 10 cigars  per day.   Substance and Sexual Activity    Alcohol use: Not Currently     Comment: Rarely    Drug use: Yes     Types: Marijuana     Comment: daily    Sexual activity: Yes     Partners: Female     Review of Systems   Constitutional: Negative.   HENT: Negative.     Eyes: Negative.    Cardiovascular:  Positive for dyspnea on exertion and palpitations.   Respiratory: Negative.     Endocrine: Negative.    Hematologic/Lymphatic: Negative.    Skin: Negative.    Musculoskeletal: Negative.    Gastrointestinal: Negative.    Genitourinary: Negative.    Neurological: Negative.    Psychiatric/Behavioral: Negative.     Allergic/Immunologic: Negative.      Objective:     Vital Signs (Most Recent):  Temp: 97.9 °F (36.6 °C) (08/05/23 1525)  Pulse: (!) 113 (08/05/23 1525)  Resp: 18 (08/05/23 1525)  BP: 118/88 (08/05/23 1525)  SpO2: 96 % (08/05/23 1525) Vital Signs (24h Range):  Temp:  [97.9 °F (36.6 °C)-98.4 °F (36.9 °C)] 97.9 °F (36.6 °C)  Pulse:  [] 113  Resp:  [17-30] 18  SpO2:  [93 %-100 %] 96 %  BP: (118-141)/(66-88) 118/88     Weight: 97.3 kg (214 lb 8.1 oz)  Body mass index is 25.44 kg/m².    SpO2: 96 %       No intake or output data in the 24 hours ending 08/05/23 1534    Lines/Drains/Airways       Peripheral Intravenous Line  Duration                  Peripheral IV - Single Lumen 08/05/23 1202 20 G Right Antecubital <1 day         Peripheral IV - Single Lumen 08/05/23 1433 20 G Left Antecubital <1 day                     Physical Exam  Vitals reviewed.   Constitutional:       Appearance: He is well-developed.   HENT:      Head: Normocephalic.   Eyes:      Conjunctiva/sclera: Conjunctivae normal.      Pupils: Pupils are equal, round, and reactive to light.   Cardiovascular:      Rate and Rhythm: Tachycardia present. Rhythm irregular.      Heart sounds: Normal heart sounds.   Pulmonary:      Effort: Pulmonary effort is normal.      Breath sounds: Normal breath sounds.   Abdominal:      General: Bowel sounds are  "normal.      Palpations: Abdomen is soft.   Musculoskeletal:      Cervical back: Normal range of motion and neck supple.   Skin:     General: Skin is warm.   Neurological:      Mental Status: He is alert and oriented to person, place, and time.          Significant Labs: BMP:   Recent Labs   Lab 08/05/23  1159   *      K 4.6      CO2 23   BUN 13   CREATININE 1.0   CALCIUM 9.6   MG 1.8   , CMP   Recent Labs   Lab 08/05/23  1159      K 4.6      CO2 23   *   BUN 13   CREATININE 1.0   CALCIUM 9.6   PROT 8.4   ALBUMIN 3.3*   BILITOT 0.8   ALKPHOS 94   AST 25   ALT 17   ANIONGAP 8   , CBC   Recent Labs   Lab 08/05/23  1159   WBC 7.30   HGB 15.2   HCT 50.5      , INR No results for input(s): "INR", "PROTIME" in the last 48 hours., Lipid Panel No results for input(s): "CHOL", "HDL", "LDLCALC", "TRIG", "CHOLHDL" in the last 48 hours., Troponin   Recent Labs   Lab 08/05/23  1159   TROPONINI 0.009   , and All pertinent lab results from the last 24 hours have been reviewed.    Significant Imaging: Echocardiogram: Transthoracic echo (TTE) complete (Cupid Only):   Results for orders placed or performed during the hospital encounter of 07/27/23   Echo   Result Value Ref Range    BSA 2.31 m2    TDI SEPTAL 0.06 m/s    LV LATERAL E/E' RATIO 14.00 m/s    LV SEPTAL E/E' RATIO 14.00 m/s    LA WIDTH 5.40 cm    IVC diameter 2.17 cm    Left Ventricular Outflow Tract Mean Velocity 0.43 cm/s    Left Ventricular Outflow Tract Mean Gradient 0.89 mmHg    AORTIC VALVE CUSP SEPERATION 1.90 cm    TDI LATERAL 0.06 m/s    LVIDd 4.26 3.5 - 6.0 cm    IVS 1.21 (A) 0.6 - 1.1 cm    Posterior Wall 1.24 (A) 0.6 - 1.1 cm    Ao root annulus 3.32 cm    LVIDs 2.71 2.1 - 4.0 cm    FS 36 28 - 44 %    LA volume 117.77 cm3    Sinus 3.36 cm    STJ 2.74 cm    Ascending aorta 3.34 cm    LV mass 187.64 g    LA size 4.40 cm    RVDD 5.30 cm    TAPSE 1.34 cm    RV S' 14.07 cm/s    Left Ventricle Relative Wall Thickness 0.58 cm "    AV mean gradient 2 mmHg    AV valve area 2.29 cm2    AV Velocity Ratio 0.64     AV index (prosthetic) 0.63     Mean e' 0.06 m/s    LVOT diameter 2.16 cm    LVOT area 3.7 cm2    LVOT peak delio 0.66 m/s    LVOT peak VTI 9.90 cm    Ao peak delio 1.03 m/s    Ao VTI 15.8 cm    LVOT stroke volume 36.26 cm3    AV peak gradient 4 mmHg    E/E' ratio 14.00 m/s    MV Peak E Delio 0.84 m/s    TR Max Delio 3.42 m/s    LV Systolic Volume 27.37 mL    LV Systolic Volume Index 11.8 mL/m2    LV Diastolic Volume 81.39 mL    LV Diastolic Volume Index 35.08 mL/m2    LA Volume Index 50.8 mL/m2    LV Mass Index 81 g/m2    RA Major Axis 5.74 cm    Left Atrium Minor Axis 5.37 cm    Left Atrium Major Axis 6.38 cm    Triscuspid Valve Regurgitation Peak Gradient 47 mmHg    RA Width 6.40 cm    Covington's Biplane MOD Ejection Fraction 3 %    Right Atrial Pressure (from IVC) 15 mmHg    EF 50 %    TV resting pulmonary artery pressure 62 mmHg    Narrative    · The estimated ejection fraction is 50%.  · Concentric remodeling and low normal systolic function.  · Severe left atrial enlargement.  · Atrial fibrillation observed.  · There are segmental left ventricular wall motion abnormalities.  · Severe right ventricular enlargement.  · Severe right atrial enlargement.  · Moderate mitral regurgitation.  · Mild tricuspid regurgitation.  · Elevated central venous pressure (15 mmHg).  · The estimated PA systolic pressure is 62 mmHg.  · There is pulmonary hypertension.        Assessment and Plan:     * Atrial flutter with rapid ventricular response  cardizem drip. Rate control on cardizem drip.     Will do cardioversion on Monday.    Npo post mn on Sunday.    Initiate sotalol.    Thoracic aorta atherosclerosis        Pulmonary interstitial fibrosis        ILD (interstitial lung disease)        Former smoker        History of HCV, s/p successful treatment w/ SVR12 5/2015            VTE Risk Mitigation (From admission, onward)         Ordered     apixaban tablet  5 mg  2 times daily         08/05/23 6280                Thank you for your consult. I will follow-up with patient. Please contact us if you have any additional questions.    David Cueva MD  Cardiology   [facility] - Intensive Care      Critical Care Time:  45 minutes     Critical care was time spent personally by me on the following activities: development of treatment plan with patient or surrogate and bedside caregivers, discussions with consultants, evaluation of patient's response to treatment, examination of patient, ordering and performing treatments and interventions, ordering and review of laboratory studies, ordering and review of radiographic studies, pulse oximetry, re-evaluation of patient's condition. This critical care time did not overlap with that of any other provider or involve time for any procedures.

## 2023-08-05 NOTE — NURSING
[facility]  Nurses Note -- 4 Eyes      8/5/2023       Skin assessed on: Admit      [x] No Pressure Injuries Present    [x]Prevention Measures Documented    [] Yes LDA  for Pressure Injury Previously documented     [] Yes New Pressure Injury Discovered   [] LDA for New Pressure Injury Added      Attending RN:  Dariela RN    Second RN:  Radhika RN

## 2023-08-05 NOTE — PLAN OF CARE
Problem: Adult Inpatient Plan of Care  Goal: Plan of Care Review  Outcome: Ongoing, Progressing  Goal: Patient-Specific Goal (Individualized)  Outcome: Ongoing, Progressing  Goal: Absence of Hospital-Acquired Illness or Injury  Outcome: Ongoing, Progressing  Goal: Optimal Comfort and Wellbeing  Outcome: Ongoing, Progressing  Goal: Readiness for Transition of Care  Outcome: Ongoing, Progressing     Problem: Dysrhythmia  Goal: Normalized Cardiac Rhythm  Outcome: Ongoing, Progressing     Pt admitted to ICU for A flutter with RVR on diltiazem gtt. No c/o CP or SOB on 2L NC for comfort. Wife at bedside. Pt is aware of the situation and plan of care r/t recent hospitalization to [facility] for A flutter with RVR 1 week prior.

## 2023-08-05 NOTE — HPI
Pt well known to me from cardiology clinic. H/o ild with afib. Recently admited with afib and underwent cardioversion. Now back in afib. Has been compliant with his eliquis      HPI: Josiah Orosco Jr. is a 67 y.o. male, with a PMHx of A-Fib, HTN, and pulmonary interstitial fibrosis, who presents to the ED with SOB and productive cough for 1 day. Patient endorses fatigue. Patient reports taking eliquis and metoprolol. Patient is being followed by Dr. Cueva, cardiology. Denies other associated symptoms.patient is in Afib with RVR again,was shocked last week,patient has been stared on Cardizem drip,cardiology is consulted.      7/28:        Left Atrium: Left atrium is moderately dilated. There is no thrombus in the left atrial appendage.    Right Ventricle: Systolic function is normal.    Right Atrium: Right atrium is mildly dilated.    Aortic Valve: The aortic valve is a trileaflet valve. There is trace aortic regurgitation.    Mitral Valve: There is mild to moderate regurgitation with a centrally directed jet.    Tricuspid Valve: There is mild transvalvular regurgitation.    Pericardium: There is no pericardial effusion.    cardioversion Was successfully performed with 200 joules x1 with successful cardioversion          The estimated ejection fraction is 50%.  Concentric remodeling and low normal systolic function.  Severe left atrial enlargement.  Atrial fibrillation observed.  There are segmental left ventricular wall motion abnormalities.  Severe right ventricular enlargement.  Severe right atrial enlargement.  Moderate mitral regurgitation.  Mild tricuspid regurgitation.  Elevated central venous pressure (15 mmHg).  The estimated PA systolic pressure is 62 mmHg.  There is pulmonary hypertension.

## 2023-08-05 NOTE — ASSESSMENT & PLAN NOTE
patient is in Afib with RVR again,was shocked last week,patient has been stared on Cardizem drip,cardiology is consulted,plan for DCCV in AM.

## 2023-08-05 NOTE — NURSING
..Nurses Note -- 4 Eyes      8/5/2023   6:55 PM      Skin assessed during: Q Shift Change      [x] No Altered Skin Integrity Present    [x]Prevention Measures Documented      [] Yes- Altered Skin Integrity Present or Discovered   [] LDA Added if Not in Epic (Describe Wound)   [] New Altered Skin Integrity was Present on Admit and Documented in LDA   [] Wound Image Taken    Wound Care Consulted? No    Attending Nurse:  Doreen Mccray RN/Staff Member:     Dariela CHAU

## 2023-08-05 NOTE — Clinical Note
Diagnosis: Atrial fibrillation with RVR [[id number]]   Admitting Provider:: KRISTINA SERRANO [[id number]]   Future Attending Provider: KRISTINA SERRANO [[id number]]   Reason for IP Medical Treatment  (Clinical interventions that can only be accomplished in the IP setting? ) :: CArdizem drip   I certify that Inpatient services for greater than or equal to 2 midnights are medically necessary:: Yes   Plans for Post-Acute care--if anticipated (pick the single best option):: A. No post acute care anticipated at this time

## 2023-08-05 NOTE — PHARMACY MED REC
"Admission Medication History     The home medication history was taken by Donna Lagos.    You may go to "Admission" then "Reconcile Home Medications" tabs to review and/or act upon these items.     The home medication list has been updated by the Pharmacy department.   Please read ALL comments highlighted in yellow.   Please address this information as you see fit.    Feel free to contact us if you have any questions or require assistance.      The medications listed below were removed from the home medication list. Please reorder if appropriate:  Patient reports no longer taking the following medication(s):  Ofloxacin    Medications listed below were obtained from: Patient/family and Analytic software- [email or web address]    The medication reconciliation was completed by the patient's bedside.      Donna Lagos  [phone]                      .          "

## 2023-08-05 NOTE — ED PROVIDER NOTES
Encounter Date: 8/5/2023    SCRIBE #1 NOTE: I, Quynh Mendez, am scribing for, and in the presence of,  Chad Mueller MD. I have scribed the following portions of the note - Other sections scribed: HPI, ROS, PE, EKG.       History     Chief Complaint   Patient presents with    Shortness of Breath     Pt reports shortness of breath and productive cough since last night. Pt reports being admitted last week due to a-fib, states he had his heart shocked out of the rhythm. Pt denies chest pain or dizziness. Pt's  in triage.      Josiah Orosco Jr. is a 67 y.o. male, with a PMHx of A-Fib, HTN, and pulmonary interstitial fibrosis, who presents to the ED with SOB and productive cough for 1 day. Patient endorses fatigue. Patient reports taking eliquis and metoprolol. Patient is being followed by Dr. Cueva, cardiology. Denies other associated symptoms.       The history is provided by the patient. No  was used.     Review of patient's allergies indicates:   Allergen Reactions    Ace inhibitors Other (See Comments)     cough     Past Medical History:   Diagnosis Date    A-fib 5/8/2023    Arthritis     GERD (gastroesophageal reflux disease)     History of HCV, s/p successful treatment w/ SVR12 5/2015     Failed treatment (stage I/II) - followed by hepatology     Joint pain     Lung disease caused by breathing particles     Widespread essentially symmetric interstitial lung disease    Obesity     Polycythemia vera     Prediabetes      Past Surgical History:   Procedure Laterality Date    BUNIONECTOMY      bilateral    CARDIOVERSION N/A 05/08/2023    Procedure: Cardioversion;  Surgeon: David Cueva MD;  Location: [facility] CATH LAB;  Service: Cardiology;  Laterality: N/A;    CATARACT EXTRACTION Left 07/27/2023    COLONOSCOPY N/A 12/09/2015    Procedure: COLONOSCOPY;  Surgeon: Conrad Iqbal MD;  Location: [facility] ENDO;  Service: Endoscopy;  Laterality: N/A;    Liver biopsy x2      rotator cuff Right      TRANSESOPHAGEAL ECHOCARDIOGRAM WITH POSSIBLE CARDIOVERSION (ELENA W/ POSS CARDIOVERSION) N/A 05/08/2023    Procedure: TRANSESOPHAGEAL ECHOCARDIOGRAM WITH POSSIBLE CARDIOVERSION (ELENA W/ POSS CARDIOVERSION);  Surgeon: David Cueva MD;  Location: [facility] CATH LAB;  Service: Cardiology;  Laterality: N/A;  12:30PM    RN PREOP 5/4/2023---EKG ON ARRIVAL    TRANSESOPHAGEAL ECHOCARDIOGRAM WITH POSSIBLE CARDIOVERSION (ELENA W/ POSS CARDIOVERSION) N/A 7/29/2023    Procedure: Transesophageal echo (ELENA) intra-procedure log documentation;  Surgeon: David Cueva MD;  Location: [facility] CATH LAB;  Service: Cardiology;  Laterality: N/A;    TRANSESOPHAGEAL ECHOCARDIOGRAPHY N/A 05/08/2023    Procedure: ECHOCARDIOGRAM, TRANSESOPHAGEAL;  Surgeon: David Cueva MD;  Location: [facility] CATH LAB;  Service: Cardiology;  Laterality: N/A;    TRANSESOPHAGEAL ECHOCARDIOGRAPHY N/A 7/29/2023    Procedure: ECHOCARDIOGRAM, TRANSESOPHAGEAL;  Surgeon: David Cueva MD;  Location: [facility] CATH LAB;  Service: Cardiology;  Laterality: N/A;     Family History   Problem Relation Age of Onset    Heart disease Mother     Kidney disease Mother     Parkinsonism Father     Prostate cancer Cousin         maternal side    Heart attack Sister         CABG    Deep vein thrombosis Sister     Pulmonary embolism Sister     Osteoarthritis Sister         s/p knee replacement    Chronic back pain Brother     Liver disease Neg Hx     Diabetes Neg Hx     Melanoma Neg Hx      Social History     Tobacco Use    Smoking status: Former     Current packs/day: 0.00    Smokeless tobacco: Never    Tobacco comments:     pt. smokes 10 cigars per day.   Substance Use Topics    Alcohol use: Not Currently     Comment: Rarely    Drug use: Yes     Types: Marijuana     Comment: daily     Review of Systems   Constitutional:  Positive for fatigue. Negative for diaphoresis and fever.   HENT:  Negative for ear pain, nosebleeds and sore throat.    Eyes:  Negative for pain and redness.    Respiratory:  Positive for cough (Productive) and shortness of breath.    Cardiovascular:  Negative for chest pain and leg swelling.   Gastrointestinal:  Negative for abdominal pain and vomiting.   Genitourinary:  Negative for dysuria and hematuria.   Musculoskeletal:  Negative for joint swelling and myalgias.   Skin:  Negative for rash and wound.   Neurological:  Negative for seizures, syncope, weakness and headaches.   Psychiatric/Behavioral:  Negative for confusion. The patient is not nervous/anxious.        Physical Exam     Initial Vitals [08/05/23 1122]   BP Pulse Resp Temp SpO2   131/84 (!) 131 20 98 °F (36.7 °C) (!) 93 %      MAP       --         Physical Exam    Nursing note and vitals reviewed.  Constitutional: He appears well-developed and well-nourished.   HENT:   Head: Atraumatic.   Eyes: EOM are normal. Pupils are equal, round, and reactive to light.   Neck: Neck supple. No JVD present.   Normal range of motion.  Cardiovascular:  Normal rate, regular rhythm, normal heart sounds and intact distal pulses.     Exam reveals no gallop and no friction rub.       No murmur heard.  No JVD  Heart rate of 122.   Pulmonary/Chest:   Distant breath sounds.   Abdominal: Abdomen is soft. Bowel sounds are normal.   Musculoskeletal:         General: No edema. Normal range of motion.      Cervical back: Normal range of motion and neck supple.     Lymphadenopathy:     He has no cervical adenopathy.   Neurological: He is alert and oriented to person, place, and time. He has normal strength.   Skin: Skin is warm and dry.   Psychiatric: He has a normal mood and affect. Thought content normal.         ED Course   Critical Care    Date/Time: 8/5/2023 1:30 PM    Performed by: Chad Mueller MD  Authorized by: Chad Mueller MD  Direct patient critical care time: 20 minutes  Additional history critical care time: 5 minutes  Ordering / reviewing critical care time: 10 minutes  Documentation critical care time: 10  minutes  Consulting other physicians critical care time: 10 minutes  Total critical care time (exclusive of procedural time) : 55 minutes  Critical care time was exclusive of teaching time and separately billable procedures and treating other patients.  Critical care was necessary to treat or prevent imminent or life-threatening deterioration of the following conditions: cardiac failure.  Critical care was time spent personally by me on the following activities: development of treatment plan with patient or surrogate, discussions with consultants, evaluation of patient's response to treatment, examination of patient, obtaining history from patient or surrogate, ordering and performing treatments and interventions, ordering and review of laboratory studies, ordering and review of radiographic studies, pulse oximetry, re-evaluation of patient's condition, review of old charts and interpretation of cardiac output measurements.        Labs Reviewed   CBC W/ AUTO DIFFERENTIAL - Abnormal; Notable for the following components:       Result Value    RBC 7.45 (*)     MCV 68 (*)     MCH 20.4 (*)     MCHC 30.1 (*)     RDW 21.5 (*)     MPV 9.1 (*)     Lymph # 0.7 (*)     Gran % 79.8 (*)     Lymph % 9.9 (*)     All other components within normal limits   COMPREHENSIVE METABOLIC PANEL - Abnormal; Notable for the following components:    Glucose 163 (*)     Albumin 3.3 (*)     All other components within normal limits   B-TYPE NATRIURETIC PEPTIDE - Abnormal; Notable for the following components:     (*)     All other components within normal limits   LACTIC ACID, PLASMA   TROPONIN I   MAGNESIUM     EKG Readings: (Independently Interpreted)   Initial Reading: No STEMI. Rhythm: Atrial Fibrillation. Heart Rate: 131. Other Impression: Bifascicular block       Imaging Results              X-Ray Chest AP Portable (Final result)  Result time 08/05/23 12:59:57      Final result by Liam Faust MD (08/05/23 12:59:57)         "           Impression:      Pulmonary emphysema and/or chronic interstitial lung disease, similar to the prior study.    Persistent somewhat nodular airspace opacity overlying the right upper lung which appears similar dating back to 02/08/2022.  Suggest pulmonology follow-up and outpatient CT chest follow-up to evaluate for possibility of underlying malignancy as clinically appropriate.      Electronically signed by: Liam Faust MD  Date:    08/05/2023  Time:    12:59               Narrative:    EXAMINATION:  XR CHEST AP PORTABLE    CLINICAL HISTORY:  Provided history is "SOB;  ".    TECHNIQUE:  One view of the chest.    COMPARISON:  07/27/2023.    FINDINGS:  Cardiac wires overlie the chest.  Cardiac silhouette is stable.  There are diffuse bilateral interstitial and ground-glass opacities throughout both lungs with overall similar aeration when compared with the prior study.  Somewhat nodular airspace opacity again noted overlying the right upper lung which appears similar dating back to at least 02/08/2022.  Pulmonary emphysema and suspected chronic interstitial lung disease.  No large pleural effusion.  No distinct pneumothorax.                                       Medications   apixaban tablet 5 mg (5 mg Oral Given 8/6/23 0947)   allopurinoL tablet 200 mg (200 mg Oral Given 8/6/23 0947)   arformoteroL nebulizer solution 15 mcg (15 mcg Nebulization Given 8/6/23 0836)     And   budesonide nebulizer solution 0.5 mg (0.5 mg Nebulization Given 8/6/23 0837)   diltiaZEM 125 mg in D5W 125 mL infusion (5 mg/hr Intravenous Rate/Dose Change 8/6/23 0121)   sotaloL tablet 80 mg (80 mg Oral Given 8/6/23 0947)   mupirocin 2 % ointment ( Nasal Given 8/6/23 0948)   prednisoLONE acetate 1 % ophthalmic suspension 1 drop (1 drop Left Eye Given 8/6/23 0948)   diltiaZEM injection 15 mg (15 mg Intravenous Given 8/5/23 1206)   diltiaZEM 125 mg in D5W 125 mL infusion (7.5 mg/hr Intravenous Verify Only 8/5/23 1514)     Medical " Decision Making:   History:   Old Medical Records: I decided to obtain old medical records.  Independently Interpreted Test(s):   I have ordered and independently interpreted X-rays - see prior notes.  I have ordered and independently interpreted EKG Reading(s) - see summary below       <> Summary of EKG Reading(s): EKG independently interpreted by me reads: see above/see below/see ED management.     Clinical Tests:   Lab Tests: Ordered and Reviewed  Radiological Study: Reviewed and Ordered  Medical Tests: Ordered and Reviewed  Afib with RVR. PT has symptomatic SOB/YANG when in afib in part due to interstitial lung disease. DR. Cueva came to see pt in the ED. Recommends Cardizem drip and ICU admission.         Scribe Attestation:   Scribe #1: I performed the above scribed service and the documentation accurately describes the services I performed. I attest to the accuracy of the note.                   I, Chad Mueller, personally performed the services described in this documentation. All medical record entries made by the scribe were at my direction and in my presence. I have reviewed the chart and agree that the record reflects my personal performance and is accurate and complete.    Clinical Impression:   Final diagnoses:  [R07.9] Chest pain  [I48.91] Atrial fibrillation with RVR (Primary)  [R06.02] SOB (shortness of breath)        ED Disposition Condition    Admit Stable                Chad Mueller MD  08/06/23 0990

## 2023-08-05 NOTE — SUBJECTIVE & OBJECTIVE
Past Medical History:   Diagnosis Date    A-fib 5/8/2023    Arthritis     GERD (gastroesophageal reflux disease)     History of HCV, s/p successful treatment w/ SVR12 5/2015     Failed treatment (stage I/II) - followed by hepatology     Joint pain     Lung disease caused by breathing particles     Widespread essentially symmetric interstitial lung disease    Obesity     Polycythemia vera     Prediabetes        Past Surgical History:   Procedure Laterality Date    BUNIONECTOMY      bilateral    CARDIOVERSION N/A 05/08/2023    Procedure: Cardioversion;  Surgeon: David Cueva MD;  Location: [facility] CATH LAB;  Service: Cardiology;  Laterality: N/A;    CATARACT EXTRACTION Left 07/27/2023    COLONOSCOPY N/A 12/09/2015    Procedure: COLONOSCOPY;  Surgeon: Conrad Iqbal MD;  Location: [facility] ENDO;  Service: Endoscopy;  Laterality: N/A;    Liver biopsy x2      rotator cuff Right     TRANSESOPHAGEAL ECHOCARDIOGRAM WITH POSSIBLE CARDIOVERSION (ELENA W/ POSS CARDIOVERSION) N/A 05/08/2023    Procedure: TRANSESOPHAGEAL ECHOCARDIOGRAM WITH POSSIBLE CARDIOVERSION (ELENA W/ POSS CARDIOVERSION);  Surgeon: David Cueva MD;  Location: [facility] CATH LAB;  Service: Cardiology;  Laterality: N/A;  12:30PM    RN PREOP 5/4/2023---EKG ON ARRIVAL    TRANSESOPHAGEAL ECHOCARDIOGRAM WITH POSSIBLE CARDIOVERSION (ELENA W/ POSS CARDIOVERSION) N/A 7/29/2023    Procedure: Transesophageal echo (ELENA) intra-procedure log documentation;  Surgeon: David Cueva MD;  Location: [facility] CATH LAB;  Service: Cardiology;  Laterality: N/A;    TRANSESOPHAGEAL ECHOCARDIOGRAPHY N/A 05/08/2023    Procedure: ECHOCARDIOGRAM, TRANSESOPHAGEAL;  Surgeon: David Cueva MD;  Location: [facility] CATH LAB;  Service: Cardiology;  Laterality: N/A;    TRANSESOPHAGEAL ECHOCARDIOGRAPHY N/A 7/29/2023    Procedure: ECHOCARDIOGRAM, TRANSESOPHAGEAL;  Surgeon: David Cueva MD;  Location: [facility] CATH LAB;  Service: Cardiology;  Laterality: N/A;       Review of patient's allergies indicates:    Allergen Reactions    Ace inhibitors Other (See Comments)     cough       No current facility-administered medications on file prior to encounter.     Current Outpatient Medications on File Prior to Encounter   Medication Sig    allopurinoL (ZYLOPRIM) 100 MG tablet Take 2 tablets (200 mg total) by mouth once daily.    apixaban (ELIQUIS) 5 mg Tab Take 1 tablet (5 mg total) by mouth 2 (two) times daily.    fluticasone propionate (FLONASE) 50 mcg/actuation nasal spray SHAKE LIQUID AND USE 1 SPRAY(50 MCG) IN EACH NOSTRIL EVERY DAY    losartan (COZAAR) 50 MG tablet TK 1 T PO D    metoprolol succinate (TOPROL-XL) 100 MG 24 hr tablet Take 1 tablet (100 mg total) by mouth once daily.    multivitamin capsule Take 1 capsule by mouth once daily.    ofloxacin (OCUFLOX) 0.3 % ophthalmic solution Place 1 drop into both eyes 4 (four) times daily.    prednisoLONE acetate (PRED FORTE) 1 % DrpS Place 1 drop into both eyes 4 (four) times daily.    TRELEGY ELLIPTA 100-62.5-25 mcg DsDv Inhale 1 puff into the lungs Daily.     Family History       Problem Relation (Age of Onset)    Chronic back pain Brother    Deep vein thrombosis Sister    Heart attack Sister    Heart disease Mother    Kidney disease Mother    Osteoarthritis Sister    Parkinsonism Father    Prostate cancer Cousin    Pulmonary embolism Sister          Tobacco Use    Smoking status: Former     Current packs/day: 0.00    Smokeless tobacco: Never    Tobacco comments:     pt. smokes 10 cigars per day.   Substance and Sexual Activity    Alcohol use: Not Currently     Comment: Rarely    Drug use: Yes     Types: Marijuana     Comment: daily    Sexual activity: Yes     Partners: Female     Review of Systems   Constitutional:  Positive for activity change and appetite change.   HENT:  Negative for congestion and dental problem.    Eyes:  Negative for discharge.   Respiratory:  Positive for shortness of breath. Negative for apnea and chest tightness.    Cardiovascular:  Negative  for chest pain and leg swelling.   Gastrointestinal:  Negative for abdominal distention and abdominal pain.   Endocrine: Negative for cold intolerance and heat intolerance.   Genitourinary:  Negative for difficulty urinating and dysuria.   Musculoskeletal:  Negative for arthralgias.   Skin:  Negative for color change and pallor.   Allergic/Immunologic: Negative for environmental allergies.   Neurological:  Negative for dizziness and facial asymmetry.   Hematological:  Negative for adenopathy. Does not bruise/bleed easily.   Psychiatric/Behavioral:  Negative for agitation and behavioral problems.      Objective:     Vital Signs (Most Recent):  Temp: 98 °F (36.7 °C) (08/05/23 1122)  Pulse: (S) 63 (08/05/23 1244)  Resp: (!) 30 (08/05/23 1244)  BP: (S) 130/66 (08/05/23 1244)  SpO2: 97 % (08/05/23 1244) Vital Signs (24h Range):  Temp:  [98 °F (36.7 °C)] 98 °F (36.7 °C)  Pulse:  [] 63  Resp:  [20-30] 30  SpO2:  [93 %-99 %] 97 %  BP: (118-140)/(66-84) 130/66     Weight: 97.5 kg (215 lb)  Body mass index is 25.5 kg/m².     Physical Exam  HENT:      Head: Atraumatic.      Nose: Nose normal. No congestion.      Mouth/Throat:      Mouth: Mucous membranes are dry.   Eyes:      Extraocular Movements: Extraocular movements intact.      Pupils: Pupils are equal, round, and reactive to light.   Cardiovascular:      Rate and Rhythm: Tachycardia present. Rhythm irregular.   Pulmonary:      Effort: No respiratory distress.      Breath sounds: No wheezing.   Abdominal:      General: There is no distension.      Tenderness: There is no abdominal tenderness.   Musculoskeletal:         General: No swelling or deformity.      Cervical back: Normal range of motion.   Skin:     Findings: No bruising.   Neurological:      Mental Status: He is alert and oriented to person, place, and time.      Cranial Nerves: No cranial nerve deficit.   Psychiatric:         Mood and Affect: Mood normal.         Behavior: Behavior normal.               CRANIAL NERVES     CN III, IV, VI   Pupils are equal, round, and reactive to light.       Significant Labs: All pertinent labs within the past 24 hours have been reviewed.  BMP:   Recent Labs   Lab 08/05/23  1159   *      K 4.6      CO2 23   BUN 13   CREATININE 1.0   CALCIUM 9.6   MG 1.8     CBC:   Recent Labs   Lab 08/05/23  1159   WBC 7.30   HGB 15.2   HCT 50.5        CMP:   Recent Labs   Lab 08/05/23  1159      K 4.6      CO2 23   *   BUN 13   CREATININE 1.0   CALCIUM 9.6   PROT 8.4   ALBUMIN 3.3*   BILITOT 0.8   ALKPHOS 94   AST 25   ALT 17   ANIONGAP 8       Significant Imaging: I have reviewed all pertinent imaging results/findings within the past 24 hours.

## 2023-08-06 LAB
ANION GAP SERPL CALC-SCNC: 6 MMOL/L (ref 8–16)
BUN SERPL-MCNC: 12 MG/DL (ref 8–23)
CALCIUM SERPL-MCNC: 9.6 MG/DL (ref 8.7–10.5)
CHLORIDE SERPL-SCNC: 105 MMOL/L (ref 95–110)
CO2 SERPL-SCNC: 25 MMOL/L (ref 23–29)
CREAT SERPL-MCNC: 0.8 MG/DL (ref 0.5–1.4)
EST. GFR  (NO RACE VARIABLE): >60 ML/MIN/1.73 M^2
GLUCOSE SERPL-MCNC: 103 MG/DL (ref 70–110)
POTASSIUM SERPL-SCNC: 4.4 MMOL/L (ref 3.5–5.1)
SODIUM SERPL-SCNC: 136 MMOL/L (ref 136–145)

## 2023-08-06 PROCEDURE — 99291 PR CRITICAL CARE, E/M 30-74 MINUTES: ICD-10-PCS | Mod: ,,, | Performed by: INTERNAL MEDICINE

## 2023-08-06 PROCEDURE — 80048 BASIC METABOLIC PNL TOTAL CA: CPT | Performed by: HOSPITALIST

## 2023-08-06 PROCEDURE — 25000003 PHARM REV CODE 250: Performed by: HOSPITALIST

## 2023-08-06 PROCEDURE — 99291 CRITICAL CARE FIRST HOUR: CPT | Mod: ,,, | Performed by: INTERNAL MEDICINE

## 2023-08-06 PROCEDURE — 93010 EKG 12-LEAD: ICD-10-PCS | Mod: ,,, | Performed by: INTERNAL MEDICINE

## 2023-08-06 PROCEDURE — 99285 EMERGENCY DEPT VISIT HI MDM: CPT

## 2023-08-06 PROCEDURE — 36415 COLL VENOUS BLD VENIPUNCTURE: CPT | Performed by: HOSPITALIST

## 2023-08-06 PROCEDURE — 93005 ELECTROCARDIOGRAM TRACING: CPT

## 2023-08-06 PROCEDURE — 27000221 HC OXYGEN, UP TO 24 HOURS

## 2023-08-06 PROCEDURE — 20000000 HC ICU ROOM

## 2023-08-06 PROCEDURE — 25000242 PHARM REV CODE 250 ALT 637 W/ HCPCS: Performed by: HOSPITALIST

## 2023-08-06 PROCEDURE — 93010 ELECTROCARDIOGRAM REPORT: CPT | Mod: ,,, | Performed by: INTERNAL MEDICINE

## 2023-08-06 PROCEDURE — 94761 N-INVAS EAR/PLS OXIMETRY MLT: CPT

## 2023-08-06 PROCEDURE — 94640 AIRWAY INHALATION TREATMENT: CPT

## 2023-08-06 RX ORDER — PREDNISOLONE ACETATE 10 MG/ML
1 SUSPENSION/ DROPS OPHTHALMIC 3 TIMES DAILY
Status: DISCONTINUED | OUTPATIENT
Start: 2023-08-06 | End: 2023-08-08 | Stop reason: HOSPADM

## 2023-08-06 RX ADMIN — PREDNISOLONE ACETATE 1 DROP: 10 SUSPENSION/ DROPS OPHTHALMIC at 08:08

## 2023-08-06 RX ADMIN — ARFORMOTEROL TARTRATE 15 MCG: 15 SOLUTION RESPIRATORY (INHALATION) at 08:08

## 2023-08-06 RX ADMIN — DILTIAZEM HYDROCHLORIDE 5 MG/HR: 5 INJECTION INTRAVENOUS at 02:08

## 2023-08-06 RX ADMIN — BUDESONIDE 0.5 MG: 0.5 INHALANT RESPIRATORY (INHALATION) at 08:08

## 2023-08-06 RX ADMIN — PREDNISOLONE ACETATE 1 DROP: 10 SUSPENSION/ DROPS OPHTHALMIC at 03:08

## 2023-08-06 RX ADMIN — ALLOPURINOL 200 MG: 100 TABLET ORAL at 09:08

## 2023-08-06 RX ADMIN — PREDNISOLONE ACETATE 1 DROP: 10 SUSPENSION/ DROPS OPHTHALMIC at 09:08

## 2023-08-06 RX ADMIN — APIXABAN 5 MG: 5 TABLET, FILM COATED ORAL at 08:08

## 2023-08-06 RX ADMIN — APIXABAN 5 MG: 5 TABLET, FILM COATED ORAL at 09:08

## 2023-08-06 RX ADMIN — SOTALOL HYDROCHLORIDE 80 MG: 80 TABLET ORAL at 09:08

## 2023-08-06 RX ADMIN — MUPIROCIN: 20 OINTMENT TOPICAL at 09:08

## 2023-08-06 RX ADMIN — SOTALOL HYDROCHLORIDE 80 MG: 80 TABLET ORAL at 08:08

## 2023-08-06 RX ADMIN — MUPIROCIN: 20 OINTMENT TOPICAL at 08:08

## 2023-08-06 NOTE — PROGRESS NOTES
[facility] [place] Intensive Care  Cardiology  Progress Note    Patient Name: Josiah Orosco Jr.  MRN: [id number]  Admission Date: 8/5/2023  Hospital Length of Stay: 1 days  Code Status: Full Code   Attending Physician: Srini Vu, *   Primary Care Physician: Elaine Landry MD  Expected Discharge Date:   Principal Problem:Atrial flutter with rapid ventricular response    Subjective:       Interval History:  continues to be on Cardizem drip.  Heart rate goes up whenever Cardizem is reduced    Review of Systems   Constitutional: Negative.   HENT: Negative.     Eyes: Negative.    Cardiovascular:  Positive for palpitations.   Respiratory: Negative.     Endocrine: Negative.    Hematologic/Lymphatic: Negative.    Skin: Negative.    Musculoskeletal: Negative.    Gastrointestinal: Negative.    Genitourinary: Negative.    Neurological: Negative.    Psychiatric/Behavioral: Negative.     Allergic/Immunologic: Negative.      Objective:     Vital Signs (Most Recent):  Temp: 98.3 °F (36.8 °C) (08/06/23 0800)  Pulse: 104 (08/06/23 1000)  Resp: (!) 25 (08/06/23 1000)  BP: (!) 161/77 (08/06/23 1000)  SpO2: 95 % (08/06/23 1000) Vital Signs (24h Range):  Temp:  [97.7 °F (36.5 °C)-98.4 °F (36.9 °C)] 98.3 °F (36.8 °C)  Pulse:  [] 104  Resp:  [12-44] 25  SpO2:  [95 %-100 %] 95 %  BP: (113-172)/() 161/77     Weight: 97.3 kg (214 lb 8.1 oz)  Body mass index is 25.44 kg/m².     SpO2: 95 %         Intake/Output Summary (Last 24 hours) at 8/6/2023 1429  Last data filed at 8/6/2023 1045  Gross per 24 hour   Intake 447.89 ml   Output 800 ml   Net -352.11 ml       Lines/Drains/Airways       Peripheral Intravenous Line  Duration                  Peripheral IV - Single Lumen 08/05/23 1202 20 G Right Antecubital 1 day         Peripheral IV - Single Lumen 08/05/23 1433 20 G Left Antecubital <1 day                       Physical Exam  Vitals reviewed.   Constitutional:       Appearance: He is well-developed.   HENT:      Head:  "Normocephalic.   Eyes:      Conjunctiva/sclera: Conjunctivae normal.      Pupils: Pupils are equal, round, and reactive to light.   Cardiovascular:      Rate and Rhythm: Rhythm irregular.      Heart sounds: Normal heart sounds.   Pulmonary:      Effort: Pulmonary effort is normal.      Breath sounds: Normal breath sounds.   Abdominal:      General: Bowel sounds are normal.      Palpations: Abdomen is soft.   Musculoskeletal:      Cervical back: Normal range of motion and neck supple.   Skin:     General: Skin is warm.   Neurological:      Mental Status: He is alert and oriented to person, place, and time.            Significant Labs: BMP:   Recent Labs   Lab 08/05/23  1159 08/06/23  0333   * 103    136   K 4.6 4.4    105   CO2 23 25   BUN 13 12   CREATININE 1.0 0.8   CALCIUM 9.6 9.6   MG 1.8  --    , CMP   Recent Labs   Lab 08/05/23  1159 08/06/23  0333    136   K 4.6 4.4    105   CO2 23 25   * 103   BUN 13 12   CREATININE 1.0 0.8   CALCIUM 9.6 9.6   PROT 8.4  --    ALBUMIN 3.3*  --    BILITOT 0.8  --    ALKPHOS 94  --    AST 25  --    ALT 17  --    ANIONGAP 8 6*   , CBC   Recent Labs   Lab 08/05/23  1159   WBC 7.30   HGB 15.2   HCT 50.5      , INR No results for input(s): "INR", "PROTIME" in the last 48 hours., Lipid Panel No results for input(s): "CHOL", "HDL", "LDLCALC", "TRIG", "CHOLHDL" in the last 48 hours., Troponin   Recent Labs   Lab 08/05/23  1159   TROPONINI 0.009   , and All pertinent lab results from the last 24 hours have been reviewed.    Significant Imaging: Echocardiogram: Transthoracic echo (TTE) complete (Cupid Only):   Results for orders placed or performed during the hospital encounter of 07/27/23   Echo   Result Value Ref Range    BSA 2.31 m2    TDI SEPTAL 0.06 m/s    LV LATERAL E/E' RATIO 14.00 m/s    LV SEPTAL E/E' RATIO 14.00 m/s    LA WIDTH 5.40 cm    IVC diameter 2.17 cm    Left Ventricular Outflow Tract Mean Velocity 0.43 cm/s    Left " Ventricular Outflow Tract Mean Gradient 0.89 mmHg    AORTIC VALVE CUSP SEPERATION 1.90 cm    TDI LATERAL 0.06 m/s    LVIDd 4.26 3.5 - 6.0 cm    IVS 1.21 (A) 0.6 - 1.1 cm    Posterior Wall 1.24 (A) 0.6 - 1.1 cm    Ao root annulus 3.32 cm    LVIDs 2.71 2.1 - 4.0 cm    FS 36 28 - 44 %    LA volume 117.77 cm3    Sinus 3.36 cm    STJ 2.74 cm    Ascending aorta 3.34 cm    LV mass 187.64 g    LA size 4.40 cm    RVDD 5.30 cm    TAPSE 1.34 cm    RV S' 14.07 cm/s    Left Ventricle Relative Wall Thickness 0.58 cm    AV mean gradient 2 mmHg    AV valve area 2.29 cm2    AV Velocity Ratio 0.64     AV index (prosthetic) 0.63     Mean e' 0.06 m/s    LVOT diameter 2.16 cm    LVOT area 3.7 cm2    LVOT peak delio 0.66 m/s    LVOT peak VTI 9.90 cm    Ao peak delio 1.03 m/s    Ao VTI 15.8 cm    LVOT stroke volume 36.26 cm3    AV peak gradient 4 mmHg    E/E' ratio 14.00 m/s    MV Peak E Delio 0.84 m/s    TR Max Delio 3.42 m/s    LV Systolic Volume 27.37 mL    LV Systolic Volume Index 11.8 mL/m2    LV Diastolic Volume 81.39 mL    LV Diastolic Volume Index 35.08 mL/m2    LA Volume Index 50.8 mL/m2    LV Mass Index 81 g/m2    RA Major Axis 5.74 cm    Left Atrium Minor Axis 5.37 cm    Left Atrium Major Axis 6.38 cm    Triscuspid Valve Regurgitation Peak Gradient 47 mmHg    RA Width 6.40 cm    Covington's Biplane MOD Ejection Fraction 3 %    Right Atrial Pressure (from IVC) 15 mmHg    EF 50 %    TV resting pulmonary artery pressure 62 mmHg    Narrative    · The estimated ejection fraction is 50%.  · Concentric remodeling and low normal systolic function.  · Severe left atrial enlargement.  · Atrial fibrillation observed.  · There are segmental left ventricular wall motion abnormalities.  · Severe right ventricular enlargement.  · Severe right atrial enlargement.  · Moderate mitral regurgitation.  · Mild tricuspid regurgitation.  · Elevated central venous pressure (15 mmHg).  · The estimated PA systolic pressure is 62 mmHg.  · There is pulmonary  hypertension.        Assessment and Plan:     Brief HPI:     * Atrial flutter with rapid ventricular response  cardizem drip. Rate control on cardizem drip.     Will do cardioversion on Monday.    Npo post mn on Sunday.    Initiate sotalol. Monitor qtc    8/6:    cv in am    Risks, benefits and alternatives of the Cardioversion procedure were discussed with the patient including throat irritation, aspiration, anesthetic complications, esophageal irritation/perforation, skin irritation, arrhythmia, etc.  Patient understands and agrees to proceed.  Consent was placed on the chart.     no need to repeat ELENA as patient has been compliant with his Eliquis since the last ELENA recently      Thoracic aorta atherosclerosis        Pulmonary interstitial fibrosis        ILD (interstitial lung disease)        Former smoker        History of HCV, s/p successful treatment w/ SVR12 5/2015            VTE Risk Mitigation (From admission, onward)         Ordered     apixaban tablet 5 mg  2 times daily         08/05/23 1421                David Cueva MD  Cardiology  [facility] - Intensive Care    Critical Care Time:  35 minutes     Critical care was time spent personally by me on the following activities: development of treatment plan with patient or surrogate and bedside caregivers, discussions with consultants, evaluation of patient's response to treatment, examination of patient, ordering and performing treatments and interventions, ordering and review of laboratory studies, ordering and review of radiographic studies, pulse oximetry, re-evaluation of patient's condition. This critical care time did not overlap with that of any other provider or involve time for any procedures.

## 2023-08-06 NOTE — NURSING
[facility] [place]  Nurses Note -- 4 Eyes      8/6/2023       Skin assessed on: Q Shift      [x] No Pressure Injuries Present    [x]Prevention Measures Documented    [] Yes LDA  for Pressure Injury Previously documented     [] Yes New Pressure Injury Discovered   [] LDA for New Pressure Injury Added      Attending RN:  ISAC Lyle    Second RN:  ISAC Logan

## 2023-08-06 NOTE — PROGRESS NOTES
[facility] Medicine  Progress Note    Patient Name: Josiah Orosco Jr.  MRN: [id number]  Patient Class: IP- Inpatient   Admission Date: 8/5/2023  Length of Stay: 1 days  Attending Physician: rSini Vu, *  Primary Care Provider: Elaine Landry MD        Subjective:     Principal Problem:Atrial flutter with rapid ventricular response        HPI:  Josiah Orosco Jr. is a 67 y.o. male, with a PMHx of A-Fib, HTN, and pulmonary interstitial fibrosis, who presents to the ED with SOB and productive cough for 1 day. Patient endorses fatigue. Patient reports taking eliquis and metoprolol. Patient is being followed by Dr. Cueva, cardiology. Denies other associated symptoms.patient is in Afib with RVR again,was shocked last week,patient has been stared on Cardizem drip,cardiology is consulted,      Overview/Hospital Course:  Josiah Orosco Jr. is a 67 y.o. male, with a PMHx of A-Fib, HTN, and pulmonary interstitial fibrosis, who presents to the ED with SOB and productive cough for 1 day. Patient endorses fatigue. Patient reports taking eliquis and metoprolol. Patient is being followed by Dr. Cueva, cardiology. Denies other associated symptoms.patient is in Aflutter  with RVR again,was shocked last week,patient has been stared on Cardizem drip,cardiology is consulted,sotalol is added,remains in Aflutter,plan for DCCV in AM.      Past Medical History:   Diagnosis Date    A-fib 5/8/2023    Arthritis     GERD (gastroesophageal reflux disease)     History of HCV, s/p successful treatment w/ SVR12 5/2015     Failed treatment (stage I/II) - followed by hepatology     Joint pain     Lung disease caused by breathing particles     Widespread essentially symmetric interstitial lung disease    Obesity     Polycythemia vera     Prediabetes        Past Surgical History:   Procedure Laterality Date    BUNIONECTOMY      bilateral    CARDIOVERSION N/A 05/08/2023    Procedure: Cardioversion;   Surgeon: David Cueva MD;  Location: [facility] CATH LAB;  Service: Cardiology;  Laterality: N/A;    CATARACT EXTRACTION Left 07/27/2023    COLONOSCOPY N/A 12/09/2015    Procedure: COLONOSCOPY;  Surgeon: Conrad Iqbal MD;  Location: [facility] ENDO;  Service: Endoscopy;  Laterality: N/A;    Liver biopsy x2      rotator cuff Right     TRANSESOPHAGEAL ECHOCARDIOGRAM WITH POSSIBLE CARDIOVERSION (ELENA W/ POSS CARDIOVERSION) N/A 05/08/2023    Procedure: TRANSESOPHAGEAL ECHOCARDIOGRAM WITH POSSIBLE CARDIOVERSION (ELENA W/ POSS CARDIOVERSION);  Surgeon: David Cueva MD;  Location: [facility] CATH LAB;  Service: Cardiology;  Laterality: N/A;  12:30PM    RN PREOP 5/4/2023---EKG ON ARRIVAL    TRANSESOPHAGEAL ECHOCARDIOGRAM WITH POSSIBLE CARDIOVERSION (ELENA W/ POSS CARDIOVERSION) N/A 7/29/2023    Procedure: Transesophageal echo (ELENA) intra-procedure log documentation;  Surgeon: David Cueva MD;  Location: [facility] CATH LAB;  Service: Cardiology;  Laterality: N/A;    TRANSESOPHAGEAL ECHOCARDIOGRAPHY N/A 05/08/2023    Procedure: ECHOCARDIOGRAM, TRANSESOPHAGEAL;  Surgeon: David Cueva MD;  Location: [facility] CATH LAB;  Service: Cardiology;  Laterality: N/A;    TRANSESOPHAGEAL ECHOCARDIOGRAPHY N/A 7/29/2023    Procedure: ECHOCARDIOGRAM, TRANSESOPHAGEAL;  Surgeon: David Cueva MD;  Location: [facility] CATH LAB;  Service: Cardiology;  Laterality: N/A;       Review of patient's allergies indicates:   Allergen Reactions    Ace inhibitors Other (See Comments)     cough       No current facility-administered medications on file prior to encounter.     Current Outpatient Medications on File Prior to Encounter   Medication Sig    allopurinoL (ZYLOPRIM) 100 MG tablet Take 2 tablets (200 mg total) by mouth once daily.    apixaban (ELIQUIS) 5 mg Tab Take 1 tablet (5 mg total) by mouth 2 (two) times daily.    fluticasone propionate (FLONASE) 50 mcg/actuation nasal spray SHAKE LIQUID AND USE 1 SPRAY(50 MCG) IN EACH NOSTRIL EVERY DAY    losartan  (COZAAR) 50 MG tablet TK 1 T PO D    metoprolol succinate (TOPROL-XL) 100 MG 24 hr tablet Take 1 tablet (100 mg total) by mouth once daily.    multivitamin capsule Take 1 capsule by mouth once daily.    prednisoLONE acetate (PRED FORTE) 1 % DrpS Place 1 drop into both eyes 3 (three) times daily.    TRELEGY ELLIPTA 100-62.5-25 mcg DsDv Inhale 1 puff into the lungs Daily.    ofloxacin (OCUFLOX) 0.3 % ophthalmic solution Place 1 drop into both eyes 4 (four) times daily.     Family History       Problem Relation (Age of Onset)    Chronic back pain Brother    Deep vein thrombosis Sister    Heart attack Sister    Heart disease Mother    Kidney disease Mother    Osteoarthritis Sister    Parkinsonism Father    Prostate cancer Cousin    Pulmonary embolism Sister          Tobacco Use    Smoking status: Former     Current packs/day: 0.00    Smokeless tobacco: Never    Tobacco comments:     pt. smokes 10 cigars per day.   Substance and Sexual Activity    Alcohol use: Not Currently     Comment: Rarely    Drug use: Yes     Types: Marijuana     Comment: daily    Sexual activity: Yes     Partners: Female     Review of Systems   Constitutional:  Positive for activity change and appetite change.   HENT:  Negative for congestion and dental problem.    Eyes:  Negative for discharge.   Respiratory:  Positive for shortness of breath. Negative for apnea and chest tightness.    Cardiovascular:  Negative for chest pain and leg swelling.   Gastrointestinal:  Negative for abdominal distention and abdominal pain.   Endocrine: Negative for cold intolerance and heat intolerance.   Genitourinary:  Negative for difficulty urinating and dysuria.   Musculoskeletal:  Negative for arthralgias.   Skin:  Negative for color change and pallor.   Allergic/Immunologic: Negative for environmental allergies.   Neurological:  Negative for dizziness and facial asymmetry.   Hematological:  Negative for adenopathy. Does not bruise/bleed easily.    Psychiatric/Behavioral:  Negative for agitation and behavioral problems.      Objective:     Vital Signs (Most Recent):  Temp: 98.3 °F (36.8 °C) (08/06/23 0800)  Pulse: 104 (08/06/23 1000)  Resp: (!) 25 (08/06/23 1000)  BP: (!) 161/77 (08/06/23 1000)  SpO2: 95 % (08/06/23 1000) Vital Signs (24h Range):  Temp:  [97.7 °F (36.5 °C)-98.4 °F (36.9 °C)] 98.3 °F (36.8 °C)  Pulse:  [] 104  Resp:  [12-44] 25  SpO2:  [95 %-100 %] 95 %  BP: (113-172)/() 161/77     Weight: 97.3 kg (214 lb 8.1 oz)  Body mass index is 25.44 kg/m².     Physical Exam  HENT:      Head: Atraumatic.      Nose: Nose normal. No congestion.      Mouth/Throat:      Mouth: Mucous membranes are dry.   Eyes:      Extraocular Movements: Extraocular movements intact.      Pupils: Pupils are equal, round, and reactive to light.   Cardiovascular:      Rate and Rhythm: Tachycardia present. Rhythm irregular.   Pulmonary:      Effort: No respiratory distress.      Breath sounds: No wheezing.   Abdominal:      General: There is no distension.      Tenderness: There is no abdominal tenderness.   Musculoskeletal:         General: No swelling or deformity.      Cervical back: Normal range of motion.   Skin:     Findings: No bruising.   Neurological:      Mental Status: He is alert and oriented to person, place, and time.      Cranial Nerves: No cranial nerve deficit.   Psychiatric:         Mood and Affect: Mood normal.         Behavior: Behavior normal.              CRANIAL NERVES     CN III, IV, VI   Pupils are equal, round, and reactive to light.       Significant Labs: All pertinent labs within the past 24 hours have been reviewed.  BMP:   Recent Labs   Lab 08/05/23  1159 08/06/23  0333   * 103    136   K 4.6 4.4    105   CO2 23 25   BUN 13 12   CREATININE 1.0 0.8   CALCIUM 9.6 9.6   MG 1.8  --        CBC:   Recent Labs   Lab 08/05/23  1159   WBC 7.30   HGB 15.2   HCT 50.5          CMP:   Recent Labs   Lab 08/05/23  1159  08/06/23  0333    136   K 4.6 4.4    105   CO2 23 25   * 103   BUN 13 12   CREATININE 1.0 0.8   CALCIUM 9.6 9.6   PROT 8.4  --    ALBUMIN 3.3*  --    BILITOT 0.8  --    ALKPHOS 94  --    AST 25  --    ALT 17  --    ANIONGAP 8 6*         Significant Imaging: I have reviewed all pertinent imaging results/findings within the past 24 hours.      Assessment/Plan:      * Atrial flutter with rapid ventricular response    patient is in Afib with RVR again,was shocked last week,patient has been stared on Cardizem drip,cardiology is consulted,plan for DCCV in AM.    Thoracic aorta atherosclerosis  On medical treatment.      Pulmonary interstitial fibrosis    On inhaler.    ILD (interstitial lung disease)  On inhaler.      Former smoker  Per history.      History of HCV, s/p successful treatment w/ SVR12 5/2015  Per history.        VTE Risk Mitigation (From admission, onward)         Ordered     apixaban tablet 5 mg  2 times daily         08/05/23 1421                Discharge Planning   THADDEUS:      Code Status: Full Code   Is the patient medically ready for discharge?:     Reason for patient still in hospital (select all that apply): Patient trending condition               Critical care time spent on the evaluation and treatment of severe organ dysfunction, review of pertinent labs and imaging studies, discussions with consulting providers and discussions with patient/family:  Over 45  minutes.      Srini Vu MD  Department of Hospital Medicine   [facility] - Intensive Care

## 2023-08-06 NOTE — SUBJECTIVE & OBJECTIVE
Interval History:  continues to be on Cardizem drip.  Heart rate goes up whenever Cardizem is reduced    Review of Systems   Constitutional: Negative.   HENT: Negative.     Eyes: Negative.    Cardiovascular:  Positive for palpitations.   Respiratory: Negative.     Endocrine: Negative.    Hematologic/Lymphatic: Negative.    Skin: Negative.    Musculoskeletal: Negative.    Gastrointestinal: Negative.    Genitourinary: Negative.    Neurological: Negative.    Psychiatric/Behavioral: Negative.     Allergic/Immunologic: Negative.      Objective:     Vital Signs (Most Recent):  Temp: 98.3 °F (36.8 °C) (08/06/23 0800)  Pulse: 104 (08/06/23 1000)  Resp: (!) 25 (08/06/23 1000)  BP: (!) 161/77 (08/06/23 1000)  SpO2: 95 % (08/06/23 1000) Vital Signs (24h Range):  Temp:  [97.7 °F (36.5 °C)-98.4 °F (36.9 °C)] 98.3 °F (36.8 °C)  Pulse:  [] 104  Resp:  [12-44] 25  SpO2:  [95 %-100 %] 95 %  BP: (113-172)/() 161/77     Weight: 97.3 kg (214 lb 8.1 oz)  Body mass index is 25.44 kg/m².     SpO2: 95 %         Intake/Output Summary (Last 24 hours) at 8/6/2023 1429  Last data filed at 8/6/2023 1045  Gross per 24 hour   Intake 447.89 ml   Output 800 ml   Net -352.11 ml       Lines/Drains/Airways       Peripheral Intravenous Line  Duration                  Peripheral IV - Single Lumen 08/05/23 1202 20 G Right Antecubital 1 day         Peripheral IV - Single Lumen 08/05/23 1433 20 G Left Antecubital <1 day                       Physical Exam  Vitals reviewed.   Constitutional:       Appearance: He is well-developed.   HENT:      Head: Normocephalic.   Eyes:      Conjunctiva/sclera: Conjunctivae normal.      Pupils: Pupils are equal, round, and reactive to light.   Cardiovascular:      Rate and Rhythm: Rhythm irregular.      Heart sounds: Normal heart sounds.   Pulmonary:      Effort: Pulmonary effort is normal.      Breath sounds: Normal breath sounds.   Abdominal:      General: Bowel sounds are normal.      Palpations: Abdomen is  "soft.   Musculoskeletal:      Cervical back: Normal range of motion and neck supple.   Skin:     General: Skin is warm.   Neurological:      Mental Status: He is alert and oriented to person, place, and time.            Significant Labs: BMP:   Recent Labs   Lab 08/05/23  1159 08/06/23  0333   * 103    136   K 4.6 4.4    105   CO2 23 25   BUN 13 12   CREATININE 1.0 0.8   CALCIUM 9.6 9.6   MG 1.8  --    , CMP   Recent Labs   Lab 08/05/23  1159 08/06/23  0333    136   K 4.6 4.4    105   CO2 23 25   * 103   BUN 13 12   CREATININE 1.0 0.8   CALCIUM 9.6 9.6   PROT 8.4  --    ALBUMIN 3.3*  --    BILITOT 0.8  --    ALKPHOS 94  --    AST 25  --    ALT 17  --    ANIONGAP 8 6*   , CBC   Recent Labs   Lab 08/05/23  1159   WBC 7.30   HGB 15.2   HCT 50.5      , INR No results for input(s): "INR", "PROTIME" in the last 48 hours., Lipid Panel No results for input(s): "CHOL", "HDL", "LDLCALC", "TRIG", "CHOLHDL" in the last 48 hours., Troponin   Recent Labs   Lab 08/05/23  1159   TROPONINI 0.009   , and All pertinent lab results from the last 24 hours have been reviewed.    Significant Imaging: Echocardiogram: Transthoracic echo (TTE) complete (Cupid Only):   Results for orders placed or performed during the hospital encounter of 07/27/23   Echo   Result Value Ref Range    BSA 2.31 m2    TDI SEPTAL 0.06 m/s    LV LATERAL E/E' RATIO 14.00 m/s    LV SEPTAL E/E' RATIO 14.00 m/s    LA WIDTH 5.40 cm    IVC diameter 2.17 cm    Left Ventricular Outflow Tract Mean Velocity 0.43 cm/s    Left Ventricular Outflow Tract Mean Gradient 0.89 mmHg    AORTIC VALVE CUSP SEPERATION 1.90 cm    TDI LATERAL 0.06 m/s    LVIDd 4.26 3.5 - 6.0 cm    IVS 1.21 (A) 0.6 - 1.1 cm    Posterior Wall 1.24 (A) 0.6 - 1.1 cm    Ao root annulus 3.32 cm    LVIDs 2.71 2.1 - 4.0 cm    FS 36 28 - 44 %    LA volume 117.77 cm3    Sinus 3.36 cm    STJ 2.74 cm    Ascending aorta 3.34 cm    LV mass 187.64 g    LA size 4.40 cm    RVDD " 5.30 cm    TAPSE 1.34 cm    RV S' 14.07 cm/s    Left Ventricle Relative Wall Thickness 0.58 cm    AV mean gradient 2 mmHg    AV valve area 2.29 cm2    AV Velocity Ratio 0.64     AV index (prosthetic) 0.63     Mean e' 0.06 m/s    LVOT diameter 2.16 cm    LVOT area 3.7 cm2    LVOT peak delio 0.66 m/s    LVOT peak VTI 9.90 cm    Ao peak delio 1.03 m/s    Ao VTI 15.8 cm    LVOT stroke volume 36.26 cm3    AV peak gradient 4 mmHg    E/E' ratio 14.00 m/s    MV Peak E Delio 0.84 m/s    TR Max Delio 3.42 m/s    LV Systolic Volume 27.37 mL    LV Systolic Volume Index 11.8 mL/m2    LV Diastolic Volume 81.39 mL    LV Diastolic Volume Index 35.08 mL/m2    LA Volume Index 50.8 mL/m2    LV Mass Index 81 g/m2    RA Major Axis 5.74 cm    Left Atrium Minor Axis 5.37 cm    Left Atrium Major Axis 6.38 cm    Triscuspid Valve Regurgitation Peak Gradient 47 mmHg    RA Width 6.40 cm    Covington's Biplane MOD Ejection Fraction 3 %    Right Atrial Pressure (from IVC) 15 mmHg    EF 50 %    TV resting pulmonary artery pressure 62 mmHg    Narrative    · The estimated ejection fraction is 50%.  · Concentric remodeling and low normal systolic function.  · Severe left atrial enlargement.  · Atrial fibrillation observed.  · There are segmental left ventricular wall motion abnormalities.  · Severe right ventricular enlargement.  · Severe right atrial enlargement.  · Moderate mitral regurgitation.  · Mild tricuspid regurgitation.  · Elevated central venous pressure (15 mmHg).  · The estimated PA systolic pressure is 62 mmHg.  · There is pulmonary hypertension.

## 2023-08-06 NOTE — HOSPITAL COURSE
Josiah Orosco Jr. is a 67 y.o. male, with a PMHx of A-Fib, HTN, and pulmonary interstitial fibrosis, who presents to the ED with SOB and productive cough for 1 day. Patient endorses fatigue. Patient reports taking eliquis and metoprolol. Patient is being followed by Dr. Cueva, cardiology. Denies other associated symptoms.patient was in Aflutter  with RVR again,was shocked last week,patient has been stared on Cardizem drip,cardiology is consulted,sotalol is added,remains in Aflutter,S/P DCCV ,back to sinus,was monitored in Telemetry and remains stable.patient was discharged home with follow up plan with cardiology as out patient.

## 2023-08-06 NOTE — SUBJECTIVE & OBJECTIVE
Past Medical History:   Diagnosis Date    A-fib 5/8/2023    Arthritis     GERD (gastroesophageal reflux disease)     History of HCV, s/p successful treatment w/ SVR12 5/2015     Failed treatment (stage I/II) - followed by hepatology     Joint pain     Lung disease caused by breathing particles     Widespread essentially symmetric interstitial lung disease    Obesity     Polycythemia vera     Prediabetes        Past Surgical History:   Procedure Laterality Date    BUNIONECTOMY      bilateral    CARDIOVERSION N/A 05/08/2023    Procedure: Cardioversion;  Surgeon: David Cueva MD;  Location: [facility] CATH LAB;  Service: Cardiology;  Laterality: N/A;    CATARACT EXTRACTION Left 07/27/2023    COLONOSCOPY N/A 12/09/2015    Procedure: COLONOSCOPY;  Surgeon: Conrad Iqbal MD;  Location: [facility] ENDO;  Service: Endoscopy;  Laterality: N/A;    Liver biopsy x2      rotator cuff Right     TRANSESOPHAGEAL ECHOCARDIOGRAM WITH POSSIBLE CARDIOVERSION (ELENA W/ POSS CARDIOVERSION) N/A 05/08/2023    Procedure: TRANSESOPHAGEAL ECHOCARDIOGRAM WITH POSSIBLE CARDIOVERSION (ELENA W/ POSS CARDIOVERSION);  Surgeon: David Cueva MD;  Location: [facility] CATH LAB;  Service: Cardiology;  Laterality: N/A;  12:30PM    RN PREOP 5/4/2023---EKG ON ARRIVAL    TRANSESOPHAGEAL ECHOCARDIOGRAM WITH POSSIBLE CARDIOVERSION (ELENA W/ POSS CARDIOVERSION) N/A 7/29/2023    Procedure: Transesophageal echo (ELENA) intra-procedure log documentation;  Surgeon: David Cueva MD;  Location: [facility] CATH LAB;  Service: Cardiology;  Laterality: N/A;    TRANSESOPHAGEAL ECHOCARDIOGRAPHY N/A 05/08/2023    Procedure: ECHOCARDIOGRAM, TRANSESOPHAGEAL;  Surgeon: David Cueva MD;  Location: [facility] CATH LAB;  Service: Cardiology;  Laterality: N/A;    TRANSESOPHAGEAL ECHOCARDIOGRAPHY N/A 7/29/2023    Procedure: ECHOCARDIOGRAM, TRANSESOPHAGEAL;  Surgeon: David Cueva MD;  Location: [facility] CATH LAB;  Service: Cardiology;  Laterality: N/A;       Review of patient's allergies indicates:    Allergen Reactions    Ace inhibitors Other (See Comments)     cough       No current facility-administered medications on file prior to encounter.     Current Outpatient Medications on File Prior to Encounter   Medication Sig    allopurinoL (ZYLOPRIM) 100 MG tablet Take 2 tablets (200 mg total) by mouth once daily.    apixaban (ELIQUIS) 5 mg Tab Take 1 tablet (5 mg total) by mouth 2 (two) times daily.    fluticasone propionate (FLONASE) 50 mcg/actuation nasal spray SHAKE LIQUID AND USE 1 SPRAY(50 MCG) IN EACH NOSTRIL EVERY DAY    losartan (COZAAR) 50 MG tablet TK 1 T PO D    metoprolol succinate (TOPROL-XL) 100 MG 24 hr tablet Take 1 tablet (100 mg total) by mouth once daily.    multivitamin capsule Take 1 capsule by mouth once daily.    prednisoLONE acetate (PRED FORTE) 1 % DrpS Place 1 drop into both eyes 3 (three) times daily.    TRELEGY ELLIPTA 100-62.5-25 mcg DsDv Inhale 1 puff into the lungs Daily.    ofloxacin (OCUFLOX) 0.3 % ophthalmic solution Place 1 drop into both eyes 4 (four) times daily.     Family History       Problem Relation (Age of Onset)    Chronic back pain Brother    Deep vein thrombosis Sister    Heart attack Sister    Heart disease Mother    Kidney disease Mother    Osteoarthritis Sister    Parkinsonism Father    Prostate cancer Cousin    Pulmonary embolism Sister          Tobacco Use    Smoking status: Former     Current packs/day: 0.00    Smokeless tobacco: Never    Tobacco comments:     pt. smokes 10 cigars per day.   Substance and Sexual Activity    Alcohol use: Not Currently     Comment: Rarely    Drug use: Yes     Types: Marijuana     Comment: daily    Sexual activity: Yes     Partners: Female     Review of Systems   Constitutional:  Positive for activity change and appetite change.   HENT:  Negative for congestion and dental problem.    Eyes:  Negative for discharge.   Respiratory:  Positive for shortness of breath. Negative for apnea and chest tightness.    Cardiovascular:  Negative  for chest pain and leg swelling.   Gastrointestinal:  Negative for abdominal distention and abdominal pain.   Endocrine: Negative for cold intolerance and heat intolerance.   Genitourinary:  Negative for difficulty urinating and dysuria.   Musculoskeletal:  Negative for arthralgias.   Skin:  Negative for color change and pallor.   Allergic/Immunologic: Negative for environmental allergies.   Neurological:  Negative for dizziness and facial asymmetry.   Hematological:  Negative for adenopathy. Does not bruise/bleed easily.   Psychiatric/Behavioral:  Negative for agitation and behavioral problems.      Objective:     Vital Signs (Most Recent):  Temp: 98.3 °F (36.8 °C) (08/06/23 0800)  Pulse: 104 (08/06/23 1000)  Resp: (!) 25 (08/06/23 1000)  BP: (!) 161/77 (08/06/23 1000)  SpO2: 95 % (08/06/23 1000) Vital Signs (24h Range):  Temp:  [97.7 °F (36.5 °C)-98.4 °F (36.9 °C)] 98.3 °F (36.8 °C)  Pulse:  [] 104  Resp:  [12-44] 25  SpO2:  [95 %-100 %] 95 %  BP: (113-172)/() 161/77     Weight: 97.3 kg (214 lb 8.1 oz)  Body mass index is 25.44 kg/m².     Physical Exam  HENT:      Head: Atraumatic.      Nose: Nose normal. No congestion.      Mouth/Throat:      Mouth: Mucous membranes are dry.   Eyes:      Extraocular Movements: Extraocular movements intact.      Pupils: Pupils are equal, round, and reactive to light.   Cardiovascular:      Rate and Rhythm: Tachycardia present. Rhythm irregular.   Pulmonary:      Effort: No respiratory distress.      Breath sounds: No wheezing.   Abdominal:      General: There is no distension.      Tenderness: There is no abdominal tenderness.   Musculoskeletal:         General: No swelling or deformity.      Cervical back: Normal range of motion.   Skin:     Findings: No bruising.   Neurological:      Mental Status: He is alert and oriented to person, place, and time.      Cranial Nerves: No cranial nerve deficit.   Psychiatric:         Mood and Affect: Mood normal.         Behavior:  Behavior normal.              CRANIAL NERVES     CN III, IV, VI   Pupils are equal, round, and reactive to light.       Significant Labs: All pertinent labs within the past 24 hours have been reviewed.  BMP:   Recent Labs   Lab 08/05/23  1159 08/06/23  0333   * 103    136   K 4.6 4.4    105   CO2 23 25   BUN 13 12   CREATININE 1.0 0.8   CALCIUM 9.6 9.6   MG 1.8  --        CBC:   Recent Labs   Lab 08/05/23  1159   WBC 7.30   HGB 15.2   HCT 50.5          CMP:   Recent Labs   Lab 08/05/23  1159 08/06/23  0333    136   K 4.6 4.4    105   CO2 23 25   * 103   BUN 13 12   CREATININE 1.0 0.8   CALCIUM 9.6 9.6   PROT 8.4  --    ALBUMIN 3.3*  --    BILITOT 0.8  --    ALKPHOS 94  --    AST 25  --    ALT 17  --    ANIONGAP 8 6*         Significant Imaging: I have reviewed all pertinent imaging results/findings within the past 24 hours.

## 2023-08-06 NOTE — PLAN OF CARE
Problem: Adult Inpatient Plan of Care  Goal: Plan of Care Review  Outcome: Ongoing, Not Progressing  Goal: Patient-Specific Goal (Individualized)  Outcome: Ongoing, Not Progressing  Goal: Absence of Hospital-Acquired Illness or Injury  Outcome: Ongoing, Not Progressing  Goal: Optimal Comfort and Wellbeing  Outcome: Ongoing, Not Progressing  Goal: Readiness for Transition of Care  Outcome: Ongoing, Not Progressing     Problem: Dysrhythmia  Goal: Normalized Cardiac Rhythm  Outcome: Ongoing, Not Progressing

## 2023-08-06 NOTE — PLAN OF CARE
Problem: Adult Inpatient Plan of Care  Goal: Plan of Care Review  Outcome: Ongoing, Progressing  Goal: Patient-Specific Goal (Individualized)  Outcome: Ongoing, Progressing  Goal: Absence of Hospital-Acquired Illness or Injury  Outcome: Ongoing, Progressing  Goal: Optimal Comfort and Wellbeing  Outcome: Ongoing, Progressing  Goal: Readiness for Transition of Care  Outcome: Ongoing, Progressing     Problem: Dysrhythmia  Goal: Normalized Cardiac Rhythm  Outcome: Ongoing, Progressing       Pt remains in ICU with titrations to diltiazem. Plans for cardioversion 8/7; NPO after midnight for procedure. Wife at bedside and pt and wife updated on plan of care.

## 2023-08-06 NOTE — ASSESSMENT & PLAN NOTE
cardizem drip. Rate control on cardizem drip.     Will do cardioversion on Monday.    Npo post mn on Sunday.    Initiate sotalol. Monitor qtc    8/6:    cv in am    Risks, benefits and alternatives of the Cardioversion procedure were discussed with the patient including throat irritation, aspiration, anesthetic complications, esophageal irritation/perforation, skin irritation, arrhythmia, etc.  Patient understands and agrees to proceed.  Consent was placed on the chart.     no need to repeat ELENA as patient has been compliant with his Eliquis since the last ELENA recently

## 2023-08-07 ENCOUNTER — ANESTHESIA (OUTPATIENT)
Dept: CARDIOLOGY | Facility: HOSPITAL | Age: 68
DRG: 310 | End: 2023-08-07
Payer: MEDICARE

## 2023-08-07 ENCOUNTER — TELEPHONE (OUTPATIENT)
Dept: FAMILY MEDICINE | Facility: CLINIC | Age: 68
End: 2023-08-07
Payer: MEDICARE

## 2023-08-07 ENCOUNTER — ANESTHESIA EVENT (OUTPATIENT)
Dept: CARDIOLOGY | Facility: HOSPITAL | Age: 68
DRG: 310 | End: 2023-08-07
Payer: MEDICARE

## 2023-08-07 LAB
ANION GAP SERPL CALC-SCNC: 7 MMOL/L (ref 8–16)
BUN SERPL-MCNC: 11 MG/DL (ref 8–23)
CALCIUM SERPL-MCNC: 9.1 MG/DL (ref 8.7–10.5)
CHLORIDE SERPL-SCNC: 103 MMOL/L (ref 95–110)
CO2 SERPL-SCNC: 25 MMOL/L (ref 23–29)
CREAT SERPL-MCNC: 0.7 MG/DL (ref 0.5–1.4)
EST. GFR  (NO RACE VARIABLE): >60 ML/MIN/1.73 M^2
GLUCOSE SERPL-MCNC: 95 MG/DL (ref 70–110)
POTASSIUM SERPL-SCNC: 4.3 MMOL/L (ref 3.5–5.1)
SODIUM SERPL-SCNC: 135 MMOL/L (ref 136–145)

## 2023-08-07 PROCEDURE — 25000242 PHARM REV CODE 250 ALT 637 W/ HCPCS: Performed by: HOSPITALIST

## 2023-08-07 PROCEDURE — 99291 PR CRITICAL CARE, E/M 30-74 MINUTES: ICD-10-PCS | Mod: 25,,, | Performed by: INTERNAL MEDICINE

## 2023-08-07 PROCEDURE — 94640 AIRWAY INHALATION TREATMENT: CPT

## 2023-08-07 PROCEDURE — D9220A PRA ANESTHESIA: ICD-10-PCS | Mod: ANES,,, | Performed by: ANESTHESIOLOGY

## 2023-08-07 PROCEDURE — 21400001 HC TELEMETRY ROOM

## 2023-08-07 PROCEDURE — D9220A PRA ANESTHESIA: ICD-10-PCS | Mod: CRNA,,, | Performed by: STUDENT IN AN ORGANIZED HEALTH CARE EDUCATION/TRAINING PROGRAM

## 2023-08-07 PROCEDURE — 25000003 PHARM REV CODE 250: Performed by: STUDENT IN AN ORGANIZED HEALTH CARE EDUCATION/TRAINING PROGRAM

## 2023-08-07 PROCEDURE — 36415 COLL VENOUS BLD VENIPUNCTURE: CPT | Performed by: HOSPITALIST

## 2023-08-07 PROCEDURE — 63600175 PHARM REV CODE 636 W HCPCS: Performed by: STUDENT IN AN ORGANIZED HEALTH CARE EDUCATION/TRAINING PROGRAM

## 2023-08-07 PROCEDURE — 37000008 HC ANESTHESIA 1ST 15 MINUTES: Performed by: INTERNAL MEDICINE

## 2023-08-07 PROCEDURE — 37000009 HC ANESTHESIA EA ADD 15 MINS: Performed by: INTERNAL MEDICINE

## 2023-08-07 PROCEDURE — 80048 BASIC METABOLIC PNL TOTAL CA: CPT | Performed by: HOSPITALIST

## 2023-08-07 PROCEDURE — 92960 CARDIOVERSION ELECTRIC EXT: CPT | Performed by: INTERNAL MEDICINE

## 2023-08-07 PROCEDURE — 92960 PR CARDIOVERSION, ELECTIVE;EXTERN: ICD-10-PCS | Mod: ,,, | Performed by: INTERNAL MEDICINE

## 2023-08-07 PROCEDURE — 25000003 PHARM REV CODE 250: Performed by: HOSPITALIST

## 2023-08-07 PROCEDURE — 25000003 PHARM REV CODE 250: Performed by: INTERNAL MEDICINE

## 2023-08-07 PROCEDURE — 92960 CARDIOVERSION ELECTRIC EXT: CPT | Mod: ,,, | Performed by: INTERNAL MEDICINE

## 2023-08-07 PROCEDURE — 99291 CRITICAL CARE FIRST HOUR: CPT | Mod: 25,,, | Performed by: INTERNAL MEDICINE

## 2023-08-07 PROCEDURE — 94761 N-INVAS EAR/PLS OXIMETRY MLT: CPT

## 2023-08-07 PROCEDURE — D9220A PRA ANESTHESIA: Mod: CRNA,,, | Performed by: STUDENT IN AN ORGANIZED HEALTH CARE EDUCATION/TRAINING PROGRAM

## 2023-08-07 PROCEDURE — 27000221 HC OXYGEN, UP TO 24 HOURS

## 2023-08-07 PROCEDURE — D9220A PRA ANESTHESIA: Mod: ANES,,, | Performed by: ANESTHESIOLOGY

## 2023-08-07 RX ORDER — METOPROLOL SUCCINATE 50 MG/1
50 TABLET, EXTENDED RELEASE ORAL DAILY
Status: DISCONTINUED | OUTPATIENT
Start: 2023-08-07 | End: 2023-08-08 | Stop reason: HOSPADM

## 2023-08-07 RX ORDER — LIDOCAINE HYDROCHLORIDE 20 MG/ML
INJECTION INTRAVENOUS
Status: DISCONTINUED | OUTPATIENT
Start: 2023-08-07 | End: 2023-08-07

## 2023-08-07 RX ORDER — PROPOFOL 10 MG/ML
VIAL (ML) INTRAVENOUS
Status: DISCONTINUED | OUTPATIENT
Start: 2023-08-07 | End: 2023-08-07

## 2023-08-07 RX ADMIN — SOTALOL HYDROCHLORIDE 80 MG: 80 TABLET ORAL at 08:08

## 2023-08-07 RX ADMIN — LIDOCAINE HYDROCHLORIDE 40 MG: 20 INJECTION, SOLUTION INTRAVENOUS at 10:08

## 2023-08-07 RX ADMIN — APIXABAN 5 MG: 5 TABLET, FILM COATED ORAL at 09:08

## 2023-08-07 RX ADMIN — MUPIROCIN: 20 OINTMENT TOPICAL at 08:08

## 2023-08-07 RX ADMIN — ARFORMOTEROL TARTRATE 15 MCG: 15 SOLUTION RESPIRATORY (INHALATION) at 08:08

## 2023-08-07 RX ADMIN — PREDNISOLONE ACETATE 1 DROP: 10 SUSPENSION/ DROPS OPHTHALMIC at 03:08

## 2023-08-07 RX ADMIN — PREDNISOLONE ACETATE 1 DROP: 10 SUSPENSION/ DROPS OPHTHALMIC at 09:08

## 2023-08-07 RX ADMIN — ALLOPURINOL 200 MG: 100 TABLET ORAL at 09:08

## 2023-08-07 RX ADMIN — SODIUM CHLORIDE, SODIUM LACTATE, POTASSIUM CHLORIDE, AND CALCIUM CHLORIDE: .6; .31; .03; .02 INJECTION, SOLUTION INTRAVENOUS at 10:08

## 2023-08-07 RX ADMIN — PREDNISOLONE ACETATE 1 DROP: 10 SUSPENSION/ DROPS OPHTHALMIC at 08:08

## 2023-08-07 RX ADMIN — MUPIROCIN: 20 OINTMENT TOPICAL at 09:08

## 2023-08-07 RX ADMIN — SOTALOL HYDROCHLORIDE 80 MG: 80 TABLET ORAL at 09:08

## 2023-08-07 RX ADMIN — PROPOFOL 20 MG: 10 INJECTION, EMULSION INTRAVENOUS at 10:08

## 2023-08-07 RX ADMIN — PROPOFOL 30 MG: 10 INJECTION, EMULSION INTRAVENOUS at 10:08

## 2023-08-07 RX ADMIN — METOPROLOL SUCCINATE 50 MG: 50 TABLET, EXTENDED RELEASE ORAL at 02:08

## 2023-08-07 RX ADMIN — BUDESONIDE 0.5 MG: 0.5 INHALANT RESPIRATORY (INHALATION) at 08:08

## 2023-08-07 RX ADMIN — APIXABAN 5 MG: 5 TABLET, FILM COATED ORAL at 08:08

## 2023-08-07 NOTE — NURSING
[facility] [place]  Nurses Note -- 4 Eyes      8/7/2023       Skin assessed on: Transfer      [x] No Pressure Injuries Present    [x]Prevention Measures Documented    [] Yes LDA  for Pressure Injury Previously documented     [] Yes New Pressure Injury Discovered   [] LDA for New Pressure Injury Added      Attending RN:  Sage Jaime RN     Second RN:  ISAC Aj

## 2023-08-07 NOTE — ASSESSMENT & PLAN NOTE
patient is in Afib with RVR again,was shocked last week,patient has been stared on Cardizem drip,cardiology is consulted,he added sotalol,S/P DCCV ,back to sinus,will observed on floor.

## 2023-08-07 NOTE — SUBJECTIVE & OBJECTIVE
Past Medical History:   Diagnosis Date    A-fib 5/8/2023    Arthritis     GERD (gastroesophageal reflux disease)     History of HCV, s/p successful treatment w/ SVR12 5/2015     Failed treatment (stage I/II) - followed by hepatology     Joint pain     Lung disease caused by breathing particles     Widespread essentially symmetric interstitial lung disease    Obesity     Polycythemia vera     Prediabetes        Past Surgical History:   Procedure Laterality Date    BUNIONECTOMY      bilateral    CARDIOVERSION N/A 05/08/2023    Procedure: Cardioversion;  Surgeon: David Cueva MD;  Location: [facility] CATH LAB;  Service: Cardiology;  Laterality: N/A;    CATARACT EXTRACTION Left 07/27/2023    COLONOSCOPY N/A 12/09/2015    Procedure: COLONOSCOPY;  Surgeon: Conrad Iqbal MD;  Location: [facility] ENDO;  Service: Endoscopy;  Laterality: N/A;    Liver biopsy x2      rotator cuff Right     TRANSESOPHAGEAL ECHOCARDIOGRAM WITH POSSIBLE CARDIOVERSION (ELENA W/ POSS CARDIOVERSION) N/A 05/08/2023    Procedure: TRANSESOPHAGEAL ECHOCARDIOGRAM WITH POSSIBLE CARDIOVERSION (ELENA W/ POSS CARDIOVERSION);  Surgeon: David Cueva MD;  Location: [facility] CATH LAB;  Service: Cardiology;  Laterality: N/A;  12:30PM    RN PREOP 5/4/2023---EKG ON ARRIVAL    TRANSESOPHAGEAL ECHOCARDIOGRAM WITH POSSIBLE CARDIOVERSION (ELENA W/ POSS CARDIOVERSION) N/A 7/29/2023    Procedure: Transesophageal echo (ELENA) intra-procedure log documentation;  Surgeon: David Cueva MD;  Location: [facility] CATH LAB;  Service: Cardiology;  Laterality: N/A;    TRANSESOPHAGEAL ECHOCARDIOGRAPHY N/A 05/08/2023    Procedure: ECHOCARDIOGRAM, TRANSESOPHAGEAL;  Surgeon: David Cueva MD;  Location: [facility] CATH LAB;  Service: Cardiology;  Laterality: N/A;    TRANSESOPHAGEAL ECHOCARDIOGRAPHY N/A 7/29/2023    Procedure: ECHOCARDIOGRAM, TRANSESOPHAGEAL;  Surgeon: David Cueva MD;  Location: [facility] CATH LAB;  Service: Cardiology;  Laterality: N/A;       Review of patient's allergies indicates:    Allergen Reactions    Ace inhibitors Other (See Comments)     cough       No current facility-administered medications on file prior to encounter.     Current Outpatient Medications on File Prior to Encounter   Medication Sig    allopurinoL (ZYLOPRIM) 100 MG tablet Take 2 tablets (200 mg total) by mouth once daily.    apixaban (ELIQUIS) 5 mg Tab Take 1 tablet (5 mg total) by mouth 2 (two) times daily.    fluticasone propionate (FLONASE) 50 mcg/actuation nasal spray SHAKE LIQUID AND USE 1 SPRAY(50 MCG) IN EACH NOSTRIL EVERY DAY    losartan (COZAAR) 50 MG tablet TK 1 T PO D    metoprolol succinate (TOPROL-XL) 100 MG 24 hr tablet Take 1 tablet (100 mg total) by mouth once daily.    multivitamin capsule Take 1 capsule by mouth once daily.    prednisoLONE acetate (PRED FORTE) 1 % DrpS Place 1 drop into both eyes 3 (three) times daily.    TRELEGY ELLIPTA 100-62.5-25 mcg DsDv Inhale 1 puff into the lungs Daily.    ofloxacin (OCUFLOX) 0.3 % ophthalmic solution Place 1 drop into both eyes 4 (four) times daily.     Family History       Problem Relation (Age of Onset)    Chronic back pain Brother    Deep vein thrombosis Sister    Heart attack Sister    Heart disease Mother    Kidney disease Mother    Osteoarthritis Sister    Parkinsonism Father    Prostate cancer Cousin    Pulmonary embolism Sister          Tobacco Use    Smoking status: Former     Current packs/day: 0.00    Smokeless tobacco: Never    Tobacco comments:     pt. smokes 10 cigars per day.   Substance and Sexual Activity    Alcohol use: Not Currently     Comment: Rarely    Drug use: Yes     Types: Marijuana     Comment: daily    Sexual activity: Yes     Partners: Female     Review of Systems   Constitutional:  Positive for activity change and appetite change.   HENT:  Negative for congestion and dental problem.    Eyes:  Negative for discharge.   Respiratory:  Positive for shortness of breath. Negative for apnea and chest tightness.    Cardiovascular:  Negative  for chest pain and leg swelling.   Gastrointestinal:  Negative for abdominal distention and abdominal pain.   Endocrine: Negative for cold intolerance and heat intolerance.   Genitourinary:  Negative for difficulty urinating and dysuria.   Musculoskeletal:  Negative for arthralgias.   Skin:  Negative for color change and pallor.   Allergic/Immunologic: Negative for environmental allergies.   Neurological:  Negative for dizziness and facial asymmetry.   Hematological:  Negative for adenopathy. Does not bruise/bleed easily.   Psychiatric/Behavioral:  Negative for agitation and behavioral problems.      Objective:     Vital Signs (Most Recent):  Temp: 97.7 °F (36.5 °C) (08/07/23 1032)  Pulse: 83 (08/07/23 1032)  Resp: 18 (08/07/23 1032)  BP: 132/71 (08/07/23 1032)  SpO2: 96 % (08/07/23 1032) Vital Signs (24h Range):  Temp:  [97.6 °F (36.4 °C)-98.6 °F (37 °C)] 97.7 °F (36.5 °C)  Pulse:  [] 83  Resp:  [13-30] 18  SpO2:  [94 %-97 %] 96 %  BP: (120-157)/(64-93) 132/71     Weight: 97.3 kg (214 lb 8.1 oz)  Body mass index is 25.44 kg/m².     Physical Exam  HENT:      Head: Atraumatic.      Nose: Nose normal. No congestion.      Mouth/Throat:      Mouth: Mucous membranes are dry.   Eyes:      Extraocular Movements: Extraocular movements intact.      Pupils: Pupils are equal, round, and reactive to light.   Cardiovascular:      Rate and Rhythm: Normal rate and regular rhythm.   Pulmonary:      Effort: No respiratory distress.      Breath sounds: No wheezing.   Abdominal:      General: There is no distension.      Tenderness: There is no abdominal tenderness.   Musculoskeletal:         General: No swelling or deformity.      Cervical back: Normal range of motion.   Skin:     Findings: No bruising.   Neurological:      Mental Status: He is alert and oriented to person, place, and time.      Cranial Nerves: No cranial nerve deficit.   Psychiatric:         Mood and Affect: Mood normal.         Behavior: Behavior normal.               CRANIAL NERVES     CN III, IV, VI   Pupils are equal, round, and reactive to light.       Significant Labs: All pertinent labs within the past 24 hours have been reviewed.  BMP:   Recent Labs   Lab 08/05/23  1159 08/06/23  0333 08/07/23  0336   *   < > 95      < > 135*   K 4.6   < > 4.3      < > 103   CO2 23   < > 25   BUN 13   < > 11   CREATININE 1.0   < > 0.7   CALCIUM 9.6   < > 9.1   MG 1.8  --   --     < > = values in this interval not displayed.       CBC:   Recent Labs   Lab 08/05/23  1159   WBC 7.30   HGB 15.2   HCT 50.5          CMP:   Recent Labs   Lab 08/05/23  1159 08/06/23  0333 08/07/23  0336    136 135*   K 4.6 4.4 4.3    105 103   CO2 23 25 25   * 103 95   BUN 13 12 11   CREATININE 1.0 0.8 0.7   CALCIUM 9.6 9.6 9.1   PROT 8.4  --   --    ALBUMIN 3.3*  --   --    BILITOT 0.8  --   --    ALKPHOS 94  --   --    AST 25  --   --    ALT 17  --   --    ANIONGAP 8 6* 7*         Significant Imaging: I have reviewed all pertinent imaging results/findings within the past 24 hours.

## 2023-08-07 NOTE — PROGRESS NOTES
[facility]  Progress Note    Patient Name: Josiah Orosco Jr.  MRN: [id number]  Patient Class: IP- Inpatient   Admission Date: 8/5/2023  Length of Stay: 2 days  Attending Physician: Srini Vu, *  Primary Care Provider: Elaine Landry MD        Subjective:     Principal Problem:Atrial flutter with rapid ventricular response        HPI:  Josiah Orosco Jr. is a 67 y.o. male, with a PMHx of A-Fib, HTN, and pulmonary interstitial fibrosis, who presents to the ED with SOB and productive cough for 1 day. Patient endorses fatigue. Patient reports taking eliquis and metoprolol. Patient is being followed by Dr. Cueva, cardiology. Denies other associated symptoms.patient is in Afib with RVR again,was shocked last week,patient has been stared on Cardizem drip,cardiology is consulted,      Overview/Hospital Course:  Josiah Orosco Jr. is a 67 y.o. male, with a PMHx of A-Fib, HTN, and pulmonary interstitial fibrosis, who presents to the ED with SOB and productive cough for 1 day. Patient endorses fatigue. Patient reports taking eliquis and metoprolol. Patient is being followed by Dr. Cueva, cardiology. Denies other associated symptoms.patient is in Aflutter  with RVR again,was shocked last week,patient has been stared on Cardizem drip,cardiology is consulted,sotalol is added,remains in Aflutter,S/P DCCV ,back to sinus,will observed on floor.      Past Medical History:   Diagnosis Date    A-fib 5/8/2023    Arthritis     GERD (gastroesophageal reflux disease)     History of HCV, s/p successful treatment w/ SVR12 5/2015     Failed treatment (stage I/II) - followed by hepatology     Joint pain     Lung disease caused by breathing particles     Widespread essentially symmetric interstitial lung disease    Obesity     Polycythemia vera     Prediabetes        Past Surgical History:   Procedure Laterality Date    BUNIONECTOMY      bilateral    CARDIOVERSION N/A 05/08/2023    Procedure:  Cardioversion;  Surgeon: David Cueva MD;  Location: [facility] CATH LAB;  Service: Cardiology;  Laterality: N/A;    CATARACT EXTRACTION Left 07/27/2023    COLONOSCOPY N/A 12/09/2015    Procedure: COLONOSCOPY;  Surgeon: Conrad Iqbal MD;  Location: [facility] ENDO;  Service: Endoscopy;  Laterality: N/A;    Liver biopsy x2      rotator cuff Right     TRANSESOPHAGEAL ECHOCARDIOGRAM WITH POSSIBLE CARDIOVERSION (ELENA W/ POSS CARDIOVERSION) N/A 05/08/2023    Procedure: TRANSESOPHAGEAL ECHOCARDIOGRAM WITH POSSIBLE CARDIOVERSION (ELENA W/ POSS CARDIOVERSION);  Surgeon: David Cueva MD;  Location: [facility] CATH LAB;  Service: Cardiology;  Laterality: N/A;  12:30PM    RN PREOP 5/4/2023---EKG ON ARRIVAL    TRANSESOPHAGEAL ECHOCARDIOGRAM WITH POSSIBLE CARDIOVERSION (ELENA W/ POSS CARDIOVERSION) N/A 7/29/2023    Procedure: Transesophageal echo (ELENA) intra-procedure log documentation;  Surgeon: David Cueva MD;  Location: [facility] CATH LAB;  Service: Cardiology;  Laterality: N/A;    TRANSESOPHAGEAL ECHOCARDIOGRAPHY N/A 05/08/2023    Procedure: ECHOCARDIOGRAM, TRANSESOPHAGEAL;  Surgeon: David Cueva MD;  Location: [facility] CATH LAB;  Service: Cardiology;  Laterality: N/A;    TRANSESOPHAGEAL ECHOCARDIOGRAPHY N/A 7/29/2023    Procedure: ECHOCARDIOGRAM, TRANSESOPHAGEAL;  Surgeon: David Cueva MD;  Location: [facility] CATH LAB;  Service: Cardiology;  Laterality: N/A;       Review of patient's allergies indicates:   Allergen Reactions    Ace inhibitors Other (See Comments)     cough       No current facility-administered medications on file prior to encounter.     Current Outpatient Medications on File Prior to Encounter   Medication Sig    allopurinoL (ZYLOPRIM) 100 MG tablet Take 2 tablets (200 mg total) by mouth once daily.    apixaban (ELIQUIS) 5 mg Tab Take 1 tablet (5 mg total) by mouth 2 (two) times daily.    fluticasone propionate (FLONASE) 50 mcg/actuation nasal spray SHAKE LIQUID AND USE 1 SPRAY(50 MCG) IN EACH NOSTRIL EVERY DAY     losartan (COZAAR) 50 MG tablet TK 1 T PO D    metoprolol succinate (TOPROL-XL) 100 MG 24 hr tablet Take 1 tablet (100 mg total) by mouth once daily.    multivitamin capsule Take 1 capsule by mouth once daily.    prednisoLONE acetate (PRED FORTE) 1 % DrpS Place 1 drop into both eyes 3 (three) times daily.    TRELEGY ELLIPTA 100-62.5-25 mcg DsDv Inhale 1 puff into the lungs Daily.    ofloxacin (OCUFLOX) 0.3 % ophthalmic solution Place 1 drop into both eyes 4 (four) times daily.     Family History       Problem Relation (Age of Onset)    Chronic back pain Brother    Deep vein thrombosis Sister    Heart attack Sister    Heart disease Mother    Kidney disease Mother    Osteoarthritis Sister    Parkinsonism Father    Prostate cancer Cousin    Pulmonary embolism Sister          Tobacco Use    Smoking status: Former     Current packs/day: 0.00    Smokeless tobacco: Never    Tobacco comments:     pt. smokes 10 cigars per day.   Substance and Sexual Activity    Alcohol use: Not Currently     Comment: Rarely    Drug use: Yes     Types: Marijuana     Comment: daily    Sexual activity: Yes     Partners: Female     Review of Systems   Constitutional:  Positive for activity change and appetite change.   HENT:  Negative for congestion and dental problem.    Eyes:  Negative for discharge.   Respiratory:  Positive for shortness of breath. Negative for apnea and chest tightness.    Cardiovascular:  Negative for chest pain and leg swelling.   Gastrointestinal:  Negative for abdominal distention and abdominal pain.   Endocrine: Negative for cold intolerance and heat intolerance.   Genitourinary:  Negative for difficulty urinating and dysuria.   Musculoskeletal:  Negative for arthralgias.   Skin:  Negative for color change and pallor.   Allergic/Immunologic: Negative for environmental allergies.   Neurological:  Negative for dizziness and facial asymmetry.   Hematological:  Negative for adenopathy. Does not bruise/bleed  easily.   Psychiatric/Behavioral:  Negative for agitation and behavioral problems.      Objective:     Vital Signs (Most Recent):  Temp: 97.7 °F (36.5 °C) (08/07/23 1032)  Pulse: 83 (08/07/23 1032)  Resp: 18 (08/07/23 1032)  BP: 132/71 (08/07/23 1032)  SpO2: 96 % (08/07/23 1032) Vital Signs (24h Range):  Temp:  [97.6 °F (36.4 °C)-98.6 °F (37 °C)] 97.7 °F (36.5 °C)  Pulse:  [] 83  Resp:  [13-30] 18  SpO2:  [94 %-97 %] 96 %  BP: (120-157)/(64-93) 132/71     Weight: 97.3 kg (214 lb 8.1 oz)  Body mass index is 25.44 kg/m².     Physical Exam  HENT:      Head: Atraumatic.      Nose: Nose normal. No congestion.      Mouth/Throat:      Mouth: Mucous membranes are dry.   Eyes:      Extraocular Movements: Extraocular movements intact.      Pupils: Pupils are equal, round, and reactive to light.   Cardiovascular:      Rate and Rhythm: Normal rate and regular rhythm.   Pulmonary:      Effort: No respiratory distress.      Breath sounds: No wheezing.   Abdominal:      General: There is no distension.      Tenderness: There is no abdominal tenderness.   Musculoskeletal:         General: No swelling or deformity.      Cervical back: Normal range of motion.   Skin:     Findings: No bruising.   Neurological:      Mental Status: He is alert and oriented to person, place, and time.      Cranial Nerves: No cranial nerve deficit.   Psychiatric:         Mood and Affect: Mood normal.         Behavior: Behavior normal.              CRANIAL NERVES     CN III, IV, VI   Pupils are equal, round, and reactive to light.       Significant Labs: All pertinent labs within the past 24 hours have been reviewed.  BMP:   Recent Labs   Lab 08/05/23  1159 08/06/23  0333 08/07/23  0336   *   < > 95      < > 135*   K 4.6   < > 4.3      < > 103   CO2 23   < > 25   BUN 13   < > 11   CREATININE 1.0   < > 0.7   CALCIUM 9.6   < > 9.1   MG 1.8  --   --     < > = values in this interval not displayed.       CBC:   Recent Labs   Lab  08/05/23  1159   WBC 7.30   HGB 15.2   HCT 50.5          CMP:   Recent Labs   Lab 08/05/23  1159 08/06/23  0333 08/07/23  0336    136 135*   K 4.6 4.4 4.3    105 103   CO2 23 25 25   * 103 95   BUN 13 12 11   CREATININE 1.0 0.8 0.7   CALCIUM 9.6 9.6 9.1   PROT 8.4  --   --    ALBUMIN 3.3*  --   --    BILITOT 0.8  --   --    ALKPHOS 94  --   --    AST 25  --   --    ALT 17  --   --    ANIONGAP 8 6* 7*         Significant Imaging: I have reviewed all pertinent imaging results/findings within the past 24 hours.      Assessment/Plan:      * Atrial flutter with rapid ventricular response    patient is in Afib with RVR again,was shocked last week,patient has been stared on Cardizem drip,cardiology is consulted,he added sotalol,S/P DCCV ,back to sinus,will observed on floor.    Thoracic aorta atherosclerosis  On medical treatment.      Pulmonary interstitial fibrosis    On inhaler.    ILD (interstitial lung disease)  On inhaler.      Former smoker  Per history.      History of HCV, s/p successful treatment w/ SVR12 5/2015  Per history.        VTE Risk Mitigation (From admission, onward)         Ordered     apixaban tablet 5 mg  2 times daily         08/05/23 1421                Discharge Planning   THADDEUS:      Code Status: Full Code   Is the patient medically ready for discharge?:     Reason for patient still in hospital (select all that apply): Patient trending condition                     Srini Vu MD  Department of Hospital Medicine   [facility] [place] - Cath Lab

## 2023-08-07 NOTE — PROGRESS NOTES
[facility] [place] Intensive Care  Cardiology  Progress Note    Patient Name: Josiah Orosco Jr.  MRN: [id number]  Admission Date: 8/5/2023  Hospital Length of Stay: 2 days  Code Status: Full Code   Attending Physician: Srini Vu, *   Primary Care Physician: Elaine Landry MD  Expected Discharge Date:   Principal Problem:Atrial flutter with rapid ventricular response    Subjective:       Interval History: s/p cardioversion today.     Review of Systems   Constitutional: Negative.   HENT: Negative.     Eyes: Negative.    Cardiovascular: Negative.    Respiratory: Negative.     Endocrine: Negative.    Hematologic/Lymphatic: Negative.    Skin: Negative.    Musculoskeletal: Negative.    Gastrointestinal: Negative.    Genitourinary: Negative.    Neurological: Negative.    Psychiatric/Behavioral: Negative.     Allergic/Immunologic: Negative.      Objective:     Vital Signs (Most Recent):  Temp: 97.7 °F (36.5 °C) (08/07/23 1032)  Pulse: 83 (08/07/23 1032)  Resp: 18 (08/07/23 1032)  BP: 132/71 (08/07/23 1032)  SpO2: 96 % (08/07/23 1032) Vital Signs (24h Range):  Temp:  [97.6 °F (36.4 °C)-98.6 °F (37 °C)] 97.7 °F (36.5 °C)  Pulse:  [] 83  Resp:  [13-30] 18  SpO2:  [94 %-97 %] 96 %  BP: (120-157)/(64-93) 132/71     Weight: 97.3 kg (214 lb 8.1 oz)  Body mass index is 25.44 kg/m².     SpO2: 96 %         Intake/Output Summary (Last 24 hours) at 8/7/2023 1204  Last data filed at 8/7/2023 1028  Gross per 24 hour   Intake 493.8 ml   Output 900 ml   Net -406.2 ml       Lines/Drains/Airways       Peripheral Intravenous Line  Duration                  Peripheral IV - Single Lumen 08/05/23 1202 20 G Right Antecubital 2 days         Peripheral IV - Single Lumen 08/05/23 1433 20 G Left Antecubital 1 day                       Physical Exam  Vitals reviewed.   Constitutional:       Appearance: He is well-developed.   HENT:      Head: Normocephalic.   Eyes:      Conjunctiva/sclera: Conjunctivae normal.      Pupils: Pupils are  "equal, round, and reactive to light.   Cardiovascular:      Rate and Rhythm: Normal rate and regular rhythm.      Heart sounds: Normal heart sounds.   Pulmonary:      Effort: Pulmonary effort is normal.      Breath sounds: Normal breath sounds.   Abdominal:      General: Bowel sounds are normal.      Palpations: Abdomen is soft.   Musculoskeletal:      Cervical back: Normal range of motion and neck supple.   Skin:     General: Skin is warm.   Neurological:      Mental Status: He is alert and oriented to person, place, and time.            Significant Labs: BMP:   Recent Labs   Lab 08/06/23  0333 08/07/23  0336    95    135*   K 4.4 4.3    103   CO2 25 25   BUN 12 11   CREATININE 0.8 0.7   CALCIUM 9.6 9.1   , CMP   Recent Labs   Lab 08/06/23 0333 08/07/23 0336    135*   K 4.4 4.3    103   CO2 25 25    95   BUN 12 11   CREATININE 0.8 0.7   CALCIUM 9.6 9.1   ANIONGAP 6* 7*   , CBC No results for input(s): "WBC", "HGB", "HCT", "PLT" in the last 48 hours., INR No results for input(s): "INR", "PROTIME" in the last 48 hours., Lipid Panel No results for input(s): "CHOL", "HDL", "LDLCALC", "TRIG", "CHOLHDL" in the last 48 hours., Troponin No results for input(s): "TROPONINI" in the last 48 hours., and All pertinent lab results from the last 24 hours have been reviewed.    Significant Imaging: Echocardiogram: Transthoracic echo (TTE) complete (Cupid Only):   Results for orders placed or performed during the hospital encounter of 07/27/23   Echo   Result Value Ref Range    BSA 2.31 m2    TDI SEPTAL 0.06 m/s    LV LATERAL E/E' RATIO 14.00 m/s    LV SEPTAL E/E' RATIO 14.00 m/s    LA WIDTH 5.40 cm    IVC diameter 2.17 cm    Left Ventricular Outflow Tract Mean Velocity 0.43 cm/s    Left Ventricular Outflow Tract Mean Gradient 0.89 mmHg    AORTIC VALVE CUSP SEPERATION 1.90 cm    TDI LATERAL 0.06 m/s    LVIDd 4.26 3.5 - 6.0 cm    IVS 1.21 (A) 0.6 - 1.1 cm    Posterior Wall 1.24 (A) 0.6 - 1.1 " cm    Ao root annulus 3.32 cm    LVIDs 2.71 2.1 - 4.0 cm    FS 36 28 - 44 %    LA volume 117.77 cm3    Sinus 3.36 cm    STJ 2.74 cm    Ascending aorta 3.34 cm    LV mass 187.64 g    LA size 4.40 cm    RVDD 5.30 cm    TAPSE 1.34 cm    RV S' 14.07 cm/s    Left Ventricle Relative Wall Thickness 0.58 cm    AV mean gradient 2 mmHg    AV valve area 2.29 cm2    AV Velocity Ratio 0.64     AV index (prosthetic) 0.63     Mean e' 0.06 m/s    LVOT diameter 2.16 cm    LVOT area 3.7 cm2    LVOT peak delio 0.66 m/s    LVOT peak VTI 9.90 cm    Ao peak delio 1.03 m/s    Ao VTI 15.8 cm    LVOT stroke volume 36.26 cm3    AV peak gradient 4 mmHg    E/E' ratio 14.00 m/s    MV Peak E Delio 0.84 m/s    TR Max Delio 3.42 m/s    LV Systolic Volume 27.37 mL    LV Systolic Volume Index 11.8 mL/m2    LV Diastolic Volume 81.39 mL    LV Diastolic Volume Index 35.08 mL/m2    LA Volume Index 50.8 mL/m2    LV Mass Index 81 g/m2    RA Major Axis 5.74 cm    Left Atrium Minor Axis 5.37 cm    Left Atrium Major Axis 6.38 cm    Triscuspid Valve Regurgitation Peak Gradient 47 mmHg    RA Width 6.40 cm    Covington's Biplane MOD Ejection Fraction 3 %    Right Atrial Pressure (from IVC) 15 mmHg    EF 50 %    TV resting pulmonary artery pressure 62 mmHg    Narrative    · The estimated ejection fraction is 50%.  · Concentric remodeling and low normal systolic function.  · Severe left atrial enlargement.  · Atrial fibrillation observed.  · There are segmental left ventricular wall motion abnormalities.  · Severe right ventricular enlargement.  · Severe right atrial enlargement.  · Moderate mitral regurgitation.  · Mild tricuspid regurgitation.  · Elevated central venous pressure (15 mmHg).  · The estimated PA systolic pressure is 62 mmHg.  · There is pulmonary hypertension.        Assessment and Plan:     Brief HPI:     * Atrial flutter with rapid ventricular response  cardizem drip. Rate control on cardizem drip.     Will do cardioversion on Monday.    Npo post mn on  Sunday.    Initiate sotalol. Monitor qtc    8/6:    cv in am    Risks, benefits and alternatives of the Cardioversion procedure were discussed with the patient including throat irritation, aspiration, anesthetic complications, esophageal irritation/perforation, skin irritation, arrhythmia, etc.  Patient understands and agrees to proceed.  Consent was placed on the chart.     no need to repeat ELENA as patient has been compliant with his Eliquis since the last ELENA recently        8/7: s/p cardioversion. In nsr. Continue sotalol and metoprolol. Check qtc.     Thoracic aorta atherosclerosis        Pulmonary interstitial fibrosis        ILD (interstitial lung disease)        Former smoker        History of HCV, s/p successful treatment w/ SVR12 5/2015            VTE Risk Mitigation (From admission, onward)         Ordered     apixaban tablet 5 mg  2 times daily         08/05/23 1421                David Cueva MD  Cardiology  [facility] - Intensive Care    Critical Care Time:  35 minutes     Critical care was time spent personally by me on the following activities: development of treatment plan with patient or surrogate and bedside caregivers, discussions with consultants, evaluation of patient's response to treatment, examination of patient, ordering and performing treatments and interventions, ordering and review of laboratory studies, ordering and review of radiographic studies, pulse oximetry, re-evaluation of patient's condition. This critical care time did not overlap with that of any other provider or involve time for any procedures.

## 2023-08-07 NOTE — NURSING
[facility] [place]  Nurses Note -- 4 Eyes      8/7/2023       Skin assessed on: Q Shift      [x] No Pressure Injuries Present    [x]Prevention Measures Documented    [] Yes LDA  for Pressure Injury Previously documented     [] Yes New Pressure Injury Discovered   [] LDA for New Pressure Injury Added      Attending RN:  Sage Jaime, RN     Second RN:  Doreen Zee RN

## 2023-08-07 NOTE — SUBJECTIVE & OBJECTIVE
Interval History: s/p cardioversion today.     Review of Systems   Constitutional: Negative.   HENT: Negative.     Eyes: Negative.    Cardiovascular: Negative.    Respiratory: Negative.     Endocrine: Negative.    Hematologic/Lymphatic: Negative.    Skin: Negative.    Musculoskeletal: Negative.    Gastrointestinal: Negative.    Genitourinary: Negative.    Neurological: Negative.    Psychiatric/Behavioral: Negative.     Allergic/Immunologic: Negative.      Objective:     Vital Signs (Most Recent):  Temp: 97.7 °F (36.5 °C) (08/07/23 1032)  Pulse: 83 (08/07/23 1032)  Resp: 18 (08/07/23 1032)  BP: 132/71 (08/07/23 1032)  SpO2: 96 % (08/07/23 1032) Vital Signs (24h Range):  Temp:  [97.6 °F (36.4 °C)-98.6 °F (37 °C)] 97.7 °F (36.5 °C)  Pulse:  [] 83  Resp:  [13-30] 18  SpO2:  [94 %-97 %] 96 %  BP: (120-157)/(64-93) 132/71     Weight: 97.3 kg (214 lb 8.1 oz)  Body mass index is 25.44 kg/m².     SpO2: 96 %         Intake/Output Summary (Last 24 hours) at 8/7/2023 1204  Last data filed at 8/7/2023 1028  Gross per 24 hour   Intake 493.8 ml   Output 900 ml   Net -406.2 ml       Lines/Drains/Airways       Peripheral Intravenous Line  Duration                  Peripheral IV - Single Lumen 08/05/23 1202 20 G Right Antecubital 2 days         Peripheral IV - Single Lumen 08/05/23 1433 20 G Left Antecubital 1 day                       Physical Exam  Vitals reviewed.   Constitutional:       Appearance: He is well-developed.   HENT:      Head: Normocephalic.   Eyes:      Conjunctiva/sclera: Conjunctivae normal.      Pupils: Pupils are equal, round, and reactive to light.   Cardiovascular:      Rate and Rhythm: Normal rate and regular rhythm.      Heart sounds: Normal heart sounds.   Pulmonary:      Effort: Pulmonary effort is normal.      Breath sounds: Normal breath sounds.   Abdominal:      General: Bowel sounds are normal.      Palpations: Abdomen is soft.   Musculoskeletal:      Cervical back: Normal range of motion and neck  "supple.   Skin:     General: Skin is warm.   Neurological:      Mental Status: He is alert and oriented to person, place, and time.            Significant Labs: BMP:   Recent Labs   Lab 08/06/23  0333 08/07/23  0336    95    135*   K 4.4 4.3    103   CO2 25 25   BUN 12 11   CREATININE 0.8 0.7   CALCIUM 9.6 9.1   , CMP   Recent Labs   Lab 08/06/23  0333 08/07/23  0336    135*   K 4.4 4.3    103   CO2 25 25    95   BUN 12 11   CREATININE 0.8 0.7   CALCIUM 9.6 9.1   ANIONGAP 6* 7*   , CBC No results for input(s): "WBC", "HGB", "HCT", "PLT" in the last 48 hours., INR No results for input(s): "INR", "PROTIME" in the last 48 hours., Lipid Panel No results for input(s): "CHOL", "HDL", "LDLCALC", "TRIG", "CHOLHDL" in the last 48 hours., Troponin No results for input(s): "TROPONINI" in the last 48 hours., and All pertinent lab results from the last 24 hours have been reviewed.    Significant Imaging: Echocardiogram: Transthoracic echo (TTE) complete (Cupid Only):   Results for orders placed or performed during the hospital encounter of 07/27/23   Echo   Result Value Ref Range    BSA 2.31 m2    TDI SEPTAL 0.06 m/s    LV LATERAL E/E' RATIO 14.00 m/s    LV SEPTAL E/E' RATIO 14.00 m/s    LA WIDTH 5.40 cm    IVC diameter 2.17 cm    Left Ventricular Outflow Tract Mean Velocity 0.43 cm/s    Left Ventricular Outflow Tract Mean Gradient 0.89 mmHg    AORTIC VALVE CUSP SEPERATION 1.90 cm    TDI LATERAL 0.06 m/s    LVIDd 4.26 3.5 - 6.0 cm    IVS 1.21 (A) 0.6 - 1.1 cm    Posterior Wall 1.24 (A) 0.6 - 1.1 cm    Ao root annulus 3.32 cm    LVIDs 2.71 2.1 - 4.0 cm    FS 36 28 - 44 %    LA volume 117.77 cm3    Sinus 3.36 cm    STJ 2.74 cm    Ascending aorta 3.34 cm    LV mass 187.64 g    LA size 4.40 cm    RVDD 5.30 cm    TAPSE 1.34 cm    RV S' 14.07 cm/s    Left Ventricle Relative Wall Thickness 0.58 cm    AV mean gradient 2 mmHg    AV valve area 2.29 cm2    AV Velocity Ratio 0.64     AV index " (prosthetic) 0.63     Mean e' 0.06 m/s    LVOT diameter 2.16 cm    LVOT area 3.7 cm2    LVOT peak delio 0.66 m/s    LVOT peak VTI 9.90 cm    Ao peak delio 1.03 m/s    Ao VTI 15.8 cm    LVOT stroke volume 36.26 cm3    AV peak gradient 4 mmHg    E/E' ratio 14.00 m/s    MV Peak E Delio 0.84 m/s    TR Max Delio 3.42 m/s    LV Systolic Volume 27.37 mL    LV Systolic Volume Index 11.8 mL/m2    LV Diastolic Volume 81.39 mL    LV Diastolic Volume Index 35.08 mL/m2    LA Volume Index 50.8 mL/m2    LV Mass Index 81 g/m2    RA Major Axis 5.74 cm    Left Atrium Minor Axis 5.37 cm    Left Atrium Major Axis 6.38 cm    Triscuspid Valve Regurgitation Peak Gradient 47 mmHg    RA Width 6.40 cm    Covington's Biplane MOD Ejection Fraction 3 %    Right Atrial Pressure (from IVC) 15 mmHg    EF 50 %    TV resting pulmonary artery pressure 62 mmHg    Narrative    · The estimated ejection fraction is 50%.  · Concentric remodeling and low normal systolic function.  · Severe left atrial enlargement.  · Atrial fibrillation observed.  · There are segmental left ventricular wall motion abnormalities.  · Severe right ventricular enlargement.  · Severe right atrial enlargement.  · Moderate mitral regurgitation.  · Mild tricuspid regurgitation.  · Elevated central venous pressure (15 mmHg).  · The estimated PA systolic pressure is 62 mmHg.  · There is pulmonary hypertension.

## 2023-08-07 NOTE — NURSING TRANSFER
Nursing Transfer Note      8/7/2023   1:49 PM    Nurse giving handoff:ISAC Yarbrough  Nurse receiving handoff:ISAC Tracy    Reason patient is being transferred: Change in level    Transfer From: ICU    Transfer via bed    Transfer with cardiac monitoring    Transported by ICN Nurse    Transfer Vital Signs:  Blood Pressure:136/85  Heart Rate:105  O2:94%  Temperature:98.5  Respirations:18    Telemetry: Box Number   [id number]    Order for Tele Monitor? Yes    Additional Lines: N/A    4eyes on Skin: yes    Medicines sent: No    Any special needs or follow-up needed: None    Patient belongings transferred with patient: Yes    Chart send with patient: Yes    Notified: spouse    Patient reassessed at: 8/7/2023, 1320    Upon arrival to floor: cardiac monitor applied, patient oriented to room, call bell in reach, and bed in lowest position

## 2023-08-07 NOTE — ASSESSMENT & PLAN NOTE
cardizem drip. Rate control on cardizem drip.     Will do cardioversion on Monday.    Npo post mn on Sunday.    Initiate sotalol. Monitor qtc    8/6:    cv in am    Risks, benefits and alternatives of the Cardioversion procedure were discussed with the patient including throat irritation, aspiration, anesthetic complications, esophageal irritation/perforation, skin irritation, arrhythmia, etc.  Patient understands and agrees to proceed.  Consent was placed on the chart.     no need to repeat ELENA as patient has been compliant with his Eliquis since the last ELENA recently        8/7: s/p cardioversion. In nsr. Continue sotalol and metoprolol. Check qtc.

## 2023-08-07 NOTE — ANESTHESIA POSTPROCEDURE EVALUATION
Anesthesia Post Evaluation    Patient: Josiah Orosco Jr.    Procedure(s) Performed: Procedure(s) (LRB):  Cardioversion or Defibrillation (N/A)    Final Anesthesia Type: MAC      Patient location: OR 5.  Patient participation: Yes- Able to Participate  Level of consciousness: awake and alert  Post-procedure vital signs: reviewed and stable  Pain management: adequate  Airway patency: patent    PONV status at discharge: No PONV  Anesthetic complications: no      Cardiovascular status: hemodynamically stable  Respiratory status: unassisted and spontaneous ventilation  Hydration status: euvolemic  Follow-up not needed.    T: 36.5  HR: 83  RR: 18  BP: 132/71  O2 sat: 96%

## 2023-08-07 NOTE — TELEPHONE ENCOUNTER
----- Message from Quynh Guerrero sent at 8/7/2023  9:58 AM CDT -----  .Type: Patient Call Back    Who called:     What is the request in detail: requesting a callback in regards to patient, no details was left    Can the clinic reply by [facility]? call    Would the patient rather a call back or a response via [facility]? call    Best call back number: [phone]    Additional Information:

## 2023-08-07 NOTE — ANESTHESIA PREPROCEDURE EVALUATION
08/07/2023  Josiah Orosco Jr. is a 67 y.o., male.      Pre-op Assessment    I have reviewed the Patient Summary Reports.     I have reviewed the Nursing Notes.       Review of Systems  Anesthesia Hx:  No problems with previous Anesthesia  Denies Family Hx of Anesthesia complications.   Denies Personal Hx of Anesthesia complications.   Social:  Former Smoker, No Alcohol Use    Hematology/Oncology:         -- Anemia: Hematology Comments: Eliquis   Cardiovascular:   Hypertension Valvular problems/Murmurs, MR Dysrhythmias atrial fibrillation On Cardizem drip    07/28/2023 Echo: EF 50%; moderate MR; PAP 62   Pulmonary:  Pulmonary Normal    Hepatic/GI:   GERD, well controlled Liver Disease, Hepatitis    Neurological:  Neurology Normal    Endocrine:  Endocrine Normal        Physical Exam  General: Well nourished, Cooperative, Alert and Oriented    Airway:  Mallampati: II   Mouth Opening: Normal  TM Distance: 4 - 6 cm  Tongue: Normal  Neck ROM: Normal ROM    Dental:    Chest/Lungs:  Normal Respiratory Rate, Clear to auscultation    Heart:  Rate: Tachycardia  Rhythm: Irregularly Irregular      Wt Readings from Last 3 Encounters:   08/07/23 97.3 kg (214 lb 8.1 oz)   07/29/23 98.9 kg (218 lb)   07/27/23 96.6 kg (213 lb)     Temp Readings from Last 3 Encounters:   08/07/23 36.8 °C (98.2 °F) (Oral)   07/29/23 36.5 °C (97.7 °F) (Oral)   07/27/23 37 °C (98.6 °F) (Oral)     BP Readings from Last 3 Encounters:   08/07/23 120/83   07/29/23 136/77   07/27/23 118/78     Pulse Readings from Last 3 Encounters:   08/07/23 (!) 121   07/29/23 81   07/27/23 (!) 128     Lab Results   Component Value Date    WBC 7.30 08/05/2023    HGB 15.2 08/05/2023    HCT 50.5 08/05/2023    MCV 68 (L) 08/05/2023     08/05/2023       CMP  Sodium   Date Value Ref Range Status   08/07/2023 135 (L) 136 - 145 mmol/L Final     Potassium   Date Value  Ref Range Status   08/07/2023 4.3 3.5 - 5.1 mmol/L Final     Chloride   Date Value Ref Range Status   08/07/2023 103 95 - 110 mmol/L Final     CO2   Date Value Ref Range Status   08/07/2023 25 23 - 29 mmol/L Final     Glucose   Date Value Ref Range Status   08/07/2023 95 70 - 110 mg/dL Final     BUN   Date Value Ref Range Status   08/07/2023 11 8 - 23 mg/dL Final     Creatinine   Date Value Ref Range Status   08/07/2023 0.7 0.5 - 1.4 mg/dL Final     Calcium   Date Value Ref Range Status   08/07/2023 9.1 8.7 - 10.5 mg/dL Final     Total Protein   Date Value Ref Range Status   08/05/2023 8.4 6.0 - 8.4 g/dL Final     Albumin   Date Value Ref Range Status   08/05/2023 3.3 (L) 3.5 - 5.2 g/dL Final     Total Bilirubin   Date Value Ref Range Status   08/05/2023 0.8 0.1 - 1.0 mg/dL Final     Comment:     For infants and newborns, interpretation of results should be based  on gestational age, weight and in agreement with clinical  observations.    Premature Infant recommended reference ranges:  Up to 24 hours.............<8.0 mg/dL  Up to 48 hours............<12.0 mg/dL  3-5 days..................<15.0 mg/dL  6-29 days.................<15.0 mg/dL       Alkaline Phosphatase   Date Value Ref Range Status   08/05/2023 94 55 - 135 U/L Final     AST   Date Value Ref Range Status   08/05/2023 25 10 - 40 U/L Final     ALT   Date Value Ref Range Status   08/05/2023 17 10 - 44 U/L Final     Anion Gap   Date Value Ref Range Status   08/07/2023 7 (L) 8 - 16 mmol/L Final     eGFR   Date Value Ref Range Status   08/07/2023 >60 >60 mL/min/1.73 m^2 Final         Anesthesia Plan  Type of Anesthesia, risks & benefits discussed:    Anesthesia Type: Gen Natural Airway, MAC, Gen ETT  Intra-op Monitoring Plan: Standard ASA Monitors  Post Op Pain Control Plan: multimodal analgesia  Induction:  IV  Informed Consent: Informed consent signed with the Patient and all parties understand the risks and agree with anesthesia plan.  All questions  answered.   ASA Score: 3  Day of Surgery Review of History & Physical: H&P Update referred to the surgeon/provider.    Ready For Surgery From Anesthesia Perspective.     .

## 2023-08-07 NOTE — TRANSFER OF CARE
"Anesthesia Transfer of Care Note    Patient: Josiah Orosco Jr.    Procedure(s) Performed: Procedure(s) (LRB):  Cardioversion or Defibrillation (N/A)    Patient location: Other: Recovered by cath lab  RN in OR 5.    Anesthesia Type: MAC    Post pain: adequate analgesia    Post assessment: no apparent anesthetic complications and tolerated procedure well    Post vital signs: stable    Level of consciousness: awake and alert    Nausea/Vomiting: no nausea/vomiting    Complications: none    Transfer of care protocol was followed      Last vitals:   Visit Vitals  /71   Pulse 83   Temp 36.5 °C (97.7 °F) (Skin)   Resp 18   Ht 6' 5" (1.956 m)   Wt 97.3 kg (214 lb 8.1 oz)   SpO2 96%   BMI 25.44 kg/m²     "

## 2023-08-07 NOTE — NURSING TRANSFER
Nursing Transfer Note      8/7/2023   12:13 PM    Nurse giving handoff:ISAC Yarbrough  Nurse receiving handoff:ISAC Tracy    Reason patient is being transferred: step down.    Transfer To: [id number]    Transfer via wheelchair    Transfer with cardiac monitoring    Transported by transfer service.    Transfer Vital Signs:  Blood Pressure:128/88  Heart Rate:97  O2:96  Temperature:97.5  Respirations:25    Telemetry: Box Number [id number]  Order for Tele Monitor? Yes    Additional Lines: Oxygen    4eyes on Skin: yes    Medicines sent: prednisolone, Mupirocin.    Any special needs or follow-up needed: tele.    Patient belongings transferred with patient: Yes    Chart send with patient: Yes    Notified: spouse    Patient reassessed at: 8/7/2023, 12:50(date, time)  1  Upon arrival to floor: cardiac monitor applied, patient oriented to room, call bell in reach, and bed in lowest position

## 2023-08-07 NOTE — NURSING
.Nurses Note -- 4 Eyes      8/6/2023   7:43 PM      Skin assessed during: Q Shift Change      [x] No Altered Skin Integrity Present    [x]Prevention Measures Documented      [] Yes- Altered Skin Integrity Present or Discovered   [] LDA Added if Not in Epic (Describe Wound)   [] New Altered Skin Integrity was Present on Admit and Documented in LDA   [] Wound Image Taken    Wound Care Consulted? No    Attending Nurse:  Doreen Mccray RN/Staff Member:          Dariela CHAU

## 2023-08-08 VITALS
SYSTOLIC BLOOD PRESSURE: 130 MMHG | RESPIRATION RATE: 19 BRPM | HEART RATE: 66 BPM | HEIGHT: 77 IN | TEMPERATURE: 99 F | OXYGEN SATURATION: 94 % | WEIGHT: 214.5 LBS | DIASTOLIC BLOOD PRESSURE: 66 MMHG | BODY MASS INDEX: 25.33 KG/M2

## 2023-08-08 LAB
ANION GAP SERPL CALC-SCNC: 8 MMOL/L (ref 8–16)
BUN SERPL-MCNC: 14 MG/DL (ref 8–23)
CALCIUM SERPL-MCNC: 9 MG/DL (ref 8.7–10.5)
CHLORIDE SERPL-SCNC: 104 MMOL/L (ref 95–110)
CO2 SERPL-SCNC: 26 MMOL/L (ref 23–29)
CREAT SERPL-MCNC: 0.7 MG/DL (ref 0.5–1.4)
EST. GFR  (NO RACE VARIABLE): >60 ML/MIN/1.73 M^2
GLUCOSE SERPL-MCNC: 83 MG/DL (ref 70–110)
POTASSIUM SERPL-SCNC: 4.5 MMOL/L (ref 3.5–5.1)
SODIUM SERPL-SCNC: 138 MMOL/L (ref 136–145)

## 2023-08-08 PROCEDURE — 99233 PR SUBSEQUENT HOSPITAL CARE,LEVL III: ICD-10-PCS | Mod: ,,, | Performed by: INTERNAL MEDICINE

## 2023-08-08 PROCEDURE — 99233 SBSQ HOSP IP/OBS HIGH 50: CPT | Mod: ,,, | Performed by: INTERNAL MEDICINE

## 2023-08-08 PROCEDURE — 25000003 PHARM REV CODE 250: Performed by: HOSPITALIST

## 2023-08-08 PROCEDURE — 80048 BASIC METABOLIC PNL TOTAL CA: CPT | Performed by: HOSPITALIST

## 2023-08-08 PROCEDURE — 36415 COLL VENOUS BLD VENIPUNCTURE: CPT | Performed by: HOSPITALIST

## 2023-08-08 PROCEDURE — 94640 AIRWAY INHALATION TREATMENT: CPT

## 2023-08-08 PROCEDURE — 25000242 PHARM REV CODE 250 ALT 637 W/ HCPCS: Performed by: HOSPITALIST

## 2023-08-08 PROCEDURE — 25000003 PHARM REV CODE 250: Performed by: INTERNAL MEDICINE

## 2023-08-08 RX ORDER — SOTALOL HYDROCHLORIDE 80 MG/1
80 TABLET ORAL 2 TIMES DAILY
Qty: 60 TABLET | Refills: 2 | Status: SHIPPED | OUTPATIENT
Start: 2023-08-08 | End: 2023-12-14

## 2023-08-08 RX ORDER — METOPROLOL SUCCINATE 50 MG/1
50 TABLET, EXTENDED RELEASE ORAL DAILY
Qty: 30 TABLET | Refills: 2 | Status: SHIPPED | OUTPATIENT
Start: 2023-08-08 | End: 2023-12-06 | Stop reason: SDUPTHER

## 2023-08-08 RX ADMIN — ARFORMOTEROL TARTRATE 15 MCG: 15 SOLUTION RESPIRATORY (INHALATION) at 07:08

## 2023-08-08 RX ADMIN — METOPROLOL SUCCINATE 50 MG: 50 TABLET, EXTENDED RELEASE ORAL at 09:08

## 2023-08-08 RX ADMIN — BUDESONIDE 0.5 MG: 0.5 INHALANT RESPIRATORY (INHALATION) at 07:08

## 2023-08-08 RX ADMIN — PREDNISOLONE ACETATE 1 DROP: 10 SUSPENSION/ DROPS OPHTHALMIC at 09:08

## 2023-08-08 RX ADMIN — SOTALOL HYDROCHLORIDE 80 MG: 80 TABLET ORAL at 09:08

## 2023-08-08 RX ADMIN — MUPIROCIN: 20 OINTMENT TOPICAL at 09:08

## 2023-08-08 RX ADMIN — ALLOPURINOL 200 MG: 100 TABLET ORAL at 09:08

## 2023-08-08 RX ADMIN — APIXABAN 5 MG: 5 TABLET, FILM COATED ORAL at 09:08

## 2023-08-08 NOTE — PLAN OF CARE
08/08/23 1040   Medicare Message   Important Message from Medicare regarding Discharge Appeal Rights Given to patient/caregiver;Explained to patient/caregiver;Signed/date by patient/caregiver   Date IMM was signed 08/08/23   Time IMM was signed 1030

## 2023-08-08 NOTE — PLAN OF CARE
Problem: Adult Inpatient Plan of Care  Goal: Plan of Care Review  Outcome: Met  Goal: Patient-Specific Goal (Individualized)  Outcome: Met  Goal: Absence of Hospital-Acquired Illness or Injury  Outcome: Met  Goal: Optimal Comfort and Wellbeing  Outcome: Met  Goal: Readiness for Transition of Care  Outcome: Met     Problem: Dysrhythmia  Goal: Normalized Cardiac Rhythm  Outcome: Met

## 2023-08-08 NOTE — SUBJECTIVE & OBJECTIVE
Interval History:  Back in sinus rhythm    Review of Systems   Constitutional: Negative.   HENT: Negative.     Eyes: Negative.    Cardiovascular: Negative.    Respiratory: Negative.     Endocrine: Negative.    Hematologic/Lymphatic: Negative.    Skin: Negative.    Musculoskeletal: Negative.    Gastrointestinal: Negative.    Genitourinary: Negative.    Neurological: Negative.    Psychiatric/Behavioral: Negative.     Allergic/Immunologic: Negative.      Objective:     Vital Signs (Most Recent):  Temp: 97.5 °F (36.4 °C) (08/08/23 0740)  Pulse: 78 (08/08/23 0740)  Resp: 19 (08/08/23 0740)  BP: 116/66 (08/08/23 0740)  SpO2: 97 % (08/08/23 0740) Vital Signs (24h Range):  Temp:  [97.5 °F (36.4 °C)-98.5 °F (36.9 °C)] 97.5 °F (36.4 °C)  Pulse:  [] 78  Resp:  [14-25] 19  SpO2:  [90 %-99 %] 97 %  BP: (115-145)/() 116/66     Weight: 97.3 kg (214 lb 8.1 oz)  Body mass index is 25.44 kg/m².     SpO2: 97 %         Intake/Output Summary (Last 24 hours) at 8/8/2023 1020  Last data filed at 8/8/2023 0931  Gross per 24 hour   Intake 830 ml   Output 950 ml   Net -120 ml       Lines/Drains/Airways       Peripheral Intravenous Line  Duration                  Peripheral IV - Single Lumen 08/05/23 1202 20 G Right Antecubital 2 days         Peripheral IV - Single Lumen 08/05/23 1433 20 G Left Antecubital 2 days                       Physical Exam  Vitals reviewed.   Constitutional:       Appearance: He is well-developed.   HENT:      Head: Normocephalic.   Eyes:      Conjunctiva/sclera: Conjunctivae normal.      Pupils: Pupils are equal, round, and reactive to light.   Cardiovascular:      Rate and Rhythm: Normal rate and regular rhythm.      Heart sounds: Normal heart sounds.   Pulmonary:      Effort: Pulmonary effort is normal.      Breath sounds: Normal breath sounds.   Abdominal:      General: Bowel sounds are normal.      Palpations: Abdomen is soft.   Musculoskeletal:      Cervical back: Normal range of motion and neck  "supple.   Skin:     General: Skin is warm.   Neurological:      Mental Status: He is alert and oriented to person, place, and time.            Significant Labs: BMP:   Recent Labs   Lab 08/07/23  0336 08/08/23  0608   GLU 95 83   * 138   K 4.3 4.5    104   CO2 25 26   BUN 11 14   CREATININE 0.7 0.7   CALCIUM 9.1 9.0   , CMP   Recent Labs   Lab 08/07/23  0336 08/08/23  0608   * 138   K 4.3 4.5    104   CO2 25 26   GLU 95 83   BUN 11 14   CREATININE 0.7 0.7   CALCIUM 9.1 9.0   ANIONGAP 7* 8   , CBC No results for input(s): "WBC", "HGB", "HCT", "PLT" in the last 48 hours., INR No results for input(s): "INR", "PROTIME" in the last 48 hours., Lipid Panel No results for input(s): "CHOL", "HDL", "LDLCALC", "TRIG", "CHOLHDL" in the last 48 hours., Troponin No results for input(s): "TROPONINI" in the last 48 hours., and All pertinent lab results from the last 24 hours have been reviewed.    Significant Imaging: Echocardiogram: Transthoracic echo (TTE) complete (Cupid Only):   Results for orders placed or performed during the hospital encounter of 07/27/23   Echo   Result Value Ref Range    BSA 2.31 m2    TDI SEPTAL 0.06 m/s    LV LATERAL E/E' RATIO 14.00 m/s    LV SEPTAL E/E' RATIO 14.00 m/s    LA WIDTH 5.40 cm    IVC diameter 2.17 cm    Left Ventricular Outflow Tract Mean Velocity 0.43 cm/s    Left Ventricular Outflow Tract Mean Gradient 0.89 mmHg    AORTIC VALVE CUSP SEPERATION 1.90 cm    TDI LATERAL 0.06 m/s    LVIDd 4.26 3.5 - 6.0 cm    IVS 1.21 (A) 0.6 - 1.1 cm    Posterior Wall 1.24 (A) 0.6 - 1.1 cm    Ao root annulus 3.32 cm    LVIDs 2.71 2.1 - 4.0 cm    FS 36 28 - 44 %    LA volume 117.77 cm3    Sinus 3.36 cm    STJ 2.74 cm    Ascending aorta 3.34 cm    LV mass 187.64 g    LA size 4.40 cm    RVDD 5.30 cm    TAPSE 1.34 cm    RV S' 14.07 cm/s    Left Ventricle Relative Wall Thickness 0.58 cm    AV mean gradient 2 mmHg    AV valve area 2.29 cm2    AV Velocity Ratio 0.64     AV index (prosthetic) " 0.63     Mean e' 0.06 m/s    LVOT diameter 2.16 cm    LVOT area 3.7 cm2    LVOT peak delio 0.66 m/s    LVOT peak VTI 9.90 cm    Ao peak delio 1.03 m/s    Ao VTI 15.8 cm    LVOT stroke volume 36.26 cm3    AV peak gradient 4 mmHg    E/E' ratio 14.00 m/s    MV Peak E Delio 0.84 m/s    TR Max Delio 3.42 m/s    LV Systolic Volume 27.37 mL    LV Systolic Volume Index 11.8 mL/m2    LV Diastolic Volume 81.39 mL    LV Diastolic Volume Index 35.08 mL/m2    LA Volume Index 50.8 mL/m2    LV Mass Index 81 g/m2    RA Major Axis 5.74 cm    Left Atrium Minor Axis 5.37 cm    Left Atrium Major Axis 6.38 cm    Triscuspid Valve Regurgitation Peak Gradient 47 mmHg    RA Width 6.40 cm    Covington's Biplane MOD Ejection Fraction 3 %    Right Atrial Pressure (from IVC) 15 mmHg    EF 50 %    TV resting pulmonary artery pressure 62 mmHg    Narrative    · The estimated ejection fraction is 50%.  · Concentric remodeling and low normal systolic function.  · Severe left atrial enlargement.  · Atrial fibrillation observed.  · There are segmental left ventricular wall motion abnormalities.  · Severe right ventricular enlargement.  · Severe right atrial enlargement.  · Moderate mitral regurgitation.  · Mild tricuspid regurgitation.  · Elevated central venous pressure (15 mmHg).  · The estimated PA systolic pressure is 62 mmHg.  · There is pulmonary hypertension.

## 2023-08-08 NOTE — PLAN OF CARE
Case Management Assessment     PCP: Dr. Landry  Pharmacy: [facility]- [address]    Patient Arrived From: Home  Existing Help at Home: Doreen- spouse    Barriers to Discharge: None    Discharge Plan:    A. Home with family   B. Home with family         08/08/23 1041   Discharge Assessment   Assessment Type Discharge Planning Assessment   Confirmed/corrected address, phone number and insurance Yes   Confirmed Demographics Correct on Facesheet   Source of Information patient   When was your last doctors appointment? 07/27/23   Reason For Admission Atrial flutter with rapid ventricular response   People in Home spouse   Facility Arrived From: home   Do you expect to return to your current living situation? Yes   Do you have help at home or someone to help you manage your care at home? Yes   Who are your caregiver(s) and their phone number(s)? Doreen- spouse [phone]   Prior to hospitilization cognitive status: Alert/Oriented   Current cognitive status: Alert/Oriented   Walking or Climbing Stairs   (none)   Dressing/Bathing   (none)   Equipment Currently Used at Home none   Readmission within 30 days? No   Patient currently being followed by outpatient case management? No   Do you take prescription medications? Yes   Do you have prescription coverage? Yes   Coverage [employer] [place]   Do you have any problems affording any of your prescribed medications? No   Is the patient taking medications as prescribed? yes   Who is going to help you get home at discharge? Doreen- spouse   How do you get to doctors appointments? car, drives self   Do you take coumadin? No  (Eliquis)   Discharge Plan A Home with family   Discharge Plan B Home with family   DME Needed Upon Discharge  none   Discharge Plan discussed with: Patient   Transition of Care Barriers None   Physical Activity   On average, how many days per week do you engage in moderate to strenuous exercise (like a brisk walk)? 0 days   On average, how many  minutes do you engage in exercise at this level? 0 min   Financial Resource Strain   How hard is it for you to pay for the very basics like food, housing, medical care, and heating? Not very   Housing Stability   In the last 12 months, was there a time when you were not able to pay the mortgage or rent on time? N   In the last 12 months, how many places have you lived? 1   In the last 12 months, was there a time when you did not have a steady place to sleep or slept in a shelter (including now)? N   Transportation Needs   In the past 12 months, has lack of transportation kept you from medical appointments or from getting medications? no   In the past 12 months, has lack of transportation kept you from meetings, work, or from getting things needed for daily living? No   Food Insecurity   Within the past 12 months, you worried that your food would run out before you got the money to buy more. Never true   Within the past 12 months, the food you bought just didn't last and you didn't have money to get more. Never true   Stress   Do you feel stress - tense, restless, nervous, or anxious, or unable to sleep at night because your mind is troubled all the time - these days? To some exte   Social Connections   In a typical week, how many times do you talk on the phone with family, friends, or neighbors? Three   How often do you get together with friends or relatives? Three times   How often do you attend Scientology or Bahai services? More than 4   Do you belong to any clubs or organizations such as Scientology groups, unions, fraternal or athletic groups, or school groups? No   How often do you attend meetings of the clubs or organizations you belong to? Never   Are you , , , , never , or living with a partner?    Alcohol Use   Q1: How often do you have a drink containing alcohol? Never   Q2: How many drinks containing alcohol do you have on a typical day when you are drinking? None    Q3: How often do you have six or more drinks on one occasion? Never   OTHER   Name(s) of People in Home Doreen- spouse

## 2023-08-08 NOTE — PROGRESS NOTES
[facility] Telemetry  Cardiology  Progress Note    Patient Name: Josiah Orosco Jr.  MRN: [id number]  Admission Date: 8/5/2023  Hospital Length of Stay: 3 days  Code Status: Full Code   Attending Physician: Srini Vu, *   Primary Care Physician: Elaine Landry MD  Expected Discharge Date: 8/8/2023  Principal Problem:Atrial flutter with rapid ventricular response    Subjective:       Interval History:  Back in sinus rhythm    Review of Systems   Constitutional: Negative.   HENT: Negative.     Eyes: Negative.    Cardiovascular: Negative.    Respiratory: Negative.     Endocrine: Negative.    Hematologic/Lymphatic: Negative.    Skin: Negative.    Musculoskeletal: Negative.    Gastrointestinal: Negative.    Genitourinary: Negative.    Neurological: Negative.    Psychiatric/Behavioral: Negative.     Allergic/Immunologic: Negative.      Objective:     Vital Signs (Most Recent):  Temp: 97.5 °F (36.4 °C) (08/08/23 0740)  Pulse: 78 (08/08/23 0740)  Resp: 19 (08/08/23 0740)  BP: 116/66 (08/08/23 0740)  SpO2: 97 % (08/08/23 0740) Vital Signs (24h Range):  Temp:  [97.5 °F (36.4 °C)-98.5 °F (36.9 °C)] 97.5 °F (36.4 °C)  Pulse:  [] 78  Resp:  [14-25] 19  SpO2:  [90 %-99 %] 97 %  BP: (115-145)/() 116/66     Weight: 97.3 kg (214 lb 8.1 oz)  Body mass index is 25.44 kg/m².     SpO2: 97 %         Intake/Output Summary (Last 24 hours) at 8/8/2023 1020  Last data filed at 8/8/2023 0931  Gross per 24 hour   Intake 830 ml   Output 950 ml   Net -120 ml       Lines/Drains/Airways       Peripheral Intravenous Line  Duration                  Peripheral IV - Single Lumen 08/05/23 1202 20 G Right Antecubital 2 days         Peripheral IV - Single Lumen 08/05/23 1433 20 G Left Antecubital 2 days                       Physical Exam  Vitals reviewed.   Constitutional:       Appearance: He is well-developed.   HENT:      Head: Normocephalic.   Eyes:      Conjunctiva/sclera: Conjunctivae normal.      Pupils: Pupils are  "equal, round, and reactive to light.   Cardiovascular:      Rate and Rhythm: Normal rate and regular rhythm.      Heart sounds: Normal heart sounds.   Pulmonary:      Effort: Pulmonary effort is normal.      Breath sounds: Normal breath sounds.   Abdominal:      General: Bowel sounds are normal.      Palpations: Abdomen is soft.   Musculoskeletal:      Cervical back: Normal range of motion and neck supple.   Skin:     General: Skin is warm.   Neurological:      Mental Status: He is alert and oriented to person, place, and time.            Significant Labs: BMP:   Recent Labs   Lab 08/07/23  0336 08/08/23  0608   GLU 95 83   * 138   K 4.3 4.5    104   CO2 25 26   BUN 11 14   CREATININE 0.7 0.7   CALCIUM 9.1 9.0   , CMP   Recent Labs   Lab 08/07/23  0336 08/08/23  0608   * 138   K 4.3 4.5    104   CO2 25 26   GLU 95 83   BUN 11 14   CREATININE 0.7 0.7   CALCIUM 9.1 9.0   ANIONGAP 7* 8   , CBC No results for input(s): "WBC", "HGB", "HCT", "PLT" in the last 48 hours., INR No results for input(s): "INR", "PROTIME" in the last 48 hours., Lipid Panel No results for input(s): "CHOL", "HDL", "LDLCALC", "TRIG", "CHOLHDL" in the last 48 hours., Troponin No results for input(s): "TROPONINI" in the last 48 hours., and All pertinent lab results from the last 24 hours have been reviewed.    Significant Imaging: Echocardiogram: Transthoracic echo (TTE) complete (Cupid Only):   Results for orders placed or performed during the hospital encounter of 07/27/23   Echo   Result Value Ref Range    BSA 2.31 m2    TDI SEPTAL 0.06 m/s    LV LATERAL E/E' RATIO 14.00 m/s    LV SEPTAL E/E' RATIO 14.00 m/s    LA WIDTH 5.40 cm    IVC diameter 2.17 cm    Left Ventricular Outflow Tract Mean Velocity 0.43 cm/s    Left Ventricular Outflow Tract Mean Gradient 0.89 mmHg    AORTIC VALVE CUSP SEPERATION 1.90 cm    TDI LATERAL 0.06 m/s    LVIDd 4.26 3.5 - 6.0 cm    IVS 1.21 (A) 0.6 - 1.1 cm    Posterior Wall 1.24 (A) 0.6 - 1.1 cm "    Ao root annulus 3.32 cm    LVIDs 2.71 2.1 - 4.0 cm    FS 36 28 - 44 %    LA volume 117.77 cm3    Sinus 3.36 cm    STJ 2.74 cm    Ascending aorta 3.34 cm    LV mass 187.64 g    LA size 4.40 cm    RVDD 5.30 cm    TAPSE 1.34 cm    RV S' 14.07 cm/s    Left Ventricle Relative Wall Thickness 0.58 cm    AV mean gradient 2 mmHg    AV valve area 2.29 cm2    AV Velocity Ratio 0.64     AV index (prosthetic) 0.63     Mean e' 0.06 m/s    LVOT diameter 2.16 cm    LVOT area 3.7 cm2    LVOT peak delio 0.66 m/s    LVOT peak VTI 9.90 cm    Ao peak delio 1.03 m/s    Ao VTI 15.8 cm    LVOT stroke volume 36.26 cm3    AV peak gradient 4 mmHg    E/E' ratio 14.00 m/s    MV Peak E Delio 0.84 m/s    TR Max Delio 3.42 m/s    LV Systolic Volume 27.37 mL    LV Systolic Volume Index 11.8 mL/m2    LV Diastolic Volume 81.39 mL    LV Diastolic Volume Index 35.08 mL/m2    LA Volume Index 50.8 mL/m2    LV Mass Index 81 g/m2    RA Major Axis 5.74 cm    Left Atrium Minor Axis 5.37 cm    Left Atrium Major Axis 6.38 cm    Triscuspid Valve Regurgitation Peak Gradient 47 mmHg    RA Width 6.40 cm    Covington's Biplane MOD Ejection Fraction 3 %    Right Atrial Pressure (from IVC) 15 mmHg    EF 50 %    TV resting pulmonary artery pressure 62 mmHg    Narrative    · The estimated ejection fraction is 50%.  · Concentric remodeling and low normal systolic function.  · Severe left atrial enlargement.  · Atrial fibrillation observed.  · There are segmental left ventricular wall motion abnormalities.  · Severe right ventricular enlargement.  · Severe right atrial enlargement.  · Moderate mitral regurgitation.  · Mild tricuspid regurgitation.  · Elevated central venous pressure (15 mmHg).  · The estimated PA systolic pressure is 62 mmHg.  · There is pulmonary hypertension.        Assessment and Plan:     Brief HPI:     * Atrial flutter with rapid ventricular response  cardizem drip. Rate control on cardizem drip.     Will do cardioversion on Monday.    Npo post mn on  Sunday.    Initiate sotalol. Monitor qtc    8/6:    cv in am    Risks, benefits and alternatives of the Cardioversion procedure were discussed with the patient including throat irritation, aspiration, anesthetic complications, esophageal irritation/perforation, skin irritation, arrhythmia, etc.  Patient understands and agrees to proceed.  Consent was placed on the chart.     no need to repeat ELENA as patient has been compliant with his Eliquis since the last ELENA recently        8/7: s/p cardioversion. In nsr. Continue sotalol and metoprolol. Check qtc.       8/8: in nsr. Continue eliquis    Thoracic aorta atherosclerosis        Pulmonary interstitial fibrosis        ILD (interstitial lung disease)        Former smoker        History of HCV, s/p successful treatment w/ SVR12 5/2015            VTE Risk Mitigation (From admission, onward)         Ordered     apixaban tablet 5 mg  2 times daily         08/05/23 1425                David Cueva MD  Cardiology  [facility] [place] - Telemetry

## 2023-08-08 NOTE — DISCHARGE SUMMARY
[facility] Medicine  Discharge Summary      Patient Name: Josiah Orosco Jr.  MRN: [id number]  JOHANA: [id number]  Patient Class: IP- Inpatient  Admission Date: 8/5/2023  Hospital Length of Stay: 3 days  Discharge Date and Time:  08/08/2023 11:09 AM  Attending Physician: Kristina Vu, *   Discharging Provider: Kristina Vu MD  Primary Care Provider: Elaine Landry MD    Primary Care Team: Networked reference to record PCT     HPI:   Josiah Orosco Jr. is a 67 y.o. male, with a PMHx of A-Fib, HTN, and pulmonary interstitial fibrosis, who presents to the ED with SOB and productive cough for 1 day. Patient endorses fatigue. Patient reports taking eliquis and metoprolol. Patient is being followed by Dr. Cueva, cardiology. Denies other associated symptoms.patient is in Afib with RVR again,was shocked last week,patient has been stared on Cardizem drip,cardiology is consulted,      Procedure(s) (LRB):  Cardioversion or Defibrillation (N/A)      Hospital Course:   Josiah Orosco Jr. is a 67 y.o. male, with a PMHx of A-Fib, HTN, and pulmonary interstitial fibrosis, who presents to the ED with SOB and productive cough for 1 day. Patient endorses fatigue. Patient reports taking eliquis and metoprolol. Patient is being followed by Dr. Cueva, cardiology. Denies other associated symptoms.patient was in Aflutter  with RVR again,was shocked last week,patient has been stared on Cardizem drip,cardiology is consulted,sotalol is added,remains in Aflutter,S/P DCCV ,back to sinus,was monitored in Telemetry and remains stable.patient was discharged home with follow up plan with cardiology as out patient.       Goals of Care Treatment Preferences:  Code Status: Full Code    Living Will: Yes              Consults:   Consults (From admission, onward)        Status Ordering Provider     Inpatient consult to Social Work  Once        Provider:  (Not yet assigned)    Completed KRISTINA VU      "Inpatient consult to Cardiology  Once        Provider:  David Cueva MD    Completed NIEVES NAYAK          No new Assessment & Plan notes have been filed under this hospital service since the last note was generated.  Service: Hospital Medicine    Final Active Diagnoses:    Diagnosis Date Noted POA    PRINCIPAL PROBLEM:  Atrial flutter with rapid ventricular response [I48.92] 05/08/2023 Yes    Thoracic aorta atherosclerosis [I70.0] 03/17/2022 Yes    Pulmonary interstitial fibrosis [J84.10]  Yes    ILD (interstitial lung disease) [J84.9] 11/16/2015 Yes    Former smoker [Z87.891] 12/10/2014 Not Applicable    History of HCV, s/p successful treatment w/ SVR12 5/2015 [Z86.19]  Yes      Problems Resolved During this Admission:       Discharged Condition: stable    Disposition: Home or Self Care    Follow Up:   Follow-up Information     Elaine Landry MD Follow up in 1 week(s).    Specialties: Internal Medicine, Pediatrics  Contact information:  [address] [place] [address]  [place] [id number] [phone]                       Patient Instructions:      Ambulatory referral/consult to Cardiology   Standing Status: Future   Referral Priority: Routine Referral Type: Consultation   Referral Reason: Specialty Services Required   Requested Specialty: Cardiology   Number of Visits Requested: 1     Activity as tolerated       Significant Diagnostic Studies: Labs:   BMP:   Recent Labs   Lab 08/07/23  0336 08/08/23  0608   GLU 95 83   * 138   K 4.3 4.5    104   CO2 25 26   BUN 11 14   CREATININE 0.7 0.7   CALCIUM 9.1 9.0   , CMP   Recent Labs   Lab 08/07/23  0336 08/08/23  0608   * 138   K 4.3 4.5    104   CO2 25 26   GLU 95 83   BUN 11 14   CREATININE 0.7 0.7   CALCIUM 9.1 9.0   ANIONGAP 7* 8    and CBC No results for input(s): "WBC", "HGB", "HCT", "PLT" in the last 48 hours.  Radiology: X-Ray: CXR: X-Ray Chest 1 View (CXR): No results found for this visit on 08/05/23. and X-Ray Chest PA and " Lateral (CXR): No results found for this visit on 08/05/23.    Pending Diagnostic Studies:     Procedure Component Value Units Date/Time    EKG 12-lead [[id number]]     Order Status: Sent Lab Status: No result     EKG 12-lead [[id number]]     Order Status: Sent Lab Status: No result     EKG 12-lead [[id number]]     Order Status: Sent Lab Status: No result          Medications:  Reconciled Home Medications:      Medication List      START taking these medications    sotaloL 80 MG tablet  Commonly known as: BETAPACE  Take 1 tablet (80 mg total) by mouth 2 (two) times daily.        CHANGE how you take these medications    metoprolol succinate 50 MG 24 hr tablet  Commonly known as: TOPROL-XL  Take 1 tablet (50 mg total) by mouth once daily.  What changed:   · medication strength  · how much to take        CONTINUE taking these medications    allopurinoL 100 MG tablet  Commonly known as: ZYLOPRIM  Take 2 tablets (200 mg total) by mouth once daily.     apixaban 5 mg Tab  Commonly known as: ELIQUIS  Take 1 tablet (5 mg total) by mouth 2 (two) times daily.     fluticasone propionate 50 mcg/actuation nasal spray  Commonly known as: FLONASE  SHAKE LIQUID AND USE 1 SPRAY(50 MCG) IN EACH NOSTRIL EVERY DAY     multivitamin capsule  Take 1 capsule by mouth once daily.     ofloxacin 0.3 % ophthalmic solution  Commonly known as: OCUFLOX  Place 1 drop into both eyes 4 (four) times daily.     prednisoLONE acetate 1 % Drps  Commonly known as: PRED FORTE  Place 1 drop into both eyes 3 (three) times daily.     TRELEGY ELLIPTA 100-62.5-25 mcg Dsdv  Generic drug: fluticasone-umeclidin-vilanter  Inhale 1 puff into the lungs Daily.        STOP taking these medications    losartan 50 MG tablet  Commonly known as: COZAAR            Indwelling Lines/Drains at time of discharge:   Lines/Drains/Airways     None                 Time spent on the discharge of patient:  Over 30  minutes         Srini Vu MD  Department of Hospital  Medicine  [facility]

## 2023-08-08 NOTE — PLAN OF CARE
CM notified nurseRossana that pt is ready for discharge from CM standpoint. All CM needs met.     08/08/23 1047   Final Note   Assessment Type Final Discharge Note   Anticipated Discharge Disposition Home   What phone number can be called within the next 1-3 days to see how you are doing after discharge? [phone]   Hospital Resources/Appts/Education Provided Appointments scheduled and added to AVS;Appointments scheduled by Navigator/Coordinator   Post-Acute Status   Coverage [employer] [place]   Discharge Delays None known at this time

## 2023-08-08 NOTE — ASSESSMENT & PLAN NOTE
cardizem drip. Rate control on cardizem drip.     Will do cardioversion on Monday.    Npo post mn on Sunday.    Initiate sotalol. Monitor qtc    8/6:    cv in am    Risks, benefits and alternatives of the Cardioversion procedure were discussed with the patient including throat irritation, aspiration, anesthetic complications, esophageal irritation/perforation, skin irritation, arrhythmia, etc.  Patient understands and agrees to proceed.  Consent was placed on the chart.     no need to repeat ELENA as patient has been compliant with his Eliquis since the last ELENA recently        8/7: s/p cardioversion. In nsr. Continue sotalol and metoprolol. Check qtc.       8/8: in nsr. Continue eliquis

## 2023-08-08 NOTE — PHYSICIAN QUERY
5PT Name: Josiah Orosco Jr.  MR #: [id number]    DOCUMENTATION CLARIFICATION     CDS/: KARY Cortez, RN, CCDS               Contact information: [email or web address]  This form is a permanent document in the medical record.     Query Date: August 8, 2023    By submitting this query, we are merely seeking further clarification of documentation. Please utilize your independent clinical judgment when addressing the question(s) below.    The Medical Record contains the following:    Indicators Supporting Clinical Findings Location in Medical Record   X   Atrial Flutter Atrial flutter with rapid ventricular response    patient is in Afib with RVR again,was shocked last week  PRINCIPAL PROBLEM:  Atrial flutter with rapid ventricular response   Cards: Dr. Cueva    DCS: Dr. Prasad 8/8    EKG Results     X Medication cardizem drip. Rate control on cardizem drip  Initiate sotalol    stared on Cardizem drip,cardiology is consulted,sotalol is added,remains in Aflutter,S/P DCCV ,back to sinus,   Cards: Dr. Cueva      DCS: Dr. Prasad 8/8    Treatment      Other       The clinical guidelines noted are only a system guideline. It does not replace the provider's clinical judgment.    Provider, please further specify the atrial flutter.    [ x  ] Typical (Type I) - Diagnosis based on morphological criteria on EKG when the cavo-tricuspid isthmus region is involved   [   ] Atypical (Type II) - Diagnosis based on morphological criteria on EKG (not meeting typical atrial flutter criteria because the cavo-tricuspid isthmus region is not involved)   [   ] Other cardiac diagnosis (please specify):_______________       Please document in your progress notes daily for the duration of treatment until resolved, and include in your discharge summary.    Reference:    ALIZA Good MD, FACC, FHRS, TIFFANIE Yu MD, [facility], [facility], KOURTNEY Lay MD, RS, [facility], & RADHA Ar[place] MD. (2019, May 27). Overview of atrial flutter (P. J.  [employer], Ed.). Retrieved October 22, 2020, from [email or web address]/contents/aihmdwlz-xl-gtnwmc-flutter?search=atrial%20flutter&source=search_result&selectedTitle=1~150&usage_type=default&display_rank=1    Form No. 52415

## 2023-08-09 ENCOUNTER — NURSE TRIAGE (OUTPATIENT)
Dept: ADMINISTRATIVE | Facility: CLINIC | Age: 68
End: 2023-08-09
Payer: MEDICARE

## 2023-08-09 ENCOUNTER — TELEPHONE (OUTPATIENT)
Dept: FAMILY MEDICINE | Facility: CLINIC | Age: 68
End: 2023-08-09
Payer: MEDICARE

## 2023-08-09 ENCOUNTER — PATIENT OUTREACH (OUTPATIENT)
Dept: ADMINISTRATIVE | Facility: CLINIC | Age: 68
End: 2023-08-09
Payer: MEDICARE

## 2023-08-09 DIAGNOSIS — R09.02 HYPOXIA: Primary | ICD-10-CM

## 2023-08-09 NOTE — TELEPHONE ENCOUNTER
"Spoke with wife in reference to her concerns of patient feeling weak. Questioned wife whether or not the patient had been drinking fluids? Wife stated "he says that he's drinking, but let's ask him because I can't watch him 24/7". Spoke with patient , questioned patient if he had been drinking fluids for today? Patient stated "Yes", Questioned patient what type of fluids has he been drinking for today? Patient stated " 3 8 oz of water, 1 16 oz of coke, & 1 boost. Patient instructed to refrain from sodas. Patient questioned when was the last time he had a bowel movement? Patient stated "he has a bowel movement everyday". Wife seems to think he was constipated, but patient denies. Patient states "he has been having one everyday". He had one today after he ate soup. Patient was seen today by Dr. Shadi Hurd & suggest a sleep study for PCP to order to possibly rule out Hypoxia/Hyposemia. MD can be reached @ [phone]. Instructed patient & wife to enforce fluids & to increase with his oral intake with protein ie: beans, tuna, & his boost drinks.  "

## 2023-08-09 NOTE — TELEPHONE ENCOUNTER
Speaking with spouse, states her  was recently discharged from hospital. States he seems like he is weak. Now speaking with patient. States I am just tired from being in bed for 3 days, and eating hospital food. I am not feeling like I felt before. He was in hospital for cardioversion. Post hospital triage done, while in progress. Spouse, Doreen states she just spoke with one of his other providers, Dr. Johnson, [facility] They want him to come in now. So they are headed to that providers' office. They had missed their appointment due to his hospitalization. She will address any concerns with them. Verb understanding.   Reason for Disposition   Caller has NON-URGENT question and triager unable to answer question    Additional Information   Negative: Sounds like a life-threatening emergency to the triager   Negative: Patient sounds very sick or weak to the triager   Negative: Sounds like a serious complication to the triager   Negative: Condition / symptoms WORSE   Negative: Caller has URGENT question and triager unable to answer question   Negative: Patient wants to be seen   Negative: Condition / symptoms SAME (not improving)    Protocols used: Post-Hospitalization Follow-up Call-A-OH

## 2023-08-09 NOTE — PROGRESS NOTES
C3 nurse attempted to contact Josiah Orosco Jr.  for a TCC post hospital discharge follow up call. No answer. Left voicemail with callback information. The patient has a scheduled HOSFU appointment with Elaine Landry MD  on 08/14/2023 @ 10 am.   Appointment with Dr. Cueva on 08/25/2023 @ 840 am.

## 2023-08-09 NOTE — TELEPHONE ENCOUNTER
Patient's wife was notified and verbally understood. Patient's wife states patient has appointment Monday with provider.

## 2023-08-10 NOTE — PROGRESS NOTES
C3 nurse spoke with Josiah Orosco Jr. And patient's wife for a TCC post hospital discharge follow up call. The patient has a scheduled HOSFU appointment with Elaine Landry MD  on 08/14/2023 @ 10 am.   Appointment with Dr. Cueva on 08/25/2023 @ 840 am.

## 2023-08-13 ENCOUNTER — NURSE TRIAGE (OUTPATIENT)
Dept: ADMINISTRATIVE | Facility: CLINIC | Age: 68
End: 2023-08-13
Payer: MEDICARE

## 2023-08-13 NOTE — TELEPHONE ENCOUNTER
"Caller states pt awaken that this morning and broke into a sweat and was hot for one half a minute x 30 mins ago. Caller denies SOB, cp, weakness or dizziness at this time.  Pt offered OAC visit and declined at this time. Caller has no complaints and states that he "feels fine".  Pt advised per protocol and verbalized understanding.     Reason for Disposition   [1] MODERATE sweating (e.g., interferes with normal activities; causes embarrassment) AND [2] possibly related to new medicine or change in medication dosage    Additional Information   Negative: SEVERE difficulty breathing (e.g., struggling for each breath, speaks in single words)   Negative: Shock suspected (e.g., cold/pale/clammy skin, too weak to stand, low BP, rapid pulse)   Negative: Sounds like a life-threatening emergency to the triager   Negative: Difficulty breathing   Negative: Patient sounds very sick or weak to the triager   Negative: [1] SEVERE sweating (e.g., drenched with sweat) AND [2] cause unknown   Negative: [1] NIGHT SWEATS occur (e.g., drenching sweat that occurs at night and has to change bed clothes or bed sheets) AND [2] cause unknown    Protocols used: [id number]    "

## 2023-08-14 ENCOUNTER — OFFICE VISIT (OUTPATIENT)
Dept: FAMILY MEDICINE | Facility: CLINIC | Age: 68
End: 2023-08-14
Payer: MEDICARE

## 2023-08-14 ENCOUNTER — TELEPHONE (OUTPATIENT)
Dept: FAMILY MEDICINE | Facility: CLINIC | Age: 68
End: 2023-08-14

## 2023-08-14 VITALS
BODY MASS INDEX: 23.78 KG/M2 | TEMPERATURE: 98 F | DIASTOLIC BLOOD PRESSURE: 68 MMHG | HEART RATE: 78 BPM | WEIGHT: 201.38 LBS | SYSTOLIC BLOOD PRESSURE: 110 MMHG | HEIGHT: 77 IN | OXYGEN SATURATION: 94 %

## 2023-08-14 DIAGNOSIS — J84.10 PULMONARY INTERSTITIAL FIBROSIS: ICD-10-CM

## 2023-08-14 DIAGNOSIS — R05.1 ACUTE COUGH: ICD-10-CM

## 2023-08-14 DIAGNOSIS — I48.92 ATRIAL FLUTTER WITH RAPID VENTRICULAR RESPONSE: ICD-10-CM

## 2023-08-14 DIAGNOSIS — U07.1 COVID-19 VIRUS DETECTED: ICD-10-CM

## 2023-08-14 DIAGNOSIS — Z09 HOSPITAL DISCHARGE FOLLOW-UP: Primary | ICD-10-CM

## 2023-08-14 DIAGNOSIS — J64: ICD-10-CM

## 2023-08-14 DIAGNOSIS — R06.02 SHORTNESS OF BREATH: ICD-10-CM

## 2023-08-14 LAB — SARS-COV-2 RNA RESP QL NAA+PROBE: DETECTED

## 2023-08-14 PROCEDURE — 3288F PR FALLS RISK ASSESSMENT DOCUMENTED: ICD-10-PCS | Mod: CPTII,S$GLB,, | Performed by: INTERNAL MEDICINE

## 2023-08-14 PROCEDURE — 99999 PR PBB SHADOW E&M-EST. PATIENT-LVL III: ICD-10-PCS | Mod: PBBFAC,,, | Performed by: INTERNAL MEDICINE

## 2023-08-14 PROCEDURE — 3288F FALL RISK ASSESSMENT DOCD: CPT | Mod: CPTII,S$GLB,, | Performed by: INTERNAL MEDICINE

## 2023-08-14 PROCEDURE — 99999 PR PBB SHADOW E&M-EST. PATIENT-LVL III: CPT | Mod: PBBFAC,,, | Performed by: INTERNAL MEDICINE

## 2023-08-14 PROCEDURE — 3074F SYST BP LT 130 MM HG: CPT | Mod: CPTII,S$GLB,, | Performed by: INTERNAL MEDICINE

## 2023-08-14 PROCEDURE — 1159F PR MEDICATION LIST DOCUMENTED IN MEDICAL RECORD: ICD-10-PCS | Mod: CPTII,S$GLB,, | Performed by: INTERNAL MEDICINE

## 2023-08-14 PROCEDURE — 1126F AMNT PAIN NOTED NONE PRSNT: CPT | Mod: CPTII,S$GLB,, | Performed by: INTERNAL MEDICINE

## 2023-08-14 PROCEDURE — 1111F DSCHRG MED/CURRENT MED MERGE: CPT | Mod: CPTII,S$GLB,, | Performed by: INTERNAL MEDICINE

## 2023-08-14 PROCEDURE — 3008F PR BODY MASS INDEX (BMI) DOCUMENTED: ICD-10-PCS | Mod: CPTII,S$GLB,, | Performed by: INTERNAL MEDICINE

## 2023-08-14 PROCEDURE — 4010F PR ACE/ARB THEARPY RXD/TAKEN: ICD-10-PCS | Mod: CPTII,S$GLB,, | Performed by: INTERNAL MEDICINE

## 2023-08-14 PROCEDURE — 1160F RVW MEDS BY RX/DR IN RCRD: CPT | Mod: CPTII,S$GLB,, | Performed by: INTERNAL MEDICINE

## 2023-08-14 PROCEDURE — 3074F PR MOST RECENT SYSTOLIC BLOOD PRESSURE < 130 MM HG: ICD-10-PCS | Mod: CPTII,S$GLB,, | Performed by: INTERNAL MEDICINE

## 2023-08-14 PROCEDURE — 87635 SARS-COV-2 COVID-19 AMP PRB: CPT | Performed by: INTERNAL MEDICINE

## 2023-08-14 PROCEDURE — 1101F PT FALLS ASSESS-DOCD LE1/YR: CPT | Mod: CPTII,S$GLB,, | Performed by: INTERNAL MEDICINE

## 2023-08-14 PROCEDURE — 99495 TRANSJ CARE MGMT MOD F2F 14D: CPT | Mod: S$GLB,,, | Performed by: INTERNAL MEDICINE

## 2023-08-14 PROCEDURE — 3008F BODY MASS INDEX DOCD: CPT | Mod: CPTII,S$GLB,, | Performed by: INTERNAL MEDICINE

## 2023-08-14 PROCEDURE — 4010F ACE/ARB THERAPY RXD/TAKEN: CPT | Mod: CPTII,S$GLB,, | Performed by: INTERNAL MEDICINE

## 2023-08-14 PROCEDURE — 99495 TCM SERVICES (MODERATE COMPLEXITY): ICD-10-PCS | Mod: S$GLB,,, | Performed by: INTERNAL MEDICINE

## 2023-08-14 PROCEDURE — 3078F PR MOST RECENT DIASTOLIC BLOOD PRESSURE < 80 MM HG: ICD-10-PCS | Mod: CPTII,S$GLB,, | Performed by: INTERNAL MEDICINE

## 2023-08-14 PROCEDURE — 3044F PR MOST RECENT HEMOGLOBIN A1C LEVEL <7.0%: ICD-10-PCS | Mod: CPTII,S$GLB,, | Performed by: INTERNAL MEDICINE

## 2023-08-14 PROCEDURE — 1111F PR DISCHARGE MEDS RECONCILED W/ CURRENT OUTPATIENT MED LIST: ICD-10-PCS | Mod: CPTII,S$GLB,, | Performed by: INTERNAL MEDICINE

## 2023-08-14 PROCEDURE — 1157F PR ADVANCE CARE PLAN OR EQUIV PRESENT IN MEDICAL RECORD: ICD-10-PCS | Mod: CPTII,S$GLB,, | Performed by: INTERNAL MEDICINE

## 2023-08-14 PROCEDURE — 1159F MED LIST DOCD IN RCRD: CPT | Mod: CPTII,S$GLB,, | Performed by: INTERNAL MEDICINE

## 2023-08-14 PROCEDURE — 1160F PR REVIEW ALL MEDS BY PRESCRIBER/CLIN PHARMACIST DOCUMENTED: ICD-10-PCS | Mod: CPTII,S$GLB,, | Performed by: INTERNAL MEDICINE

## 2023-08-14 PROCEDURE — 1157F ADVNC CARE PLAN IN RCRD: CPT | Mod: CPTII,S$GLB,, | Performed by: INTERNAL MEDICINE

## 2023-08-14 PROCEDURE — 1101F PR PT FALLS ASSESS DOC 0-1 FALLS W/OUT INJ PAST YR: ICD-10-PCS | Mod: CPTII,S$GLB,, | Performed by: INTERNAL MEDICINE

## 2023-08-14 PROCEDURE — 3078F DIAST BP <80 MM HG: CPT | Mod: CPTII,S$GLB,, | Performed by: INTERNAL MEDICINE

## 2023-08-14 PROCEDURE — 3044F HG A1C LEVEL LT 7.0%: CPT | Mod: CPTII,S$GLB,, | Performed by: INTERNAL MEDICINE

## 2023-08-14 PROCEDURE — 1126F PR PAIN SEVERITY QUANTIFIED, NO PAIN PRESENT: ICD-10-PCS | Mod: CPTII,S$GLB,, | Performed by: INTERNAL MEDICINE

## 2023-08-14 NOTE — TELEPHONE ENCOUNTER
Spoke with patient's wife and advised her that both she and her  tested positive for Covid.   They are both past 10 days since starting symptoms so no need for quarantine.  She voiced understanding.  They may continue with otc meds for symptoms, if needed. She will let us know if symptoms worsen or persist.

## 2023-08-14 NOTE — PROGRESS NOTES
Transitional Care Note    Family and/or Caretaker present at visit?  Yes.  Diagnostic tests reviewed/disposition: No diagnosic tests pending after this hospitalization.  Disease/illness education: yes  Home health/community services discussion/referrals: Patient does not have home health established from hospital visit.  They do not need home health.  If needed, we will set up home health for the patient.   Establishment or re-establishment of referral orders for community resources: No other necessary community resources.   Discussion with other health care providers: No discussion with other health care providers necessary.     Medications reviewed and reconciled.    CHIEF COMPLAINT:   Chief Complaint   Patient presents with    Follow-up     Hospital           HISTORY OF PRESENT ILLNESS:  Josiah Orosco Jr. is a 67 y.o. male who presents to the clinic today for Follow-up (Hospital )  .      The patient presents to clinic today for follow-up of recent ED/hospitalizations on 07/27 and 8/5 for shortness of breath and atrial flutter with rapid ventricular response.  During the 2nd hospital stay he was cardioverted.  He states he is overall feeling much better.  His wife reports today that he also had congestion and cough, but he was never tested for COVID.  She reports that they both still have some lingering congestion and cough but are overall feeling better.  She would like both of them to be tested today.  They have been using over-the-counter medication as needed to help with symptoms.  The patient reports that overall he is feeling much better.  He has follow-up with Cardiology in the near future.    PAST MEDICAL HISTORY:  Past Medical History:   Diagnosis Date    A-fib 5/8/2023    Arthritis     GERD (gastroesophageal reflux disease)     History of HCV, s/p successful treatment w/ SVR12 5/2015     Failed treatment (stage I/II) - followed by hepatology     Joint pain     Lung disease caused by breathing  particles     Widespread essentially symmetric interstitial lung disease    Obesity     Polycythemia vera     Prediabetes        PAST SURGICAL HISTORY:  Past Surgical History:   Procedure Laterality Date    BUNIONECTOMY      bilateral    CARDIOVERSION N/A 05/08/2023    Procedure: Cardioversion;  Surgeon: David Cueva MD;  Location: [facility] CATH LAB;  Service: Cardiology;  Laterality: N/A;    CATARACT EXTRACTION Left 07/27/2023    COLONOSCOPY N/A 12/09/2015    Procedure: COLONOSCOPY;  Surgeon: Conrad Iqbal MD;  Location: [facility] ENDO;  Service: Endoscopy;  Laterality: N/A;    Liver biopsy x2      rotator cuff Right     TRANSESOPHAGEAL ECHOCARDIOGRAM WITH POSSIBLE CARDIOVERSION (ELENA W/ POSS CARDIOVERSION) N/A 05/08/2023    Procedure: TRANSESOPHAGEAL ECHOCARDIOGRAM WITH POSSIBLE CARDIOVERSION (ELENA W/ POSS CARDIOVERSION);  Surgeon: David Cueva MD;  Location: [facility] CATH LAB;  Service: Cardiology;  Laterality: N/A;  12:30PM    RN PREOP 5/4/2023---EKG ON ARRIVAL    TRANSESOPHAGEAL ECHOCARDIOGRAM WITH POSSIBLE CARDIOVERSION (ELENA W/ POSS CARDIOVERSION) N/A 7/29/2023    Procedure: Transesophageal echo (ELENA) intra-procedure log documentation;  Surgeon: David Cueva MD;  Location: [facility] CATH LAB;  Service: Cardiology;  Laterality: N/A;    TRANSESOPHAGEAL ECHOCARDIOGRAPHY N/A 05/08/2023    Procedure: ECHOCARDIOGRAM, TRANSESOPHAGEAL;  Surgeon: David Cueva MD;  Location: [facility] CATH LAB;  Service: Cardiology;  Laterality: N/A;    TRANSESOPHAGEAL ECHOCARDIOGRAPHY N/A 7/29/2023    Procedure: ECHOCARDIOGRAM, TRANSESOPHAGEAL;  Surgeon: David Cueva MD;  Location: [facility] CATH LAB;  Service: Cardiology;  Laterality: N/A;    TREATMENT OF CARDIAC ARRHYTHMIA N/A 8/7/2023    Procedure: Cardioversion or Defibrillation;  Surgeon: David Cueva MD;  Location: [facility] CATH LAB;  Service: Cardiology;  Laterality: N/A;       SOCIAL HISTORY:  Social History     Socioeconomic History    Marital status:     Number of children: 0     Highest education level: Some college, no degree   Occupational History    Occupation: Self-employed     Employer: [employer]   Tobacco Use    Smoking status: Former     Current packs/day: 0.00    Smokeless tobacco: Never    Tobacco comments:     pt. smokes 10 cigars per day.   Substance and Sexual Activity    Alcohol use: Not Currently     Comment: Rarely    Drug use: Yes     Types: Marijuana     Comment: daily    Sexual activity: Yes     Partners: Female     Social Determinants of Health     Financial Resource Strain: Low Risk  (8/8/2023)    Overall Financial Resource Strain (CARDIA)     Difficulty of Paying Living Expenses: Not very hard   Food Insecurity: No Food Insecurity (8/8/2023)    Hunger Vital Sign     Worried About Running Out of Food in the Last Year: Never true     Ran Out of Food in the Last Year: Never true   Transportation Needs: No Transportation Needs (8/8/2023)    PRAPARE - Transportation     Lack of Transportation (Medical): No     Lack of Transportation (Non-Medical): No   Physical Activity: Inactive (8/8/2023)    Exercise Vital Sign     Days of Exercise per Week: 0 days     Minutes of Exercise per Session: 0 min   Stress: Stress Concern Present (8/8/2023)    [employer] - Occupational Stress Questionnaire     Feeling of Stress : To some extent   Social Connections: Moderately Integrated (8/8/2023)    Social Connection and Isolation Panel [NHANES]     Frequency of Communication with Friends and Family: Three times a week     Frequency of Social Gatherings with Friends and Family: Three times a week     Attends Orthodoxy Services: More than 4 times per year     Active Member of Clubs or Organizations: No     Attends Club or Organization Meetings: Never     Marital Status:    Housing Stability: Low Risk  (8/8/2023)    Housing Stability Vital Sign     Unable to Pay for Housing in the Last Year: No     Number of Places Lived in the Last Year: 1     Unstable  Housing in the Last Year: No       FAMILY HISTORY:  Family History   Problem Relation Age of Onset    Heart disease Mother     Kidney disease Mother     Parkinsonism Father     Prostate cancer Cousin         maternal side    Heart attack Sister         CABG    Deep vein thrombosis Sister     Pulmonary embolism Sister     Osteoarthritis Sister         s/p knee replacement    Chronic back pain Brother     Liver disease Neg Hx     Diabetes Neg Hx     Melanoma Neg Hx        ALLERGIES AND MEDICATIONS: updated and reviewed.  Review of patient's allergies indicates:   Allergen Reactions    Ace inhibitors Other (See Comments)     cough     Medication List with Changes/Refills   Current Medications    ALLOPURINOL (ZYLOPRIM) 100 MG TABLET    Take 2 tablets (200 mg total) by mouth once daily.    APIXABAN (ELIQUIS) 5 MG TAB    Take 1 tablet (5 mg total) by mouth 2 (two) times daily.    FLUTICASONE PROPIONATE (FLONASE) 50 MCG/ACTUATION NASAL SPRAY    SHAKE LIQUID AND USE 1 SPRAY(50 MCG) IN EACH NOSTRIL EVERY DAY    METOPROLOL SUCCINATE (TOPROL-XL) 50 MG 24 HR TABLET    Take 1 tablet (50 mg total) by mouth once daily.    MULTIVITAMIN CAPSULE    Take 1 capsule by mouth once daily.    OFLOXACIN (OCUFLOX) 0.3 % OPHTHALMIC SOLUTION    Place 1 drop into both eyes 4 (four) times daily.    PREDNISOLONE ACETATE (PRED FORTE) 1 % DRPS    Place 1 drop into both eyes 3 (three) times daily.    SOTALOL (BETAPACE) 80 MG TABLET    Take 1 tablet (80 mg total) by mouth 2 (two) times daily.    TRELEGY ELLIPTA 100-62.5-25 MCG DSDV    Inhale 1 puff into the lungs Daily.         CARE TEAM:  Patient Care Team:  Elaine Landry MD as PCP - General (Internal Medicine)  Scheuermann, Jennifer B., PA as Physician Assistant (Hepatology)  Karen Johnson MD as Consulting Physician (Hematology)  Junior Torres OD (Optometry)  Meche Reyes LPN as Care Coordinator       REVIEW OF SYSTEMS:  Review of Systems   Constitutional:  Negative for chills  "and fever.   HENT:  Positive for congestion.    Respiratory:  Positive for cough. Negative for shortness of breath.    Cardiovascular:  Negative for chest pain.   Gastrointestinal:  Negative for abdominal pain.         PHYSICAL EXAMINATION/VITALS:  Vitals:    08/14/23 0950   BP: 110/68   Pulse: 78   Temp: 97.7 °F (36.5 °C)   TempSrc: Oral   SpO2: (!) 94%   Weight: 91.3 kg (201 lb 6.2 oz)   Height: 6' 5" (1.956 m)       Body mass index is 23.88 kg/m².      General appearance - alert, well appearing, and in no distress, normal appearing weight  Psychiatric - alert, oriented to person, place, and time, normal behavior, speech, dress, motor activity and thought process  Chest - clear to auscultation, no wheezes, rales or rhonchi, symmetric air entry  Heart - normal rate and regular rhythm, no gallops noted      LABS:  Ordered/Scheduled      ASSESSMENT AND PLAN:   1. Hospital discharge follow-up/2. Shortness of breath/5. Atrial flutter with rapid ventricular response  The patient reports feeling much better today.  He denies any shortness of breath at this time.  He auscultated to be in normal sinus rhythm today.  He will continue on blood thinners.  He will keep his follow-up appointment with Cardiology in the near future.  He will seek immediate medical care for any acute changes in his medical condition.    3. Lung disease caused by breathing particles/4. Pulmonary interstitial fibrosis  The current medical regimen is effective;  continue present plan and medications.   Followed by: Pulmonology.   Overview:  Widespread essentially symmetric interstitial lung disease  - followed by Dr. Dale giordano    6. Acute cough  His wife thinks that he and her both had COVID recently.  She is requesting testing.  May continue with over-the-counter medication as needed for his symptoms.  His symptoms initially began 2 or 3 weeks ago.  They do not need to quarantine at this time.  They are both starting to feel much better.  -   "   Cancel: COVID-19 Routine Screening    Other orders  -     COVID-19 Routine Screening            Orders Placed This Encounter   Procedures    COVID-19 Routine Screening      FOLLOW UP: Follow up in about 8 months (around 4/14/2024), or if symptoms worsen or fail to improve, for annual exam. or sooner as needed.

## 2023-08-15 ENCOUNTER — TELEPHONE (OUTPATIENT)
Dept: FAMILY MEDICINE | Facility: CLINIC | Age: 68
End: 2023-08-15
Payer: MEDICARE

## 2023-08-15 DIAGNOSIS — I48.3 TYPICAL ATRIAL FLUTTER: Primary | ICD-10-CM

## 2023-08-15 NOTE — TELEPHONE ENCOUNTER
----- Message from Samantha Granados sent at 8/15/2023 11:43 AM CDT -----  Regarding: Doreen [phone]  Who called: Doreen wife       What is the request in detail: Wife stated pt was seen on yesterday didn't get After Summary Visit but would like nurse to mail paper to pt, address has been confirmed to send paper to [address] [place] [id number]       Can the clinic reply by [facility]? No        Would the patient rather a call back or a response via [facility]? Call back       Best call back number:[phone]             Last Appt: 5/27/22 with dr clancy         Last refill: 3 years ago       Last lab: 9/4/2      Next appt: na

## 2023-08-23 ENCOUNTER — OFFICE VISIT (OUTPATIENT)
Dept: ELECTROPHYSIOLOGY | Facility: CLINIC | Age: 68
End: 2023-08-23
Payer: MEDICARE

## 2023-08-23 ENCOUNTER — HOSPITAL ENCOUNTER (OUTPATIENT)
Dept: CARDIOLOGY | Facility: CLINIC | Age: 68
Discharge: HOME | End: 2023-08-23
Payer: MEDICARE

## 2023-08-23 VITALS
DIASTOLIC BLOOD PRESSURE: 69 MMHG | SYSTOLIC BLOOD PRESSURE: 131 MMHG | BODY MASS INDEX: 24.88 KG/M2 | HEART RATE: 78 BPM | WEIGHT: 210.75 LBS | HEIGHT: 77 IN

## 2023-08-23 DIAGNOSIS — I48.92 ATRIAL FLUTTER, UNSPECIFIED TYPE: Primary | ICD-10-CM

## 2023-08-23 DIAGNOSIS — I49.9 CARDIAC ARRHYTHMIA, UNSPECIFIED CARDIAC ARRHYTHMIA TYPE: ICD-10-CM

## 2023-08-23 DIAGNOSIS — Z86.19 HISTORY OF HEPATITIS C: ICD-10-CM

## 2023-08-23 DIAGNOSIS — I10 HTN, GOAL BELOW 140/90: ICD-10-CM

## 2023-08-23 DIAGNOSIS — I48.3 TYPICAL ATRIAL FLUTTER: ICD-10-CM

## 2023-08-23 DIAGNOSIS — J84.10 PULMONARY INTERSTITIAL FIBROSIS: Primary | ICD-10-CM

## 2023-08-23 DIAGNOSIS — E66.3 OVERWEIGHT (BMI 25.0-29.9): ICD-10-CM

## 2023-08-23 PROCEDURE — 99205 OFFICE O/P NEW HI 60 MIN: CPT | Mod: S$GLB,,, | Performed by: INTERNAL MEDICINE

## 2023-08-23 PROCEDURE — 1160F RVW MEDS BY RX/DR IN RCRD: CPT | Mod: CPTII,S$GLB,, | Performed by: INTERNAL MEDICINE

## 2023-08-23 PROCEDURE — 1101F PR PT FALLS ASSESS DOC 0-1 FALLS W/OUT INJ PAST YR: ICD-10-PCS | Mod: CPTII,S$GLB,, | Performed by: INTERNAL MEDICINE

## 2023-08-23 PROCEDURE — 3288F FALL RISK ASSESSMENT DOCD: CPT | Mod: CPTII,S$GLB,, | Performed by: INTERNAL MEDICINE

## 2023-08-23 PROCEDURE — 1101F PT FALLS ASSESS-DOCD LE1/YR: CPT | Mod: CPTII,S$GLB,, | Performed by: INTERNAL MEDICINE

## 2023-08-23 PROCEDURE — 3044F HG A1C LEVEL LT 7.0%: CPT | Mod: CPTII,S$GLB,, | Performed by: INTERNAL MEDICINE

## 2023-08-23 PROCEDURE — 4010F ACE/ARB THERAPY RXD/TAKEN: CPT | Mod: CPTII,S$GLB,, | Performed by: INTERNAL MEDICINE

## 2023-08-23 PROCEDURE — 3075F SYST BP GE 130 - 139MM HG: CPT | Mod: CPTII,S$GLB,, | Performed by: INTERNAL MEDICINE

## 2023-08-23 PROCEDURE — 93010 RHYTHM STRIP: ICD-10-PCS | Mod: S$GLB,,, | Performed by: INTERNAL MEDICINE

## 2023-08-23 PROCEDURE — 93005 RHYTHM STRIP: ICD-10-PCS | Mod: S$GLB,,, | Performed by: INTERNAL MEDICINE

## 2023-08-23 PROCEDURE — 99205 PR OFFICE/OUTPT VISIT, NEW, LEVL V, 60-74 MIN: ICD-10-PCS | Mod: S$GLB,,, | Performed by: INTERNAL MEDICINE

## 2023-08-23 PROCEDURE — 3078F PR MOST RECENT DIASTOLIC BLOOD PRESSURE < 80 MM HG: ICD-10-PCS | Mod: CPTII,S$GLB,, | Performed by: INTERNAL MEDICINE

## 2023-08-23 PROCEDURE — 1111F DSCHRG MED/CURRENT MED MERGE: CPT | Mod: CPTII,S$GLB,, | Performed by: INTERNAL MEDICINE

## 2023-08-23 PROCEDURE — 1159F MED LIST DOCD IN RCRD: CPT | Mod: CPTII,S$GLB,, | Performed by: INTERNAL MEDICINE

## 2023-08-23 PROCEDURE — 3008F BODY MASS INDEX DOCD: CPT | Mod: CPTII,S$GLB,, | Performed by: INTERNAL MEDICINE

## 2023-08-23 PROCEDURE — 1157F PR ADVANCE CARE PLAN OR EQUIV PRESENT IN MEDICAL RECORD: ICD-10-PCS | Mod: CPTII,S$GLB,, | Performed by: INTERNAL MEDICINE

## 2023-08-23 PROCEDURE — 1159F PR MEDICATION LIST DOCUMENTED IN MEDICAL RECORD: ICD-10-PCS | Mod: CPTII,S$GLB,, | Performed by: INTERNAL MEDICINE

## 2023-08-23 PROCEDURE — 1126F PR PAIN SEVERITY QUANTIFIED, NO PAIN PRESENT: ICD-10-PCS | Mod: CPTII,S$GLB,, | Performed by: INTERNAL MEDICINE

## 2023-08-23 PROCEDURE — 3288F PR FALLS RISK ASSESSMENT DOCUMENTED: ICD-10-PCS | Mod: CPTII,S$GLB,, | Performed by: INTERNAL MEDICINE

## 2023-08-23 PROCEDURE — 93005 ELECTROCARDIOGRAM TRACING: CPT | Mod: S$GLB,,, | Performed by: INTERNAL MEDICINE

## 2023-08-23 PROCEDURE — 99999 PR PBB SHADOW E&M-EST. PATIENT-LVL III: ICD-10-PCS | Mod: PBBFAC,,, | Performed by: INTERNAL MEDICINE

## 2023-08-23 PROCEDURE — 99999 PR PBB SHADOW E&M-EST. PATIENT-LVL III: CPT | Mod: PBBFAC,,, | Performed by: INTERNAL MEDICINE

## 2023-08-23 PROCEDURE — 3008F PR BODY MASS INDEX (BMI) DOCUMENTED: ICD-10-PCS | Mod: CPTII,S$GLB,, | Performed by: INTERNAL MEDICINE

## 2023-08-23 PROCEDURE — 3044F PR MOST RECENT HEMOGLOBIN A1C LEVEL <7.0%: ICD-10-PCS | Mod: CPTII,S$GLB,, | Performed by: INTERNAL MEDICINE

## 2023-08-23 PROCEDURE — 1157F ADVNC CARE PLAN IN RCRD: CPT | Mod: CPTII,S$GLB,, | Performed by: INTERNAL MEDICINE

## 2023-08-23 PROCEDURE — 1126F AMNT PAIN NOTED NONE PRSNT: CPT | Mod: CPTII,S$GLB,, | Performed by: INTERNAL MEDICINE

## 2023-08-23 PROCEDURE — 1160F PR REVIEW ALL MEDS BY PRESCRIBER/CLIN PHARMACIST DOCUMENTED: ICD-10-PCS | Mod: CPTII,S$GLB,, | Performed by: INTERNAL MEDICINE

## 2023-08-23 PROCEDURE — 1111F PR DISCHARGE MEDS RECONCILED W/ CURRENT OUTPATIENT MED LIST: ICD-10-PCS | Mod: CPTII,S$GLB,, | Performed by: INTERNAL MEDICINE

## 2023-08-23 PROCEDURE — 93010 ELECTROCARDIOGRAM REPORT: CPT | Mod: S$GLB,,, | Performed by: INTERNAL MEDICINE

## 2023-08-23 PROCEDURE — 3075F PR MOST RECENT SYSTOLIC BLOOD PRESS GE 130-139MM HG: ICD-10-PCS | Mod: CPTII,S$GLB,, | Performed by: INTERNAL MEDICINE

## 2023-08-23 PROCEDURE — 3078F DIAST BP <80 MM HG: CPT | Mod: CPTII,S$GLB,, | Performed by: INTERNAL MEDICINE

## 2023-08-23 PROCEDURE — 4010F PR ACE/ARB THEARPY RXD/TAKEN: ICD-10-PCS | Mod: CPTII,S$GLB,, | Performed by: INTERNAL MEDICINE

## 2023-08-23 NOTE — PROGRESS NOTES
Subjective:   Patient ID:  Josiah Orosco Jr. is a 67 y.o. male who presents for evaluation of AFL    HPI  67 y.o. 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    Echo 50%    My interpretation of today's ECG is NSR    Review of Systems   Constitutional: Negative. Negative for malaise/fatigue.   HENT: Negative.  Negative for ear pain and tinnitus.    Eyes:  Negative for blurred vision.   Cardiovascular: Negative.  Negative for chest pain, dyspnea on exertion, near-syncope, palpitations and syncope.   Respiratory: Negative.  Negative for shortness of breath.    Endocrine: Negative.  Negative for polyuria.   Hematologic/Lymphatic: Does not bruise/bleed easily.   Skin: Negative.  Negative for rash.   Musculoskeletal: Negative.  Negative for joint pain and muscle weakness.   Gastrointestinal: Negative.  Negative for abdominal pain and change in bowel habit.   Genitourinary:  Negative for frequency.   Neurological: Negative.  Negative for dizziness and weakness.   Psychiatric/Behavioral: Negative.  Negative for depression. The patient is not nervous/anxious.    Allergic/Immunologic: Negative for environmental allergies.       Objective:   Physical Exam  Vitals and nursing note reviewed.   Constitutional:       Appearance: He is well-developed.   HENT:      Head: Normocephalic and atraumatic.   Eyes:      General: Lids are normal. No scleral icterus.     Conjunctiva/sclera: Conjunctivae normal.   Neck:      Thyroid: No thyromegaly.      Vascular: No JVD.      Trachea: No tracheal deviation.   Cardiovascular:      Rate and Rhythm: Normal rate and regular rhythm.      Pulses:           Radial pulses are 2+ on the right side and 2+ on the left side.   Pulmonary:      Effort: Pulmonary effort is normal. No tachypnea, accessory muscle usage or respiratory distress.      Breath sounds: No wheezing.   Abdominal:      General:  There is no distension.   Musculoskeletal:         General: Normal range of motion.      Cervical back: Normal range of motion.   Skin:     General: Skin is warm and dry.   Neurological:      Mental Status: He is alert and oriented to person, place, and time.      Motor: No abnormal muscle tone.      Deep Tendon Reflexes: Reflexes are normal and symmetric.   Psychiatric:         Behavior: Behavior normal.         Assessment:      1. Pulmonary interstitial fibrosis    2. Typical atrial flutter    3. HTN, goal below 140/90    4. Overweight (BMI 25.0-29.9)    5. History of HCV, s/p successful treatment w/ SVR12 5/2015        Plan:     Discussed AFL and its basic pathophysiology, including its health implications and treatment options (rate vs. rhythm control, meds vs. procedural/device treatment) as appropriate for the patient.  I spent about a half hour discussing the nature of EP study and ablation, including possible transseptal puncture. We discused risks and benefits at length. Our discussion included, but was not limited to the risk of death, infection, bleeding, stroke, MI, cardiac perforation, embolism, cardiac tamponade, skin burns, and other organic injury including the possibility for need for surgery or pacemaker implantation..d  I discussed with patient risks, indications, benefits, and alternatives of the planned procedure. All questions were answered. Patient understands and wishes to proceed.    After ablation, likely ILR for further surveillance (and guidance of a/c).

## 2023-08-25 ENCOUNTER — OFFICE VISIT (OUTPATIENT)
Dept: CARDIOLOGY | Facility: CLINIC | Age: 68
End: 2023-08-25
Payer: MEDICARE

## 2023-08-25 VITALS
SYSTOLIC BLOOD PRESSURE: 146 MMHG | OXYGEN SATURATION: 95 % | RESPIRATION RATE: 15 BRPM | HEIGHT: 77 IN | DIASTOLIC BLOOD PRESSURE: 86 MMHG | BODY MASS INDEX: 24.17 KG/M2 | HEART RATE: 76 BPM | WEIGHT: 204.69 LBS

## 2023-08-25 DIAGNOSIS — J84.9 ILD (INTERSTITIAL LUNG DISEASE): ICD-10-CM

## 2023-08-25 DIAGNOSIS — I70.0 THORACIC AORTA ATHEROSCLEROSIS: ICD-10-CM

## 2023-08-25 DIAGNOSIS — K21.9 GASTROESOPHAGEAL REFLUX DISEASE, UNSPECIFIED WHETHER ESOPHAGITIS PRESENT: ICD-10-CM

## 2023-08-25 DIAGNOSIS — I10 HTN, GOAL BELOW 140/90: ICD-10-CM

## 2023-08-25 DIAGNOSIS — I48.3 TYPICAL ATRIAL FLUTTER: Primary | ICD-10-CM

## 2023-08-25 DIAGNOSIS — I27.20 PULMONARY HYPERTENSION: ICD-10-CM

## 2023-08-25 DIAGNOSIS — E66.3 OVERWEIGHT (BMI 25.0-29.9): ICD-10-CM

## 2023-08-25 PROCEDURE — 1126F AMNT PAIN NOTED NONE PRSNT: CPT | Mod: CPTII,S$GLB,, | Performed by: INTERNAL MEDICINE

## 2023-08-25 PROCEDURE — 1160F PR REVIEW ALL MEDS BY PRESCRIBER/CLIN PHARMACIST DOCUMENTED: ICD-10-PCS | Mod: CPTII,S$GLB,, | Performed by: INTERNAL MEDICINE

## 2023-08-25 PROCEDURE — 1157F PR ADVANCE CARE PLAN OR EQUIV PRESENT IN MEDICAL RECORD: ICD-10-PCS | Mod: CPTII,S$GLB,, | Performed by: INTERNAL MEDICINE

## 2023-08-25 PROCEDURE — 3044F HG A1C LEVEL LT 7.0%: CPT | Mod: CPTII,S$GLB,, | Performed by: INTERNAL MEDICINE

## 2023-08-25 PROCEDURE — 4010F PR ACE/ARB THEARPY RXD/TAKEN: ICD-10-PCS | Mod: CPTII,S$GLB,, | Performed by: INTERNAL MEDICINE

## 2023-08-25 PROCEDURE — 3077F SYST BP >= 140 MM HG: CPT | Mod: CPTII,S$GLB,, | Performed by: INTERNAL MEDICINE

## 2023-08-25 PROCEDURE — 1101F PR PT FALLS ASSESS DOC 0-1 FALLS W/OUT INJ PAST YR: ICD-10-PCS | Mod: CPTII,S$GLB,, | Performed by: INTERNAL MEDICINE

## 2023-08-25 PROCEDURE — 3008F BODY MASS INDEX DOCD: CPT | Mod: CPTII,S$GLB,, | Performed by: INTERNAL MEDICINE

## 2023-08-25 PROCEDURE — 99999 PR PBB SHADOW E&M-EST. PATIENT-LVL IV: ICD-10-PCS | Mod: PBBFAC,,, | Performed by: INTERNAL MEDICINE

## 2023-08-25 PROCEDURE — 1159F MED LIST DOCD IN RCRD: CPT | Mod: CPTII,S$GLB,, | Performed by: INTERNAL MEDICINE

## 2023-08-25 PROCEDURE — 1157F ADVNC CARE PLAN IN RCRD: CPT | Mod: CPTII,S$GLB,, | Performed by: INTERNAL MEDICINE

## 2023-08-25 PROCEDURE — 1111F PR DISCHARGE MEDS RECONCILED W/ CURRENT OUTPATIENT MED LIST: ICD-10-PCS | Mod: CPTII,S$GLB,, | Performed by: INTERNAL MEDICINE

## 2023-08-25 PROCEDURE — 99214 PR OFFICE/OUTPT VISIT, EST, LEVL IV, 30-39 MIN: ICD-10-PCS | Mod: S$GLB,,, | Performed by: INTERNAL MEDICINE

## 2023-08-25 PROCEDURE — 3077F PR MOST RECENT SYSTOLIC BLOOD PRESSURE >= 140 MM HG: ICD-10-PCS | Mod: CPTII,S$GLB,, | Performed by: INTERNAL MEDICINE

## 2023-08-25 PROCEDURE — 99999 PR PBB SHADOW E&M-EST. PATIENT-LVL IV: CPT | Mod: PBBFAC,,, | Performed by: INTERNAL MEDICINE

## 2023-08-25 PROCEDURE — 3044F PR MOST RECENT HEMOGLOBIN A1C LEVEL <7.0%: ICD-10-PCS | Mod: CPTII,S$GLB,, | Performed by: INTERNAL MEDICINE

## 2023-08-25 PROCEDURE — 3079F DIAST BP 80-89 MM HG: CPT | Mod: CPTII,S$GLB,, | Performed by: INTERNAL MEDICINE

## 2023-08-25 PROCEDURE — 3008F PR BODY MASS INDEX (BMI) DOCUMENTED: ICD-10-PCS | Mod: CPTII,S$GLB,, | Performed by: INTERNAL MEDICINE

## 2023-08-25 PROCEDURE — 1126F PR PAIN SEVERITY QUANTIFIED, NO PAIN PRESENT: ICD-10-PCS | Mod: CPTII,S$GLB,, | Performed by: INTERNAL MEDICINE

## 2023-08-25 PROCEDURE — 99214 OFFICE O/P EST MOD 30 MIN: CPT | Mod: S$GLB,,, | Performed by: INTERNAL MEDICINE

## 2023-08-25 PROCEDURE — 1111F DSCHRG MED/CURRENT MED MERGE: CPT | Mod: CPTII,S$GLB,, | Performed by: INTERNAL MEDICINE

## 2023-08-25 PROCEDURE — 1160F RVW MEDS BY RX/DR IN RCRD: CPT | Mod: CPTII,S$GLB,, | Performed by: INTERNAL MEDICINE

## 2023-08-25 PROCEDURE — 3079F PR MOST RECENT DIASTOLIC BLOOD PRESSURE 80-89 MM HG: ICD-10-PCS | Mod: CPTII,S$GLB,, | Performed by: INTERNAL MEDICINE

## 2023-08-25 PROCEDURE — 1159F PR MEDICATION LIST DOCUMENTED IN MEDICAL RECORD: ICD-10-PCS | Mod: CPTII,S$GLB,, | Performed by: INTERNAL MEDICINE

## 2023-08-25 PROCEDURE — 1101F PT FALLS ASSESS-DOCD LE1/YR: CPT | Mod: CPTII,S$GLB,, | Performed by: INTERNAL MEDICINE

## 2023-08-25 PROCEDURE — 3288F FALL RISK ASSESSMENT DOCD: CPT | Mod: CPTII,S$GLB,, | Performed by: INTERNAL MEDICINE

## 2023-08-25 PROCEDURE — 4010F ACE/ARB THERAPY RXD/TAKEN: CPT | Mod: CPTII,S$GLB,, | Performed by: INTERNAL MEDICINE

## 2023-08-25 PROCEDURE — 3288F PR FALLS RISK ASSESSMENT DOCUMENTED: ICD-10-PCS | Mod: CPTII,S$GLB,, | Performed by: INTERNAL MEDICINE

## 2023-08-25 NOTE — PROGRESS NOTES
CARDIOVASCULAR CONSULTATION    REASON FOR CONSULT:   Josiah Orosco Jr. is a 67 y.o. male who presents for evaluation      HISTORY OF PRESENT ILLNESS:     Patient is a pleasant 66-year-old man.  Here for evaluation.  Patient states that lately he has been experiencing dyspnea on exertion.  Also he was told that he has a regular rhythm and hence has been referred.  Patient denies any palpitations.  Denies any orthopnea or PND.  EKG done personally reviewed and shows sinus rhythm with PACs.    Notes from September 2022:  Patient here for follow-up.    Sinus rhythm with heart rates varying between 44 and 164 BPM with an average of 88 BPM.  There were rare PVCs totalling 402 and averaging 8.38 per hour. There were 1 couplets. There were 1 triplets.  There were very frequent PACs  SVT/possible atrial flutter.  Recommend event monitor.    The left ventricle is normal in size with mild concentric hypertrophy and normal systolic function.  The estimated ejection fraction is 60%.  Indeterminate left ventricular diastolic function.  Mild right atrial enlargement.  Normal right ventricular size with normal right ventricular systolic function.  Mild tricuspid regurgitation.  Normal central venous pressure (3 mmHg).  The estimated PA systolic pressure is 38 mmHg.  There is mild pulmonary hypertension.      Normal myocardial perfusion scan. There is no evidence of myocardial ischemia or infarction.    There is a  mild to moderate intensity fixed perfusion abnormality in the inferior wall of the left ventricle secondary to diaphragm attenuation.    The gated perfusion images showed an ejection fraction of 61% post stress.    There is normal wall motion post stress.    The EKG portion of this study is negative for ischemia.    The patient reported no chest pain during the stress test.    During stress, occasional PVCs are noted. , During stress, SVTs are noted.      Notes from October 2022:    Patient here for follow-up.  Doing  fine.  Event monitor showed some PACs and periods of tachycardia as described below.  Patient asymptomatic.      At baseline, in the absence of symptoms, the rhythm was sinus with heart rate 85 beats per minute.  There were PACs and sequential PACs.  An auto trigger event on 09/27 showed sinus tachycardia at 155 beats per minute, and on 09/28 another auto trigger event also showed sinus tachycardia.  There also was a regular narrow complex tachycardia at 157 beats per minute.  This lasted 27 seconds and began following 2 PVCs.  P-waves were not clearly evident.  There was a gradual slow down with P-waves becoming evident, to become consistent with sinus tachycardia with occasional PACs.  No activity level (e.g., exercise) was specified.     Impression: Sinus rhythm with PACs and periods of tachycardia, as described above.    Notes from January 23    Patient doing fine.  Denies any chest pains at rest on exertion, orthopnea, PND, swelling of feet    Notes from April 23: Patient here for follow-up.  Lately has been experiencing dyspnea on exertion.  EKG done at outside hospital demonstrated atrial flutter and patient was initiated on Eliquis.        May 23:  Patient here for follow-up after cardioversion.  States breathing better after cardioversion.  Now back to his baseline.  EKG done today shows sinus rhythm.    Normal systolic function.  Normal right ventricular size with normal right ventricular systolic function.  Moderate left atrial enlargement.  Mild right atrial enlargement.  Mild-to-moderate mitral regurgitation.  Mild tricuspid regurgitation.  No thrombus is present in the appendage.  The estimated ejection fraction is 55%.  A synchronized cardioversion was successfully performed with restoration of normal sinus rhythm.    Notes from August 23: Patient here for follow-up status post cardioversion.  On sotalol.  Maintaining regular rhythm.  Saw Dr. Chahal who is planning for ablation.    PAST MEDICAL  HISTORY:     Past Medical History:   Diagnosis Date    A-fib 5/8/2023    Arthritis     GERD (gastroesophageal reflux disease)     History of HCV, s/p successful treatment w/ SVR12 5/2015     Failed treatment (stage I/II) - followed by hepatology     Joint pain     Lung disease caused by breathing particles     Widespread essentially symmetric interstitial lung disease    Obesity     Polycythemia vera     Prediabetes        PAST SURGICAL HISTORY:     Past Surgical History:   Procedure Laterality Date    BUNIONECTOMY      bilateral    CARDIOVERSION N/A 05/08/2023    Procedure: Cardioversion;  Surgeon: David Cueva MD;  Location: [facility] CATH LAB;  Service: Cardiology;  Laterality: N/A;    CATARACT EXTRACTION Left 07/27/2023    COLONOSCOPY N/A 12/09/2015    Procedure: COLONOSCOPY;  Surgeon: Conrad Iqbal MD;  Location: [facility] ENDO;  Service: Endoscopy;  Laterality: N/A;    Liver biopsy x2      rotator cuff Right     TRANSESOPHAGEAL ECHOCARDIOGRAM WITH POSSIBLE CARDIOVERSION (ELENA W/ POSS CARDIOVERSION) N/A 05/08/2023    Procedure: TRANSESOPHAGEAL ECHOCARDIOGRAM WITH POSSIBLE CARDIOVERSION (ELENA W/ POSS CARDIOVERSION);  Surgeon: David Cueva MD;  Location: [facility] CATH LAB;  Service: Cardiology;  Laterality: N/A;  12:30PM    RN PREOP 5/4/2023---EKG ON ARRIVAL    TRANSESOPHAGEAL ECHOCARDIOGRAM WITH POSSIBLE CARDIOVERSION (ELENA W/ POSS CARDIOVERSION) N/A 7/29/2023    Procedure: Transesophageal echo (ELENA) intra-procedure log documentation;  Surgeon: David Cueva MD;  Location: [facility] CATH LAB;  Service: Cardiology;  Laterality: N/A;    TRANSESOPHAGEAL ECHOCARDIOGRAPHY N/A 05/08/2023    Procedure: ECHOCARDIOGRAM, TRANSESOPHAGEAL;  Surgeon: David Cueva MD;  Location: [facility] CATH LAB;  Service: Cardiology;  Laterality: N/A;    TRANSESOPHAGEAL ECHOCARDIOGRAPHY N/A 7/29/2023    Procedure: ECHOCARDIOGRAM, TRANSESOPHAGEAL;  Surgeon: David Cueva MD;  Location: [facility] CATH LAB;  Service: Cardiology;  Laterality: N/A;     TREATMENT OF CARDIAC ARRHYTHMIA N/A 8/7/2023    Procedure: Cardioversion or Defibrillation;  Surgeon: David Cueva MD;  Location: [facility] CATH LAB;  Service: Cardiology;  Laterality: N/A;       ALLERGIES AND MEDICATION:     Review of patient's allergies indicates:   Allergen Reactions    Ace inhibitors Other (See Comments)     cough        Medication List            Accurate as of August 25, 2023  9:05 AM. If you have any questions, ask your nurse or doctor.                CONTINUE taking these medications      allopurinoL 100 MG tablet  Commonly known as: ZYLOPRIM  Take 2 tablets (200 mg total) by mouth once daily.     apixaban 5 mg Tab  Commonly known as: ELIQUIS  Take 1 tablet (5 mg total) by mouth 2 (two) times daily.     fluticasone propionate 50 mcg/actuation nasal spray  Commonly known as: FLONASE  SHAKE LIQUID AND USE 1 SPRAY(50 MCG) IN EACH NOSTRIL EVERY DAY     metoprolol succinate 50 MG 24 hr tablet  Commonly known as: TOPROL-XL  Take 1 tablet (50 mg total) by mouth once daily.     multivitamin capsule     ofloxacin 0.3 % ophthalmic solution  Commonly known as: OCUFLOX     prednisoLONE acetate 1 % Drps  Commonly known as: PRED FORTE     sotaloL 80 MG tablet  Commonly known as: BETAPACE  Take 1 tablet (80 mg total) by mouth 2 (two) times daily.     TRELEGY ELLIPTA 100-62.5-25 mcg Dsdv  Generic drug: fluticasone-umeclidin-vilanter              SOCIAL HISTORY:     Social History     Socioeconomic History    Marital status:     Number of children: 0    Highest education level: Some college, no degree   Occupational History    Occupation: Self-employed     Employer: [employer]   Tobacco Use    Smoking status: Former    Smokeless tobacco: Never    Tobacco comments:     pt. smokes 10 cigars per day.   Substance and Sexual Activity    Alcohol use: Not Currently     Comment: Rarely    Drug use: Yes     Types: Marijuana     Comment: daily    Sexual activity: Yes     Partners: Female     Social  Determinants of Health     Financial Resource Strain: Low Risk  (8/8/2023)    Overall Financial Resource Strain (CARDIA)     Difficulty of Paying Living Expenses: Not very hard   Food Insecurity: No Food Insecurity (8/8/2023)    Hunger Vital Sign     Worried About Running Out of Food in the Last Year: Never true     Ran Out of Food in the Last Year: Never true   Transportation Needs: No Transportation Needs (8/8/2023)    PRAPARE - Transportation     Lack of Transportation (Medical): No     Lack of Transportation (Non-Medical): No   Physical Activity: Inactive (8/8/2023)    Exercise Vital Sign     Days of Exercise per Week: 0 days     Minutes of Exercise per Session: 0 min   Stress: Stress Concern Present (8/8/2023)    [place] [employer] - Occupational Stress Questionnaire     Feeling of Stress : To some extent   Social Connections: Moderately Integrated (8/8/2023)    Social Connection and Isolation Panel [NHANES]     Frequency of Communication with Friends and Family: Three times a week     Frequency of Social Gatherings with Friends and Family: Three times a week     Attends [employer] Services: More than 4 times per year     Active Member of Clubs or Organizations: No     Attends Club or Organization Meetings: Never     Marital Status:    Housing Stability: Low Risk  (8/8/2023)    Housing Stability Vital Sign     Unable to Pay for Housing in the Last Year: No     Number of Places Lived in the Last Year: 1     Unstable Housing in the Last Year: No       FAMILY HISTORY:     Family History   Problem Relation Age of Onset    Heart disease Mother     Kidney disease Mother     Parkinsonism Father     Prostate cancer Cousin         maternal side    Heart attack Sister         CABG    Deep vein thrombosis Sister     Pulmonary embolism Sister     Osteoarthritis Sister         s/p knee replacement    Chronic back pain Brother     Liver disease Neg Hx     Diabetes Neg Hx     Melanoma Neg Hx   "      REVIEW OF SYSTEMS:   Review of Systems   Constitutional: Negative.   HENT: Negative.     Eyes: Negative.    Endocrine: Negative.    Hematologic/Lymphatic: Negative.    Skin: Negative.    Musculoskeletal: Negative.    Gastrointestinal: Negative.    Genitourinary: Negative.    Neurological: Negative.    Psychiatric/Behavioral: Negative.     Allergic/Immunologic: Negative.        A 10 point review of systems was performed and all the pertinent positives have been mentioned. Rest of review of systems was negative.        PHYSICAL EXAM:     Vitals:    08/25/23 0842   BP: (!) 146/86   Pulse: 76   Resp: 15    Body mass index is 24.27 kg/m².  Weight: 92.8 kg (204 lb 11.2 oz)   Height: 6' 5" (195.6 cm)     Physical Exam  Vitals reviewed.   Constitutional:       Appearance: He is well-developed.   HENT:      Head: Normocephalic.   Eyes:      Conjunctiva/sclera: Conjunctivae normal.      Pupils: Pupils are equal, round, and reactive to light.   Cardiovascular:      Rate and Rhythm: Normal rate and regular rhythm.      Heart sounds: Normal heart sounds.   Pulmonary:      Effort: Pulmonary effort is normal.      Breath sounds: Normal breath sounds.   Abdominal:      General: Bowel sounds are normal.      Palpations: Abdomen is soft.   Musculoskeletal:      Cervical back: Normal range of motion and neck supple.   Skin:     General: Skin is warm.   Neurological:      Mental Status: He is alert and oriented to person, place, and time.           DATA:     Laboratory:  CBC:  Recent Labs   Lab 04/14/23  0953 07/27/23  1435 08/05/23  1159   WBC 8.68 8.02 7.30   Hemoglobin 13.4 L 14.4 15.2   Hematocrit 46.5 48.7 50.5   Platelets 394 341 246         CHEMISTRIES:  Recent Labs   Lab 03/06/21  0822 03/11/22  0705 04/14/23  0953 08/05/23  1159 08/06/23  0333 08/07/23  0336 08/08/23  0608   Glucose 96 101   < > 163 H 103 95 83   Sodium 140 143   < > 136 136 135 L 138   Potassium 4.4 4.6   < > 4.6 4.4 4.3 4.5   BUN 12 10   < > 13 12 11 " 14   Creatinine 0.9 0.9   < > 1.0 0.8 0.7 0.7   eGFR if African American >60.0 >60  --   --   --   --   --    eGFR if non  >60.0 >60  --   --   --   --   --    Calcium 9.3 9.5   < > 9.6 9.6 9.1 9.0   Magnesium  --   --   --  1.8  --   --   --     < > = values in this interval not displayed.         CARDIAC BIOMARKERS:  Recent Labs   Lab 07/27/23  1435 08/05/23  1159   Troponin I 0.010 0.009         COAGS:  Recent Labs   Lab 07/27/23  1435   INR 1.2         LIPIDS/LFTS:  Recent Labs   Lab 03/06/21  0822 03/11/22  0705 04/14/23  0953 07/27/23  1435 08/05/23  1159   Cholesterol 136 142  --   --   --    Triglycerides 46 56  --   --   --    HDL 39 L 42  --   --   --    LDL Cholesterol 87.8 88.8  --   --   --    Non-HDL Cholesterol 97 100  --   --   --    AST 26 25 30 27 25   ALT 11 12 24 32 17         Hemoglobin A1C   Date Value Ref Range Status   04/14/2023 6.0 (H) 4.0 - 5.6 % Final     Comment:     ADA Screening Guidelines:  5.7-6.4%  Consistent with prediabetes  >or=6.5%  Consistent with diabetes    High levels of fetal hemoglobin interfere with the HbA1C  assay. Heterozygous hemoglobin variants (HbS, HgC, etc)do  not significantly interfere with this assay.   However, presence of multiple variants may affect accuracy.     03/11/2022 5.9 (H) 4.0 - 5.6 % Final     Comment:     ADA Screening Guidelines:  5.7-6.4%  Consistent with prediabetes  >or=6.5%  Consistent with diabetes    High levels of fetal hemoglobin interfere with the HbA1C  assay. Heterozygous hemoglobin variants (HbS, HgC, etc)do  not significantly interfere with this assay.   However, presence of multiple variants may affect accuracy.     03/06/2021 5.8 (H) 4.0 - 5.6 % Final     Comment:     ADA Screening Guidelines:  5.7-6.4%  Consistent with prediabetes  >or=6.5%  Consistent with diabetes    High levels of fetal hemoglobin interfere with the HbA1C  assay. Heterozygous hemoglobin variants (HbS, HgC, etc)do  not significantly interfere  with this assay.   However, presence of multiple variants may affect accuracy.         TSH  Recent Labs   Lab 05/23/23  0843 07/27/23  1435   TSH 0.942 1.015         The 10-year ASCVD risk score (Felix CHOE, et al., 2019) is: 21.3%    Values used to calculate the score:      Age: 67 years      Sex: Male      Is Non- : Yes      Diabetic: No      Tobacco smoker: No      Systolic Blood Pressure: 146 mmHg      Is BP treated: Yes      HDL Cholesterol: 42 mg/dL      Total Cholesterol: 142 mg/dL             ASSESSMENT AND PLAN     Patient Active Problem List   Diagnosis    History of HCV, s/p successful treatment w/ SVR12 5/2015    Lung disease caused by breathing particles    Overweight (BMI 25.0-29.9)    GERD (gastroesophageal reflux disease)    Polycythemia    Former smoker    Hyperuricemia    ILD (interstitial lung disease)    Prediabetes    Arthritis    Allergic cough    HTN, goal below 140/90    Pulmonary interstitial fibrosis    Pulmonary hypertension    Marijuana user    Thoracic aorta atherosclerosis    Cataract    Atrial flutter       Dyspnea on exertion, palpitations and shortness of breath.  Further evaluation with the help of stress test, echo, Holter monitor.  Stress test did not show any significant ischemia.  Echo showed normal left ventricle systolic function.      atrial flutter:  Symptomatic.  Now status post cardioversion.  Is in sinus rhythm.  Has seen Dr. Paniagua who is planning to do ablation on the patient.    Cataract surgery: Patient may proceed for cataract surgery at low risk.  Patient has been told to wait 1 month after his cardioversion prior to holding Eliquis.  May hold Eliquis as needed.      Continue Eliquis 5 mg b.i.d.      Follow-up after 3 months    Thank you very much for involving me in the care of your patient.  Please do not hesitate to contact me if there are any questions.      David Cueva MD, FACC, [facility]  Interventional Cardiologist, [facility].            This note was dictated with the help of speech recognition software.  There might be un-intended errors and/or substitutions.

## 2023-09-07 ENCOUNTER — TELEPHONE (OUTPATIENT)
Dept: FAMILY MEDICINE | Facility: CLINIC | Age: 68
End: 2023-09-07
Payer: MEDICARE

## 2023-09-07 NOTE — TELEPHONE ENCOUNTER
I sent a zpack Left message on voice mail to return call to clinic in reference to possible pre-op visit prior to upcoming surgery.   Detail Level: Detailed Detail Level: Generalized

## 2023-09-07 NOTE — TELEPHONE ENCOUNTER
----- Message from Erna Gutierrez sent at 9/7/2023 10:18 AM CDT -----  Type:  Patient Returning Call    Who Called: Doreen , Wife    Who Left Message for Patient: Saira Cobos    Does the patient know what this is regarding?:Yes    Would the patient rather a call back or a response via [facility]? Call    Best Call Back Number:[phone]    Additional Information:

## 2023-09-07 NOTE — TELEPHONE ENCOUNTER
"Spoke to wife concerned whether or not her  will be able to undergo both surgeries @ the same time. Patient has a cataract surgery scheduled on 9/26/23 & a heart ablagion on 9/29/23. Instucted wife to reach out to the Cardiologist that performed the pre-op clearance for the ablagion to make him aware that the cataract surgery is scheduled within the same week. Wife states that "MD was aware that patient had an upcoming cataract surgery coming up, but he didn't know the date it would be scheduled". Instructed wife again to reach out to the cardiologist that did the pre-op for the ablagion. Patient did have a pre-op clearance on 7/10/23 by [facility] Sultana, but patient went into the ed/hospital on 7/27/23 & 8/5/23 for sob and atrial flutter with rapid ventricular response.   "

## 2023-09-07 NOTE — TELEPHONE ENCOUNTER
----- Message from Doreen Leroy sent at 9/7/2023  8:53 AM CDT -----  Type: Patient Call Back    Who called:pt's wife joni [phone] (home) [phone] (work)      What is the request in detail:pt requestng to discuss procedures upcoming for her . Call pt     Can the clinic reply by [facility]?    Would the patient rather a call back or a response via [facility]? call    Best call back number:[phone] (home) [phone] (work)      Additional Information:

## 2023-09-08 ENCOUNTER — TELEPHONE (OUTPATIENT)
Dept: ELECTROPHYSIOLOGY | Facility: CLINIC | Age: 68
End: 2023-09-08
Payer: MEDICARE

## 2023-09-08 NOTE — TELEPHONE ENCOUNTER
----- Message from Jennifer Gutierrez MA sent at 9/8/2023 10:25 AM CDT -----  Regarding: FW: Doreen Wife [phone]    ----- Message -----  From: Herman Martinez MA  Sent: 9/8/2023  10:23 AM CDT  To: [facility] BRYAN Staff  Subject: FW: Doreen Wife [phone]                      Please advise   ----- Message -----  From: Shauna Zavala  Sent: 9/8/2023   9:15 AM CDT  To: Anay Hernandez Staff  Subject: Doreen Wife [phone]                          Type: Patient Call Back     What is the request in detail: Pt is requesting a call back in regards to her  having two procedures in the same week, Please call Wife back to discuss if its ok for the pt to have both procedures in the same week.     Can the clinic reply by [facility]? No     Would the patient rather a call back or a response via [facility]? Call back    Best call back number: .[phone]      Additional Information:    Thank you.

## 2023-09-11 ENCOUNTER — TELEPHONE (OUTPATIENT)
Dept: ELECTROPHYSIOLOGY | Facility: CLINIC | Age: 68
End: 2023-09-11
Payer: MEDICARE

## 2023-09-11 NOTE — TELEPHONE ENCOUNTER
Spoke with spouse, informed her of Dr Mcgill's recommendations, she reports he will postpone his cataract sx

## 2023-09-11 NOTE — TELEPHONE ENCOUNTER
----- Message from Jonh Mcgill MD sent at 9/11/2023  2:37 PM CDT -----  If he doesn't have to hold anticoagulation for the eye surgery, no problem.  If he does, then just tell him that there's a chance that if he's in [facility] on the day of the procedure, there might be a clot present. Probably better to postpone the eye surgery if he has to stop the blood thinner for it at all.    ----- Message -----  From: Anu Moralez RN  Sent: 9/11/2023  12:51 PM CDT  To: Jonh Mcgill MD    Pt is scheduled for CTI Ablation on Friday 9/29, his spouse called and reports he is also scheduled for cataract sx on Monday of that same week, She does know he will have to hold his blood thinner 3 days prior to his cataract procedure.   Is he ok to have both of these in the same week?    If not, she is ok with rescheduling the cataract sx, she understands it would need to be about 6-8 weeks after his ablation.    Thanks,   Anu

## 2023-09-22 ENCOUNTER — LAB VISIT (OUTPATIENT)
Dept: LAB | Facility: HOSPITAL | Age: 68
End: 2023-09-22
Attending: INTERNAL MEDICINE
Payer: MEDICARE

## 2023-09-22 DIAGNOSIS — I49.9 CARDIAC ARRHYTHMIA, UNSPECIFIED CARDIAC ARRHYTHMIA TYPE: ICD-10-CM

## 2023-09-22 DIAGNOSIS — I48.92 ATRIAL FLUTTER, UNSPECIFIED TYPE: ICD-10-CM

## 2023-09-22 LAB
ANION GAP SERPL CALC-SCNC: 8 MMOL/L (ref 8–16)
APTT PPP: 25.9 SEC (ref 21–32)
BUN SERPL-MCNC: 9 MG/DL (ref 8–23)
CALCIUM SERPL-MCNC: 9.6 MG/DL (ref 8.7–10.5)
CHLORIDE SERPL-SCNC: 106 MMOL/L (ref 95–110)
CO2 SERPL-SCNC: 25 MMOL/L (ref 23–29)
CREAT SERPL-MCNC: 0.8 MG/DL (ref 0.5–1.4)
ERYTHROCYTE [DISTWIDTH] IN BLOOD BY AUTOMATED COUNT: 20.1 % (ref 11.5–14.5)
EST. GFR  (NO RACE VARIABLE): >60 ML/MIN/1.73 M^2
GLUCOSE SERPL-MCNC: 104 MG/DL (ref 70–110)
HCT VFR BLD AUTO: 45.6 % (ref 40–54)
HGB BLD-MCNC: 13.8 G/DL (ref 14–18)
INR PPP: 1.2 (ref 0.8–1.2)
MCH RBC QN AUTO: 21.1 PG (ref 27–31)
MCHC RBC AUTO-ENTMCNC: 30.3 G/DL (ref 32–36)
MCV RBC AUTO: 70 FL (ref 82–98)
PLATELET # BLD AUTO: 251 K/UL (ref 150–450)
PMV BLD AUTO: 9.1 FL (ref 9.2–12.9)
POTASSIUM SERPL-SCNC: 4.6 MMOL/L (ref 3.5–5.1)
PROTHROMBIN TIME: 12.5 SEC (ref 9–12.5)
RBC # BLD AUTO: 6.55 M/UL (ref 4.6–6.2)
SODIUM SERPL-SCNC: 139 MMOL/L (ref 136–145)
WBC # BLD AUTO: 6.22 K/UL (ref 3.9–12.7)

## 2023-09-22 PROCEDURE — 85027 COMPLETE CBC AUTOMATED: CPT | Performed by: INTERNAL MEDICINE

## 2023-09-22 PROCEDURE — 85610 PROTHROMBIN TIME: CPT | Performed by: INTERNAL MEDICINE

## 2023-09-22 PROCEDURE — 85730 THROMBOPLASTIN TIME PARTIAL: CPT | Performed by: INTERNAL MEDICINE

## 2023-09-22 PROCEDURE — 36415 COLL VENOUS BLD VENIPUNCTURE: CPT | Performed by: INTERNAL MEDICINE

## 2023-09-22 PROCEDURE — 80048 BASIC METABOLIC PNL TOTAL CA: CPT | Performed by: INTERNAL MEDICINE

## 2023-09-28 ENCOUNTER — TELEPHONE (OUTPATIENT)
Dept: ELECTROPHYSIOLOGY | Facility: CLINIC | Age: 68
End: 2023-09-28
Payer: MEDICARE

## 2023-09-28 NOTE — TELEPHONE ENCOUNTER
Spoke to patient's spouse    CONFIRMED procedure arrival time of 10:00 AM    Reiterated instructions including:  -Directions to check in desk  -NPO after midnight night prior to procedure  -High importance of HOLDING Eliquis the morning of the procedure, pt's spouse reports holding sotalol since 9/25/23.  -Confirmed compliance of Eliquis  -Pre-procedure LABS reviewed with no findings.  -Confirmed absence of implanted device/stimulator   -Confirmed no fever, cough, or shortness of breath in the past 30 days  -Confirmed no redness, rash, irritation, or yeast infection to groin area.   -Bathe night prior and morning prior to procedure with Hibiclens solution or an antibacterial soap    Patient verbalized understanding of above and appreciated the call.

## 2023-09-29 ENCOUNTER — ANESTHESIA (OUTPATIENT)
Dept: MEDSURG UNIT | Facility: HOSPITAL | Age: 68
End: 2023-09-29
Payer: MEDICARE

## 2023-09-29 ENCOUNTER — HOSPITAL ENCOUNTER (OUTPATIENT)
Facility: HOSPITAL | Age: 68
Discharge: HOME | End: 2023-09-29
Attending: INTERNAL MEDICINE | Admitting: INTERNAL MEDICINE
Payer: MEDICARE

## 2023-09-29 ENCOUNTER — ANESTHESIA EVENT (OUTPATIENT)
Dept: MEDSURG UNIT | Facility: HOSPITAL | Age: 68
End: 2023-09-29
Payer: MEDICARE

## 2023-09-29 VITALS
HEART RATE: 77 BPM | SYSTOLIC BLOOD PRESSURE: 141 MMHG | OXYGEN SATURATION: 94 % | WEIGHT: 210 LBS | BODY MASS INDEX: 24.79 KG/M2 | DIASTOLIC BLOOD PRESSURE: 72 MMHG | HEIGHT: 77 IN | TEMPERATURE: 98 F | RESPIRATION RATE: 20 BRPM

## 2023-09-29 DIAGNOSIS — I49.9 CARDIAC ARRHYTHMIA, UNSPECIFIED CARDIAC ARRHYTHMIA TYPE: ICD-10-CM

## 2023-09-29 DIAGNOSIS — I48.92 ATRIAL FLUTTER: ICD-10-CM

## 2023-09-29 DIAGNOSIS — I49.9 ARRHYTHMIA: ICD-10-CM

## 2023-09-29 DIAGNOSIS — I48.92 ATRIAL FLUTTER, UNSPECIFIED TYPE: ICD-10-CM

## 2023-09-29 DIAGNOSIS — I48.3 TYPICAL ATRIAL FLUTTER: Primary | ICD-10-CM

## 2023-09-29 PROCEDURE — 25000003 PHARM REV CODE 250: Performed by: REGISTERED NURSE

## 2023-09-29 PROCEDURE — 93653 COMPRE EP EVAL TX SVT: CPT | Mod: ,,, | Performed by: INTERNAL MEDICINE

## 2023-09-29 PROCEDURE — 99234 HOSP IP/OBS SM DT SF/LOW 45: CPT | Mod: ,,, | Performed by: INTERNAL MEDICINE

## 2023-09-29 PROCEDURE — 25000003 PHARM REV CODE 250: Performed by: INTERNAL MEDICINE

## 2023-09-29 PROCEDURE — 37000008 HC ANESTHESIA 1ST 15 MINUTES: Performed by: INTERNAL MEDICINE

## 2023-09-29 PROCEDURE — C1730 CATH, EP, 19 OR FEW ELECT: HCPCS | Performed by: INTERNAL MEDICINE

## 2023-09-29 PROCEDURE — C1894 INTRO/SHEATH, NON-LASER: HCPCS | Performed by: INTERNAL MEDICINE

## 2023-09-29 PROCEDURE — 37000009 HC ANESTHESIA EA ADD 15 MINS: Performed by: INTERNAL MEDICINE

## 2023-09-29 PROCEDURE — D9220A PRA ANESTHESIA: ICD-10-PCS | Mod: ANES,,, | Performed by: ANESTHESIOLOGY

## 2023-09-29 PROCEDURE — C1893 INTRO/SHEATH, FIXED,NON-PEEL: HCPCS | Performed by: INTERNAL MEDICINE

## 2023-09-29 PROCEDURE — 93653 PR ELECTROPHYS EVAL, COMPREHEN, W/SUPRAVENT TACHYCARD TRMT: ICD-10-PCS | Mod: ,,, | Performed by: INTERNAL MEDICINE

## 2023-09-29 PROCEDURE — 93010 ELECTROCARDIOGRAM REPORT: CPT | Mod: ,,, | Performed by: INTERNAL MEDICINE

## 2023-09-29 PROCEDURE — 93653 COMPRE EP EVAL TX SVT: CPT | Performed by: INTERNAL MEDICINE

## 2023-09-29 PROCEDURE — 99234 PR OBSERV/HOSP SAME DATE,LEVL III: ICD-10-PCS | Mod: ,,, | Performed by: INTERNAL MEDICINE

## 2023-09-29 PROCEDURE — 27201423 OPTIME MED/SURG SUP & DEVICES STERILE SUPPLY: Performed by: INTERNAL MEDICINE

## 2023-09-29 PROCEDURE — 93005 ELECTROCARDIOGRAM TRACING: CPT | Mod: 59

## 2023-09-29 PROCEDURE — D9220A PRA ANESTHESIA: ICD-10-PCS | Mod: CRNA,,, | Performed by: REGISTERED NURSE

## 2023-09-29 PROCEDURE — C1731 CATH, EP, 20 OR MORE ELEC: HCPCS | Performed by: INTERNAL MEDICINE

## 2023-09-29 PROCEDURE — 63600175 PHARM REV CODE 636 W HCPCS: Performed by: REGISTERED NURSE

## 2023-09-29 PROCEDURE — D9220A PRA ANESTHESIA: Mod: ANES,,, | Performed by: ANESTHESIOLOGY

## 2023-09-29 PROCEDURE — D9220A PRA ANESTHESIA: Mod: CRNA,,, | Performed by: REGISTERED NURSE

## 2023-09-29 PROCEDURE — 93010 EKG 12-LEAD: ICD-10-PCS | Mod: ,,, | Performed by: INTERNAL MEDICINE

## 2023-09-29 PROCEDURE — C1732 CATH, EP, DIAG/ABL, 3D/VECT: HCPCS | Performed by: INTERNAL MEDICINE

## 2023-09-29 PROCEDURE — C1733 CATH, EP, OTHR THAN COOL-TIP: HCPCS | Performed by: INTERNAL MEDICINE

## 2023-09-29 PROCEDURE — 93005 ELECTROCARDIOGRAM TRACING: CPT

## 2023-09-29 RX ORDER — LIDOCAINE HYDROCHLORIDE 20 MG/ML
INJECTION, SOLUTION INFILTRATION; PERINEURAL
Status: DISCONTINUED | OUTPATIENT
Start: 2023-09-29 | End: 2023-09-29 | Stop reason: HOSPADM

## 2023-09-29 RX ORDER — LIDOCAINE HYDROCHLORIDE 20 MG/ML
INJECTION INTRAVENOUS
Status: DISCONTINUED | OUTPATIENT
Start: 2023-09-29 | End: 2023-09-29

## 2023-09-29 RX ORDER — FENTANYL CITRATE 50 UG/ML
INJECTION, SOLUTION INTRAMUSCULAR; INTRAVENOUS
Status: DISCONTINUED | OUTPATIENT
Start: 2023-09-29 | End: 2023-09-29

## 2023-09-29 RX ORDER — DIPHENHYDRAMINE HYDROCHLORIDE 50 MG/ML
25 INJECTION INTRAMUSCULAR; INTRAVENOUS EVERY 6 HOURS PRN
Status: DISCONTINUED | OUTPATIENT
Start: 2023-09-29 | End: 2023-09-29 | Stop reason: HOSPADM

## 2023-09-29 RX ORDER — SODIUM CHLORIDE 0.9 % (FLUSH) 0.9 %
3 SYRINGE (ML) INJECTION
Status: DISCONTINUED | OUTPATIENT
Start: 2023-09-29 | End: 2023-09-29 | Stop reason: HOSPADM

## 2023-09-29 RX ORDER — MIDAZOLAM HYDROCHLORIDE 1 MG/ML
INJECTION INTRAMUSCULAR; INTRAVENOUS
Status: DISCONTINUED | OUTPATIENT
Start: 2023-09-29 | End: 2023-09-29

## 2023-09-29 RX ORDER — DEXAMETHASONE SODIUM PHOSPHATE 4 MG/ML
INJECTION, SOLUTION INTRA-ARTICULAR; INTRALESIONAL; INTRAMUSCULAR; INTRAVENOUS; SOFT TISSUE
Status: DISCONTINUED | OUTPATIENT
Start: 2023-09-29 | End: 2023-09-29

## 2023-09-29 RX ORDER — PROCHLORPERAZINE EDISYLATE 5 MG/ML
5 INJECTION INTRAMUSCULAR; INTRAVENOUS EVERY 30 MIN PRN
Status: DISCONTINUED | OUTPATIENT
Start: 2023-09-29 | End: 2023-09-29 | Stop reason: HOSPADM

## 2023-09-29 RX ORDER — HYDROMORPHONE HYDROCHLORIDE 1 MG/ML
0.2 INJECTION, SOLUTION INTRAMUSCULAR; INTRAVENOUS; SUBCUTANEOUS EVERY 5 MIN PRN
Status: DISCONTINUED | OUTPATIENT
Start: 2023-09-29 | End: 2023-09-29 | Stop reason: HOSPADM

## 2023-09-29 RX ORDER — FENTANYL CITRATE 50 UG/ML
25 INJECTION, SOLUTION INTRAMUSCULAR; INTRAVENOUS EVERY 5 MIN PRN
Status: DISCONTINUED | OUTPATIENT
Start: 2023-09-29 | End: 2023-09-29 | Stop reason: HOSPADM

## 2023-09-29 RX ORDER — PHENYLEPHRINE HYDROCHLORIDE 10 MG/ML
INJECTION INTRAVENOUS
Status: DISCONTINUED | OUTPATIENT
Start: 2023-09-29 | End: 2023-09-29

## 2023-09-29 RX ORDER — ACETAMINOPHEN 325 MG/1
650 TABLET ORAL EVERY 4 HOURS PRN
Status: DISCONTINUED | OUTPATIENT
Start: 2023-09-29 | End: 2023-09-29 | Stop reason: HOSPADM

## 2023-09-29 RX ORDER — PROPOFOL 10 MG/ML
INJECTION, EMULSION INTRAVENOUS CONTINUOUS PRN
Status: DISCONTINUED | OUTPATIENT
Start: 2023-09-29 | End: 2023-09-29

## 2023-09-29 RX ORDER — PROPOFOL 10 MG/ML
VIAL (ML) INTRAVENOUS
Status: DISCONTINUED | OUTPATIENT
Start: 2023-09-29 | End: 2023-09-29

## 2023-09-29 RX ORDER — HEPARIN SOD,PORCINE/0.9 % NACL 1000/500ML
INTRAVENOUS SOLUTION INTRAVENOUS
Status: DISCONTINUED | OUTPATIENT
Start: 2023-09-29 | End: 2023-09-29 | Stop reason: HOSPADM

## 2023-09-29 RX ORDER — ONDANSETRON 2 MG/ML
INJECTION INTRAMUSCULAR; INTRAVENOUS
Status: DISCONTINUED | OUTPATIENT
Start: 2023-09-29 | End: 2023-09-29

## 2023-09-29 RX ADMIN — PHENYLEPHRINE HYDROCHLORIDE 100 MCG: 10 INJECTION INTRAVENOUS at 01:09

## 2023-09-29 RX ADMIN — PROPOFOL 40 MG: 10 INJECTION, EMULSION INTRAVENOUS at 12:09

## 2023-09-29 RX ADMIN — FENTANYL CITRATE 25 MCG: 50 INJECTION, SOLUTION INTRAMUSCULAR; INTRAVENOUS at 02:09

## 2023-09-29 RX ADMIN — ONDANSETRON 4 MG: 2 INJECTION INTRAMUSCULAR; INTRAVENOUS at 12:09

## 2023-09-29 RX ADMIN — SODIUM CHLORIDE: 9 INJECTION, SOLUTION INTRAVENOUS at 11:09

## 2023-09-29 RX ADMIN — PROPOFOL 125 MCG/KG/MIN: 10 INJECTION, EMULSION INTRAVENOUS at 12:09

## 2023-09-29 RX ADMIN — FENTANYL CITRATE 25 MCG: 50 INJECTION, SOLUTION INTRAMUSCULAR; INTRAVENOUS at 12:09

## 2023-09-29 RX ADMIN — DEXAMETHASONE SODIUM PHOSPHATE 4 MG: 4 INJECTION, SOLUTION INTRAMUSCULAR; INTRAVENOUS at 12:09

## 2023-09-29 RX ADMIN — FENTANYL CITRATE 25 MCG: 50 INJECTION, SOLUTION INTRAMUSCULAR; INTRAVENOUS at 01:09

## 2023-09-29 RX ADMIN — LIDOCAINE HYDROCHLORIDE 50 MG: 20 INJECTION INTRAVENOUS at 12:09

## 2023-09-29 RX ADMIN — MIDAZOLAM HYDROCHLORIDE 2 MG: 2 INJECTION, SOLUTION INTRAMUSCULAR; INTRAVENOUS at 11:09

## 2023-09-29 NOTE — H&P
[facility]  Cardiac Electrophysiology  History and Physical     Admission Date: 9/29/2023  Code Status: Prior   Attending Provider: Jonh Mcgill MD   Principal Problem:Atrial flutter    Subjective:     Chief Complaint: Atrial flutter    HPI: Mr. 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    History obtained through patient as well as clinic notes:     8/23/23: During his last office visit with Dr. Mcgill Multiple episodes of typical AFL over this year, requiring DCCV.  Echo 50%  My interpretation of today's ECG is NSR  Discussed AFL and its basic pathophysiology, including its health implications and treatment options (rate vs. rhythm control, meds vs. procedural/device treatment) as appropriate for the patient.  I spent about a half hour discussing the nature of EP study and ablation, including possible transseptal puncture. We discused risks and benefits at length. Our discussion included, but was not limited to the risk of death, infection, bleeding, stroke, MI, cardiac perforation, embolism, cardiac tamponade, skin burns, and other organic injury including the possibility for need for surgery or pacemaker implantation..d  I discussed with patient risks, indications, benefits, and alternatives of the planned procedure. All questions were answered. Patient understands and wishes to proceed.  After ablation, likely ILR for further surveillance (and guidance of a/c).    Mr. Orosco presents today to [facility] for scheduled atrial flutter ablation with Dr. Mcgill. He denies any chest pain, palpitations, SOB, dizziness, light headedness, weakness, syncope, or near syncopal episodes. Experiences YANG when out of rhythm. He denies any bleeding, infections, fevers, rashes, or surgeries in the past 30 days. He is currently taking eliquis 5 mg BID (last dose taken  9/28/23 PM), metoprolol 50 mg daily, and sotalol 80 mg BID (last dose taken 9/25/23). ELENA deferred per Dr. Mcgill as patient reports full compliance with eliquis, he has been taking this medication as prescribed for > 1 month, denies missing any doses, he is 100% compliant. Patient denies Hx of CVA/TIA or blood clots.    ECG today shows sinus rhythm at 76 bpm  ms  ms QT/Qtc 426/479 ms.    Past Medical History:   Diagnosis Date    A-fib 5/8/2023    Arthritis     GERD (gastroesophageal reflux disease)     History of HCV, s/p successful treatment w/ SVR12 5/2015     Failed treatment (stage I/II) - followed by hepatology     Joint pain     Lung disease caused by breathing particles     Widespread essentially symmetric interstitial lung disease    Obesity     Polycythemia vera     Prediabetes        Past Surgical History:   Procedure Laterality Date    BUNIONECTOMY      bilateral    CARDIOVERSION N/A 05/08/2023    Procedure: Cardioversion;  Surgeon: David Cueva MD;  Location: [facility] CATH LAB;  Service: Cardiology;  Laterality: N/A;    CATARACT EXTRACTION Left 07/27/2023    COLONOSCOPY N/A 12/09/2015    Procedure: COLONOSCOPY;  Surgeon: Conrad Iqbal MD;  Location: [facility] ENDO;  Service: Endoscopy;  Laterality: N/A;    Liver biopsy x2      rotator cuff Right     TRANSESOPHAGEAL ECHOCARDIOGRAM WITH POSSIBLE CARDIOVERSION (ELENA W/ POSS CARDIOVERSION) N/A 05/08/2023    Procedure: TRANSESOPHAGEAL ECHOCARDIOGRAM WITH POSSIBLE CARDIOVERSION (ELENA W/ POSS CARDIOVERSION);  Surgeon: David Cueva MD;  Location: [facility] CATH LAB;  Service: Cardiology;  Laterality: N/A;  12:30PM    RN PREOP 5/4/2023---EKG ON ARRIVAL    TRANSESOPHAGEAL ECHOCARDIOGRAM WITH POSSIBLE CARDIOVERSION (ELENA W/ POSS CARDIOVERSION) N/A 7/29/2023    Procedure: Transesophageal echo (ELENA) intra-procedure log documentation;  Surgeon: David Cueva MD;  Location: [facility] CATH LAB;  Service: Cardiology;  Laterality: N/A;    TRANSESOPHAGEAL  ECHOCARDIOGRAPHY N/A 05/08/2023    Procedure: ECHOCARDIOGRAM, TRANSESOPHAGEAL;  Surgeon: David Cueva MD;  Location: [facility] CATH LAB;  Service: Cardiology;  Laterality: N/A;    TRANSESOPHAGEAL ECHOCARDIOGRAPHY N/A 7/29/2023    Procedure: ECHOCARDIOGRAM, TRANSESOPHAGEAL;  Surgeon: David Cueva MD;  Location: [facility] CATH LAB;  Service: Cardiology;  Laterality: N/A;    TREATMENT OF CARDIAC ARRHYTHMIA N/A 8/7/2023    Procedure: Cardioversion or Defibrillation;  Surgeon: David Cueva MD;  Location: [facility] CATH LAB;  Service: Cardiology;  Laterality: N/A;       Review of patient's allergies indicates:   Allergen Reactions    Ace inhibitors Other (See Comments)     cough       No current facility-administered medications on file prior to encounter.     Current Outpatient Medications on File Prior to Encounter   Medication Sig    allopurinoL (ZYLOPRIM) 100 MG tablet Take 2 tablets (200 mg total) by mouth once daily.    apixaban (ELIQUIS) 5 mg Tab Take 1 tablet (5 mg total) by mouth 2 (two) times daily.    fluticasone propionate (FLONASE) 50 mcg/actuation nasal spray SHAKE LIQUID AND USE 1 SPRAY(50 MCG) IN EACH NOSTRIL EVERY DAY    metoprolol succinate (TOPROL-XL) 50 MG 24 hr tablet Take 1 tablet (50 mg total) by mouth once daily.    multivitamin capsule Take 1 capsule by mouth once daily.    TRELEGY ELLIPTA 100-62.5-25 mcg DsDv Inhale 1 puff into the lungs Daily.    ofloxacin (OCUFLOX) 0.3 % ophthalmic solution Place 1 drop into both eyes 4 (four) times daily.    prednisoLONE acetate (PRED FORTE) 1 % DrpS Place 1 drop into both eyes 3 (three) times daily.    sotaloL (BETAPACE) 80 MG tablet Take 1 tablet (80 mg total) by mouth 2 (two) times daily.     Family History       Problem Relation (Age of Onset)    Chronic back pain Brother    Deep vein thrombosis Sister    Heart attack Sister    Heart disease Mother    Kidney disease Mother    Osteoarthritis Sister    Parkinsonism Father    Prostate cancer Cousin    Pulmonary  "embolism Sister     Tobacco Use    Smoking status: Former    Smokeless tobacco: Never    Tobacco comments:     pt. smokes 10 cigars per day.   Substance and Sexual Activity    Alcohol use: Not Currently     Comment: Rarely    Drug use: Yes     Types: Marijuana     Comment: daily    Sexual activity: Yes     Partners: Female     Review of Systems   Constitutional: Negative for chills, fever and malaise/fatigue.   HENT:  Negative for congestion and nosebleeds.    Eyes:  Negative for blurred vision.   Cardiovascular:  Positive for YANG. Negative for chest pain, irregular heartbeat, leg swelling, near-syncope, orthopnea, palpitations, paroxysmal nocturnal dyspnea and syncope.   Respiratory:  Negative for cough, hemoptysis, shortness of breath, sleep disturbances due to breathing, sputum production and wheezing.    Endocrine: Negative for polyphagia.   Hematologic/Lymphatic: Negative for bleeding problem. Does not bruise/bleed easily.   Skin:  Negative for itching and rash.   Musculoskeletal:  Negative for back pain, joint swelling, muscle cramps and muscle weakness.   Gastrointestinal:  Negative for bloating, abdominal pain, hematemesis, hematochezia, nausea and vomiting.   Genitourinary:  Negative for dysuria and hematuria.   Neurological:  Negative for dizziness, focal weakness, headaches, light-headedness, loss of balance, numbness and weakness.   Psychiatric/Behavioral:  Negative for altered mental status.      Objective:     Vital Signs (Most Recent):     Vitals:    09/29/23 0955 09/29/23 0956   BP: (!) 151/80 (!) 155/78   BP Location: Right arm Left arm   Patient Position: Lying Lying   Pulse: 73    Resp: 16    Temp: 97.9 °F (36.6 °C)    TempSrc: Temporal    SpO2: 95%    Weight: 95.3 kg (210 lb)    Height: 6' 5" (1.956 m)       Vital Signs (24h Range):        Physical Exam  Vitals and nursing note reviewed.   Constitutional:       General: He is not in acute distress.     Appearance: Normal appearance. He is " well-developed. He is not diaphoretic.   HENT:      Head: Normocephalic and atraumatic.      Mouth/Throat:      Mouth: Mucous membranes are moist.      Pharynx: No oropharyngeal exudate.   Eyes:      Conjunctiva/sclera: Conjunctivae normal.      Pupils: Pupils are equal, round, and reactive to light.   Cardiovascular:      Rate and Rhythm: Normal rate and regular rhythm. No extrasystoles are present.     Pulses: Normal pulses and intact distal pulses.           Radial pulses are 2+ on the right side and 2+ on the left side.        Dorsalis pedis pulses are 2+ on the right side and 2+ on the left side.      Heart sounds: Normal heart sounds, S1 normal and S2 normal. No murmur heard.  Pulmonary:      Effort: Pulmonary effort is normal. No accessory muscle usage or respiratory distress.      Breath sounds: Normal breath sounds. No decreased breath sounds, wheezing, rhonchi or rales.   Chest:      Chest wall: No tenderness.   Abdominal:      General: Bowel sounds are normal. There is no distension.      Palpations: Abdomen is soft.      Tenderness: There is no abdominal tenderness. There is no guarding.   Musculoskeletal:         General: Normal range of motion.      Cervical back: Normal range of motion and neck supple.   Skin:     General: Skin is warm and dry.      Findings: No erythema or rash.   Neurological:      Mental Status: He is alert and oriented to person, place, and time. He is not disoriented.      Sensory: No sensory deficit.      Motor: No abnormal muscle tone.      Coordination: Coordination normal.      Gait: Gait normal.   Psychiatric:         Mood and Affect: Mood normal.         Behavior: Behavior normal.         Thought Content: Thought content normal.         Judgment: Judgment normal.     Significant Labs: Pre procedure labs from 9/22/23 reviewed.     Significant Imaging: ECG today shows sinus rhythm at 76 bpm  ms  ms QT/Qtc 426/479 ms.    Assessment and Plan:   Plan:  -AFL  RFA  -ELENA deferred per Dr. Mcgill as patient reports full compliance with eliquis, he has been taking this medication as prescribed for > 1 month, denies missing any doses, he is 100% compliant. Patient denies Hx of CVA/TIA or blood clots.  -Anesthesia for sedation    Prior to procedure, Dr. Mcgill at bedside speaking with patient and family. Extensive discussion with patient regarding the nature of AFL RFA. We discussed risks and benefits at length. Our discussion included, but was not limited to the risk of death, infection, bleeding, stroke, MI, cardiac perforation, embolism, cardiac tamponade, vascular injury, AE fistula, injury to phrenic nerve, pulmonary vein stenosis and other organic injury including the possibility for need for surgery or pacemaker implantation. We discussed success rates and possible need for redo procedures. Patient verbalized understanding. All questions answered, no further questions or concerns, patient would like to proceed. Consents signed.    Linh Mistry NP  Cardiac Electrophysiology  [facility]    Attending: Jonh Mcgill MD

## 2023-09-29 NOTE — ANESTHESIA PREPROCEDURE EVALUATION
09/29/2023  Pre-operative evaluation for Procedure(s) (LRB):  Ablation, Atrial Flutter, Typical (N/A)  Transesophageal echo (ELENA) intra-procedure log documentation (N/A)    Josiah Orosco Jr. is a 67 y.o. male     Patient Active Problem List   Diagnosis    History of HCV, s/p successful treatment w/ SVR12 5/2015    Lung disease caused by breathing particles    Overweight (BMI 25.0-29.9)    GERD (gastroesophageal reflux disease)    Polycythemia    Former smoker    Hyperuricemia    ILD (interstitial lung disease)    Prediabetes    Arthritis    Allergic cough    HTN, goal below 140/90    Pulmonary interstitial fibrosis    Pulmonary hypertension    Marijuana user    Thoracic aorta atherosclerosis    Cataract    Atrial flutter       Review of patient's allergies indicates:   Allergen Reactions    Ace inhibitors Other (See Comments)     cough       No current facility-administered medications on file prior to encounter.     Current Outpatient Medications on File Prior to Encounter   Medication Sig Dispense Refill    allopurinoL (ZYLOPRIM) 100 MG tablet Take 2 tablets (200 mg total) by mouth once daily. 180 tablet 3    apixaban (ELIQUIS) 5 mg Tab Take 1 tablet (5 mg total) by mouth 2 (two) times daily. 180 tablet 3    fluticasone propionate (FLONASE) 50 mcg/actuation nasal spray SHAKE LIQUID AND USE 1 SPRAY(50 MCG) IN EACH NOSTRIL EVERY DAY 16 g 0    metoprolol succinate (TOPROL-XL) 50 MG 24 hr tablet Take 1 tablet (50 mg total) by mouth once daily. 30 tablet 2    multivitamin capsule Take 1 capsule by mouth once daily.      TRELEGY ELLIPTA 100-62.5-25 mcg DsDv Inhale 1 puff into the lungs Daily.      ofloxacin (OCUFLOX) 0.3 % ophthalmic solution Place 1 drop into both eyes 4 (four) times daily.      prednisoLONE acetate (PRED FORTE) 1 % DrpS Place 1 drop into both eyes 3 (three) times  daily.      sotaloL (BETAPACE) 80 MG tablet Take 1 tablet (80 mg total) by mouth 2 (two) times daily. 60 tablet 2       Past Surgical History:   Procedure Laterality Date    BUNIONECTOMY      bilateral    CARDIOVERSION N/A 05/08/2023    Procedure: Cardioversion;  Surgeon: David Cueva MD;  Location: [facility] CATH LAB;  Service: Cardiology;  Laterality: N/A;    CATARACT EXTRACTION Left 07/27/2023    COLONOSCOPY N/A 12/09/2015    Procedure: COLONOSCOPY;  Surgeon: Conrad Iqbal MD;  Location: [facility] ENDO;  Service: Endoscopy;  Laterality: N/A;    Liver biopsy x2      rotator cuff Right     TRANSESOPHAGEAL ECHOCARDIOGRAM WITH POSSIBLE CARDIOVERSION (ELENA W/ POSS CARDIOVERSION) N/A 05/08/2023    Procedure: TRANSESOPHAGEAL ECHOCARDIOGRAM WITH POSSIBLE CARDIOVERSION (ELENA W/ POSS CARDIOVERSION);  Surgeon: David Cueva MD;  Location: [facility] CATH LAB;  Service: Cardiology;  Laterality: N/A;  12:30PM    RN PREOP 5/4/2023---EKG ON ARRIVAL    TRANSESOPHAGEAL ECHOCARDIOGRAM WITH POSSIBLE CARDIOVERSION (ELENA W/ POSS CARDIOVERSION) N/A 7/29/2023    Procedure: Transesophageal echo (ELENA) intra-procedure log documentation;  Surgeon: David Cueva MD;  Location: [facility] CATH LAB;  Service: Cardiology;  Laterality: N/A;    TRANSESOPHAGEAL ECHOCARDIOGRAPHY N/A 05/08/2023    Procedure: ECHOCARDIOGRAM, TRANSESOPHAGEAL;  Surgeon: David Cueva MD;  Location: [facility] CATH LAB;  Service: Cardiology;  Laterality: N/A;    TRANSESOPHAGEAL ECHOCARDIOGRAPHY N/A 7/29/2023    Procedure: ECHOCARDIOGRAM, TRANSESOPHAGEAL;  Surgeon: David Cueva MD;  Location: [facility] CATH LAB;  Service: Cardiology;  Laterality: N/A;    TREATMENT OF CARDIAC ARRHYTHMIA N/A 8/7/2023    Procedure: Cardioversion or Defibrillation;  Surgeon: David Cueva MD;  Location: [facility] CATH LAB;  Service: Cardiology;  Laterality: N/A;       Social History     Socioeconomic History    Marital status:     Number of children: 0    Highest education level: Some  college, no degree   Occupational History    Occupation: Self-employed     Employer: [employer]   Tobacco Use    Smoking status: Former    Smokeless tobacco: Never    Tobacco comments:     pt. smokes 10 cigars per day.   Substance and Sexual Activity    Alcohol use: Not Currently     Comment: Rarely    Drug use: Yes     Types: Marijuana     Comment: daily    Sexual activity: Yes     Partners: Female     Social Determinants of Health     Financial Resource Strain: Low Risk  (8/8/2023)    Overall Financial Resource Strain (CARDIA)     Difficulty of Paying Living Expenses: Not very hard   Food Insecurity: No Food Insecurity (8/8/2023)    Hunger Vital Sign     Worried About Running Out of Food in the Last Year: Never true     Ran Out of Food in the Last Year: Never true   Transportation Needs: No Transportation Needs (8/8/2023)    PRAPARE - Transportation     Lack of Transportation (Medical): No     Lack of Transportation (Non-Medical): No   Physical Activity: Inactive (8/8/2023)    Exercise Vital Sign     Days of Exercise per Week: 0 days     Minutes of Exercise per Session: 0 min   Stress: Stress Concern Present (8/8/2023)    [employer] [place] [employer] - Occupational Stress Questionnaire     Feeling of Stress : To some extent   Social Connections: Moderately Integrated (8/8/2023)    Social Connection and Isolation Panel [NHANES]     Frequency of Communication with Friends and Family: Three times a week     Frequency of Social Gatherings with Friends and Family: Three times a week     Attends Ep[employer] Services: More than 4 times per year     Active Member of Clubs or Organizations: No     Attends Club or Organization Meetings: Never     Marital Status:    Housing Stability: Low Risk  (8/8/2023)    Housing Stability Vital Sign     Unable to Pay for Housing in the Last Year: No     Number of Places Lived in the Last Year: 1     Unstable Housing in the Last Year: No                2D Echo:  Results for orders placed or performed during the hospital encounter of 08/31/15   2D Echo w/ Color Flow Doppler   Result Value Ref Range    EF + QEF 55 55 - 65    Mitral Valve Regurgitation TRIVIAL     Diastolic Dysfunction No     Est. PA Systolic Pressure 40.45 (A)     Tricuspid Valve Regurgitation MILD TO MODERATE            Pre-op Assessment    I have reviewed the Patient Summary Reports.     I have reviewed the Nursing Notes. I have reviewed the NPO Status.      Review of Systems  Anesthesia Hx:  No problems with previous Anesthesia    Cardiovascular:   Hypertension    Hepatic/GI:   GERD Liver Disease, Hepatitis    Neurological:  Neurology Normal        Physical Exam    Airway:  Mouth Opening: Normal  Tongue: Normal    Chest/Lungs:  Normal Respiratory Rate    Heart:  Rhythm: Regular Rhythm        Anesthesia Plan  Type of Anesthesia, risks & benefits discussed:    Anesthesia Type: Gen Natural Airway  Intra-op Monitoring Plan: Standard ASA Monitors  Induction:  IV  Informed Consent: Informed consent signed with the Patient and all parties understand the risks and agree with anesthesia plan.  All questions answered.   ASA Score: 3    Ready For Surgery From Anesthesia Perspective.     .

## 2023-09-29 NOTE — HPI
Mr. 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    History obtained through patient as well as clinic notes:     8/23/23: During his last office visit with Dr. Mcgill Multiple episodes of typical AFL over this year, requiring DCCV.  Echo 50%  My interpretation of today's ECG is NSR  Discussed AFL and its basic pathophysiology, including its health implications and treatment options (rate vs. rhythm control, meds vs. procedural/device treatment) as appropriate for the patient.  I spent about a half hour discussing the nature of EP study and ablation, including possible transseptal puncture. We discused risks and benefits at length. Our discussion included, but was not limited to the risk of death, infection, bleeding, stroke, MI, cardiac perforation, embolism, cardiac tamponade, skin burns, and other organic injury including the possibility for need for surgery or pacemaker implantation..d  I discussed with patient risks, indications, benefits, and alternatives of the planned procedure. All questions were answered. Patient understands and wishes to proceed.  After ablation, likely ILR for further surveillance (and guidance of a/c).    Mr. Orosco presents today to [facility] for scheduled atrial flutter ablation with Dr. Mcgill. He denies any chest pain, palpitations, SOB, YANG, dizziness, light headedness, weakness, syncope, or near syncopal episodes. He denies any bleeding, infections, fevers, rashes, or surgeries in the past 30 days. He is currently taking eliquis 5 mg BID (last dose taken  9/28/23 PM), metoprolol 50 mg daily, and sotalol 80 mg BID (last dose taken 9/25/23).    ECG today shows sinus rhythm at 76 bpm  ms  ms QT/Qtc 426/479 ms.

## 2023-09-29 NOTE — SUBJECTIVE & OBJECTIVE
Past Medical History:   Diagnosis Date    A-fib 5/8/2023    Arthritis     GERD (gastroesophageal reflux disease)     History of HCV, s/p successful treatment w/ SVR12 5/2015     Failed treatment (stage I/II) - followed by hepatology     Joint pain     Lung disease caused by breathing particles     Widespread essentially symmetric interstitial lung disease    Obesity     Polycythemia vera     Prediabetes        Past Surgical History:   Procedure Laterality Date    BUNIONECTOMY      bilateral    CARDIOVERSION N/A 05/08/2023    Procedure: Cardioversion;  Surgeon: David Cueva MD;  Location: [facility] CATH LAB;  Service: Cardiology;  Laterality: N/A;    CATARACT EXTRACTION Left 07/27/2023    COLONOSCOPY N/A 12/09/2015    Procedure: COLONOSCOPY;  Surgeon: Conrad Iqbal MD;  Location: [facility] ENDO;  Service: Endoscopy;  Laterality: N/A;    Liver biopsy x2      rotator cuff Right     TRANSESOPHAGEAL ECHOCARDIOGRAM WITH POSSIBLE CARDIOVERSION (ELENA W/ POSS CARDIOVERSION) N/A 05/08/2023    Procedure: TRANSESOPHAGEAL ECHOCARDIOGRAM WITH POSSIBLE CARDIOVERSION (ELENA W/ POSS CARDIOVERSION);  Surgeon: David Cueva MD;  Location: [facility] CATH LAB;  Service: Cardiology;  Laterality: N/A;  12:30PM    RN PREOP 5/4/2023---EKG ON ARRIVAL    TRANSESOPHAGEAL ECHOCARDIOGRAM WITH POSSIBLE CARDIOVERSION (ELENA W/ POSS CARDIOVERSION) N/A 7/29/2023    Procedure: Transesophageal echo (ELENA) intra-procedure log documentation;  Surgeon: David Cueva MD;  Location: [facility] CATH LAB;  Service: Cardiology;  Laterality: N/A;    TRANSESOPHAGEAL ECHOCARDIOGRAPHY N/A 05/08/2023    Procedure: ECHOCARDIOGRAM, TRANSESOPHAGEAL;  Surgeon: David Cueva MD;  Location: [facility] CATH LAB;  Service: Cardiology;  Laterality: N/A;    TRANSESOPHAGEAL ECHOCARDIOGRAPHY N/A 7/29/2023    Procedure: ECHOCARDIOGRAM, TRANSESOPHAGEAL;  Surgeon: David Cueva MD;  Location: [facility] CATH LAB;  Service: Cardiology;  Laterality: N/A;    TREATMENT OF CARDIAC ARRHYTHMIA N/A 8/7/2023     Procedure: Cardioversion or Defibrillation;  Surgeon: David Cueva MD;  Location: [facility] CATH LAB;  Service: Cardiology;  Laterality: N/A;       Review of patient's allergies indicates:   Allergen Reactions    Ace inhibitors Other (See Comments)     cough       No current facility-administered medications on file prior to encounter.     Current Outpatient Medications on File Prior to Encounter   Medication Sig    allopurinoL (ZYLOPRIM) 100 MG tablet Take 2 tablets (200 mg total) by mouth once daily.    apixaban (ELIQUIS) 5 mg Tab Take 1 tablet (5 mg total) by mouth 2 (two) times daily.    fluticasone propionate (FLONASE) 50 mcg/actuation nasal spray SHAKE LIQUID AND USE 1 SPRAY(50 MCG) IN EACH NOSTRIL EVERY DAY    metoprolol succinate (TOPROL-XL) 50 MG 24 hr tablet Take 1 tablet (50 mg total) by mouth once daily.    multivitamin capsule Take 1 capsule by mouth once daily.    TRELEGY ELLIPTA 100-62.5-25 mcg DsDv Inhale 1 puff into the lungs Daily.    ofloxacin (OCUFLOX) 0.3 % ophthalmic solution Place 1 drop into both eyes 4 (four) times daily.    prednisoLONE acetate (PRED FORTE) 1 % DrpS Place 1 drop into both eyes 3 (three) times daily.    sotaloL (BETAPACE) 80 MG tablet Take 1 tablet (80 mg total) by mouth 2 (two) times daily.     Family History       Problem Relation (Age of Onset)    Chronic back pain Brother    Deep vein thrombosis Sister    Heart attack Sister    Heart disease Mother    Kidney disease Mother    Osteoarthritis Sister    Parkinsonism Father    Prostate cancer Cousin    Pulmonary embolism Sister          Tobacco Use    Smoking status: Former    Smokeless tobacco: Never    Tobacco comments:     pt. smokes 10 cigars per day.   Substance and Sexual Activity    Alcohol use: Not Currently     Comment: Rarely    Drug use: Yes     Types: Marijuana     Comment: daily    Sexual activity: Yes     Partners: Female     Review of Systems   Constitutional: Negative for chills, fever and malaise/fatigue.    HENT:  Negative for congestion and nosebleeds.    Eyes:  Negative for blurred vision.   Cardiovascular:  Negative for chest pain, dyspnea on exertion, irregular heartbeat, leg swelling, near-syncope, orthopnea, palpitations, paroxysmal nocturnal dyspnea and syncope.   Respiratory:  Negative for cough, hemoptysis, shortness of breath, sleep disturbances due to breathing, sputum production and wheezing.    Endocrine: Negative for polyphagia.   Hematologic/Lymphatic: Negative for bleeding problem. Does not bruise/bleed easily.   Skin:  Negative for itching and rash.   Musculoskeletal:  Negative for back pain, joint swelling, muscle cramps and muscle weakness.   Gastrointestinal:  Negative for bloating, abdominal pain, hematemesis, hematochezia, nausea and vomiting.   Genitourinary:  Negative for dysuria and hematuria.   Neurological:  Negative for dizziness, focal weakness, headaches, light-headedness, loss of balance, numbness and weakness.   Psychiatric/Behavioral:  Negative for altered mental status.      Objective:     Vital Signs (Most Recent):    Vital Signs (24h Range):             There is no height or weight on file to calculate BMI.             Physical Exam  Vitals and nursing note reviewed.   Constitutional:       General: He is not in acute distress.     Appearance: Normal appearance. He is well-developed. He is not diaphoretic.   HENT:      Head: Normocephalic and atraumatic.      Mouth/Throat:      Mouth: Mucous membranes are moist.      Pharynx: No oropharyngeal exudate.   Eyes:      Conjunctiva/sclera: Conjunctivae normal.      Pupils: Pupils are equal, round, and reactive to light.   Cardiovascular:      Rate and Rhythm: Normal rate and regular rhythm. No extrasystoles are present.     Pulses: Normal pulses and intact distal pulses.           Radial pulses are 2+ on the right side and 2+ on the left side.        Dorsalis pedis pulses are 2+ on the right side and 2+ on the left side.      Heart  sounds: Normal heart sounds, S1 normal and S2 normal. No murmur heard.  Pulmonary:      Effort: Pulmonary effort is normal. No accessory muscle usage or respiratory distress.      Breath sounds: Normal breath sounds. No decreased breath sounds, wheezing, rhonchi or rales.   Chest:      Chest wall: No tenderness.   Abdominal:      General: Bowel sounds are normal. There is no distension.      Palpations: Abdomen is soft.      Tenderness: There is no abdominal tenderness. There is no guarding.   Musculoskeletal:         General: Normal range of motion.      Cervical back: Normal range of motion and neck supple.   Skin:     General: Skin is warm and dry.      Findings: No erythema or rash.   Neurological:      Mental Status: He is alert and oriented to person, place, and time. He is not disoriented.      Sensory: No sensory deficit.      Motor: No abnormal muscle tone.      Coordination: Coordination normal.      Gait: Gait normal.   Psychiatric:         Mood and Affect: Mood normal.         Behavior: Behavior normal.         Thought Content: Thought content normal.         Judgment: Judgment normal.            Significant Labs: Pre procedure labs from 9/22/23 reviewed.     Significant Imaging:  ECG

## 2023-09-29 NOTE — DISCHARGE INSTRUCTIONS
Discharge Instructions and Care of Your Groin      Follow up:  -Follow up with Dr. Mcgill in 3 months.    Medications:  -Continue all of your home medications especially your blood thinner Eliquis.    Catheter Insertion Site  -The morning after your procedure, you may take the dressing off. The easiest way to do this is when you are showering, get the tape and dressing wet and remove it.  -After the bandage is removed, cover the area with a small adhesive bandage. It is normal for the catheter insertion site to be black and blue for a couple of days. The site may also be slightly swollen and pink, and there may be a small lump (about the size of a quarter) at the site.  -Wash the catheter insertion site at least once daily with soap and water. Place soapy water on your hand or washcloth and gently wash the insertion site; do not rub.  -Keep the area clean and dry when you are not showering.  -Do not use creams, lotions or ointment on the wound site.  -Wear loose clothes and loose underwear.  -Do not take a bath, tub soak, go in a Jacuzzi, or swim in a pool or lake for one week after the procedure.    Activity Instructions  -Do not drive or operate any dangerous machinery for 24 hours, but optimally 48-72 hours since you were given general anesthesia.   -Do not strain during bowel movements for the first 3 to 4 days after the procedure to prevent bleeding from the catheter insertion site.  -Avoid heavy lifting (more than 10 pounds) and pushing or pulling heavy objects for the first 5 to 7 days after the procedure.  -Do not participate in strenuous activities for 5 days after the procedure. This includes most sports - jogging, golfing, play tennis, and bowling.  -You may climb stairs if needed, but walk up and down the stairs more slowly than usual.  -Gradually increase your activities until you reach your normal activity level within one week after the procedure.    If bleeding should occur following discharge:  -Sit  down and apply firm pressure to the puncture site with your fingers for 10 minutes   -If the bleeding stops, continue to sit quietly, keeping your leg straight for 2 hours. Notify your physician as soon as possible   -If bleeding does not stop after 10 minutes or if there is a large amount of bleeding or spurting, call 911 immediately.  Do not drive yourself to the hospital.     Notify your physician if these symptoms persist or if you experience:  -Change in color or temperature of the leg  -Redness, heat, or pus at the puncture site  -Chills or fever greater than 101.0 F     Go to the Emergency Department if you develop:   -Severe bleeding   -Acute Weakness or numbness   -Visual, gait or speech disturbances   -New chest pain, palpitations, shortness of breath, fainting

## 2023-09-29 NOTE — PROGRESS NOTES
Nursing Transfer Note        Reason patient is being transferred: back to short stay post recovery    Transfer To: short stay 8    Transfer via stretcher    Transfer with cardiac monitoring    Transported by RN    Telemetry: Box Number [id number]    Medicines sent: none    Any special needs or follow-up needed: continue bedrest with lay flat. Bedrest complete at 1830.    Patient belongings transferred with patient: Yes    Chart send with patient: Yes    Notified: spouse    Patient reassessed at: to be done by receiving RN (date, time)

## 2023-09-29 NOTE — PROGRESS NOTES
Pt arrived to EP PACU awake, VSS. R groin dsg saturated. Site is soft. Dressing removed per Yuliet RN (EP RN). No active bleeding noted. Pressure held for 5 mins. New gauze with clear tegaderm applied. Will continue to monitor.

## 2023-09-29 NOTE — PLAN OF CARE
Pt arrived from EP PACU, A&Ox4, NAD. Rt groin site is CDI, no bleeding or hematoma present. POC reviewed. NITA.

## 2023-09-29 NOTE — TRANSFER OF CARE
"Anesthesia Transfer of Care Note    Patient: Josiah Orosco Jr.    Procedure(s) Performed: Procedure(s) (LRB):  Ablation, Atrial Flutter, Typical (N/A)    Patient location: PACU    Anesthesia Type: general    Transport from OR: Transported from OR on 6-10 L/min O2 by face mask with adequate spontaneous ventilation    Post pain: adequate analgesia    Post assessment: no apparent anesthetic complications and tolerated procedure well    Post vital signs: stable    Level of consciousness: awake and alert    Nausea/Vomiting: no nausea/vomiting    Complications: none    Transfer of care protocol was followedComments: Nurse at bedside, VSS, spont reg resp noted      Last vitals:   Visit Vitals  /88   Pulse 65   Temp 36.6 °C (97.9 °F) (Temporal)   Resp 17   Ht 6' 5" (1.956 m)   Wt 95.3 kg (210 lb)   SpO2 100%   BMI 24.90 kg/m²     "

## 2023-09-29 NOTE — ANESTHESIA POSTPROCEDURE EVALUATION
Anesthesia Post Evaluation    Patient: Josiah Orosco Jr.    Procedure(s) Performed: Procedure(s) (LRB):  Ablation, Atrial Flutter, Typical (N/A)    Final Anesthesia Type: general      Level of consciousness: awake and alert  Post-procedure vital signs: reviewed and stable  Pain control: Pain has been treated.  Airway patency: patent    PONV status: Absent or treated.  Anesthetic complications: no      Cardiovascular status: hemodynamically stable  Respiratory status: unassisted  Hydration status: euvolemic            Vitals Value Taken Time   /81 09/29/23 1502   Temp 36.6 °C (97.9 °F) 09/29/23 1419   Pulse 68 09/29/23 1516   Resp 20 09/29/23 1516   SpO2 90 % 09/29/23 1516   Vitals shown include unvalidated device data.      No case tracking events are documented in the log.      Pain/Marycruz Score: Marycruz Score: 10 (9/29/2023  2:30 PM)

## 2023-09-30 NOTE — NURSING
Pt's rt groin site had a small amount of bleeding on dressing. Removed dressing and held pressure for 15 min until hemostasis. Dressed with guaze and tegaderm. Reach out to on call cards. Dr. Madison came and assessed Pt. Ok to walk Pt if no bleeding after another 15 min.

## 2023-09-30 NOTE — NURSING
Rt groin site is CDI, no bleeding or hematoma present. Pt ambulated to the bathroom without any issues. Site CDI after ambulation. Ok to Pt to d/c per MD. Reviewed d/c instructions with Pt and Pt's spouse. Both verbalizes understanding. IV & Tele removed. Written instructions given to Pt. Pt left unit via wheelchair.

## 2023-10-02 NOTE — PROGRESS NOTES
[facility] ([facility]) Team notified of pt referral via Ambulatory Referral to Heart Failure Transitional Care ([id number]).    Patient screened today October 2, 2023 by provider and RN for enrollment to program.      Pt was deemed not a candidate for enrollment at this time related to patient primary presenting complaint would not benefit from Heart Failure Transitional Care Program.     Pt will require additional follow up planning per primary team.     If pt status, diagnosis, or treatment plan changes , please place AMB referral to [facility] ([id number]) for [facility] enrollment re-evalution.

## 2023-10-02 NOTE — DISCHARGE SUMMARY
[facility] - Short Stay Cardiac Unit  Cardiac Electrophysiology  Discharge Summary      Patient Name: Josiah Orosco Jr.  MRN: [id number]  Admission Date: 9/29/2023  Hospital Length of Stay: 0 days  Discharge Date and Time: 9/29/2023  7:46 PM  Attending Physician: Jonh Mcgill MD  Discharging Provider: Linh Mistry NP  Primary Care Physician: Elaine Landry MD    HPI: Mr. 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     History obtained through patient as well as clinic notes:     8/23/23: During his last office visit with Dr. Mcgill Multiple episodes of typical AFL over this year, requiring DCCV.  Echo 50%  My interpretation of today's ECG is NSR  Discussed AFL and its basic pathophysiology, including its health implications and treatment options (rate vs. rhythm control, meds vs. procedural/device treatment) as appropriate for the patient.  I spent about a half hour discussing the nature of EP study and ablation, including possible transseptal puncture. We discused risks and benefits at length. Our discussion included, but was not limited to the risk of death, infection, bleeding, stroke, MI, cardiac perforation, embolism, cardiac tamponade, skin burns, and other organic injury including the possibility for need for surgery or pacemaker implantation..d  I discussed with patient risks, indications, benefits, and alternatives of the planned procedure. All questions were answered. Patient understands and wishes to proceed.  After ablation, likely ILR for further surveillance (and guidance of a/c).     Mr. Orosco presents today to [facility] for scheduled atrial flutter ablation with Dr. Mcgill. He denies any chest pain, palpitations, SOB, dizziness, light headedness, weakness, syncope, or near syncopal episodes. Experiences YANG when out of rhythm. He denies any bleeding, infections,  fevers, rashes, or surgeries in the past 30 days. He is currently taking eliquis 5 mg BID (last dose taken 9/28/23 PM), metoprolol 50 mg daily, and sotalol 80 mg BID (last dose taken 9/25/23). ELENA deferred per Dr. Mcgill as patient reports full compliance with eliquis, he has been taking this medication as prescribed for > 1 month, denies missing any doses, he is 100% compliant. Patient denies Hx of CVA/TIA or blood clots.     ECG today shows sinus rhythm at 76 bpm  ms  ms QT/Qtc 426/479 ms.    Procedure(s) (LRB):  Ablation, Atrial Flutter, Typical (N/A)     Indwelling Lines/Drains at time of discharge:  Lines/Drains/Airways    None   Hospital Course: Patient underwent AFL RFA (see procedure note for details), tolerated procedure well with no acute complications. Admitted to PACU, post-procedure ECG normal sinus rhythm at normal sinus rhythm with RBBB LAFB at 65 bpm  ms  ms QT/QTc 450/468 ms. Bilateral groin sites with sutures removed, dressings C/D/I. No bleeding, hematoma, or bruit noted. Bleeding noted, bed rest extending, bleeding resolved. Bedrest completed x 6 hours. He was monitored on telemetry, no acute arrhythmias. Patient ambulating, tolerating PO intake, and voiding with no issues. Denies any chest pain or SOB. Discharge plans discussed with Dr. Mcgill. Patient to continue all home medications including eliquis 5 mg BID (last dose taken 9/28/23 PM), metoprolol 50 mg daily, and sotalol 80 mg BID. Follow up with Dr. Mcgill in 3 months.  Mr. Orosco was assessed at bedside prior to discharge. He reported feeling well and denied chest discomfort, shortness of breath, palpitations, lightheadedness, or any other acute symptoms. Discharge plans/instructions discussed with patient and wife who verbalized understanding and agreement of plans of care. No further questions or concerns voiced at this time. He was discharged home in stable condition.     Goals of Care Treatment  Preferences:  Code Status: Full Code    Living Will: Yes    Consults: None    Significant Diagnostic Studies: N/A    Final Active Diagnoses:    Diagnosis Date Noted POA    PRINCIPAL PROBLEM:  Atrial flutter [I48.92] 07/27/2023 Yes      Problems Resolved During this Admission:     Discharged Condition: stable    Disposition: Home or Self Care    Follow Up:   Follow-up Information     Jonh Mcgill MD Follow up.    Specialties: Electrophysiology, Cardiology  Why: Post ablation  Contact information:  [facility]  [facility] [id number]  [phone]                       Patient Instructions:      ACCEPT - Ambulatory referral/consult to [facility]   Standing Status: Future   Referral Priority: Routine Referral Type: Consultation   Referral Reason: Specialty Services Required   Requested Specialty: Cardiology   Number of Visits Requested: 1     Other restrictions (specify):   Order Comments: Discharge Instructions and Care of Your Groin      Follow up:  -Follow up with Dr. Mcgill in 3 months.    Medications:  -Continue all of your home medications especially your blood thinner Eliquis.    Catheter Insertion Site  -The morning after your procedure, you may take the dressing off. The easiest way to do this is when you are showering, get the tape and dressing wet and remove it.  -After the bandage is removed, cover the area with a small adhesive bandage. It is normal for the catheter insertion site to be black and blue for a couple of days. The site may also be slightly swollen and pink, and there may be a small lump (about the size of a quarter) at the site.  -Wash the catheter insertion site at least once daily with soap and water. Place soapy water on your hand or washcloth and gently wash the insertion site; do not rub.  -Keep the area clean and dry when you are not showering.  -Do not use creams, lotions or ointment on the wound site.  -Wear loose clothes and loose underwear.  -Do  not take a bath, tub soak, go in a Jacuzzi, or swim in a pool or lake for one week after the procedure.    Activity Instructions  -Do not drive or operate any dangerous machinery for 24 hours, but optimally 48-72 hours since you were given general anesthesia.   -Do not strain during bowel movements for the first 3 to 4 days after the procedure to prevent bleeding from the catheter insertion site.  -Avoid heavy lifting (more than 10 pounds) and pushing or pulling heavy objects for the first 5 to 7 days after the procedure.  -Do not participate in strenuous activities for 5 days after the procedure. This includes most sports - jogging, golfing, play tennis, and bowling.  -You may climb stairs if needed, but walk up and down the stairs more slowly than usual.  -Gradually increase your activities until you reach your normal activity level within one week after the procedure.    If bleeding should occur following discharge:  -Sit down and apply firm pressure to the puncture site with your fingers for 10 minutes   -If the bleeding stops, continue to sit quietly, keeping your leg straight for 2 hours. Notify your physician as soon as possible   -If bleeding does not stop after 10 minutes or if there is a large amount of bleeding or spurting, call 911 immediately.  Do not drive yourself to the hospital.     Notify your physician if these symptoms persist or if you experience:  -Change in color or temperature of the leg  -Redness, heat, or pus at the puncture site  -Chills or fever greater than 101.0 F     Go to the Emergency Department if you develop:   -Severe bleeding   -Acute Weakness or numbness   -Visual, gait or speech disturbances   -New chest pain, palpitations, shortness of breath, fainting     Lifting restrictions     No driving until:   Order Comments: No driving or operating heavy machinery for 24-48 hours after your procedure because you received sedation.     Notify your health care provider if you experience  any of the following:  increased confusion or weakness     Notify your health care provider if you experience any of the following:  worsening rash     Notify your health care provider if you experience any of the following:  severe persistent headache     Notify your health care provider if you experience any of the following:  difficulty breathing or increased cough     Notify your health care provider if you experience any of the following:  persistent dizziness, light-headedness, or visual disturbances     Notify your health care provider if you experience any of the following:  redness, tenderness, or signs of infection (pain, swelling, redness, odor or green/yellow discharge around incision site)     Notify your health care provider if you experience any of the following:  severe uncontrolled pain     Notify your health care provider if you experience any of the following:  persistent nausea and vomiting or diarrhea     Notify your health care provider if you experience any of the following:  temperature >100.4     Remove dressing in 24 hours     Weight bearing restrictions (specify):     Shower on day dressing removed (No bath)     Medications:  Reconciled Home Medications:      Medication List      CONTINUE taking these medications    allopurinoL 100 MG tablet  Commonly known as: ZYLOPRIM  Take 2 tablets (200 mg total) by mouth once daily.     apixaban 5 mg Tab  Commonly known as: ELIQUIS  Take 1 tablet (5 mg total) by mouth 2 (two) times daily.     fluticasone propionate 50 mcg/actuation nasal spray  Commonly known as: FLONASE  SHAKE LIQUID AND USE 1 SPRAY(50 MCG) IN EACH NOSTRIL EVERY DAY     metoprolol succinate 50 MG 24 hr tablet  Commonly known as: TOPROL-XL  Take 1 tablet (50 mg total) by mouth once daily.     multivitamin capsule  Take 1 capsule by mouth once daily.     ofloxacin 0.3 % ophthalmic solution  Commonly known as: OCUFLOX  Place 1 drop into both eyes 4 (four) times daily.     prednisoLONE  acetate 1 % Drps  Commonly known as: PRED FORTE  Place 1 drop into both eyes 3 (three) times daily.     sotaloL 80 MG tablet  Commonly known as: BETAPACE  Take 1 tablet (80 mg total) by mouth 2 (two) times daily.     TRELEGY ELLIPTA 100-62.5-25 mcg Dsdv  Generic drug: fluticasone-umeclidin-vilanter  Inhale 1 puff into the lungs Daily.          Plan:  -Continue all home medications  -Follow up with Dr. Mcgill in 3 months.    Time spent on the discharge of patient: 20 minutes    Linh Mistry NP  Cardiac Electrophysiology  [facility] - Arrhythmia    Attending: Jonh Mcgill MD

## 2023-10-06 ENCOUNTER — TELEPHONE (OUTPATIENT)
Dept: CARDIOLOGY | Facility: CLINIC | Age: 68
End: 2023-10-06
Payer: MEDICARE

## 2023-10-06 NOTE — TELEPHONE ENCOUNTER
Wife's call was returned in regards to cataract surgery scheduled on the  for her . Per Dr. Cueva's notes he is cleared at low risk and may hold eliquis as needed. Wife will fax over new copy of information as the clearance has .

## 2023-10-06 NOTE — TELEPHONE ENCOUNTER
----- Message from Luke Montana sent at 10/5/2023  3:58 PM CDT -----  Regarding: Wife [phone]  Type: Patient Call Back    Who called: Wife     What is the request in detail: called because the pt needs a medical clearance for eye surgery on October 24th. Would like a call back     Can the clinic reply by [facility]? No     Would the patient rather a call back or a response via [facility]? Call back     Best call back number: [phone]     Additional Information:    Thank you.

## 2023-10-09 ENCOUNTER — TELEPHONE (OUTPATIENT)
Dept: CARDIOLOGY | Facility: CLINIC | Age: 68
End: 2023-10-09
Payer: MEDICARE

## 2023-10-09 NOTE — TELEPHONE ENCOUNTER
----- Message from Doreen Leroy sent at 10/9/2023 11:03 AM CDT -----  Type: Patient Call Back    Who called: pt's wife [phone] (home) [phone] (work)      What is the request in detail:pt calling to get updated medical clearance for eye surgery. Has appointment this week. Call wife. The form needs to be filled out and sent back.     Can the clinic reply by [facility]?    Would the patient rather a call back or a response via [facility]? call    Best call back number:[phone] (home) [phone] (work)      Additional Information:

## 2023-10-11 ENCOUNTER — TELEPHONE (OUTPATIENT)
Dept: FAMILY MEDICINE | Facility: CLINIC | Age: 68
End: 2023-10-11
Payer: MEDICARE

## 2023-10-11 NOTE — TELEPHONE ENCOUNTER
Spoke with patient wife , she stated to disregard the message she will contact Dr. Cueva office to have the form sent over to the surgeon office. She will call back if she have any other questions or if a appointment is needed

## 2023-10-11 NOTE — TELEPHONE ENCOUNTER
----- Message from Shauna Zavala sent at 10/10/2023  3:59 PM CDT -----  Regarding: Self [phone]  Type: Patient Call Back     What is the request in detail: Pt is requesting a cardiac medical clearance for cataract sx     Can the clinic reply by [facility]? No     Would the patient rather a call back or a response via [facility]? Call back    Best call back number: .[phone]      Additional Information: Pt stated the eye dr would like a current medical exam report for sx on 10/24    Thank you.

## 2023-10-11 NOTE — TELEPHONE ENCOUNTER
Spoke to wife again, that she needs to reach out to the cardiologist office in reference to getting a pre-op scheduled from Dr. Franco.

## 2023-10-11 NOTE — TELEPHONE ENCOUNTER
"----- Message from Kavya Neal sent at 10/11/2023  1:38 PM CDT -----  Regarding: Doreen wife  .[phone]  .Type:  Patient Returning Call    Who Called: Doreen 'wife"    Who Left Message for Patient: Sheron    Does the patient know what this is regarding? yes    Would the patient rather a call back or a response via [facility]? Call back    Best Call Back Number: .[phone]        "

## 2023-10-11 NOTE — TELEPHONE ENCOUNTER
----- Message from Emily Abbasi sent at 10/10/2023  4:08 PM CDT -----  Regarding: [phone]  Type:  Sooner Appointment Request    Patient is requesting a sooner appointment.  Patient declined first available appointment listed as well as another facility and provider .  Patient will not accept being placed on the waitlist and is requesting a message be sent to doctor.    Name of Caller: wife - Doreen     When is the first available appointment? 12/10    Symptoms: pre op , medical clearance     Would the patient rather a call back or a response via [facility]? Call back     Best Call Back Number: [phone]       Additional Information: she states pt surgery is scheduled for the 24th

## 2023-10-19 ENCOUNTER — OFFICE VISIT (OUTPATIENT)
Dept: CARDIOLOGY | Facility: CLINIC | Age: 68
End: 2023-10-19
Payer: MEDICARE

## 2023-10-19 VITALS
RESPIRATION RATE: 18 BRPM | SYSTOLIC BLOOD PRESSURE: 124 MMHG | WEIGHT: 209.69 LBS | BODY MASS INDEX: 24.76 KG/M2 | HEIGHT: 77 IN | HEART RATE: 75 BPM | OXYGEN SATURATION: 95 % | DIASTOLIC BLOOD PRESSURE: 74 MMHG

## 2023-10-19 DIAGNOSIS — J84.10 PULMONARY INTERSTITIAL FIBROSIS: ICD-10-CM

## 2023-10-19 DIAGNOSIS — I27.20 PULMONARY HYPERTENSION: ICD-10-CM

## 2023-10-19 DIAGNOSIS — K21.9 GASTROESOPHAGEAL REFLUX DISEASE, UNSPECIFIED WHETHER ESOPHAGITIS PRESENT: ICD-10-CM

## 2023-10-19 DIAGNOSIS — I70.0 THORACIC AORTA ATHEROSCLEROSIS: ICD-10-CM

## 2023-10-19 DIAGNOSIS — I10 HTN, GOAL BELOW 140/90: Primary | ICD-10-CM

## 2023-10-19 DIAGNOSIS — E66.3 OVERWEIGHT (BMI 25.0-29.9): ICD-10-CM

## 2023-10-19 DIAGNOSIS — I48.3 TYPICAL ATRIAL FLUTTER: ICD-10-CM

## 2023-10-19 DIAGNOSIS — J84.9 ILD (INTERSTITIAL LUNG DISEASE): ICD-10-CM

## 2023-10-19 PROCEDURE — 3074F SYST BP LT 130 MM HG: CPT | Mod: CPTII,S$GLB,, | Performed by: INTERNAL MEDICINE

## 2023-10-19 PROCEDURE — 3044F PR MOST RECENT HEMOGLOBIN A1C LEVEL <7.0%: ICD-10-PCS | Mod: CPTII,S$GLB,, | Performed by: INTERNAL MEDICINE

## 2023-10-19 PROCEDURE — 3008F PR BODY MASS INDEX (BMI) DOCUMENTED: ICD-10-PCS | Mod: CPTII,S$GLB,, | Performed by: INTERNAL MEDICINE

## 2023-10-19 PROCEDURE — 3288F PR FALLS RISK ASSESSMENT DOCUMENTED: ICD-10-PCS | Mod: CPTII,S$GLB,, | Performed by: INTERNAL MEDICINE

## 2023-10-19 PROCEDURE — 99214 OFFICE O/P EST MOD 30 MIN: CPT | Mod: S$GLB,,, | Performed by: INTERNAL MEDICINE

## 2023-10-19 PROCEDURE — 3008F BODY MASS INDEX DOCD: CPT | Mod: CPTII,S$GLB,, | Performed by: INTERNAL MEDICINE

## 2023-10-19 PROCEDURE — 1159F PR MEDICATION LIST DOCUMENTED IN MEDICAL RECORD: ICD-10-PCS | Mod: CPTII,S$GLB,, | Performed by: INTERNAL MEDICINE

## 2023-10-19 PROCEDURE — 1101F PT FALLS ASSESS-DOCD LE1/YR: CPT | Mod: CPTII,S$GLB,, | Performed by: INTERNAL MEDICINE

## 2023-10-19 PROCEDURE — 1126F AMNT PAIN NOTED NONE PRSNT: CPT | Mod: CPTII,S$GLB,, | Performed by: INTERNAL MEDICINE

## 2023-10-19 PROCEDURE — 4010F ACE/ARB THERAPY RXD/TAKEN: CPT | Mod: CPTII,S$GLB,, | Performed by: INTERNAL MEDICINE

## 2023-10-19 PROCEDURE — 1160F RVW MEDS BY RX/DR IN RCRD: CPT | Mod: CPTII,S$GLB,, | Performed by: INTERNAL MEDICINE

## 2023-10-19 PROCEDURE — 3074F PR MOST RECENT SYSTOLIC BLOOD PRESSURE < 130 MM HG: ICD-10-PCS | Mod: CPTII,S$GLB,, | Performed by: INTERNAL MEDICINE

## 2023-10-19 PROCEDURE — 3078F DIAST BP <80 MM HG: CPT | Mod: CPTII,S$GLB,, | Performed by: INTERNAL MEDICINE

## 2023-10-19 PROCEDURE — 1157F ADVNC CARE PLAN IN RCRD: CPT | Mod: CPTII,S$GLB,, | Performed by: INTERNAL MEDICINE

## 2023-10-19 PROCEDURE — 99999 PR PBB SHADOW E&M-EST. PATIENT-LVL III: ICD-10-PCS | Mod: PBBFAC,,, | Performed by: INTERNAL MEDICINE

## 2023-10-19 PROCEDURE — 1126F PR PAIN SEVERITY QUANTIFIED, NO PAIN PRESENT: ICD-10-PCS | Mod: CPTII,S$GLB,, | Performed by: INTERNAL MEDICINE

## 2023-10-19 PROCEDURE — 99214 PR OFFICE/OUTPT VISIT, EST, LEVL IV, 30-39 MIN: ICD-10-PCS | Mod: S$GLB,,, | Performed by: INTERNAL MEDICINE

## 2023-10-19 PROCEDURE — 1159F MED LIST DOCD IN RCRD: CPT | Mod: CPTII,S$GLB,, | Performed by: INTERNAL MEDICINE

## 2023-10-19 PROCEDURE — 3288F FALL RISK ASSESSMENT DOCD: CPT | Mod: CPTII,S$GLB,, | Performed by: INTERNAL MEDICINE

## 2023-10-19 PROCEDURE — 4010F PR ACE/ARB THEARPY RXD/TAKEN: ICD-10-PCS | Mod: CPTII,S$GLB,, | Performed by: INTERNAL MEDICINE

## 2023-10-19 PROCEDURE — 1160F PR REVIEW ALL MEDS BY PRESCRIBER/CLIN PHARMACIST DOCUMENTED: ICD-10-PCS | Mod: CPTII,S$GLB,, | Performed by: INTERNAL MEDICINE

## 2023-10-19 PROCEDURE — 99999 PR PBB SHADOW E&M-EST. PATIENT-LVL III: CPT | Mod: PBBFAC,,, | Performed by: INTERNAL MEDICINE

## 2023-10-19 PROCEDURE — 1157F PR ADVANCE CARE PLAN OR EQUIV PRESENT IN MEDICAL RECORD: ICD-10-PCS | Mod: CPTII,S$GLB,, | Performed by: INTERNAL MEDICINE

## 2023-10-19 PROCEDURE — 3078F PR MOST RECENT DIASTOLIC BLOOD PRESSURE < 80 MM HG: ICD-10-PCS | Mod: CPTII,S$GLB,, | Performed by: INTERNAL MEDICINE

## 2023-10-19 PROCEDURE — 1101F PR PT FALLS ASSESS DOC 0-1 FALLS W/OUT INJ PAST YR: ICD-10-PCS | Mod: CPTII,S$GLB,, | Performed by: INTERNAL MEDICINE

## 2023-10-19 PROCEDURE — 3044F HG A1C LEVEL LT 7.0%: CPT | Mod: CPTII,S$GLB,, | Performed by: INTERNAL MEDICINE

## 2023-10-19 NOTE — PROGRESS NOTES
CARDIOVASCULAR CONSULTATION    REASON FOR CONSULT:   Josiah Orosco Jr. is a 68 y.o. male who presents for evaluation      HISTORY OF PRESENT ILLNESS:     Patient is a pleasant 66-year-old man.  Here for evaluation.  Patient states that lately he has been experiencing dyspnea on exertion.  Also he was told that he has a regular rhythm and hence has been referred.  Patient denies any palpitations.  Denies any orthopnea or PND.  EKG done personally reviewed and shows sinus rhythm with PACs.    Notes from September 2022:  Patient here for follow-up.    Sinus rhythm with heart rates varying between 44 and 164 BPM with an average of 88 BPM.  There were rare PVCs totalling 402 and averaging 8.38 per hour. There were 1 couplets. There were 1 triplets.  There were very frequent PACs  SVT/possible atrial flutter.  Recommend event monitor.    The left ventricle is normal in size with mild concentric hypertrophy and normal systolic function.  The estimated ejection fraction is 60%.  Indeterminate left ventricular diastolic function.  Mild right atrial enlargement.  Normal right ventricular size with normal right ventricular systolic function.  Mild tricuspid regurgitation.  Normal central venous pressure (3 mmHg).  The estimated PA systolic pressure is 38 mmHg.  There is mild pulmonary hypertension.      Normal myocardial perfusion scan. There is no evidence of myocardial ischemia or infarction.    There is a  mild to moderate intensity fixed perfusion abnormality in the inferior wall of the left ventricle secondary to diaphragm attenuation.    The gated perfusion images showed an ejection fraction of 61% post stress.    There is normal wall motion post stress.    The EKG portion of this study is negative for ischemia.    The patient reported no chest pain during the stress test.    During stress, occasional PVCs are noted. , During stress, SVTs are noted.      Notes from October 2022:    Patient here for follow-up.  Doing  fine.  Event monitor showed some PACs and periods of tachycardia as described below.  Patient asymptomatic.      At baseline, in the absence of symptoms, the rhythm was sinus with heart rate 85 beats per minute.  There were PACs and sequential PACs.  An auto trigger event on 09/27 showed sinus tachycardia at 155 beats per minute, and on 09/28 another auto trigger event also showed sinus tachycardia.  There also was a regular narrow complex tachycardia at 157 beats per minute.  This lasted 27 seconds and began following 2 PVCs.  P-waves were not clearly evident.  There was a gradual slow down with P-waves becoming evident, to become consistent with sinus tachycardia with occasional PACs.  No activity level (e.g., exercise) was specified.     Impression: Sinus rhythm with PACs and periods of tachycardia, as described above.    Notes from January 23    Patient doing fine.  Denies any chest pains at rest on exertion, orthopnea, PND, swelling of feet    Notes from April 23: Patient here for follow-up.  Lately has been experiencing dyspnea on exertion.  EKG done at outside hospital demonstrated atrial flutter and patient was initiated on Eliquis.        May 23:  Patient here for follow-up after cardioversion.  States breathing better after cardioversion.  Now back to his baseline.  EKG done today shows sinus rhythm.    Normal systolic function.  Normal right ventricular size with normal right ventricular systolic function.  Moderate left atrial enlargement.  Mild right atrial enlargement.  Mild-to-moderate mitral regurgitation.  Mild tricuspid regurgitation.  No thrombus is present in the appendage.  The estimated ejection fraction is 55%.  A synchronized cardioversion was successfully performed with restoration of normal sinus rhythm.    Notes from August 23: Patient here for follow-up status post cardioversion.  On sotalol.  Maintaining regular rhythm.  Saw Dr. Chahal who is planning for ablation.    Notes from October  23: Patient here for follow-up status post atrial flutter ablation by Dr. mario veliz.  Currently in sinus rhythm.  States feels much better after atrial flutter ablation.  Does have his chronic dyspnea on exertion, but does not feel as tired as he used to prior to the ablation.    PAST MEDICAL HISTORY:     Past Medical History:   Diagnosis Date    A-fib 5/8/2023    Arthritis     GERD (gastroesophageal reflux disease)     History of HCV, s/p successful treatment w/ SVR12 5/2015     Failed treatment (stage I/II) - followed by hepatology     Joint pain     Lung disease caused by breathing particles     Widespread essentially symmetric interstitial lung disease    Obesity     Polycythemia vera     Prediabetes        PAST SURGICAL HISTORY:     Past Surgical History:   Procedure Laterality Date    ABLATION, ATRIAL FLUTTER, TYPICAL N/A 9/29/2023    Procedure: Ablation, Atrial Flutter, Typical;  Surgeon: Jonh Mcgill MD;  Location: [facility] EP LAB;  Service: Cardiology;  Laterality: N/A;  AFL, ELENA (Cx if SR), CTI, RFA, BETHANY, MAC, DM, 3 Prep    BUNIONECTOMY      bilateral    CARDIOVERSION N/A 05/08/2023    Procedure: Cardioversion;  Surgeon: David Cueva MD;  Location: [facility] CATH LAB;  Service: Cardiology;  Laterality: N/A;    CATARACT EXTRACTION Left 07/27/2023    COLONOSCOPY N/A 12/09/2015    Procedure: COLONOSCOPY;  Surgeon: Conrad Iqbal MD;  Location: [facility] ENDO;  Service: Endoscopy;  Laterality: N/A;    Liver biopsy x2      rotator cuff Right     TRANSESOPHAGEAL ECHOCARDIOGRAM WITH POSSIBLE CARDIOVERSION (ELENA W/ POSS CARDIOVERSION) N/A 05/08/2023    Procedure: TRANSESOPHAGEAL ECHOCARDIOGRAM WITH POSSIBLE CARDIOVERSION (ELENA W/ POSS CARDIOVERSION);  Surgeon: David Cueva MD;  Location: [facility] CATH LAB;  Service: Cardiology;  Laterality: N/A;  12:30PM    RN PREOP 5/4/2023---EKG ON ARRIVAL    TRANSESOPHAGEAL ECHOCARDIOGRAM WITH POSSIBLE CARDIOVERSION (ELENA W/ POSS CARDIOVERSION) N/A 7/29/2023    Procedure:  Transesophageal echo (ELENA) intra-procedure log documentation;  Surgeon: David Cueva MD;  Location: [facility] CATH LAB;  Service: Cardiology;  Laterality: N/A;    TRANSESOPHAGEAL ECHOCARDIOGRAPHY N/A 05/08/2023    Procedure: ECHOCARDIOGRAM, TRANSESOPHAGEAL;  Surgeon: David Cueva MD;  Location: [facility] CATH LAB;  Service: Cardiology;  Laterality: N/A;    TRANSESOPHAGEAL ECHOCARDIOGRAPHY N/A 7/29/2023    Procedure: ECHOCARDIOGRAM, TRANSESOPHAGEAL;  Surgeon: David Cueva MD;  Location: [facility] CATH LAB;  Service: Cardiology;  Laterality: N/A;    TREATMENT OF CARDIAC ARRHYTHMIA N/A 8/7/2023    Procedure: Cardioversion or Defibrillation;  Surgeon: David Cueva MD;  Location: [facility] CATH LAB;  Service: Cardiology;  Laterality: N/A;       ALLERGIES AND MEDICATION:     Review of patient's allergies indicates:   Allergen Reactions    Ace inhibitors Other (See Comments)     cough        Medication List            Accurate as of October 19, 2023 10:29 AM. If you have any questions, ask your nurse or doctor.                CONTINUE taking these medications      allopurinoL 100 MG tablet  Commonly known as: ZYLOPRIM  Take 2 tablets (200 mg total) by mouth once daily.     apixaban 5 mg Tab  Commonly known as: ELIQUIS  Take 1 tablet (5 mg total) by mouth 2 (two) times daily.     fluticasone propionate 50 mcg/actuation nasal spray  Commonly known as: FLONASE  SHAKE LIQUID AND USE 1 SPRAY(50 MCG) IN EACH NOSTRIL EVERY DAY     metoprolol succinate 50 MG 24 hr tablet  Commonly known as: TOPROL-XL  Take 1 tablet (50 mg total) by mouth once daily.     multivitamin capsule     ofloxacin 0.3 % ophthalmic solution  Commonly known as: OCUFLOX     prednisoLONE acetate 1 % Drps  Commonly known as: PRED FORTE     sotaloL 80 MG tablet  Commonly known as: BETAPACE  Take 1 tablet (80 mg total) by mouth 2 (two) times daily.     TRELEGY ELLIPTA 100-62.5-25 mcg Dsdv  Generic drug: fluticasone-umeclidin-vilanter              SOCIAL HISTORY:      Social History     Socioeconomic History    Marital status:     Number of children: 0    Highest education level: Some college, no degree   Occupational History    Occupation: Self-employed     Employer: [employer]   Tobacco Use    Smoking status: Former    Smokeless tobacco: Never    Tobacco comments:     pt. smokes 10 cigars per day.   Substance and Sexual Activity    Alcohol use: Not Currently     Comment: Rarely    Drug use: Yes     Types: Marijuana     Comment: daily    Sexual activity: Yes     Partners: Female     Social Determinants of Health     Financial Resource Strain: Low Risk  (8/8/2023)    Overall Financial Resource Strain (CARDIA)     Difficulty of Paying Living Expenses: Not very hard   Food Insecurity: No Food Insecurity (8/8/2023)    Hunger Vital Sign     Worried About Running Out of Food in the Last Year: Never true     Ran Out of Food in the Last Year: Never true   Transportation Needs: No Transportation Needs (8/8/2023)    PRAPARE - Transportation     Lack of Transportation (Medical): No     Lack of Transportation (Non-Medical): No   Physical Activity: Inactive (8/8/2023)    Exercise Vital Sign     Days of Exercise per Week: 0 days     Minutes of Exercise per Session: 0 min   Stress: Stress Concern Present (8/8/2023)    [employer] [place] [employer] - Occupational Stress Questionnaire     Feeling of Stress : To some extent   Social Connections: Moderately Integrated (8/8/2023)    Social Connection and Isolation Panel [NHANES]     Frequency of Communication with Friends and Family: Three times a week     Frequency of Social Gatherings with Friends and Family: Three times a week     Attends [employer] Services: More than 4 times per year     Active Member of Clubs or Organizations: No     Attends Club or Organization Meetings: Never     Marital Status:    Housing Stability: Low Risk  (8/8/2023)    Housing Stability Vital Sign     Unable to Pay for Housing in the Last  "Year: No     Number of Places Lived in the Last Year: 1     Unstable Housing in the Last Year: No       FAMILY HISTORY:     Family History   Problem Relation Age of Onset    Heart disease Mother     Kidney disease Mother     Parkinsonism Father     Prostate cancer Cousin         maternal side    Heart attack Sister         CABG    Deep vein thrombosis Sister     Pulmonary embolism Sister     Osteoarthritis Sister         s/p knee replacement    Chronic back pain Brother     Liver disease Neg Hx     Diabetes Neg Hx     Melanoma Neg Hx        REVIEW OF SYSTEMS:   Review of Systems   Constitutional: Negative.   HENT: Negative.     Eyes: Negative.    Endocrine: Negative.    Hematologic/Lymphatic: Negative.    Skin: Negative.    Musculoskeletal: Negative.    Gastrointestinal: Negative.    Genitourinary: Negative.    Neurological: Negative.    Psychiatric/Behavioral: Negative.     Allergic/Immunologic: Negative.        A 10 point review of systems was performed and all the pertinent positives have been mentioned. Rest of review of systems was negative.        PHYSICAL EXAM:     Vitals:    10/19/23 1006   BP: 124/74   Pulse: 75   Resp: 18    Body mass index is 24.86 kg/m².  Weight: 95.1 kg (209 lb 10.5 oz)   Height: 6' 5" (195.6 cm)     Physical Exam  Vitals reviewed.   Constitutional:       Appearance: He is well-developed.   HENT:      Head: Normocephalic.   Eyes:      Conjunctiva/sclera: Conjunctivae normal.      Pupils: Pupils are equal, round, and reactive to light.   Cardiovascular:      Rate and Rhythm: Normal rate and regular rhythm.      Heart sounds: Normal heart sounds.   Pulmonary:      Effort: Pulmonary effort is normal.      Breath sounds: Normal breath sounds.   Abdominal:      General: Bowel sounds are normal.      Palpations: Abdomen is soft.   Musculoskeletal:      Cervical back: Normal range of motion and neck supple.   Skin:     General: Skin is warm.   Neurological:      Mental Status: He is alert and " oriented to person, place, and time.           DATA:     Laboratory:  CBC:  Recent Labs   Lab 07/27/23  1435 08/05/23  1159 09/22/23  0846   WBC 8.02 7.30 6.22   Hemoglobin 14.4 15.2 13.8 L   Hematocrit 48.7 50.5 45.6   Platelets 341 246 251         CHEMISTRIES:  Recent Labs   Lab 03/06/21  0822 03/11/22  0705 04/14/23  0953 08/05/23  1159 08/06/23  0333 08/07/23  0336 08/08/23  0608 09/22/23  0846   Glucose 96 101   < > 163 H   < > 95 83 104   Sodium 140 143   < > 136   < > 135 L 138 139   Potassium 4.4 4.6   < > 4.6   < > 4.3 4.5 4.6   BUN 12 10   < > 13   < > 11 14 9   Creatinine 0.9 0.9   < > 1.0   < > 0.7 0.7 0.8   eGFR if African American >60.0 >60  --   --   --   --   --   --    eGFR if non  >60.0 >60  --   --   --   --   --   --    Calcium 9.3 9.5   < > 9.6   < > 9.1 9.0 9.6   Magnesium  --   --   --  1.8  --   --   --   --     < > = values in this interval not displayed.         CARDIAC BIOMARKERS:  Recent Labs   Lab 07/27/23  1435 08/05/23  1159   Troponin I 0.010 0.009         COAGS:  Recent Labs   Lab 07/27/23  1435 09/22/23  0846   INR 1.2 1.2         LIPIDS/LFTS:  Recent Labs   Lab 03/06/21  0822 03/11/22  0705 04/14/23  0953 07/27/23  1435 08/05/23  1159   Cholesterol 136 142  --   --   --    Triglycerides 46 56  --   --   --    HDL 39 L 42  --   --   --    LDL Cholesterol 87.8 88.8  --   --   --    Non-HDL Cholesterol 97 100  --   --   --    AST 26 25 30 27 25   ALT 11 12 24 32 17         Hemoglobin A1C   Date Value Ref Range Status   04/14/2023 6.0 (H) 4.0 - 5.6 % Final     Comment:     ADA Screening Guidelines:  5.7-6.4%  Consistent with prediabetes  >or=6.5%  Consistent with diabetes    High levels of fetal hemoglobin interfere with the HbA1C  assay. Heterozygous hemoglobin variants (HbS, HgC, etc)do  not significantly interfere with this assay.   However, presence of multiple variants may affect accuracy.     03/11/2022 5.9 (H) 4.0 - 5.6 % Final     Comment:     ADA Screening  Guidelines:  5.7-6.4%  Consistent with prediabetes  >or=6.5%  Consistent with diabetes    High levels of fetal hemoglobin interfere with the HbA1C  assay. Heterozygous hemoglobin variants (HbS, HgC, etc)do  not significantly interfere with this assay.   However, presence of multiple variants may affect accuracy.     03/06/2021 5.8 (H) 4.0 - 5.6 % Final     Comment:     ADA Screening Guidelines:  5.7-6.4%  Consistent with prediabetes  >or=6.5%  Consistent with diabetes    High levels of fetal hemoglobin interfere with the HbA1C  assay. Heterozygous hemoglobin variants (HbS, HgC, etc)do  not significantly interfere with this assay.   However, presence of multiple variants may affect accuracy.         TSH  Recent Labs   Lab 05/23/23  0843 07/27/23  1435   TSH 0.942 1.015         The 10-year ASCVD risk score (Felix CHOE, et al., 2019) is: 16.6%    Values used to calculate the score:      Age: 68 years      Sex: Male      Is Non- : Yes      Diabetic: No      Tobacco smoker: No      Systolic Blood Pressure: 124 mmHg      Is BP treated: Yes      HDL Cholesterol: 42 mg/dL      Total Cholesterol: 142 mg/dL             ASSESSMENT AND PLAN     Patient Active Problem List   Diagnosis    History of HCV, s/p successful treatment w/ SVR12 5/2015    Lung disease caused by breathing particles    Overweight (BMI 25.0-29.9)    GERD (gastroesophageal reflux disease)    Polycythemia    Former smoker    Hyperuricemia    ILD (interstitial lung disease)    Prediabetes    Arthritis    Allergic cough    HTN, goal below 140/90    Pulmonary interstitial fibrosis    Pulmonary hypertension    Marijuana user    Thoracic aorta atherosclerosis    Cataract    Atrial flutter       Dyspnea on exertion, palpitations and shortness of breath.  Further evaluation with the help of stress test, echo, Holter monitor.  Stress test did not show any significant ischemia.  Echo showed normal left ventricle systolic function.      atrial  flutter:  Status post ablation.  In sinus rhythm now.  Continue anticoagulation sotalol.      Cataract surgery: Patient may proceed for cataract surgery at low risk.  Patient has been told to wait 1 month after his cardioversion prior to holding Eliquis.  May hold Eliquis as needed.      Continue Eliquis 5 mg b.i.d.      Follow-up after 3 months    Thank you very much for involving me in the care of your patient.  Please do not hesitate to contact me if there are any questions.      David Cueva MD, FAC, [facility]  Interventional Cardiologist, [facility].           This note was dictated with the help of speech recognition software.  There might be un-intended errors and/or substitutions.

## 2023-11-06 ENCOUNTER — TELEPHONE (OUTPATIENT)
Dept: FAMILY MEDICINE | Facility: CLINIC | Age: 68
End: 2023-11-06
Payer: MEDICARE

## 2023-11-06 NOTE — TELEPHONE ENCOUNTER
----- Message from Stu Sauceda sent at 11/6/2023  1:59 PM CST -----  Type: Patient Call Back    Who called: Doreen Orosco/ Wife     What is the request in detail: Says PT Oxygen level was at a 84 this morning when he went to the Dr and was told he needed to call his PCP to address it     Can the clinic reply by [facility]? No     Would the patient rather a call back or a response via [facility]? Call     Best call back number: [phone] (Wife Cell)

## 2023-11-06 NOTE — TELEPHONE ENCOUNTER
"Spoke with patient's wife in reference to his low pulse ox readings. Patient went to the Dr. This am & registered at 84. Patient exhibits S.O.B. on exhertion. Patient lives in a two story home with 12 steps to enter the master bedroom. Questioned wife were any bedrooms downstairs? Wife states "NO, he only has a man cave. Instructed wife on ways to conserve his energy while at home. Wife has a pulse ox gadget, but not at home. Wife instructed to go to the emergency room if 's pulse ox is less than 88 since there is no oxygen at home. Wife states understanding. Provider will be notified.   "

## 2023-11-06 NOTE — TELEPHONE ENCOUNTER
Recommend he contact his pulmonologist for further evaluation and treatment. Recommend UC/ED if he is symptomatic.

## 2023-11-08 ENCOUNTER — TELEPHONE (OUTPATIENT)
Dept: PULMONOLOGY | Facility: CLINIC | Age: 68
End: 2023-11-08
Payer: MEDICARE

## 2023-11-08 NOTE — TELEPHONE ENCOUNTER
I spoke with patient wife Ms Logan in regards to Mr Orosco needing an appointment. Ms Logan stated her  is having a difficult time breathing. I advised her to bring him to the nearest Emergency Room. Appointment is made with Dr Ortiz on 11/9/23 at 3 PM. She confirmed and verbalized understanding.

## 2023-11-08 NOTE — TELEPHONE ENCOUNTER
----- Message from Aida Barrios sent at 11/8/2023  8:32 AM CST -----  Regarding: Pt's Oxygen level is low during normal activities ( 2nd attempt)  Contact: [phone]  CONSULT/ADVISORY    Name of Caller:  Doreen ( Spouse)    Contact Preference:  [phone]    Nature of Call:  Spouse states she called yesterday to inform staff that pt is a prev pt of Dr Hunter.  Pts oxygen level is low during normal activities.   States no one called her back.  Please call.

## 2023-11-09 ENCOUNTER — OFFICE VISIT (OUTPATIENT)
Dept: PULMONOLOGY | Facility: CLINIC | Age: 68
End: 2023-11-09
Payer: MEDICARE

## 2023-11-09 ENCOUNTER — HOSPITAL ENCOUNTER (OUTPATIENT)
Dept: RADIOLOGY | Facility: HOSPITAL | Age: 68
Discharge: HOME | End: 2023-11-09
Attending: INTERNAL MEDICINE
Payer: MEDICARE

## 2023-11-09 ENCOUNTER — HOSPITAL ENCOUNTER (OUTPATIENT)
Dept: PULMONOLOGY | Facility: CLINIC | Age: 68
Discharge: HOME | End: 2023-11-09
Payer: MEDICARE

## 2023-11-09 VITALS
WEIGHT: 204.81 LBS | DIASTOLIC BLOOD PRESSURE: 76 MMHG | OXYGEN SATURATION: 85 % | HEIGHT: 77 IN | BODY MASS INDEX: 24.18 KG/M2 | HEART RATE: 83 BPM | SYSTOLIC BLOOD PRESSURE: 130 MMHG

## 2023-11-09 VITALS — HEIGHT: 77 IN | BODY MASS INDEX: 24.18 KG/M2 | WEIGHT: 204.81 LBS

## 2023-11-09 DIAGNOSIS — J84.89 PROGRESSIVE FIBROSING INTERSTITIAL LUNG DISEASE: ICD-10-CM

## 2023-11-09 DIAGNOSIS — I50.812 CHRONIC RIGHT HEART FAILURE: ICD-10-CM

## 2023-11-09 DIAGNOSIS — J84.9 ILD (INTERSTITIAL LUNG DISEASE): ICD-10-CM

## 2023-11-09 DIAGNOSIS — J96.11 CHRONIC HYPOXEMIC RESPIRATORY FAILURE: ICD-10-CM

## 2023-11-09 DIAGNOSIS — J84.9 ILD (INTERSTITIAL LUNG DISEASE): Primary | ICD-10-CM

## 2023-11-09 DIAGNOSIS — I27.20 PULMONARY HYPERTENSION: ICD-10-CM

## 2023-11-09 DIAGNOSIS — Z01.811 ENCOUNTER FOR PREOPERATIVE PULMONARY EXAMINATION: ICD-10-CM

## 2023-11-09 PROCEDURE — 99215 PR OFFICE/OUTPT VISIT, EST, LEVL V, 40-54 MIN: ICD-10-PCS | Mod: 25,S$GLB,, | Performed by: INTERNAL MEDICINE

## 2023-11-09 PROCEDURE — 71046 X-RAY EXAM CHEST 2 VIEWS: CPT | Mod: 26,,, | Performed by: RADIOLOGY

## 2023-11-09 PROCEDURE — 71046 XR CHEST PA AND LATERAL: ICD-10-PCS | Mod: 26,,, | Performed by: RADIOLOGY

## 2023-11-09 PROCEDURE — 3288F PR FALLS RISK ASSESSMENT DOCUMENTED: ICD-10-PCS | Mod: CPTII,S$GLB,, | Performed by: INTERNAL MEDICINE

## 2023-11-09 PROCEDURE — 1101F PT FALLS ASSESS-DOCD LE1/YR: CPT | Mod: CPTII,S$GLB,, | Performed by: INTERNAL MEDICINE

## 2023-11-09 PROCEDURE — 94618 PULMONARY STRESS TESTING: ICD-10-PCS | Mod: S$GLB,,, | Performed by: INTERNAL MEDICINE

## 2023-11-09 PROCEDURE — 1157F PR ADVANCE CARE PLAN OR EQUIV PRESENT IN MEDICAL RECORD: ICD-10-PCS | Mod: CPTII,S$GLB,, | Performed by: INTERNAL MEDICINE

## 2023-11-09 PROCEDURE — 3008F BODY MASS INDEX DOCD: CPT | Mod: CPTII,S$GLB,, | Performed by: INTERNAL MEDICINE

## 2023-11-09 PROCEDURE — 1157F ADVNC CARE PLAN IN RCRD: CPT | Mod: CPTII,S$GLB,, | Performed by: INTERNAL MEDICINE

## 2023-11-09 PROCEDURE — 4010F ACE/ARB THERAPY RXD/TAKEN: CPT | Mod: CPTII,S$GLB,, | Performed by: INTERNAL MEDICINE

## 2023-11-09 PROCEDURE — 99215 OFFICE O/P EST HI 40 MIN: CPT | Mod: 25,S$GLB,, | Performed by: INTERNAL MEDICINE

## 2023-11-09 PROCEDURE — 3008F PR BODY MASS INDEX (BMI) DOCUMENTED: ICD-10-PCS | Mod: CPTII,S$GLB,, | Performed by: INTERNAL MEDICINE

## 2023-11-09 PROCEDURE — 3288F FALL RISK ASSESSMENT DOCD: CPT | Mod: CPTII,S$GLB,, | Performed by: INTERNAL MEDICINE

## 2023-11-09 PROCEDURE — 94618 PULMONARY STRESS TESTING: CPT | Mod: S$GLB,,, | Performed by: INTERNAL MEDICINE

## 2023-11-09 PROCEDURE — 99999 PR PBB SHADOW E&M-EST. PATIENT-LVL III: CPT | Mod: PBBFAC,,, | Performed by: INTERNAL MEDICINE

## 2023-11-09 PROCEDURE — 4010F PR ACE/ARB THEARPY RXD/TAKEN: ICD-10-PCS | Mod: CPTII,S$GLB,, | Performed by: INTERNAL MEDICINE

## 2023-11-09 PROCEDURE — 3075F PR MOST RECENT SYSTOLIC BLOOD PRESS GE 130-139MM HG: ICD-10-PCS | Mod: CPTII,S$GLB,, | Performed by: INTERNAL MEDICINE

## 2023-11-09 PROCEDURE — 3075F SYST BP GE 130 - 139MM HG: CPT | Mod: CPTII,S$GLB,, | Performed by: INTERNAL MEDICINE

## 2023-11-09 PROCEDURE — 1159F MED LIST DOCD IN RCRD: CPT | Mod: CPTII,S$GLB,, | Performed by: INTERNAL MEDICINE

## 2023-11-09 PROCEDURE — 3078F PR MOST RECENT DIASTOLIC BLOOD PRESSURE < 80 MM HG: ICD-10-PCS | Mod: CPTII,S$GLB,, | Performed by: INTERNAL MEDICINE

## 2023-11-09 PROCEDURE — 3078F DIAST BP <80 MM HG: CPT | Mod: CPTII,S$GLB,, | Performed by: INTERNAL MEDICINE

## 2023-11-09 PROCEDURE — 1101F PR PT FALLS ASSESS DOC 0-1 FALLS W/OUT INJ PAST YR: ICD-10-PCS | Mod: CPTII,S$GLB,, | Performed by: INTERNAL MEDICINE

## 2023-11-09 PROCEDURE — 3044F PR MOST RECENT HEMOGLOBIN A1C LEVEL <7.0%: ICD-10-PCS | Mod: CPTII,S$GLB,, | Performed by: INTERNAL MEDICINE

## 2023-11-09 PROCEDURE — 99999 PR PBB SHADOW E&M-EST. PATIENT-LVL III: ICD-10-PCS | Mod: PBBFAC,,, | Performed by: INTERNAL MEDICINE

## 2023-11-09 PROCEDURE — 1159F PR MEDICATION LIST DOCUMENTED IN MEDICAL RECORD: ICD-10-PCS | Mod: CPTII,S$GLB,, | Performed by: INTERNAL MEDICINE

## 2023-11-09 PROCEDURE — 71046 X-RAY EXAM CHEST 2 VIEWS: CPT | Mod: TC,FY

## 2023-11-09 PROCEDURE — 3044F HG A1C LEVEL LT 7.0%: CPT | Mod: CPTII,S$GLB,, | Performed by: INTERNAL MEDICINE

## 2023-11-09 NOTE — PROCEDURES
Josiah Orosco Jr. is a 68 y.o.  male patient, who presents for a 6 minute walk test ordered by MD Angel.  The diagnosis is Qualify for Oxygen; Interstitial Lung Disease.  The patient's BMI is 24.3 kg/m2. Predicted distance (lower limit of normal) is 371.82 meters.    Test Results:    The test was not completed. The patient stopped 2 times for a total of 224 seconds. The total time walked was 136 seconds. During walking, the patient reported:  Dyspnea. The patient used no assistive devices during testing.     11/09/2023---------Distance: 121.92 meters (400 feet)     O2 Sat % Supplemental Oxygen Heart Rate Blood Pressure Ashlie Scale   Pre-exercise  (Resting) 92 % Room Air 109 bpm 157/95 mmHg 4   During Exercise 82 % Room Air 103 bpm 170/89 mmHg 7-8   Post-exercise   90 % Room Air  97 bpm       Recovery Time: 125 seconds    Oxygen Qualification:     O2 Sat % Supplemental Oxygen Heart Rate Blood Pressure Ashlie Scale   Pre-exercise  (Resting) 98 % 2 L/M  106 bpm  150/90 mmHg 3    During Exercise   98 %  2 L/M  107 bpm  168/92 mmHg  3    Post-exercise   98 %  2 L/M  105 bpm        Performing nurse/tech: Farhat WISDOM      PREVIOUS STUDY:   03/07/2016---------Distance: 435.86 meters (1430 feet)       O2 Sat % Supplemental Oxygen Heart Rate Blood Pressure Sahlie Scale   Pre-exercise  (Resting) 95 % Room Air 95 bpm 135/71 mmHg 0   During Exercise 91 % Room Air 104 bpm 156/72 mmHg 0   Post-exercise  (Recovery) 98 % Room Air  93 bpm         CLINICAL INTERPRETATION:  Six minute walk distance is 121.92 meters (400 feet) with very heavy dyspnea.  During exercise, there was significant desaturation while breathing room air.  Both blood pressure and heart rate remained stable with walking.  Hypertension and Tachycardia were present prior to exercise.  The patient did not report non-pulmonary symptoms during exercise.  Severe exercise impairment is likely due to desaturation, cardiovascular causes, and subjective  symptoms.  The patient did not complete the study, walking 136 seconds of the 360 second test.  The patient may benefit from using supplemental oxygen during exertion.  Since the previous study in March 2016, exercise capacity is significantly worse.  Based upon age and body mass index, exercise capacity is less than predicted.   Oxygen saturation did improve while breathing supplemental oxygen.

## 2023-11-09 NOTE — PROGRESS NOTES
Progress Note  [facility]    SUBJECTIVE:     Patient ID: Josiah Orosco Jr. is a 68 y.o. male.    Chief Complaint: Shortness of Breath, Interstitial Lung Disease, and Abnormal Ct Scan    HPI:  The patient is accompanied to his visit today by his wife.    68 yo male with interstitial lung disease that was being followed by Dr Hunter for many years for ILD. His ILD dates back at least 15 years based on imaging review. Today is my first time meeting the patient. He experiences chronic MMRC4 dyspnea on exertion. He experiences a chronic cough that bothers him more at night.    PMH: Aflutter s/p ablation 9/29/2023. During the visit, the patient oxygen sats are at 88-89% at rest.    Patient has history of smoking cigarettes. He smoked for 35 years about 1PPD. He quit ~20 years ago.    He is using trelegy inhaler with good adherence. He does not use a rescue inhaler.     Patient owns an auto body shop. Patient still works around the shop though he has had to start cutting back in the past 6 months. He has done a lot of grinding on metal as part of his occupation.    No pets.    Review of patient's allergies indicates:   Allergen Reactions    Ace inhibitors Other (See Comments)     cough       Past Medical History:   Diagnosis Date    A-fib 5/8/2023    Arthritis     GERD (gastroesophageal reflux disease)     History of HCV, s/p successful treatment w/ SVR12 5/2015     Failed treatment (stage I/II) - followed by hepatology     Joint pain     Lung disease caused by breathing particles     Widespread essentially symmetric interstitial lung disease    Obesity     Polycythemia vera     Prediabetes      Past Surgical History:   Procedure Laterality Date    ABLATION, ATRIAL FLUTTER, TYPICAL N/A 9/29/2023    Procedure: Ablation, Atrial Flutter, Typical;  Surgeon: Jonh Mcgill MD;  Location: [facility] EP LAB;  Service: Cardiology;  Laterality: N/A;  AFL, ELENA (Cx if SR), CTI, RFA, BETHANY, MAC, DM, 3 Prep    BUNIONECTOMY       bilateral    CARDIOVERSION N/A 05/08/2023    Procedure: Cardioversion;  Surgeon: David Cueva MD;  Location: [facility] CATH LAB;  Service: Cardiology;  Laterality: N/A;    CATARACT EXTRACTION Left 07/27/2023    COLONOSCOPY N/A 12/09/2015    Procedure: COLONOSCOPY;  Surgeon: Conrad Iqbal MD;  Location: [facility] ENDO;  Service: Endoscopy;  Laterality: N/A;    Liver biopsy x2      rotator cuff Right     TRANSESOPHAGEAL ECHOCARDIOGRAM WITH POSSIBLE CARDIOVERSION (ELENA W/ POSS CARDIOVERSION) N/A 05/08/2023    Procedure: TRANSESOPHAGEAL ECHOCARDIOGRAM WITH POSSIBLE CARDIOVERSION (ELENA W/ POSS CARDIOVERSION);  Surgeon: David Cueva MD;  Location: [facility] CATH LAB;  Service: Cardiology;  Laterality: N/A;  12:30PM    RN PREOP 5/4/2023---EKG ON ARRIVAL    TRANSESOPHAGEAL ECHOCARDIOGRAM WITH POSSIBLE CARDIOVERSION (ELENA W/ POSS CARDIOVERSION) N/A 7/29/2023    Procedure: Transesophageal echo (ELENA) intra-procedure log documentation;  Surgeon: David Cueva MD;  Location: [facility] CATH LAB;  Service: Cardiology;  Laterality: N/A;    TRANSESOPHAGEAL ECHOCARDIOGRAPHY N/A 05/08/2023    Procedure: ECHOCARDIOGRAM, TRANSESOPHAGEAL;  Surgeon: David Cueva MD;  Location: [facility] CATH LAB;  Service: Cardiology;  Laterality: N/A;    TRANSESOPHAGEAL ECHOCARDIOGRAPHY N/A 7/29/2023    Procedure: ECHOCARDIOGRAM, TRANSESOPHAGEAL;  Surgeon: David Cueva MD;  Location: [facility] CATH LAB;  Service: Cardiology;  Laterality: N/A;    TREATMENT OF CARDIAC ARRHYTHMIA N/A 8/7/2023    Procedure: Cardioversion or Defibrillation;  Surgeon: David Cueva MD;  Location: [facility] CATH LAB;  Service: Cardiology;  Laterality: N/A;     Family History   Problem Relation Age of Onset    Heart disease Mother     Kidney disease Mother     Parkinsonism Father     Prostate cancer Cousin         maternal side    Heart attack Sister         CABG    Deep vein thrombosis Sister     Pulmonary embolism Sister     Osteoarthritis Sister         s/p knee replacement    Chronic back  pain Brother     Liver disease Neg Hx     Diabetes Neg Hx     Melanoma Neg Hx      Social History     Socioeconomic History    Marital status:     Number of children: 0    Highest education level: Some college, no degree   Occupational History    Occupation: Self-employed     Employer: [employer]   Tobacco Use    Smoking status: Former     Current packs/day: 1.00     Average packs/day: 1 pack/day for 30.0 years (30.0 ttl pk-yrs)     Types: Cigarettes     Start date: 11/9/1993    Smokeless tobacco: Never    Tobacco comments:     pt. smokes 10 cigars per day.   Substance and Sexual Activity    Alcohol use: Not Currently     Comment: Rarely    Drug use: Yes     Types: Marijuana     Comment: daily    Sexual activity: Yes     Partners: Female     Social Determinants of Health     Financial Resource Strain: Low Risk  (8/8/2023)    Overall Financial Resource Strain (CARDIA)     Difficulty of Paying Living Expenses: Not very hard   Food Insecurity: No Food Insecurity (8/8/2023)    Hunger Vital Sign     Worried About Running Out of Food in the Last Year: Never true     Ran Out of Food in the Last Year: Never true   Transportation Needs: No Transportation Needs (8/8/2023)    PRAPARE - Transportation     Lack of Transportation (Medical): No     Lack of Transportation (Non-Medical): No   Physical Activity: Inactive (8/8/2023)    Exercise Vital Sign     Days of Exercise per Week: 0 days     Minutes of Exercise per Session: 0 min   Stress: Stress Concern Present (8/8/2023)    [place] [employer] - Occupational Stress Questionnaire     Feeling of Stress : To some extent   Social Connections: Moderately Integrated (8/8/2023)    Social Connection and Isolation Panel [NHANES]     Frequency of Communication with Friends and Family: Three times a week     Frequency of Social Gatherings with Friends and Family: Three times a week     Attends Spiritism Services: More than 4 times per year     Active Member  "of Clubs or Organizations: No     Attends Club or Organization Meetings: Never     Marital Status:    Housing Stability: Low Risk  (8/8/2023)    Housing Stability Vital Sign     Unable to Pay for Housing in the Last Year: No     Number of Places Lived in the Last Year: 1     Unstable Housing in the Last Year: No       Review of Systems:  Constitutional:  Negative for activity change, appetite change, chills, diaphoresis, fatigue, fever and unexpected weight change.   HENT:  Negative for nasal congestion, sinus pressure/congestion and sneezing.    Eyes:  Negative for discharge and itching.   Respiratory:  Negative for cough, choking, chest tightness, shortness of breath, wheezing and stridor.    Cardiovascular:  Negative for palpitations, leg swelling and claudication.   Gastrointestinal:  Negative for abdominal distention, nausea, vomiting and reflux.   Allergic/Immunologic: Negative for environmental allergies, food allergies and immunocompromised state.   Neurological:  Negative for dizziness, light-headedness and headaches.     OBJECTIVE:     Vital Signs  Vitals:    11/09/23 1428   BP: 130/76   Pulse: 83   SpO2: (!) 85%   Weight: 92.9 kg (204 lb 12.9 oz)   Height: 6' 5" (1.956 m)     Body mass index is 24.29 kg/m².    Physical Exam:  Constitutional:       Normal appearance, appears in no acute distress.Mental status alert and oriented.  HENT:      Head: Normocephalic and atraumatic.      Eyelid: No eyelid discharge. Pupils PERRLA.     Nose: No congestion or rhinorrhea.   Cardiovascular:      Rate and Rhythm: Normal rate and regular rhythm.      Pulses: Normal pulses.      Heart sounds: Normal heart sounds. No murmur heard.  Pulmonary:      Effort: Pulmonary effort is normal. No respiratory distress.      Breath sounds: Normal breath sounds. No stridor. No wheezing, rhonchi, +faint rales.     Chest wall: No tenderness.   Ext: +clubbing  Abdominal:      General: Abdomen is flat.      Palpations: Abdomen is " soft.     Laboratory:  Lab Results   Component Value Date    WBC 6.22 09/22/2023    HGB 13.8 (L) 09/22/2023    HCT 45.6 09/22/2023    MCV 70 (L) 09/22/2023     09/22/2023       Chest Imaging, My Impression:   Chest X-Ray 2023: diffuse interstitial disease that has progressively worsened over time. For example there is a marked worsening apparent when the current cxr is compared to the cxr from 9/2016.          ASSESSMENT/PLAN:     1) Chronic hypoxemic respiratory failure  - Recommend continuous use of supplemental oxygen. Discussed with patient at length. Provided education regarding supplemental oxygen use, pulmonary hypertension, & right heart failure. Obtained O2 titration study & rx sent to [facility].    2) ILD with progressive fibrotic phenotype  - ILD with worsening subjective symptoms, worsening PFT, worsening imaging findings. Patient meets criteria for anti-fibrotic therapy. Will discuss option at next visit.  - At time of initial diagnosis, pt had autoimmune workup which included negative RF & negative MAAME. Pt does not exhibit signs of autoimmune disease on ROS.  - Suspect ILD may be occupational in etiology.    3) Pulmonary hypertension with right heart failure (chronic)  - Suspect this is primarily WHO group 3 disease. Recommend starting & adhering to supplemental oxygen. Reassess TTE once adherent to oxygen therapy. Can consider additional therapeutic options pending reassessment.    4) RUL opacity  - Discussed that cancer is one possibility in the differential of this worsening opacity. Chest CT would be the next step in evaluation. Pt understands but would rather not pursue it further.     5) Preoperative pulmonary exam  - Patient asking for pulmonary clearance for cataract surgery. While patient is at risk for pulmonary complications related to anesthesia, he does have adequate lung function to undergo cataract surgery which is typically done with conscious sedation. I recommend avoidance of  general anesthesia for this patient. I defer decisions regarding interruption of eliquis to cardiology. The only thing patient needs for pulmonary optimization prior to surgery is to acquire home oxygen & adhere to it.    6) Atrial flutter  - Takes eliquis. S/p aflutter ablation 9/29/2023.     7) Mitral regurgitation  - Monitor.    Total professional time spent for the encounter: 40 minutes  Time was spent preparing to see the patient, reviewing results of prior testing, obtaining and/or reviewing separately obtained history, performing a medically appropriate examination and interview, counseling and educating the patient/family, ordering medications/tests/procedures, referring and communicating with other health care professionals, documenting clinical information in the electronic health record, and independently interpreting results.    Davion Ortiz MD  [facility]

## 2023-11-13 ENCOUNTER — TELEPHONE (OUTPATIENT)
Dept: PULMONOLOGY | Facility: CLINIC | Age: 68
End: 2023-11-13
Payer: MEDICARE

## 2023-11-13 NOTE — TELEPHONE ENCOUNTER
I called Mrs Orosco and her voicemail came on. I left a message that I am calling back at her request.I told her Mr Orosco's oxygen was ordered 11-10-23 from [facility] . Melisa Lucio LPN

## 2023-11-13 NOTE — TELEPHONE ENCOUNTER
----- Message from Alexus Proctor sent at 11/13/2023  2:25 PM CST -----  Regarding: question  Contact: @[phone]  Pt wife called in regards to speaking with someone she has some question for about her  benjamin care .....Please call and adv @[phone]

## 2023-11-27 ENCOUNTER — TELEPHONE (OUTPATIENT)
Dept: PULMONOLOGY | Facility: CLINIC | Age: 68
End: 2023-11-27
Payer: MEDICARE

## 2023-11-27 NOTE — TELEPHONE ENCOUNTER
----- Message from Mandy Barton sent at 11/27/2023  2:56 PM CST -----  Regarding: call back  Contact: [phone]  Pt wife calling in requesting  call back please call to discuss Further

## 2023-11-27 NOTE — TELEPHONE ENCOUNTER
Doreen, wife of Josiah Orosco called asking if Josiah could be a Lung Transplant candidate and could I ask Dr Ortiz? I said I would ask him but told her the process is a long one with a lot of testing involved. I reminded her that Mr Orosco declined a ct scan and pulmonary function tests when he was were last month. I told her I will speak to Dr Ortiz and let her know back. Melisa Lucio LPN

## 2023-12-04 ENCOUNTER — TELEPHONE (OUTPATIENT)
Dept: PULMONOLOGY | Facility: CLINIC | Age: 68
End: 2023-12-04
Payer: MEDICARE

## 2023-12-04 NOTE — TELEPHONE ENCOUNTER
Spoke with patient wife, informed her that I have received her message Pt wife states that she spoke with Ms Vincent about a week ago in regards to seeing if patient was a fit candidate for Lung Transplant and have not heard anything since. I verbalized to pt wife that I understand and advised her that I will forward her message to Ms Vincent to review/advise. Pt wife verbalized that she understand.

## 2023-12-04 NOTE — TELEPHONE ENCOUNTER
----- Message from Thao West sent at 12/4/2023  1:24 PM CST -----  Regarding: Call back requested  Contact: [phone]  Hi, pt's spouse called to request a call back to discuss the pt. Pls call the pt at [phone] to k with the pt.  Thank you.

## 2023-12-05 NOTE — TELEPHONE ENCOUNTER
I spoke to Dr Ortiz about Mrs Orosco's question about lung transplant for her  Josiah. Dr Ortiz said 64 is the cut off age for patients to have a  lung transplant.Mrs Orosco accepts this. Melisa Bhatt

## 2023-12-06 ENCOUNTER — TELEPHONE (OUTPATIENT)
Dept: FAMILY MEDICINE | Facility: CLINIC | Age: 68
End: 2023-12-06
Payer: MEDICARE

## 2023-12-06 RX ORDER — METOPROLOL SUCCINATE 50 MG/1
50 TABLET, EXTENDED RELEASE ORAL DAILY
Qty: 30 TABLET | Refills: 2 | Status: CANCELLED | OUTPATIENT
Start: 2023-12-05 | End: 2024-03-04

## 2023-12-06 RX ORDER — METOPROLOL SUCCINATE 50 MG/1
50 TABLET, EXTENDED RELEASE ORAL DAILY
Qty: 30 TABLET | Refills: 2 | Status: SHIPPED | OUTPATIENT
Start: 2023-12-06 | End: 2024-01-05 | Stop reason: SDUPTHER

## 2023-12-06 NOTE — TELEPHONE ENCOUNTER
----- Message from Shauna Zavala sent at 12/6/2023 11:52 AM CST -----  Regarding: Doreen Wife .[phone]  Type: Patient Call Back     What is the request in detail: Pts wife is requesting a call back from staff in regards to the pts medical status and his lung transplant, please call pts wife back to go over and discuss further treatment. Pts wife spoke to the lung Dr and Wanted to know if Dr Landry reviewed the information.     Can the clinic reply by [facility]? No     Would the patient rather a call back or a response via [facility]? Call back    Best call back number: .[phone]      Additional Information:    Thank you.

## 2023-12-06 NOTE — TELEPHONE ENCOUNTER
Spoke with wife Doreen in reference to Dr. Landry not having any additional recommendations other than a CT Scan as discussed by his lung doctor recently to follow up on the abnormality noted on his last CXR. Wife was appreciative for provider taking the time out to look at her 's chart. Provider will be notified.

## 2023-12-06 NOTE — TELEPHONE ENCOUNTER
Spoke to patient's wife in reference to her  not being a candidate for a lung transplant. Wife wants to speak to patient's provider to inquire if there are any other alternatives? Wife realizes that his provider is not a pulmonologist, but would like a call back to discuss his case. Informed wife that provider is in clinic, but will be notified of situation.

## 2023-12-06 NOTE — TELEPHONE ENCOUNTER
Please call patient's wife: I have reviewed the chart and do not see any additional recommendations other than doing a CT scan (as discussed by his lung doctor recently) to follow up on the abnormality noted on his last CXR).

## 2023-12-07 ENCOUNTER — TELEPHONE (OUTPATIENT)
Dept: PULMONOLOGY | Facility: CLINIC | Age: 68
End: 2023-12-07
Payer: MEDICARE

## 2023-12-07 ENCOUNTER — TELEPHONE (OUTPATIENT)
Dept: CARDIOLOGY | Facility: CLINIC | Age: 68
End: 2023-12-07
Payer: MEDICARE

## 2023-12-07 NOTE — TELEPHONE ENCOUNTER
Called pt's wife to let her know that pt's metoprolol rx was ready for  at the [facility]. She stated that she had previously called [facility]. No further concerns voiced.

## 2023-12-07 NOTE — TELEPHONE ENCOUNTER
----- Message from Wilda Grullon sent at 12/6/2023  3:54 PM CST -----  Regarding: pt adivce  Contact: [phone]  Pt wife Doreen  calling iin regards to needing a lung transplant referral faxed over to [facility] Fax [phone]. Pls calll

## 2023-12-07 NOTE — TELEPHONE ENCOUNTER
----- Message from Rossana Mendoza sent at 12/7/2023 10:49 AM CST -----  Regarding: Patient call back  .Type: Patient Call Back    Who called:spouse-Doreen     What is the request in detail:states medication metoprolol succinate (TOPROL-XL) 50 MG 24 hr refill was denied, would like to know if that means the patient should stop taking the medication     Can the clinic reply by [facility]?no     Would the patient rather a call back or a response via [facility]? Call     Best call back number:.[phone]    Additional Information:

## 2023-12-07 NOTE — TELEPHONE ENCOUNTER
Mrs Orosco called and is asking for a referral to Lung transplant in [facility] for her . Mr Orosco's Sister lives in [place] and encouraged him to come. Mr Orosco is willing to go but is not as excited as his wife and sister are. I told Mrs Orosco I will fax his records after I speak to Dr Ortiz. Melsia Bhatt

## 2023-12-08 ENCOUNTER — TELEPHONE (OUTPATIENT)
Dept: CARDIOLOGY | Facility: CLINIC | Age: 68
End: 2023-12-08
Payer: MEDICARE

## 2023-12-08 NOTE — TELEPHONE ENCOUNTER
----- Message from Olya Ayala sent at 12/8/2023  9:59 AM CST -----  Regarding: pt called  Type: Patient Call Back          Who called:Doreen luna)          What is the request in detail: Pt readings 1056 on bmp. Please call patient          Can the clinic reply by [facility]? No         Would the patient rather a call back or a response via [facility]? Call back         Best call back number: Telephone Information:  Mobile          [phone]             Additional Information:         Thank you.

## 2023-12-08 NOTE — TELEPHONE ENCOUNTER
Spoke with patient wife on this morning patient readings 1056 on bmp.  Wife stated that patient is on Lasix drip once a week.

## 2023-12-12 ENCOUNTER — TELEPHONE (OUTPATIENT)
Dept: PULMONOLOGY | Facility: CLINIC | Age: 68
End: 2023-12-12
Payer: MEDICARE

## 2023-12-12 ENCOUNTER — TELEPHONE (OUTPATIENT)
Dept: CARDIOLOGY | Facility: CLINIC | Age: 68
End: 2023-12-12
Payer: MEDICARE

## 2023-12-12 NOTE — TELEPHONE ENCOUNTER
"Mrs Orosco is calling to ask Dr Ortiz if he will order a scooter for her  as his mobility has decreased as his ILD  worsens. He is finally wearing his oxygen full time but is still SOB. Dr Ortiz  recommended a CT scan when Mr Orosco came to see him last month but patient declined. Dr Ortiz has only seen Dr Ortiz once. Dr Landry is his PCP. Last week Mr Orosco saw Dr Karen Johnson at her [place] office and he had abnormal lab and was given a Lasix ":drip''on Friday and will have 4 more drips every Friday for I month. Mrs Orosco called Dr Mcgill's office in Cardiology to let him know. Dr Johnson use to work at [facility].I told Mrs Orosco.I will speak to Dr Ortiz and let her know back. Melisa Bhatt      "

## 2023-12-12 NOTE — TELEPHONE ENCOUNTER
Spoke with patient wife on today informing her that I would resubmit the message over to the provider.  In regards to patient being on Lasix drip and BMP 1056.

## 2023-12-13 NOTE — TELEPHONE ENCOUNTER
No care due was identified.  Health [facility] Embedded Care Due Messages. Reference number: [id number].   12/13/2023 6:22:17 AM CST

## 2023-12-14 DIAGNOSIS — I50.9 CONGESTIVE HEART FAILURE, UNSPECIFIED HF CHRONICITY, UNSPECIFIED HEART FAILURE TYPE: Primary | ICD-10-CM

## 2023-12-14 DIAGNOSIS — J64: ICD-10-CM

## 2023-12-14 RX ORDER — SOTALOL HYDROCHLORIDE 80 MG/1
80 TABLET ORAL 2 TIMES DAILY
Qty: 180 TABLET | Refills: 3 | Status: SHIPPED | OUTPATIENT
Start: 2023-12-14 | End: 2024-01-05

## 2023-12-14 NOTE — TELEPHONE ENCOUNTER
----- Message from Shauna Zavala sent at 12/14/2023 10:20 AM CST -----  Regarding: Wife Doreen .[phone]   Type: RX Refill Request    Who Called: Wife     Have you contacted your pharmacy: yes     Refill    RX Name and Strength:sotaloL (BETAPACE) 80 MG tablet    Preferred Pharmacy with phone number:.  [facility] #[id number] - [place], [place] - [address] [facility] [address] [place] [address] [address]  [facility] [address]  Phone: [phone] Fax: [phone]        Local or Mail Order:local     Would the patient rather a call back or a response via [facility]? Call back     Best Call Back Number: .[phone]      Additional Information: Pts wife stated she left a msg last week and staff has not returned her call, please call Doreen back, Pt has 1 pill left.     Thank you.

## 2023-12-14 NOTE — TELEPHONE ENCOUNTER
Please have patient check with his cardiologist if he should still be taking this medication as he is also on metoprolol which is the same type of medication.

## 2023-12-14 NOTE — TELEPHONE ENCOUNTER
Spoke with patient's wife. Wife states they have been trying to  contact the cardiologist for the past week and have not received a reply. Message sent to Cardiologist team for reply.

## 2023-12-14 NOTE — TELEPHONE ENCOUNTER
----- Message from Shauna Zavala sent at 12/14/2023 10:20 AM CST -----  Regarding: Wife Doreen .[phone]   Type: RX Refill Request    Who Called: Wife     Have you contacted your pharmacy: yes     Refill    RX Name and Strength:sotaloL (BETAPACE) 80 MG tablet    Preferred Pharmacy with phone number:.  [facility] #[id number] - [place], [place] - [address] [facility] [address] [place] [address] [address]  [facility] [address]  Phone: [phone] Fax: [phone]        Local or Mail Order:local     Would the patient rather a call back or a response via [facility]? Call back     Best Call Back Number: .[phone]      Additional Information: Pts wife stated she left a msg last week and staff has not returned her call, please call Doreen back, Pt has 1 pill left.     Thank you.

## 2023-12-15 ENCOUNTER — LAB VISIT (OUTPATIENT)
Dept: LAB | Facility: HOSPITAL | Age: 68
End: 2023-12-15
Attending: INTERNAL MEDICINE
Payer: MEDICARE

## 2023-12-15 DIAGNOSIS — I50.9 CONGESTIVE HEART FAILURE, UNSPECIFIED HF CHRONICITY, UNSPECIFIED HEART FAILURE TYPE: ICD-10-CM

## 2023-12-15 LAB
ALBUMIN SERPL BCP-MCNC: 2.7 G/DL (ref 3.5–5.2)
ALP SERPL-CCNC: 111 U/L (ref 55–135)
ALT SERPL W/O P-5'-P-CCNC: 38 U/L (ref 10–44)
ANION GAP SERPL CALC-SCNC: 8 MMOL/L (ref 8–16)
AST SERPL-CCNC: 38 U/L (ref 10–40)
BILIRUB SERPL-MCNC: 1.3 MG/DL (ref 0.1–1)
BNP SERPL-MCNC: 2328 PG/ML (ref 0–99)
BUN SERPL-MCNC: 14 MG/DL (ref 8–23)
CALCIUM SERPL-MCNC: 9.3 MG/DL (ref 8.7–10.5)
CHLORIDE SERPL-SCNC: 107 MMOL/L (ref 95–110)
CO2 SERPL-SCNC: 24 MMOL/L (ref 23–29)
CREAT SERPL-MCNC: 0.9 MG/DL (ref 0.5–1.4)
EST. GFR  (NO RACE VARIABLE): >60 ML/MIN/1.73 M^2
GLUCOSE SERPL-MCNC: 108 MG/DL (ref 70–110)
POTASSIUM SERPL-SCNC: 4.4 MMOL/L (ref 3.5–5.1)
PROT SERPL-MCNC: 9.2 G/DL (ref 6–8.4)
SODIUM SERPL-SCNC: 139 MMOL/L (ref 136–145)

## 2023-12-15 PROCEDURE — 80053 COMPREHEN METABOLIC PANEL: CPT | Performed by: INTERNAL MEDICINE

## 2023-12-15 PROCEDURE — 83880 ASSAY OF NATRIURETIC PEPTIDE: CPT | Performed by: INTERNAL MEDICINE

## 2023-12-15 PROCEDURE — 36415 COLL VENOUS BLD VENIPUNCTURE: CPT | Performed by: INTERNAL MEDICINE

## 2023-12-15 RX ORDER — SOTALOL HYDROCHLORIDE 80 MG/1
80 TABLET ORAL 2 TIMES DAILY
Qty: 60 TABLET | Refills: 2 | OUTPATIENT
Start: 2023-12-15 | End: 2024-03-14

## 2023-12-18 ENCOUNTER — TELEPHONE (OUTPATIENT)
Dept: PULMONOLOGY | Facility: CLINIC | Age: 68
End: 2023-12-18
Payer: MEDICARE

## 2023-12-18 ENCOUNTER — OFFICE VISIT (OUTPATIENT)
Dept: CARDIOLOGY | Facility: CLINIC | Age: 68
End: 2023-12-18
Payer: MEDICARE

## 2023-12-18 VITALS
WEIGHT: 204.81 LBS | SYSTOLIC BLOOD PRESSURE: 115 MMHG | DIASTOLIC BLOOD PRESSURE: 60 MMHG | HEART RATE: 95 BPM | RESPIRATION RATE: 18 BRPM | HEIGHT: 77 IN | OXYGEN SATURATION: 78 % | BODY MASS INDEX: 24.18 KG/M2

## 2023-12-18 DIAGNOSIS — I50.9 CONGESTIVE HEART FAILURE, UNSPECIFIED HF CHRONICITY, UNSPECIFIED HEART FAILURE TYPE: ICD-10-CM

## 2023-12-18 DIAGNOSIS — I27.20 PULMONARY HYPERTENSION: ICD-10-CM

## 2023-12-18 DIAGNOSIS — I70.0 THORACIC AORTA ATHEROSCLEROSIS: ICD-10-CM

## 2023-12-18 DIAGNOSIS — R06.09 DOE (DYSPNEA ON EXERTION): ICD-10-CM

## 2023-12-18 DIAGNOSIS — R06.02 SOB (SHORTNESS OF BREATH): ICD-10-CM

## 2023-12-18 DIAGNOSIS — I48.91 ATRIAL FIBRILLATION WITH RVR: ICD-10-CM

## 2023-12-18 DIAGNOSIS — I48.0 PAROXYSMAL A-FIB: ICD-10-CM

## 2023-12-18 DIAGNOSIS — Z86.19 HISTORY OF HEPATITIS C: ICD-10-CM

## 2023-12-18 DIAGNOSIS — R94.31 QT PROLONGATION: ICD-10-CM

## 2023-12-18 DIAGNOSIS — I10 HTN, GOAL BELOW 140/90: ICD-10-CM

## 2023-12-18 DIAGNOSIS — I50.42 CHRONIC COMBINED SYSTOLIC AND DIASTOLIC HEART FAILURE: Primary | ICD-10-CM

## 2023-12-18 DIAGNOSIS — J84.9 ILD (INTERSTITIAL LUNG DISEASE): ICD-10-CM

## 2023-12-18 PROCEDURE — 1101F PT FALLS ASSESS-DOCD LE1/YR: CPT | Mod: CPTII,S$GLB,, | Performed by: INTERNAL MEDICINE

## 2023-12-18 PROCEDURE — 3074F PR MOST RECENT SYSTOLIC BLOOD PRESSURE < 130 MM HG: ICD-10-PCS | Mod: CPTII,S$GLB,, | Performed by: INTERNAL MEDICINE

## 2023-12-18 PROCEDURE — 3008F BODY MASS INDEX DOCD: CPT | Mod: CPTII,S$GLB,, | Performed by: INTERNAL MEDICINE

## 2023-12-18 PROCEDURE — 1159F MED LIST DOCD IN RCRD: CPT | Mod: CPTII,S$GLB,, | Performed by: INTERNAL MEDICINE

## 2023-12-18 PROCEDURE — 93000 ELECTROCARDIOGRAM COMPLETE: CPT | Mod: S$GLB,,, | Performed by: INTERNAL MEDICINE

## 2023-12-18 PROCEDURE — 99999 PR PBB SHADOW E&M-EST. PATIENT-LVL IV: ICD-10-PCS | Mod: PBBFAC,,, | Performed by: INTERNAL MEDICINE

## 2023-12-18 PROCEDURE — 99214 PR OFFICE/OUTPT VISIT, EST, LEVL IV, 30-39 MIN: ICD-10-PCS | Mod: S$GLB,,, | Performed by: INTERNAL MEDICINE

## 2023-12-18 PROCEDURE — 1157F ADVNC CARE PLAN IN RCRD: CPT | Mod: CPTII,S$GLB,, | Performed by: INTERNAL MEDICINE

## 2023-12-18 PROCEDURE — 93000 EKG 12-LEAD: ICD-10-PCS | Mod: S$GLB,,, | Performed by: INTERNAL MEDICINE

## 2023-12-18 PROCEDURE — 99214 OFFICE O/P EST MOD 30 MIN: CPT | Mod: S$GLB,,, | Performed by: INTERNAL MEDICINE

## 2023-12-18 PROCEDURE — 3074F SYST BP LT 130 MM HG: CPT | Mod: CPTII,S$GLB,, | Performed by: INTERNAL MEDICINE

## 2023-12-18 PROCEDURE — 3044F PR MOST RECENT HEMOGLOBIN A1C LEVEL <7.0%: ICD-10-PCS | Mod: CPTII,S$GLB,, | Performed by: INTERNAL MEDICINE

## 2023-12-18 PROCEDURE — 1126F AMNT PAIN NOTED NONE PRSNT: CPT | Mod: CPTII,S$GLB,, | Performed by: INTERNAL MEDICINE

## 2023-12-18 PROCEDURE — 4010F PR ACE/ARB THEARPY RXD/TAKEN: ICD-10-PCS | Mod: CPTII,S$GLB,, | Performed by: INTERNAL MEDICINE

## 2023-12-18 PROCEDURE — 1126F PR PAIN SEVERITY QUANTIFIED, NO PAIN PRESENT: ICD-10-PCS | Mod: CPTII,S$GLB,, | Performed by: INTERNAL MEDICINE

## 2023-12-18 PROCEDURE — 4010F ACE/ARB THERAPY RXD/TAKEN: CPT | Mod: CPTII,S$GLB,, | Performed by: INTERNAL MEDICINE

## 2023-12-18 PROCEDURE — 99999 PR PBB SHADOW E&M-EST. PATIENT-LVL IV: CPT | Mod: PBBFAC,,, | Performed by: INTERNAL MEDICINE

## 2023-12-18 PROCEDURE — 3288F FALL RISK ASSESSMENT DOCD: CPT | Mod: CPTII,S$GLB,, | Performed by: INTERNAL MEDICINE

## 2023-12-18 PROCEDURE — 3078F DIAST BP <80 MM HG: CPT | Mod: CPTII,S$GLB,, | Performed by: INTERNAL MEDICINE

## 2023-12-18 PROCEDURE — 1101F PR PT FALLS ASSESS DOC 0-1 FALLS W/OUT INJ PAST YR: ICD-10-PCS | Mod: CPTII,S$GLB,, | Performed by: INTERNAL MEDICINE

## 2023-12-18 PROCEDURE — 1159F PR MEDICATION LIST DOCUMENTED IN MEDICAL RECORD: ICD-10-PCS | Mod: CPTII,S$GLB,, | Performed by: INTERNAL MEDICINE

## 2023-12-18 PROCEDURE — 3008F PR BODY MASS INDEX (BMI) DOCUMENTED: ICD-10-PCS | Mod: CPTII,S$GLB,, | Performed by: INTERNAL MEDICINE

## 2023-12-18 PROCEDURE — 3044F HG A1C LEVEL LT 7.0%: CPT | Mod: CPTII,S$GLB,, | Performed by: INTERNAL MEDICINE

## 2023-12-18 PROCEDURE — 3078F PR MOST RECENT DIASTOLIC BLOOD PRESSURE < 80 MM HG: ICD-10-PCS | Mod: CPTII,S$GLB,, | Performed by: INTERNAL MEDICINE

## 2023-12-18 PROCEDURE — 3288F PR FALLS RISK ASSESSMENT DOCUMENTED: ICD-10-PCS | Mod: CPTII,S$GLB,, | Performed by: INTERNAL MEDICINE

## 2023-12-18 PROCEDURE — 1157F PR ADVANCE CARE PLAN OR EQUIV PRESENT IN MEDICAL RECORD: ICD-10-PCS | Mod: CPTII,S$GLB,, | Performed by: INTERNAL MEDICINE

## 2023-12-18 RX ORDER — FUROSEMIDE 20 MG/1
20 TABLET ORAL 2 TIMES DAILY PRN
Qty: 180 TABLET | Refills: 3 | Status: SHIPPED | OUTPATIENT
Start: 2023-12-18

## 2023-12-18 NOTE — TELEPHONE ENCOUNTER
Spoke with patient wife, informed her that I have received her message. Pt wife states that pt had to set his oxygen on 3 liters  and pt wants to know if he can continue his liter flow on 3 liters. Pt wife states that the 3 liters seem to help pt more better. Pt wife also states that she will like to know if Ms Vincent (Dr Richard assistant) have faxed pt records to [facility]. I verbalized to pt wife that I understand and advised her that Ms Vincent is not here at this current time but however upon her arrival arrival on tomorrow, I will speak with Ms Vincent and advise her to contact pt. I also advised pt wife that I will forward her message to Dr richard in regards to pt 3 liters. Pt wife verbalized that she understand.

## 2023-12-18 NOTE — TELEPHONE ENCOUNTER
----- Message from Joyce Rolon MA sent at 12/18/2023 10:15 AM CST -----  Regarding: pt advice  Contact: wife  at [phone]  Pt  wife joni is calling to speak with someone in provider office is asking for a return call stated that patient oxygen level was at  72 stated that she  turned it up to 3 at the cardiology appt  please call pt wife  at [phone]

## 2023-12-18 NOTE — TELEPHONE ENCOUNTER
I received a phone call from Mr Orosco's wife. I returned her call. Mr Orosco is currently being admitted at [facility]. We can arrange outpt follow up for Mr Orosco after he is discharged.    Davion Ortiz MD  [facility]

## 2023-12-18 NOTE — PROGRESS NOTES
CARDIOVASCULAR CONSULTATION    REASON FOR CONSULT:   Josiah Orosco Jr. is a 68 y.o. male who presents for evaluation      HISTORY OF PRESENT ILLNESS:     Patient is a pleasant 66-year-old man.  Here for evaluation.  Patient states that lately he has been experiencing dyspnea on exertion.  Also he was told that he has a regular rhythm and hence has been referred.  Patient denies any palpitations.  Denies any orthopnea or PND.  EKG done personally reviewed and shows sinus rhythm with PACs.    Notes from September 2022:  Patient here for follow-up.    Sinus rhythm with heart rates varying between 44 and 164 BPM with an average of 88 BPM.  There were rare PVCs totalling 402 and averaging 8.38 per hour. There were 1 couplets. There were 1 triplets.  There were very frequent PACs  SVT/possible atrial flutter.  Recommend event monitor.    The left ventricle is normal in size with mild concentric hypertrophy and normal systolic function.  The estimated ejection fraction is 60%.  Indeterminate left ventricular diastolic function.  Mild right atrial enlargement.  Normal right ventricular size with normal right ventricular systolic function.  Mild tricuspid regurgitation.  Normal central venous pressure (3 mmHg).  The estimated PA systolic pressure is 38 mmHg.  There is mild pulmonary hypertension.      Normal myocardial perfusion scan. There is no evidence of myocardial ischemia or infarction.    There is a  mild to moderate intensity fixed perfusion abnormality in the inferior wall of the left ventricle secondary to diaphragm attenuation.    The gated perfusion images showed an ejection fraction of 61% post stress.    There is normal wall motion post stress.    The EKG portion of this study is negative for ischemia.    The patient reported no chest pain during the stress test.    During stress, occasional PVCs are noted. , During stress, SVTs are noted.      Notes from October 2022:    Patient here for follow-up.  Doing  fine.  Event monitor showed some PACs and periods of tachycardia as described below.  Patient asymptomatic.      At baseline, in the absence of symptoms, the rhythm was sinus with heart rate 85 beats per minute.  There were PACs and sequential PACs.  An auto trigger event on 09/27 showed sinus tachycardia at 155 beats per minute, and on 09/28 another auto trigger event also showed sinus tachycardia.  There also was a regular narrow complex tachycardia at 157 beats per minute.  This lasted 27 seconds and began following 2 PVCs.  P-waves were not clearly evident.  There was a gradual slow down with P-waves becoming evident, to become consistent with sinus tachycardia with occasional PACs.  No activity level (e.g., exercise) was specified.     Impression: Sinus rhythm with PACs and periods of tachycardia, as described above.    Notes from January 23    Patient doing fine.  Denies any chest pains at rest on exertion, orthopnea, PND, swelling of feet    Notes from April 23: Patient here for follow-up.  Lately has been experiencing dyspnea on exertion.  EKG done at outside hospital demonstrated atrial flutter and patient was initiated on Eliquis.        May 23:  Patient here for follow-up after cardioversion.  States breathing better after cardioversion.  Now back to his baseline.  EKG done today shows sinus rhythm.    Normal systolic function.  Normal right ventricular size with normal right ventricular systolic function.  Moderate left atrial enlargement.  Mild right atrial enlargement.  Mild-to-moderate mitral regurgitation.  Mild tricuspid regurgitation.  No thrombus is present in the appendage.  The estimated ejection fraction is 55%.  A synchronized cardioversion was successfully performed with restoration of normal sinus rhythm.    Notes from August 23: Patient here for follow-up status post cardioversion.  On sotalol.  Maintaining regular rhythm.  Saw Dr. Chahal who is planning for ablation.    Notes from October  23: Patient here for follow-up status post atrial flutter ablation by Dr. mario veliz.  Currently in sinus rhythm.  States feels much better after atrial flutter ablation.  Does have his chronic dyspnea on exertion, but does not feel as tired as he used to prior to the ablation.    Dec 23:  Patient here for follow-up with his wife.  States that they have been going to a physician in his clinic for getting IV Lasix 40 mg Q weekly.  Not taking any daily Lasix.  Does have dyspnea on exertion.    PAST MEDICAL HISTORY:     Past Medical History:   Diagnosis Date    A-fib 5/8/2023    Arthritis     Chronic hypoxemic respiratory failure 11/9/2023    Chronic right heart failure 11/9/2023    GERD (gastroesophageal reflux disease)     History of HCV, s/p successful treatment w/ SVR12 5/2015     Failed treatment (stage I/II) - followed by hepatology     Joint pain     Lung disease caused by breathing particles     Widespread essentially symmetric interstitial lung disease    Obesity     Polycythemia vera     Prediabetes        PAST SURGICAL HISTORY:     Past Surgical History:   Procedure Laterality Date    ABLATION, ATRIAL FLUTTER, TYPICAL N/A 9/29/2023    Procedure: Ablation, Atrial Flutter, Typical;  Surgeon: Jonh Mcgill MD;  Location: [facility] EP LAB;  Service: Cardiology;  Laterality: N/A;  AFL, ELENA (Cx if SR), CTI, RFA, BETHANY, MAC, DM, 3 Prep    BUNIONECTOMY      bilateral    CARDIOVERSION N/A 05/08/2023    Procedure: Cardioversion;  Surgeon: David Cueva MD;  Location: [facility] CATH LAB;  Service: Cardiology;  Laterality: N/A;    CATARACT EXTRACTION Left 07/27/2023    COLONOSCOPY N/A 12/09/2015    Procedure: COLONOSCOPY;  Surgeon: Conrad Iqbal MD;  Location: [facility] ENDO;  Service: Endoscopy;  Laterality: N/A;    Liver biopsy x2      rotator cuff Right     TRANSESOPHAGEAL ECHOCARDIOGRAM WITH POSSIBLE CARDIOVERSION (ELENA W/ POSS CARDIOVERSION) N/A 05/08/2023    Procedure: TRANSESOPHAGEAL ECHOCARDIOGRAM WITH POSSIBLE  CARDIOVERSION (ELENA W/ POSS CARDIOVERSION);  Surgeon: David Cueva MD;  Location: [facility] CATH LAB;  Service: Cardiology;  Laterality: N/A;  12:30PM    RN PREOP 5/4/2023---EKG ON ARRIVAL    TRANSESOPHAGEAL ECHOCARDIOGRAM WITH POSSIBLE CARDIOVERSION (ELENA W/ POSS CARDIOVERSION) N/A 7/29/2023    Procedure: Transesophageal echo (ELENA) intra-procedure log documentation;  Surgeon: David Cueva MD;  Location: [facility] CATH LAB;  Service: Cardiology;  Laterality: N/A;    TRANSESOPHAGEAL ECHOCARDIOGRAPHY N/A 05/08/2023    Procedure: ECHOCARDIOGRAM, TRANSESOPHAGEAL;  Surgeon: David Cueva MD;  Location: [facility] CATH LAB;  Service: Cardiology;  Laterality: N/A;    TRANSESOPHAGEAL ECHOCARDIOGRAPHY N/A 7/29/2023    Procedure: ECHOCARDIOGRAM, TRANSESOPHAGEAL;  Surgeon: David Cueva MD;  Location: [facility] CATH LAB;  Service: Cardiology;  Laterality: N/A;    TREATMENT OF CARDIAC ARRHYTHMIA N/A 8/7/2023    Procedure: Cardioversion or Defibrillation;  Surgeon: David Cueva MD;  Location: [facility] CATH LAB;  Service: Cardiology;  Laterality: N/A;       ALLERGIES AND MEDICATION:     Review of patient's allergies indicates:   Allergen Reactions    Ace inhibitors Other (See Comments)     cough        Medication List            Accurate as of December 18, 2023  1:09 PM. If you have any questions, ask your nurse or doctor.                START taking these medications      furosemide 20 MG tablet  Commonly known as: LASIX  Take 1 tablet (20 mg total) by mouth 2 (two) times daily as needed (1-2 pills as needed for fluid overload).  Started by: David Cueva MD            CONTINUE taking these medications      allopurinoL 100 MG tablet  Commonly known as: ZYLOPRIM  Take 2 tablets (200 mg total) by mouth once daily.     apixaban 5 mg Tab  Commonly known as: ELIQUIS  Take 1 tablet (5 mg total) by mouth 2 (two) times daily.     fluticasone propionate 50 mcg/actuation nasal spray  Commonly known as: FLONASE  SHAKE LIQUID AND USE 1 SPRAY(50 MCG) IN  EACH NOSTRIL EVERY DAY     metoprolol succinate 50 MG 24 hr tablet  Commonly known as: TOPROL-XL  Take 1 tablet (50 mg total) by mouth once daily.     multivitamin capsule     ofloxacin 0.3 % ophthalmic solution  Commonly known as: OCUFLOX     prednisoLONE acetate 1 % Drps  Commonly known as: PRED FORTE     sotaloL 80 MG tablet  Commonly known as: BETAPACE  TAKE 1 TABLET BY MOUTH TWICE DAILY     TRELEGY ELLIPTA 100-62.5-25 mcg Dsdv  Generic drug: fluticasone-umeclidin-vilanter               Where to Get Your Medications        These medications were sent to [id number] - [place], [place] - [address]  [address], [facility] [address]      Hours: 24-hours Phone: [phone]   furosemide 20 MG tablet         SOCIAL HISTORY:     Social History     Socioeconomic History    Marital status:     Number of children: 0    Highest education level: Some college, no degree   Occupational History    Occupation: Self-employed     Employer: [employer]   Tobacco Use    Smoking status: Former     Current packs/day: 1.00     Average packs/day: 1 pack/day for 30.1 years (30.1 ttl pk-yrs)     Types: Cigarettes     Start date: 11/9/1993    Smokeless tobacco: Never    Tobacco comments:     pt. smokes 10 cigars per day.   Substance and Sexual Activity    Alcohol use: Not Currently     Comment: Rarely    Drug use: Yes     Types: Marijuana     Comment: daily    Sexual activity: Yes     Partners: Female     Social Determinants of Health     Financial Resource Strain: Low Risk  (8/8/2023)    Overall Financial Resource Strain (CARDIA)     Difficulty of Paying Living Expenses: Not very hard   Food Insecurity: No Food Insecurity (8/8/2023)    Hunger Vital Sign     Worried About Running Out of Food in the Last Year: Never true     Ran Out of Food in the Last Year: Never true   Transportation Needs: No Transportation Needs (8/8/2023)    PRAPARE - Transportation      Lack of Transportation (Medical): No     Lack of Transportation (Non-Medical): No   Physical Activity: Inactive (8/8/2023)    Exercise Vital Sign     Days of Exercise per Week: 0 days     Minutes of Exercise per Session: 0 min   Stress: Stress Concern Present (8/8/2023)    [place] citizen [place] of Occupational Health - Occupational Stress Questionnaire     Feeling of Stress : To some extent   Social Connections: Moderately Integrated (8/8/2023)    Social Connection and Isolation Panel [NHANES]     Frequency of Communication with Friends and Family: Three times a week     Frequency of Social Gatherings with Friends and Family: Three times a week     Attends [employer] Services: More than 4 times per year     Active Member of Clubs or Organizations: No     Attends Club or Organization Meetings: Never     Marital Status:    Housing Stability: Low Risk  (8/8/2023)    Housing Stability Vital Sign     Unable to Pay for Housing in the Last Year: No     Number of Places Lived in the Last Year: 1     Unstable Housing in the Last Year: No       FAMILY HISTORY:     Family History   Problem Relation Age of Onset    Heart disease Mother     Kidney disease Mother     Parkinsonism Father     Prostate cancer Cousin         maternal side    Heart attack Sister         CABG    Deep vein thrombosis Sister     Pulmonary embolism Sister     Osteoarthritis Sister         s/p knee replacement    Chronic back pain Brother     Liver disease Neg Hx     Diabetes Neg Hx     Melanoma Neg Hx        REVIEW OF SYSTEMS:   Review of Systems   Constitutional: Negative.   HENT: Negative.     Eyes: Negative.    Endocrine: Negative.    Hematologic/Lymphatic: Negative.    Skin: Negative.    Musculoskeletal: Negative.    Gastrointestinal: Negative.    Genitourinary: Negative.    Neurological: Negative.    Psychiatric/Behavioral: Negative.     Allergic/Immunologic: Negative.        A 10 point review of systems was performed and all the pertinent  "positives have been mentioned. Rest of review of systems was negative.        PHYSICAL EXAM:     Vitals:    12/18/23 0942   BP: 115/60   Pulse: 95   Resp: 18    Body mass index is 24.29 kg/m².  Weight: 92.9 kg (204 lb 12.9 oz)   Height: 6' 5" (195.6 cm)     Physical Exam  Vitals reviewed.   Constitutional:       Appearance: He is well-developed.   HENT:      Head: Normocephalic.   Eyes:      Conjunctiva/sclera: Conjunctivae normal.      Pupils: Pupils are equal, round, and reactive to light.   Cardiovascular:      Rate and Rhythm: Normal rate and regular rhythm.      Heart sounds: Normal heart sounds.   Pulmonary:      Effort: Pulmonary effort is normal.      Breath sounds: Normal breath sounds.   Abdominal:      General: Bowel sounds are normal.      Palpations: Abdomen is soft.   Musculoskeletal:      Cervical back: Normal range of motion and neck supple.   Skin:     General: Skin is warm.   Neurological:      Mental Status: He is alert and oriented to person, place, and time.           DATA:     Laboratory:  CBC:  Recent Labs   Lab 07/27/23  1435 08/05/23  1159 09/22/23  0846   WBC 8.02 7.30 6.22   Hemoglobin 14.4 15.2 13.8 L   Hematocrit 48.7 50.5 45.6   Platelets 341 246 251       CHEMISTRIES:  Recent Labs   Lab 03/06/21  0822 03/11/22  0705 04/14/23  0953 08/05/23  1159 08/06/23  0333 08/08/23  0608 09/22/23  0846 12/15/23  0818   Glucose 96 101   < > 163 H   < > 83 104 108   Sodium 140 143   < > 136   < > 138 139 139   Potassium 4.4 4.6   < > 4.6   < > 4.5 4.6 4.4   BUN 12 10   < > 13   < > 14 9 14   Creatinine 0.9 0.9   < > 1.0   < > 0.7 0.8 0.9   eGFR if African American >60.0 >60  --   --   --   --   --   --    eGFR if non  >60.0 >60  --   --   --   --   --   --    Calcium 9.3 9.5   < > 9.6   < > 9.0 9.6 9.3   Magnesium  --   --   --  1.8  --   --   --   --     < > = values in this interval not displayed.       CARDIAC BIOMARKERS:  Recent Labs   Lab 07/27/23  1435 08/05/23  1159   Troponin " I 0.010 0.009       COAGS:  Recent Labs   Lab 07/27/23  1435 09/22/23  0846   INR 1.2 1.2       LIPIDS/LFTS:  Recent Labs   Lab 03/06/21  0822 03/11/22  0705 04/14/23  0953 07/27/23  1435 08/05/23  1159 12/15/23  0818   Cholesterol 136 142  --   --   --   --    Triglycerides 46 56  --   --   --   --    HDL 39 L 42  --   --   --   --    LDL Cholesterol 87.8 88.8  --   --   --   --    Non-HDL Cholesterol 97 100  --   --   --   --    AST 26 25   < > 27 25 38   ALT 11 12   < > 32 17 38    < > = values in this interval not displayed.       Hemoglobin A1C   Date Value Ref Range Status   04/14/2023 6.0 (H) 4.0 - 5.6 % Final     Comment:     ADA Screening Guidelines:  5.7-6.4%  Consistent with prediabetes  >or=6.5%  Consistent with diabetes    High levels of fetal hemoglobin interfere with the HbA1C  assay. Heterozygous hemoglobin variants (HbS, HgC, etc)do  not significantly interfere with this assay.   However, presence of multiple variants may affect accuracy.     03/11/2022 5.9 (H) 4.0 - 5.6 % Final     Comment:     ADA Screening Guidelines:  5.7-6.4%  Consistent with prediabetes  >or=6.5%  Consistent with diabetes    High levels of fetal hemoglobin interfere with the HbA1C  assay. Heterozygous hemoglobin variants (HbS, HgC, etc)do  not significantly interfere with this assay.   However, presence of multiple variants may affect accuracy.     03/06/2021 5.8 (H) 4.0 - 5.6 % Final     Comment:     ADA Screening Guidelines:  5.7-6.4%  Consistent with prediabetes  >or=6.5%  Consistent with diabetes    High levels of fetal hemoglobin interfere with the HbA1C  assay. Heterozygous hemoglobin variants (HbS, HgC, etc)do  not significantly interfere with this assay.   However, presence of multiple variants may affect accuracy.         TSH  Recent Labs   Lab 05/23/23  0843 07/27/23  1435   TSH 0.942 1.015       The 10-year ASCVD risk score (Felix DK, et al., 2019) is: 14.6%    Values used to calculate the score:      Age: 68  years      Sex: Male      Is Non- : Yes      Diabetic: No      Tobacco smoker: No      Systolic Blood Pressure: 115 mmHg      Is BP treated: Yes      HDL Cholesterol: 42 mg/dL      Total Cholesterol: 142 mg/dL             ASSESSMENT AND PLAN     Patient Active Problem List   Diagnosis    History of HCV, s/p successful treatment w/ SVR12 5/2015    Lung disease caused by breathing particles    Overweight (BMI 25.0-29.9)    GERD (gastroesophageal reflux disease)    Polycythemia    Former smoker    Hyperuricemia    ILD (interstitial lung disease)    Prediabetes    Arthritis    Allergic cough    HTN, goal below 140/90    Pulmonary interstitial fibrosis    Pulmonary hypertension    Marijuana user    Thoracic aorta atherosclerosis    Cataract    Atrial flutter    Chronic hypoxemic respiratory failure    Chronic right heart failure       Dyspnea on exertion, palpitations and shortness of breath.  Multifactorial.  BNP elevated.  Also has significant pulmonary issues.  Initiate daily Lasix.  I have explained to patient to check daily weights and then titrate Lasix accordingly.  Check BMP in 1 week.  Check echo and follow-up in 1 month    atrial flutter:  Status post ablation.  In sinus rhythm now.  Continue anticoagulation and sotalol.      Continue Eliquis 5 mg b.i.d.      Follow-up after 1 m    Thank you very much for involving me in the care of your patient.  Please do not hesitate to contact me if there are any questions.      David Cueva MD, FACC, [facility]  Interventional Cardiologist, [facility].           This note was dictated with the help of speech recognition software.  There might be un-intended errors and/or substitutions.

## 2023-12-19 ENCOUNTER — TELEPHONE (OUTPATIENT)
Dept: CARDIOLOGY | Facility: CLINIC | Age: 68
End: 2023-12-19
Payer: MEDICARE

## 2023-12-19 NOTE — TELEPHONE ENCOUNTER
----- Message from Divya Desai sent at 12/19/2023 12:00 PM CST -----  Regarding: Doreen wife [phone]  .Type: Patient Call Back    Who called: Doreen wife     What is the request in detail: patient wife called in regards to his appointment tomorrow. Doreen stated patient is in [facility] and the ECHO will be performed there. Patient wife wanted to notify the office.     Can the clinic reply by [facility]?no    Would the patient rather a call back or a response via [facility]? Call back    Best call back number [phone]

## 2023-12-27 ENCOUNTER — TELEPHONE (OUTPATIENT)
Dept: PULMONOLOGY | Facility: CLINIC | Age: 68
End: 2023-12-27
Payer: MEDICARE

## 2023-12-27 NOTE — TELEPHONE ENCOUNTER
----- Message from Marbella Galvan sent at 12/27/2023  3:21 PM CST -----  PATIENTCALL     Pt wife is calling to speak with someone in provider office she needs to get an appt scheduled per Dr Ortiz for a hospital follow up appt she  is asking for a return call please call pt at  [phone]

## 2023-12-28 ENCOUNTER — TELEPHONE (OUTPATIENT)
Dept: PULMONOLOGY | Facility: CLINIC | Age: 68
End: 2023-12-28
Payer: MEDICARE

## 2023-12-28 NOTE — TELEPHONE ENCOUNTER
I called Mrs Orosco back and she told me about Mr Orosco being in [facility] and that he was discharged on 5 liters of oxygen. She said [facility] will need a new RX. Mr Orosco will see Dr Ortiz 1-11-24. Melisa Bhatt       No data to display

## 2023-12-28 NOTE — TELEPHONE ENCOUNTER
----- Message from Rodney Joaquin sent at 12/28/2023 12:36 PM CST -----  Contact: [phone]  Josiah Orosco calling regarding Patient Advice (message) Pt return a call stating the nurse must be calling the wrong number asking for a call back at [phone]

## 2024-01-05 ENCOUNTER — OFFICE VISIT (OUTPATIENT)
Dept: ELECTROPHYSIOLOGY | Facility: CLINIC | Age: 69
End: 2024-01-05
Payer: MEDICARE

## 2024-01-05 ENCOUNTER — HOSPITAL ENCOUNTER (OUTPATIENT)
Dept: CARDIOLOGY | Facility: HOSPITAL | Age: 69
Discharge: HOME | End: 2024-01-05
Attending: INTERNAL MEDICINE
Payer: MEDICARE

## 2024-01-05 ENCOUNTER — HOSPITAL ENCOUNTER (OUTPATIENT)
Dept: CARDIOLOGY | Facility: CLINIC | Age: 69
Discharge: HOME | End: 2024-01-05
Payer: MEDICARE

## 2024-01-05 VITALS
HEIGHT: 77 IN | WEIGHT: 204.81 LBS | HEART RATE: 122 BPM | DIASTOLIC BLOOD PRESSURE: 60 MMHG | SYSTOLIC BLOOD PRESSURE: 116 MMHG | BODY MASS INDEX: 24.18 KG/M2

## 2024-01-05 DIAGNOSIS — I48.3 TYPICAL ATRIAL FLUTTER: ICD-10-CM

## 2024-01-05 DIAGNOSIS — I49.9 CARDIAC ARRHYTHMIA, UNSPECIFIED CARDIAC ARRHYTHMIA TYPE: ICD-10-CM

## 2024-01-05 DIAGNOSIS — J84.10 PULMONARY INTERSTITIAL FIBROSIS: ICD-10-CM

## 2024-01-05 DIAGNOSIS — I10 HTN, GOAL BELOW 140/90: ICD-10-CM

## 2024-01-05 DIAGNOSIS — I48.0 PAROXYSMAL A-FIB: Primary | ICD-10-CM

## 2024-01-05 DIAGNOSIS — I50.42 CHRONIC COMBINED SYSTOLIC AND DIASTOLIC HEART FAILURE: ICD-10-CM

## 2024-01-05 DIAGNOSIS — I48.0 PAROXYSMAL A-FIB: ICD-10-CM

## 2024-01-05 DIAGNOSIS — I27.20 PULMONARY HYPERTENSION: ICD-10-CM

## 2024-01-05 DIAGNOSIS — I48.92 ATRIAL FLUTTER, UNSPECIFIED TYPE: ICD-10-CM

## 2024-01-05 DIAGNOSIS — C34.90 MALIGNANT NEOPLASM OF LUNG, UNSPECIFIED LATERALITY, UNSPECIFIED PART OF LUNG: Primary | ICD-10-CM

## 2024-01-05 DIAGNOSIS — I70.0 THORACIC AORTA ATHEROSCLEROSIS: ICD-10-CM

## 2024-01-05 DIAGNOSIS — I50.812 CHRONIC RIGHT HEART FAILURE: ICD-10-CM

## 2024-01-05 LAB
ASCENDING AORTA: 3.5 CM
AV INDEX (PROSTH): 0.9
AV MEAN GRADIENT: 3 MMHG
AV PEAK GRADIENT: 4 MMHG
AV VALVE AREA BY VELOCITY RATIO: 3.78 CM²
AV VALVE AREA: 3.79 CM²
AV VELOCITY RATIO: 0.89
BSA FOR ECHO PROCEDURE: 2.25 M2
CV ECHO LV RWT: 0.56 CM
DOP CALC AO PEAK VEL: 1.04 M/S
DOP CALC AO VTI: 18.6 CM
DOP CALC LVOT AREA: 4.2 CM2
DOP CALC LVOT DIAMETER: 2.32 CM
DOP CALC LVOT PEAK VEL: 0.93 M/S
DOP CALC LVOT STROKE VOLUME: 70.56 CM3
DOP CALCLVOT PEAK VEL VTI: 16.7 CM
E WAVE DECELERATION TIME: 193.64 MSEC
E/A RATIO: 1.09
E/E' RATIO: 7.79 M/S
ECHO LV POSTERIOR WALL: 1.21 CM (ref 0.6–1.1)
FRACTIONAL SHORTENING: 38 % (ref 28–44)
INTERVENTRICULAR SEPTUM: 1.22 CM (ref 0.6–1.1)
IVC DIAMETER: 1.73 CM
IVRT: 97.05 MSEC
LA MAJOR: 5.62 CM
LA MINOR: 5.47 CM
LA WIDTH: 4 CM
LEFT ATRIUM SIZE: 4 CM
LEFT ATRIUM VOLUME INDEX: 33.4 ML/M2
LEFT ATRIUM VOLUME: 75.4 CM3
LEFT INTERNAL DIMENSION IN SYSTOLE: 2.69 CM (ref 2.1–4)
LEFT VENTRICLE DIASTOLIC VOLUME INDEX: 37.31 ML/M2
LEFT VENTRICLE DIASTOLIC VOLUME: 84.31 ML
LEFT VENTRICLE MASS INDEX: 84 G/M2
LEFT VENTRICLE SYSTOLIC VOLUME INDEX: 11.8 ML/M2
LEFT VENTRICLE SYSTOLIC VOLUME: 26.72 ML
LEFT VENTRICULAR INTERNAL DIMENSION IN DIASTOLE: 4.33 CM (ref 3.5–6)
LEFT VENTRICULAR MASS: 190.07 G
LV LATERAL E/E' RATIO: 6.73 M/S
LV SEPTAL E/E' RATIO: 9.25 M/S
LVOT MG: 2.31 MMHG
LVOT MV: 0.73 CM/S
MV PEAK A VEL: 0.68 M/S
MV PEAK E VEL: 0.74 M/S
MV STENOSIS PRESSURE HALF TIME: 56.16 MS
MV VALVE AREA P 1/2 METHOD: 3.92 CM2
PISA TR MAX VEL: 4.22 M/S
PV PEAK GRADIENT: 3 MMHG
PV PEAK VELOCITY: 0.89 M/S
RA MAJOR: 6.05 CM
RA PRESSURE ESTIMATED: 3 MMHG
RA WIDTH: 4.9 CM
RIGHT VENTRICULAR END-DIASTOLIC DIMENSION: 5.41 CM
RV TB RVSP: 7 MMHG
RV TISSUE DOPPLER FREE WALL SYSTOLIC VELOCITY 1 (APICAL 4 CHAMBER VIEW): 12.92 CM/S
SINUS: 3.77 CM
STJ: 3.11 CM
TDI LATERAL: 0.11 M/S
TDI SEPTAL: 0.08 M/S
TDI: 0.1 M/S
TR MAX PG: 71 MMHG
TRICUSPID ANNULAR PLANE SYSTOLIC EXCURSION: 1.85 CM
TV PEAK GRADIENT: 3 MMHG
TV REST PULMONARY ARTERY PRESSURE: 74 MMHG
Z-SCORE OF LEFT VENTRICULAR DIMENSION IN END DIASTOLE: -6.5
Z-SCORE OF LEFT VENTRICULAR DIMENSION IN END SYSTOLE: -4.93

## 2024-01-05 PROCEDURE — 3074F SYST BP LT 130 MM HG: CPT | Mod: CPTII,S$GLB,, | Performed by: INTERNAL MEDICINE

## 2024-01-05 PROCEDURE — 99999 PR PBB SHADOW E&M-EST. PATIENT-LVL IV: CPT | Mod: PBBFAC,,, | Performed by: INTERNAL MEDICINE

## 2024-01-05 PROCEDURE — 1159F MED LIST DOCD IN RCRD: CPT | Mod: CPTII,S$GLB,, | Performed by: INTERNAL MEDICINE

## 2024-01-05 PROCEDURE — 3078F DIAST BP <80 MM HG: CPT | Mod: CPTII,S$GLB,, | Performed by: INTERNAL MEDICINE

## 2024-01-05 PROCEDURE — 99215 OFFICE O/P EST HI 40 MIN: CPT | Mod: S$GLB,,, | Performed by: INTERNAL MEDICINE

## 2024-01-05 PROCEDURE — 1101F PT FALLS ASSESS-DOCD LE1/YR: CPT | Mod: CPTII,S$GLB,, | Performed by: INTERNAL MEDICINE

## 2024-01-05 PROCEDURE — 1157F ADVNC CARE PLAN IN RCRD: CPT | Mod: CPTII,S$GLB,, | Performed by: INTERNAL MEDICINE

## 2024-01-05 PROCEDURE — 3008F BODY MASS INDEX DOCD: CPT | Mod: CPTII,S$GLB,, | Performed by: INTERNAL MEDICINE

## 2024-01-05 PROCEDURE — 1160F RVW MEDS BY RX/DR IN RCRD: CPT | Mod: CPTII,S$GLB,, | Performed by: INTERNAL MEDICINE

## 2024-01-05 PROCEDURE — 93010 ELECTROCARDIOGRAM REPORT: CPT | Mod: S$GLB,,, | Performed by: INTERNAL MEDICINE

## 2024-01-05 PROCEDURE — 93306 TTE W/DOPPLER COMPLETE: CPT | Mod: 26,,, | Performed by: INTERNAL MEDICINE

## 2024-01-05 PROCEDURE — 93005 ELECTROCARDIOGRAM TRACING: CPT | Mod: S$GLB,,, | Performed by: INTERNAL MEDICINE

## 2024-01-05 PROCEDURE — 1126F AMNT PAIN NOTED NONE PRSNT: CPT | Mod: CPTII,S$GLB,, | Performed by: INTERNAL MEDICINE

## 2024-01-05 PROCEDURE — 3288F FALL RISK ASSESSMENT DOCD: CPT | Mod: CPTII,S$GLB,, | Performed by: INTERNAL MEDICINE

## 2024-01-05 PROCEDURE — 93306 TTE W/DOPPLER COMPLETE: CPT

## 2024-01-05 RX ORDER — METOPROLOL TARTRATE 50 MG/1
1 TABLET ORAL DAILY
COMMUNITY
End: 2024-01-05

## 2024-01-05 RX ORDER — POTASSIUM CHLORIDE 750 MG/1
1 TABLET, EXTENDED RELEASE ORAL DAILY
COMMUNITY
Start: 2023-12-24 | End: 2024-12-23

## 2024-01-05 RX ORDER — METOPROLOL SUCCINATE 50 MG/1
50 TABLET, EXTENDED RELEASE ORAL 2 TIMES DAILY
Qty: 180 TABLET | Refills: 3 | Status: SHIPPED | OUTPATIENT
Start: 2024-01-05

## 2024-01-05 NOTE — PROGRESS NOTES
Subjective:   Patient ID:  Josiah Orosco Jr. is a 68 y.o. male who presents for evaluation of AFL    HPI  68 y.o. 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  Echo 50%    Update 1/2024:  RF typical AFL without issues on 10/2/23.  Since then, developed more SOB. Went to [facility]. CT scan showed masses. Bx is positive for adenoCA. That was c/b PTX. (Records available in [facility]where.)  While at [facility], echo (reportedly done before biopsy/PTX) showed dilated R heart, severe TR, PASP 75.  Surprisingly, he feels much better recently. Not using home O2 any longer.    My interpretation of today's ECG is ST. RBBB.    Review of Systems   Constitutional: Negative. Negative for malaise/fatigue.   HENT: Negative.  Negative for ear pain and tinnitus.    Eyes:  Negative for blurred vision.   Cardiovascular:  Positive for dyspnea on exertion. Negative for chest pain, near-syncope, palpitations and syncope.   Respiratory: Negative.  Negative for shortness of breath.    Endocrine: Negative.  Negative for polyuria.   Hematologic/Lymphatic: Does not bruise/bleed easily.   Skin: Negative.  Negative for rash.   Musculoskeletal: Negative.  Negative for joint pain and muscle weakness.   Gastrointestinal: Negative.  Negative for abdominal pain and change in bowel habit.   Genitourinary:  Negative for frequency.   Neurological: Negative.  Negative for dizziness and weakness.   Psychiatric/Behavioral: Negative.  Negative for depression. The patient is not nervous/anxious.    Allergic/Immunologic: Negative for environmental allergies.       Objective:   Physical Exam  Vitals and nursing note reviewed.   Constitutional:       Appearance: He is well-developed.   HENT:      Head: Normocephalic and atraumatic.   Eyes:      General: Lids are normal. No scleral icterus.     Conjunctiva/sclera: Conjunctivae normal.   Neck:       Thyroid: No thyromegaly.      Vascular: No JVD.      Trachea: No tracheal deviation.   Cardiovascular:      Rate and Rhythm: Regular rhythm. Tachycardia present.      Pulses:           Radial pulses are 2+ on the right side and 2+ on the left side.   Pulmonary:      Effort: Pulmonary effort is normal. No tachypnea, accessory muscle usage or respiratory distress.      Breath sounds: No wheezing.   Abdominal:      General: There is no distension.   Musculoskeletal:         General: Normal range of motion.      Cervical back: Normal range of motion.   Skin:     General: Skin is warm and dry.   Neurological:      Mental Status: He is alert and oriented to person, place, and time.      Motor: No abnormal muscle tone.      Deep Tendon Reflexes: Reflexes are normal and symmetric.   Psychiatric:         Behavior: Behavior normal.         Assessment:      1. Malignant neoplasm of lung, unspecified laterality, unspecified part of lung    2. Chronic combined systolic and diastolic heart failure    3. Paroxysmal A-fib    4. Thoracic aorta atherosclerosis    5. Pulmonary interstitial fibrosis    6. Typical atrial flutter    7. HTN, goal below 140/90    8. Chronic right heart failure    9. Pulmonary hypertension        Plan:     Doing well rhythm-wise s/p RFA for typical AFL.  d/c sotalol and eliquis.   Will place ILR for surveillance for AF (or other arrhythmia), which could indicate therapies including resumption of a/c.    Increase BB 50qd -> 50bid.  Referral to [facility] re: significant PH. Update echo prior to that.  Continue f/u with Dr Cueva as well.    f/u with PCP (cc'd here) re: new lung CA diagnosis. Has an appointment with PCP early next week already.    I discussed with the patient the risks and benefits of ILR placement. Our discussion of risks included (but was not limited to) the possibility of infection, bleeding, medication reaction, scar.  I discussed with patient risks, indications, benefits, and alternatives of the  planned procedure. All questions were answered. Patient understands and wishes to proceed.

## 2024-01-05 NOTE — LETTER
January 5, 2024        Elaine Landry MD  [address]  [place] [address]             [facility] - Electrophysiology [facility]  [address]  [facility] [address]  Phone: [phone]  Fax: [phone]   Patient: Josiah Orosco Jr.   MR Number: [id number]   YOB: 1955   Date of Visit: 1/5/2024       Dear Dr. Landry:    Thank you for referring Josiah Orosco to me for evaluation. Below are the relevant portions of my assessment and plan of care.            If you have questions, please do not hesitate to call me. I look forward to following Josiah along with you.    Sincerely,      Jonh Mcgill MD           CC    No Recipients

## 2024-01-08 ENCOUNTER — OFFICE VISIT (OUTPATIENT)
Dept: FAMILY MEDICINE | Facility: CLINIC | Age: 69
End: 2024-01-08
Payer: MEDICARE

## 2024-01-08 VITALS
HEIGHT: 77 IN | TEMPERATURE: 98 F | HEART RATE: 102 BPM | OXYGEN SATURATION: 93 % | DIASTOLIC BLOOD PRESSURE: 84 MMHG | SYSTOLIC BLOOD PRESSURE: 112 MMHG | BODY MASS INDEX: 24.09 KG/M2 | WEIGHT: 204.06 LBS

## 2024-01-08 DIAGNOSIS — D75.1 POLYCYTHEMIA: ICD-10-CM

## 2024-01-08 DIAGNOSIS — Z09 HOSPITAL DISCHARGE FOLLOW-UP: Primary | ICD-10-CM

## 2024-01-08 DIAGNOSIS — I50.42 CHRONIC COMBINED SYSTOLIC AND DIASTOLIC HEART FAILURE: ICD-10-CM

## 2024-01-08 DIAGNOSIS — I70.0 THORACIC AORTA ATHEROSCLEROSIS: ICD-10-CM

## 2024-01-08 DIAGNOSIS — C34.90 ADENOCARCINOMA OF LUNG, UNSPECIFIED LATERALITY: ICD-10-CM

## 2024-01-08 DIAGNOSIS — J96.11 CHRONIC HYPOXEMIC RESPIRATORY FAILURE: ICD-10-CM

## 2024-01-08 DIAGNOSIS — I48.0 PAROXYSMAL A-FIB: ICD-10-CM

## 2024-01-08 DIAGNOSIS — J84.9 ILD (INTERSTITIAL LUNG DISEASE): ICD-10-CM

## 2024-01-08 PROCEDURE — 3008F BODY MASS INDEX DOCD: CPT | Mod: CPTII,S$GLB,, | Performed by: INTERNAL MEDICINE

## 2024-01-08 PROCEDURE — 99999 PR PBB SHADOW E&M-EST. PATIENT-LVL IV: CPT | Mod: PBBFAC,,, | Performed by: INTERNAL MEDICINE

## 2024-01-08 PROCEDURE — 99215 OFFICE O/P EST HI 40 MIN: CPT | Mod: S$GLB,,, | Performed by: INTERNAL MEDICINE

## 2024-01-08 PROCEDURE — 1126F AMNT PAIN NOTED NONE PRSNT: CPT | Mod: CPTII,S$GLB,, | Performed by: INTERNAL MEDICINE

## 2024-01-08 PROCEDURE — 3079F DIAST BP 80-89 MM HG: CPT | Mod: CPTII,S$GLB,, | Performed by: INTERNAL MEDICINE

## 2024-01-08 PROCEDURE — 1157F ADVNC CARE PLAN IN RCRD: CPT | Mod: CPTII,S$GLB,, | Performed by: INTERNAL MEDICINE

## 2024-01-08 PROCEDURE — 3074F SYST BP LT 130 MM HG: CPT | Mod: CPTII,S$GLB,, | Performed by: INTERNAL MEDICINE

## 2024-01-08 PROCEDURE — 1159F MED LIST DOCD IN RCRD: CPT | Mod: CPTII,S$GLB,, | Performed by: INTERNAL MEDICINE

## 2024-01-08 PROCEDURE — 3288F FALL RISK ASSESSMENT DOCD: CPT | Mod: CPTII,S$GLB,, | Performed by: INTERNAL MEDICINE

## 2024-01-08 PROCEDURE — 1101F PT FALLS ASSESS-DOCD LE1/YR: CPT | Mod: CPTII,S$GLB,, | Performed by: INTERNAL MEDICINE

## 2024-01-08 PROCEDURE — 1160F RVW MEDS BY RX/DR IN RCRD: CPT | Mod: CPTII,S$GLB,, | Performed by: INTERNAL MEDICINE

## 2024-01-08 NOTE — PROGRESS NOTES
CHIEF COMPLAINT:   Chief Complaint   Patient presents with    Hospital Follow Up          HISTORY OF PRESENT ILLNESS:  Josiah Orosco Jr. is a 68 y.o. male who presents to the clinic today for Hospital Follow Up  .      The patient presents to clinic today with his wife for follow-up of a recent hospitalization at [facility].  Had gone to see his hematologist who he sees on a regular basis for his polycythemia.  When he was seen he was noted to be short of breath.  Was hospitalized and diagnosed with acute on chronic heart failure.  He was diuresed.  He had a CT scan done of his lungs which showed a growing pulmonary nodule.  Biopsy showed adenocarcinoma of the lung.  Biopsy was complicated by pneumothorax.  He has follow-up with Pulmonary at [facility] in the near future for further evaluation.  He has not yet established with Oncology.  He is on 5 L of oxygen at home.  In general that does well for him to keep him from being short of breath with minimal exertion.  He is currently on steroids and has a good appetite.  He states he overall feels good and has no pain.    Subjective    PAST MEDICAL HISTORY:  Past Medical History:   Diagnosis Date    A-fib 5/8/2023    Arthritis     Chronic hypoxemic respiratory failure 11/9/2023    Chronic right heart failure 11/9/2023    GERD (gastroesophageal reflux disease)     History of HCV, s/p successful treatment w/ SVR12 5/2015     Failed treatment (stage I/II) - followed by hepatology     Joint pain     Lung disease caused by breathing particles     Widespread essentially symmetric interstitial lung disease    Obesity     Polycythemia vera     Prediabetes        PAST SURGICAL HISTORY:  Past Surgical History:   Procedure Laterality Date    ABLATION, ATRIAL FLUTTER, TYPICAL N/A 9/29/2023    Procedure: Ablation, Atrial Flutter, Typical;  Surgeon: Jonh Mcgill MD;  Location: [facility] EP LAB;  Service: Cardiology;  Laterality: N/A;  AFL, ELENA (Cx if SR), CTI, RFA, BETHANY,  MAC, DM, 3 Prep    BUNIONECTOMY      bilateral    CARDIOVERSION N/A 05/08/2023    Procedure: Cardioversion;  Surgeon: David Cueva MD;  Location: [facility] CATH LAB;  Service: Cardiology;  Laterality: N/A;    CATARACT EXTRACTION Left 07/27/2023    COLONOSCOPY N/A 12/09/2015    Procedure: COLONOSCOPY;  Surgeon: Conrad Iqbal MD;  Location: [facility] ENDO;  Service: Endoscopy;  Laterality: N/A;    Liver biopsy x2      rotator cuff Right     TRANSESOPHAGEAL ECHOCARDIOGRAM WITH POSSIBLE CARDIOVERSION (ELENA W/ POSS CARDIOVERSION) N/A 05/08/2023    Procedure: TRANSESOPHAGEAL ECHOCARDIOGRAM WITH POSSIBLE CARDIOVERSION (ELENA W/ POSS CARDIOVERSION);  Surgeon: David Cueva MD;  Location: [facility] CATH LAB;  Service: Cardiology;  Laterality: N/A;  12:30PM    RN PREOP 5/4/2023---EKG ON ARRIVAL    TRANSESOPHAGEAL ECHOCARDIOGRAM WITH POSSIBLE CARDIOVERSION (ELENA W/ POSS CARDIOVERSION) N/A 7/29/2023    Procedure: Transesophageal echo (ELENA) intra-procedure log documentation;  Surgeon: David Cueva MD;  Location: [facility] CATH LAB;  Service: Cardiology;  Laterality: N/A;    TRANSESOPHAGEAL ECHOCARDIOGRAPHY N/A 05/08/2023    Procedure: ECHOCARDIOGRAM, TRANSESOPHAGEAL;  Surgeon: David Cueva MD;  Location: [facility] CATH LAB;  Service: Cardiology;  Laterality: N/A;    TRANSESOPHAGEAL ECHOCARDIOGRAPHY N/A 7/29/2023    Procedure: ECHOCARDIOGRAM, TRANSESOPHAGEAL;  Surgeon: David Cueva MD;  Location: [facility] CATH LAB;  Service: Cardiology;  Laterality: N/A;    TREATMENT OF CARDIAC ARRHYTHMIA N/A 8/7/2023    Procedure: Cardioversion or Defibrillation;  Surgeon: David Cueva MD;  Location: [facility] CATH LAB;  Service: Cardiology;  Laterality: N/A;       SOCIAL HISTORY:  Social History     Socioeconomic History    Marital status:     Number of children: 0    Highest education level: Some college, no degree   Occupational History    Occupation: Self-employed     Employer: [employer]   Tobacco Use    Smoking status: Former     Current  packs/day: 1.00     Average packs/day: 1 pack/day for 30.2 years (30.2 ttl pk-yrs)     Types: Cigarettes     Start date: 11/9/1993    Smokeless tobacco: Never    Tobacco comments:     pt. smokes 10 cigars per day.   Substance and Sexual Activity    Alcohol use: Not Currently     Comment: Rarely    Drug use: Yes     Types: Marijuana     Comment: daily    Sexual activity: Yes     Partners: Female     Social Determinants of Health     Financial Resource Strain: Low Risk  (8/8/2023)    Overall Financial Resource Strain (CARDIA)     Difficulty of Paying Living Expenses: Not very hard   Food Insecurity: No Food Insecurity (8/8/2023)    Hunger Vital Sign     Worried About Running Out of Food in the Last Year: Never true     Ran Out of Food in the Last Year: Never true   Transportation Needs: No Transportation Needs (8/8/2023)    PRAPARE - Transportation     Lack of Transportation (Medical): No     Lack of Transportation (Non-Medical): No   Physical Activity: Inactive (8/8/2023)    Exercise Vital Sign     Days of Exercise per Week: 0 days     Minutes of Exercise per Session: 0 min   Stress: Stress Concern Present (8/8/2023)    [employer] [employer] - Occupational Stress Questionnaire     Feeling of Stress : To some extent   Social Connections: Moderately Integrated (8/8/2023)    Social Connection and Isolation Panel [NHANES]     Frequency of Communication with Friends and Family: Three times a week     Frequency of Social Gatherings with Friends and Family: Three times a week     Attends Gnosticist Services: More than 4 times per year     Active Member of Clubs or Organizations: No     Attends Club or Organization Meetings: Never     Marital Status:    Housing Stability: Low Risk  (8/8/2023)    Housing Stability Vital Sign     Unable to Pay for Housing in the Last Year: No     Number of Places Lived in the Last Year: 1     Unstable Housing in the Last Year: No       FAMILY HISTORY:  Family History    Problem Relation Age of Onset    Heart disease Mother     Kidney disease Mother     Parkinsonism Father     Prostate cancer Cousin         maternal side    Heart attack Sister         CABG    Deep vein thrombosis Sister     Pulmonary embolism Sister     Osteoarthritis Sister         s/p knee replacement    Chronic back pain Brother     Liver disease Neg Hx     Diabetes Neg Hx     Melanoma Neg Hx        ALLERGIES AND MEDICATIONS: updated and reviewed.  Review of patient's allergies indicates:   Allergen Reactions    Ace inhibitors Other (See Comments)     cough     Medication List with Changes/Refills   Current Medications    ALLOPURINOL (ZYLOPRIM) 100 MG TABLET    Take 2 tablets (200 mg total) by mouth once daily.    FLUTICASONE PROPIONATE (FLONASE) 50 MCG/ACTUATION NASAL SPRAY    SHAKE LIQUID AND USE 1 SPRAY(50 MCG) IN EACH NOSTRIL EVERY DAY    FUROSEMIDE (LASIX) 20 MG TABLET    Take 1 tablet (20 mg total) by mouth 2 (two) times daily as needed (1-2 pills as needed for fluid overload).    METOPROLOL SUCCINATE (TOPROL-XL) 50 MG 24 HR TABLET    Take 1 tablet (50 mg total) by mouth 2 (two) times daily.    MULTIVITAMIN CAPSULE    Take 1 capsule by mouth once daily.    OFLOXACIN (OCUFLOX) 0.3 % OPHTHALMIC SOLUTION    Place 1 drop into both eyes 4 (four) times daily.    POTASSIUM CHLORIDE (KLOR-CON) 10 MEQ TBSR    Take 1 tablet by mouth once daily.    PREDNISOLONE ACETATE (PRED FORTE) 1 % DRPS    Place 1 drop into both eyes 3 (three) times daily.    TRELEGY ELLIPTA 100-62.5-25 MCG DSDV    Inhale 1 puff into the lungs Daily.         CARE TEAM:  Patient Care Team:  Elaine Landry MD as PCP - General (Internal Medicine)  Scheuermann, Jennifer B., PA as Physician Assistant (Hepatology)  Karen Johnson MD as Consulting Physician (Hematology)  Junior Torres OD (Optometry)  Meche Reyes LPN as Care Coordinator       REVIEW OF SYSTEMS:  Review of Systems   Constitutional:  Negative for chills and fever.  "  HENT:  Negative for congestion.    Respiratory:  Positive for shortness of breath. Negative for cough.    Cardiovascular:  Negative for chest pain and leg swelling.   Gastrointestinal:  Negative for abdominal pain.   Genitourinary:  Negative for dysuria.             Objective    PHYSICAL EXAMINATION/VITALS:  Vitals:    01/08/24 0954 01/08/24 1253   BP: 112/84    Pulse: 102    Temp: 98 °F (36.7 °C)    TempSrc: Oral    SpO2: (!) 85%  Comment: on 5L with exertion (!) 93%  Comment: on 5L at rest   Weight: 92.5 kg (204 lb 0.6 oz)    Height: 6' 5" (1.956 m)        Body mass index is 24.2 kg/m².      General appearance - alert and in no distress, normal appearing weight  Psychiatric - alert, oriented to person, place, and time, normal behavior, speech, dress, motor activity and thought process  Chest - clear to auscultation, no wheezes, rales or rhonchi, symmetric air entry, no tachypnea, retractions or cyanosis, he was wearing his portable oxygen  Heart - normal rate and regular rhythm      LABS:  No labs needed at this time            ASSESSMENT AND PLAN:   1. Hospital discharge follow-up  He feels like overall his shortness of breath has improved.  He has follow-up with Cardiology and pulmonology in the near future.    2. Adenocarcinoma of lung, unspecified laterality  We briefly discussed that this new diagnosis will need to be further evaluated by Oncology.  He plans to establish with them shortly after seeing pulmonology in the near future.    3. ILD (interstitial lung disease)/4. Chronic hypoxemic respiratory failure  The current medical regimen is effective;  continue present plan and medications.   Followed by: Pulmonology.   -     Ambulatory referral/consult to Physical Medicine Rehab; Future; Expected date: 01/15/2024    5. Chronic combined systolic and diastolic heart failure/6. Paroxysmal A-fib  The current medical regimen is effective;  continue present plan and medications.   Followed by: Cardiology. "     7. Thoracic aorta atherosclerosis  Asymptomatic.  He is currently on a statin medication.  Defer to Cardiology.  Overview:  - noted on CXR - 5/9/2019      8. Polycythemia  Followed by Hematology.  Stable.  Overview:  - getting regular phlebotomy (approximately every 3 weeks)  - followed by hematology, Dr. Johnson              Orders Placed This Encounter   Procedures    Ambulatory referral/consult to Physical Medicine Rehab      FOLLOW UP: Follow up in about 6 months (around 7/8/2024), or if symptoms worsen or fail to improve, for follow up chronic medical conditions.. or sooner as needed.      I spent a total of 40 minutes on the day of the visit.  This includes face to face time and non-face to face time preparing to see the patient (eg, review of tests), obtaining and/or reviewing separately obtained history, documenting clinical information in the electronic or other health record, independently interpreting results and communicating results to the patient/family/caregiver, or care coordinator.

## 2024-01-09 ENCOUNTER — HOSPITAL ENCOUNTER (INPATIENT)
Facility: HOSPITAL | Age: 69
LOS: 3 days | Discharge: HOSPICE | DRG: 389 | End: 2024-01-13
Attending: EMERGENCY MEDICINE | Admitting: INTERNAL MEDICINE
Payer: MEDICARE

## 2024-01-09 DIAGNOSIS — R10.9 ABDOMINAL PAIN, UNSPECIFIED ABDOMINAL LOCATION: ICD-10-CM

## 2024-01-09 DIAGNOSIS — K56.609 SMALL BOWEL OBSTRUCTION: Primary | ICD-10-CM

## 2024-01-09 DIAGNOSIS — R00.0 TACHYCARDIA: ICD-10-CM

## 2024-01-09 DIAGNOSIS — R11.2 NAUSEA AND VOMITING, UNSPECIFIED VOMITING TYPE: ICD-10-CM

## 2024-01-09 DIAGNOSIS — Z51.5 PALLIATIVE CARE ENCOUNTER: ICD-10-CM

## 2024-01-09 DIAGNOSIS — R07.9 CHEST PAIN: ICD-10-CM

## 2024-01-09 DIAGNOSIS — R06.02 SHORTNESS OF BREATH: ICD-10-CM

## 2024-01-09 LAB
ALBUMIN SERPL BCP-MCNC: 3.2 G/DL (ref 3.5–5.2)
ALP SERPL-CCNC: 109 U/L (ref 55–135)
ALT SERPL W/O P-5'-P-CCNC: 29 U/L (ref 10–44)
ANION GAP SERPL CALC-SCNC: 16 MMOL/L (ref 8–16)
AST SERPL-CCNC: 32 U/L (ref 10–40)
BACTERIA #/AREA URNS HPF: ABNORMAL /HPF
BASOPHILS # BLD AUTO: 0.02 K/UL (ref 0–0.2)
BASOPHILS NFR BLD: 0.3 % (ref 0–1.9)
BILIRUB SERPL-MCNC: 0.8 MG/DL (ref 0.1–1)
BILIRUB UR QL STRIP: NEGATIVE
BNP SERPL-MCNC: 837 PG/ML (ref 0–99)
BUN SERPL-MCNC: 11 MG/DL (ref 8–23)
CALCIUM SERPL-MCNC: 10.4 MG/DL (ref 8.7–10.5)
CHLORIDE SERPL-SCNC: 97 MMOL/L (ref 95–110)
CLARITY UR: CLEAR
CO2 SERPL-SCNC: 28 MMOL/L (ref 23–29)
COLOR UR: YELLOW
CREAT SERPL-MCNC: 0.8 MG/DL (ref 0.5–1.4)
CTP QC/QA: YES
CTP QC/QA: YES
DIFFERENTIAL METHOD BLD: ABNORMAL
EOSINOPHIL # BLD AUTO: 0 K/UL (ref 0–0.5)
EOSINOPHIL NFR BLD: 0.5 % (ref 0–8)
ERYTHROCYTE [DISTWIDTH] IN BLOOD BY AUTOMATED COUNT: 22.2 % (ref 11.5–14.5)
EST. GFR  (NO RACE VARIABLE): >60 ML/MIN/1.73 M^2
GLUCOSE SERPL-MCNC: 120 MG/DL (ref 70–110)
GLUCOSE UR QL STRIP: NEGATIVE
HCT VFR BLD AUTO: 49.3 % (ref 40–54)
HGB BLD-MCNC: 14.7 G/DL (ref 14–18)
HGB UR QL STRIP: NEGATIVE
HYALINE CASTS #/AREA URNS LPF: 38 /LPF
IMM GRANULOCYTES # BLD AUTO: 0.03 K/UL (ref 0–0.04)
IMM GRANULOCYTES NFR BLD AUTO: 0.4 % (ref 0–0.5)
INR PPP: 1.1 (ref 0.8–1.2)
KETONES UR QL STRIP: NEGATIVE
LACTATE SERPL-SCNC: 2.5 MMOL/L (ref 0.5–2.2)
LEUKOCYTE ESTERASE UR QL STRIP: NEGATIVE
LIPASE SERPL-CCNC: 13 U/L (ref 4–60)
LYMPHOCYTES # BLD AUTO: 1.7 K/UL (ref 1–4.8)
LYMPHOCYTES NFR BLD: 21.8 % (ref 18–48)
MCH RBC QN AUTO: 19.8 PG (ref 27–31)
MCHC RBC AUTO-ENTMCNC: 29.8 G/DL (ref 32–36)
MCV RBC AUTO: 66 FL (ref 82–98)
MICROSCOPIC COMMENT: ABNORMAL
MONOCYTES # BLD AUTO: 0.5 K/UL (ref 0.3–1)
MONOCYTES NFR BLD: 6.4 % (ref 4–15)
NEUTROPHILS # BLD AUTO: 5.5 K/UL (ref 1.8–7.7)
NEUTROPHILS NFR BLD: 70.6 % (ref 38–73)
NITRITE UR QL STRIP: NEGATIVE
NRBC BLD-RTO: 0 /100 WBC
PH UR STRIP: 7 [PH] (ref 5–8)
PLATELET # BLD AUTO: 243 K/UL (ref 150–450)
PMV BLD AUTO: ABNORMAL FL (ref 9.2–12.9)
POC MOLECULAR INFLUENZA A AGN: NEGATIVE
POC MOLECULAR INFLUENZA B AGN: NEGATIVE
POCT GLUCOSE: 119 MG/DL (ref 70–110)
POTASSIUM SERPL-SCNC: 4.6 MMOL/L (ref 3.5–5.1)
PROT SERPL-MCNC: 8.7 G/DL (ref 6–8.4)
PROT UR QL STRIP: ABNORMAL
PROTHROMBIN TIME: 11.4 SEC (ref 9–12.5)
RBC # BLD AUTO: 7.43 M/UL (ref 4.6–6.2)
RBC #/AREA URNS HPF: 6 /HPF (ref 0–4)
SARS-COV-2 RDRP RESP QL NAA+PROBE: NEGATIVE
SODIUM SERPL-SCNC: 141 MMOL/L (ref 136–145)
SP GR UR STRIP: 1.03 (ref 1–1.03)
SQUAMOUS #/AREA URNS HPF: 2 /HPF
TROPONIN I SERPL DL<=0.01 NG/ML-MCNC: 0.02 NG/ML (ref 0–0.03)
URN SPEC COLLECT METH UR: ABNORMAL
UROBILINOGEN UR STRIP-ACNC: ABNORMAL EU/DL
WBC # BLD AUTO: 7.8 K/UL (ref 3.9–12.7)
WBC #/AREA URNS HPF: 6 /HPF (ref 0–5)

## 2024-01-09 PROCEDURE — 96376 TX/PRO/DX INJ SAME DRUG ADON: CPT

## 2024-01-09 PROCEDURE — 83690 ASSAY OF LIPASE: CPT | Performed by: PHYSICIAN ASSISTANT

## 2024-01-09 PROCEDURE — 84484 ASSAY OF TROPONIN QUANT: CPT | Performed by: PHYSICIAN ASSISTANT

## 2024-01-09 PROCEDURE — 87635 SARS-COV-2 COVID-19 AMP PRB: CPT | Performed by: EMERGENCY MEDICINE

## 2024-01-09 PROCEDURE — 25000003 PHARM REV CODE 250: Performed by: EMERGENCY MEDICINE

## 2024-01-09 PROCEDURE — 83605 ASSAY OF LACTIC ACID: CPT | Performed by: EMERGENCY MEDICINE

## 2024-01-09 PROCEDURE — 85025 COMPLETE CBC W/AUTO DIFF WBC: CPT | Performed by: PHYSICIAN ASSISTANT

## 2024-01-09 PROCEDURE — 83880 ASSAY OF NATRIURETIC PEPTIDE: CPT | Performed by: PHYSICIAN ASSISTANT

## 2024-01-09 PROCEDURE — 87502 INFLUENZA DNA AMP PROBE: CPT

## 2024-01-09 PROCEDURE — 96374 THER/PROPH/DIAG INJ IV PUSH: CPT

## 2024-01-09 PROCEDURE — 99285 EMERGENCY DEPT VISIT HI MDM: CPT | Mod: 25

## 2024-01-09 PROCEDURE — 96375 TX/PRO/DX INJ NEW DRUG ADDON: CPT

## 2024-01-09 PROCEDURE — 81000 URINALYSIS NONAUTO W/SCOPE: CPT | Performed by: EMERGENCY MEDICINE

## 2024-01-09 PROCEDURE — 80053 COMPREHEN METABOLIC PANEL: CPT | Performed by: PHYSICIAN ASSISTANT

## 2024-01-09 PROCEDURE — 82962 GLUCOSE BLOOD TEST: CPT

## 2024-01-09 PROCEDURE — 63600175 PHARM REV CODE 636 W HCPCS: Performed by: EMERGENCY MEDICINE

## 2024-01-09 PROCEDURE — 85610 PROTHROMBIN TIME: CPT | Performed by: PHYSICIAN ASSISTANT

## 2024-01-09 PROCEDURE — 96361 HYDRATE IV INFUSION ADD-ON: CPT

## 2024-01-09 RX ORDER — METOPROLOL TARTRATE 1 MG/ML
5 INJECTION, SOLUTION INTRAVENOUS
Status: COMPLETED | OUTPATIENT
Start: 2024-01-09 | End: 2024-01-09

## 2024-01-09 RX ORDER — MORPHINE SULFATE 4 MG/ML
8 INJECTION, SOLUTION INTRAMUSCULAR; INTRAVENOUS
Status: COMPLETED | OUTPATIENT
Start: 2024-01-09 | End: 2024-01-09

## 2024-01-09 RX ORDER — ONDANSETRON HYDROCHLORIDE 2 MG/ML
4 INJECTION, SOLUTION INTRAVENOUS
Status: COMPLETED | OUTPATIENT
Start: 2024-01-09 | End: 2024-01-09

## 2024-01-09 RX ADMIN — ONDANSETRON 4 MG: 2 INJECTION INTRAMUSCULAR; INTRAVENOUS at 10:01

## 2024-01-09 RX ADMIN — METOROPROLOL TARTRATE 5 MG: 5 INJECTION, SOLUTION INTRAVENOUS at 10:01

## 2024-01-09 RX ADMIN — IOHEXOL 100 ML: 350 INJECTION, SOLUTION INTRAVENOUS at 11:01

## 2024-01-09 RX ADMIN — MORPHINE SULFATE 8 MG: 4 INJECTION, SOLUTION INTRAMUSCULAR; INTRAVENOUS at 10:01

## 2024-01-09 RX ADMIN — SODIUM CHLORIDE 500 ML: 9 INJECTION, SOLUTION INTRAVENOUS at 10:01

## 2024-01-10 ENCOUNTER — TELEPHONE (OUTPATIENT)
Dept: PULMONOLOGY | Facility: CLINIC | Age: 69
End: 2024-01-10
Payer: MEDICARE

## 2024-01-10 ENCOUNTER — PATIENT OUTREACH (OUTPATIENT)
Dept: ADMINISTRATIVE | Facility: OTHER | Age: 69
End: 2024-01-10
Payer: MEDICARE

## 2024-01-10 ENCOUNTER — TELEPHONE (OUTPATIENT)
Dept: FAMILY MEDICINE | Facility: CLINIC | Age: 69
End: 2024-01-10
Payer: MEDICARE

## 2024-01-10 PROBLEM — Z71.89 ADVANCED CARE PLANNING/COUNSELING DISCUSSION: Status: ACTIVE | Noted: 2024-01-10

## 2024-01-10 PROBLEM — K56.609 SMALL BOWEL OBSTRUCTION: Status: ACTIVE | Noted: 2024-01-10

## 2024-01-10 PROBLEM — R94.31 PROLONGED Q-T INTERVAL ON ECG: Status: ACTIVE | Noted: 2024-01-10

## 2024-01-10 PROBLEM — I70.0 THORACIC AORTA ATHEROSCLEROSIS: Status: RESOLVED | Noted: 2022-03-17 | Resolved: 2024-01-10

## 2024-01-10 LAB
ALBUMIN SERPL BCP-MCNC: 3 G/DL (ref 3.5–5.2)
ALP SERPL-CCNC: 92 U/L (ref 55–135)
ALT SERPL W/O P-5'-P-CCNC: 24 U/L (ref 10–44)
ANION GAP SERPL CALC-SCNC: 11 MMOL/L (ref 8–16)
AST SERPL-CCNC: 25 U/L (ref 10–40)
BILIRUB SERPL-MCNC: 0.8 MG/DL (ref 0.1–1)
BUN SERPL-MCNC: 14 MG/DL (ref 8–23)
CALCIUM SERPL-MCNC: 10.1 MG/DL (ref 8.7–10.5)
CHLORIDE SERPL-SCNC: 96 MMOL/L (ref 95–110)
CO2 SERPL-SCNC: 36 MMOL/L (ref 23–29)
CREAT SERPL-MCNC: 0.9 MG/DL (ref 0.5–1.4)
EST. GFR  (NO RACE VARIABLE): >60 ML/MIN/1.73 M^2
GLUCOSE SERPL-MCNC: 124 MG/DL (ref 70–110)
LACTATE SERPL-SCNC: 1.4 MMOL/L (ref 0.5–2.2)
MAGNESIUM SERPL-MCNC: 2 MG/DL (ref 1.6–2.6)
PHOSPHATE SERPL-MCNC: 5.5 MG/DL (ref 2.7–4.5)
POCT GLUCOSE: 127 MG/DL (ref 70–110)
POTASSIUM SERPL-SCNC: 4.7 MMOL/L (ref 3.5–5.1)
PROT SERPL-MCNC: 8.4 G/DL (ref 6–8.4)
SODIUM SERPL-SCNC: 143 MMOL/L (ref 136–145)
TSH SERPL DL<=0.005 MIU/L-ACNC: 0.74 UIU/ML (ref 0.4–4)

## 2024-01-10 PROCEDURE — 63600175 PHARM REV CODE 636 W HCPCS: Performed by: STUDENT IN AN ORGANIZED HEALTH CARE EDUCATION/TRAINING PROGRAM

## 2024-01-10 PROCEDURE — 25000003 PHARM REV CODE 250: Performed by: INTERNAL MEDICINE

## 2024-01-10 PROCEDURE — 84443 ASSAY THYROID STIM HORMONE: CPT | Performed by: STUDENT IN AN ORGANIZED HEALTH CARE EDUCATION/TRAINING PROGRAM

## 2024-01-10 PROCEDURE — 25500020 PHARM REV CODE 255: Performed by: EMERGENCY MEDICINE

## 2024-01-10 PROCEDURE — 99223 1ST HOSP IP/OBS HIGH 75: CPT | Mod: 25,,, | Performed by: INTERNAL MEDICINE

## 2024-01-10 PROCEDURE — 93005 ELECTROCARDIOGRAM TRACING: CPT

## 2024-01-10 PROCEDURE — 99900035 HC TECH TIME PER 15 MIN (STAT)

## 2024-01-10 PROCEDURE — 93010 ELECTROCARDIOGRAM REPORT: CPT | Mod: 76,,, | Performed by: INTERNAL MEDICINE

## 2024-01-10 PROCEDURE — 93010 ELECTROCARDIOGRAM REPORT: CPT | Mod: ,,, | Performed by: INTERNAL MEDICINE

## 2024-01-10 PROCEDURE — 11000001 HC ACUTE MED/SURG PRIVATE ROOM

## 2024-01-10 PROCEDURE — 25000003 PHARM REV CODE 250: Performed by: STUDENT IN AN ORGANIZED HEALTH CARE EDUCATION/TRAINING PROGRAM

## 2024-01-10 PROCEDURE — 99223 1ST HOSP IP/OBS HIGH 75: CPT | Mod: ,,, | Performed by: SURGERY

## 2024-01-10 PROCEDURE — 83735 ASSAY OF MAGNESIUM: CPT | Performed by: STUDENT IN AN ORGANIZED HEALTH CARE EDUCATION/TRAINING PROGRAM

## 2024-01-10 PROCEDURE — 84100 ASSAY OF PHOSPHORUS: CPT | Performed by: STUDENT IN AN ORGANIZED HEALTH CARE EDUCATION/TRAINING PROGRAM

## 2024-01-10 PROCEDURE — 80053 COMPREHEN METABOLIC PANEL: CPT | Performed by: STUDENT IN AN ORGANIZED HEALTH CARE EDUCATION/TRAINING PROGRAM

## 2024-01-10 PROCEDURE — 83605 ASSAY OF LACTIC ACID: CPT | Performed by: STUDENT IN AN ORGANIZED HEALTH CARE EDUCATION/TRAINING PROGRAM

## 2024-01-10 PROCEDURE — 63600175 PHARM REV CODE 636 W HCPCS

## 2024-01-10 RX ORDER — FUROSEMIDE 20 MG/1
20 TABLET ORAL 2 TIMES DAILY PRN
Status: DISCONTINUED | OUTPATIENT
Start: 2024-01-10 | End: 2024-01-13 | Stop reason: HOSPADM

## 2024-01-10 RX ORDER — SODIUM CHLORIDE 0.9 % (FLUSH) 0.9 %
10 SYRINGE (ML) INJECTION EVERY 12 HOURS PRN
Status: DISCONTINUED | OUTPATIENT
Start: 2024-01-10 | End: 2024-01-13 | Stop reason: HOSPADM

## 2024-01-10 RX ORDER — LANOLIN ALCOHOL/MO/W.PET/CERES
800 CREAM (GRAM) TOPICAL
Status: DISCONTINUED | OUTPATIENT
Start: 2024-01-10 | End: 2024-01-13 | Stop reason: HOSPADM

## 2024-01-10 RX ORDER — IBUPROFEN 200 MG
24 TABLET ORAL
Status: DISCONTINUED | OUTPATIENT
Start: 2024-01-10 | End: 2024-01-13 | Stop reason: HOSPADM

## 2024-01-10 RX ORDER — ALUMINUM HYDROXIDE, MAGNESIUM HYDROXIDE, AND SIMETHICONE 1200; 120; 1200 MG/30ML; MG/30ML; MG/30ML
30 SUSPENSION ORAL 4 TIMES DAILY PRN
Status: DISCONTINUED | OUTPATIENT
Start: 2024-01-10 | End: 2024-01-13 | Stop reason: HOSPADM

## 2024-01-10 RX ORDER — ONDANSETRON HYDROCHLORIDE 2 MG/ML
4 INJECTION, SOLUTION INTRAVENOUS
Status: COMPLETED | OUTPATIENT
Start: 2024-01-10 | End: 2024-01-10

## 2024-01-10 RX ORDER — METOPROLOL SUCCINATE 50 MG/1
50 TABLET, EXTENDED RELEASE ORAL 2 TIMES DAILY
Status: DISCONTINUED | OUTPATIENT
Start: 2024-01-10 | End: 2024-01-10

## 2024-01-10 RX ORDER — GLUCAGON 1 MG
1 KIT INJECTION
Status: DISCONTINUED | OUTPATIENT
Start: 2024-01-10 | End: 2024-01-13 | Stop reason: HOSPADM

## 2024-01-10 RX ORDER — ONDANSETRON HYDROCHLORIDE 2 MG/ML
INJECTION, SOLUTION INTRAVENOUS
Status: COMPLETED
Start: 2024-01-10 | End: 2024-01-10

## 2024-01-10 RX ORDER — ONDANSETRON HYDROCHLORIDE 2 MG/ML
4 INJECTION, SOLUTION INTRAVENOUS EVERY 8 HOURS PRN
Status: DISCONTINUED | OUTPATIENT
Start: 2024-01-10 | End: 2024-01-10

## 2024-01-10 RX ORDER — METOPROLOL TARTRATE 1 MG/ML
5 INJECTION, SOLUTION INTRAVENOUS EVERY 6 HOURS PRN
Status: DISCONTINUED | OUTPATIENT
Start: 2024-01-10 | End: 2024-01-13 | Stop reason: HOSPADM

## 2024-01-10 RX ORDER — IPRATROPIUM BROMIDE AND ALBUTEROL SULFATE 2.5; .5 MG/3ML; MG/3ML
3 SOLUTION RESPIRATORY (INHALATION) EVERY 4 HOURS PRN
Status: DISCONTINUED | OUTPATIENT
Start: 2024-01-10 | End: 2024-01-13 | Stop reason: HOSPADM

## 2024-01-10 RX ORDER — ALLOPURINOL 100 MG/1
200 TABLET ORAL DAILY
Status: DISCONTINUED | OUTPATIENT
Start: 2024-01-10 | End: 2024-01-13 | Stop reason: HOSPADM

## 2024-01-10 RX ORDER — DEXTROSE MONOHYDRATE AND SODIUM CHLORIDE 5; .9 G/100ML; G/100ML
INJECTION, SOLUTION INTRAVENOUS CONTINUOUS
Status: DISCONTINUED | OUTPATIENT
Start: 2024-01-10 | End: 2024-01-11

## 2024-01-10 RX ORDER — METOPROLOL TARTRATE 1 MG/ML
5 INJECTION, SOLUTION INTRAVENOUS
Status: DISCONTINUED | OUTPATIENT
Start: 2024-01-10 | End: 2024-01-10

## 2024-01-10 RX ORDER — SODIUM,POTASSIUM PHOSPHATES 280-250MG
2 POWDER IN PACKET (EA) ORAL
Status: DISCONTINUED | OUTPATIENT
Start: 2024-01-10 | End: 2024-01-13 | Stop reason: HOSPADM

## 2024-01-10 RX ORDER — METOPROLOL TARTRATE 50 MG/1
50 TABLET ORAL 2 TIMES DAILY
Status: DISCONTINUED | OUTPATIENT
Start: 2024-01-10 | End: 2024-01-13 | Stop reason: HOSPADM

## 2024-01-10 RX ORDER — PROCHLORPERAZINE EDISYLATE 5 MG/ML
5 INJECTION INTRAMUSCULAR; INTRAVENOUS EVERY 6 HOURS PRN
Status: DISCONTINUED | OUTPATIENT
Start: 2024-01-10 | End: 2024-01-13 | Stop reason: HOSPADM

## 2024-01-10 RX ORDER — ACETAMINOPHEN 325 MG/1
650 TABLET ORAL EVERY 8 HOURS PRN
Status: DISCONTINUED | OUTPATIENT
Start: 2024-01-10 | End: 2024-01-13 | Stop reason: HOSPADM

## 2024-01-10 RX ORDER — PREDNISONE 5 MG/1
10 TABLET ORAL DAILY
COMMUNITY
Start: 2023-12-23 | End: 2024-01-20

## 2024-01-10 RX ORDER — MULTIVITAMIN
1 TABLET ORAL DAILY
COMMUNITY
Start: 2023-12-24 | End: 2024-12-23

## 2024-01-10 RX ORDER — SIMETHICONE 80 MG
1 TABLET,CHEWABLE ORAL 4 TIMES DAILY PRN
Status: DISCONTINUED | OUTPATIENT
Start: 2024-01-10 | End: 2024-01-13 | Stop reason: HOSPADM

## 2024-01-10 RX ORDER — IBUPROFEN 200 MG
16 TABLET ORAL
Status: DISCONTINUED | OUTPATIENT
Start: 2024-01-10 | End: 2024-01-13 | Stop reason: HOSPADM

## 2024-01-10 RX ORDER — HEPARIN SODIUM 5000 [USP'U]/ML
5000 INJECTION, SOLUTION INTRAVENOUS; SUBCUTANEOUS EVERY 8 HOURS
Status: DISCONTINUED | OUTPATIENT
Start: 2024-01-10 | End: 2024-01-13 | Stop reason: HOSPADM

## 2024-01-10 RX ORDER — NALOXONE HCL 0.4 MG/ML
0.02 VIAL (ML) INJECTION
Status: DISCONTINUED | OUTPATIENT
Start: 2024-01-10 | End: 2024-01-13 | Stop reason: HOSPADM

## 2024-01-10 RX ORDER — TALC
6 POWDER (GRAM) TOPICAL NIGHTLY PRN
Status: DISCONTINUED | OUTPATIENT
Start: 2024-01-10 | End: 2024-01-13 | Stop reason: HOSPADM

## 2024-01-10 RX ORDER — ACETAMINOPHEN 325 MG/1
650 TABLET ORAL EVERY 4 HOURS PRN
Status: DISCONTINUED | OUTPATIENT
Start: 2024-01-10 | End: 2024-01-13 | Stop reason: HOSPADM

## 2024-01-10 RX ADMIN — ONDANSETRON 4 MG: 2 INJECTION INTRAMUSCULAR; INTRAVENOUS at 06:01

## 2024-01-10 RX ADMIN — DEXTROSE AND SODIUM CHLORIDE 100 ML: 5; 900 INJECTION, SOLUTION INTRAVENOUS at 10:01

## 2024-01-10 RX ADMIN — HEPARIN SODIUM 5000 UNITS: 5000 INJECTION INTRAVENOUS; SUBCUTANEOUS at 01:01

## 2024-01-10 RX ADMIN — METOROPROLOL TARTRATE 5 MG: 5 INJECTION, SOLUTION INTRAVENOUS at 06:01

## 2024-01-10 RX ADMIN — HEPARIN SODIUM 5000 UNITS: 5000 INJECTION INTRAVENOUS; SUBCUTANEOUS at 06:01

## 2024-01-10 RX ADMIN — METOPROLOL TARTRATE 50 MG: 50 TABLET, FILM COATED ORAL at 01:01

## 2024-01-10 RX ADMIN — ONDANSETRON HYDROCHLORIDE 4 MG: 2 INJECTION, SOLUTION INTRAVENOUS at 12:01

## 2024-01-10 RX ADMIN — DEXTROSE AND SODIUM CHLORIDE: 5; 900 INJECTION, SOLUTION INTRAVENOUS at 12:01

## 2024-01-10 RX ADMIN — METOPROLOL TARTRATE 50 MG: 50 TABLET, FILM COATED ORAL at 09:01

## 2024-01-10 RX ADMIN — HEPARIN SODIUM 5000 UNITS: 5000 INJECTION INTRAVENOUS; SUBCUTANEOUS at 09:01

## 2024-01-10 RX ADMIN — Medication 6 MG: at 09:01

## 2024-01-10 RX ADMIN — ONDANSETRON 4 MG: 2 INJECTION INTRAMUSCULAR; INTRAVENOUS at 12:01

## 2024-01-10 RX ADMIN — METOPROLOL SUCCINATE 50 MG: 50 TABLET, EXTENDED RELEASE ORAL at 03:01

## 2024-01-10 NOTE — ADMISSIONCARE
AdmissionCare    Guideline: Intestinal Obstruction, Inpatient    Based on the indications selected for the patient, the bed status of Admit to Inpatient was determined to be MET    The following indications were selected as present at the time of evaluation of the patient:      Signs and symptoms of bowel obstruction (eg, vomiting, inability to tolerate PO intake, pain, distention) that are severe (feculent vomiting, Hypotension, electrolyte abnormality, evidence of bowel ischemia or suspected perforation), or persistent (eg, NG tube placed and will need to be continued, IV hydration support required)   -    - Imaging study consistent with bowel obstruction (ie, alternative diagnosis not more likely)    AdmissionCare documentation entered by: ERWIN Simmons    [facility], 27th edition, Copyright © 2023 [facility], [facility] All Rights Reserved.  [id number]

## 2024-01-10 NOTE — CONSULTS
[facility] Emergency Dept  General Surgery  Consult Note    Patient Name: Josiah Orosco Jr.  MRN: [id number]  Code Status: Full Code  Admission Date: 1/9/2024  Hospital Length of Stay: 0 days  Attending Physician: Magdi Devine DO  Primary Care Provider: Elaine Landry MD    Patient information was obtained from patient, spouse/SO, and ER records.     Inpatient consult to General Surgery  Consult performed by: Danny Licea MD  Consult ordered by: Adeel Montaño MD        Subjective:     Principal Problem: Small bowel obstruction    History of Present Illness: Josiah Orosco is a 67 yo M with history of ILD (on 5L O2), HFpEF (EF: 55%, reduced RV systolic function), pulmonary hypertension (sPAP of 74 mmHg) hypertension, atrial flutter (s/p ablation in 09/2023, not on AC anymore), polycythemia, and recently diagnosed metastatic lung cancer who presents to the ED with complaints of generalized abdominal pain, nausea and vomiting. This began yesterday. Since arrival, his pain has completely resolved though he continues to have nausea/vomiting. CT is concerning for SBO. NGT was placed this morning with approximately 700cc of bilious output. He is in atrial fibrillation, somewhat rate controlled (90s-low 100s) but is stable. He denies any previous abdominal surgery.     No current facility-administered medications on file prior to encounter.     Current Outpatient Medications on File Prior to Encounter   Medication Sig    allopurinoL (ZYLOPRIM) 100 MG tablet Take 2 tablets (200 mg total) by mouth once daily. (Patient taking differently: Take 100 mg by mouth once daily.)    fluticasone propionate (FLONASE) 50 mcg/actuation nasal spray SHAKE LIQUID AND USE 1 SPRAY(50 MCG) IN EACH NOSTRIL EVERY DAY    furosemide (LASIX) 20 MG tablet Take 1 tablet (20 mg total) by mouth 2 (two) times daily as needed (1-2 pills as needed for fluid overload). (Patient taking differently: Take 40 mg by mouth 2 (two) times daily as needed  (1-2 pills as needed for fluid overload).)    metoprolol succinate (TOPROL-XL) 50 MG 24 hr tablet Take 1 tablet (50 mg total) by mouth 2 (two) times daily.    multivitamin with folic acid 400 mcg Tab Take 1 tablet by mouth once daily.    potassium chloride (KLOR-CON) 10 MEQ TbSR Take 1 tablet by mouth once daily.    predniSONE (DELTASONE) 5 MG tablet Take 10 mg by mouth once daily.    TRELEGY ELLIPTA 100-62.5-25 mcg DsDv Inhale 1 puff into the lungs Daily.    [DISCONTINUED] multivitamin capsule Take 1 capsule by mouth once daily.    [DISCONTINUED] ofloxacin (OCUFLOX) 0.3 % ophthalmic solution Place 1 drop into both eyes 4 (four) times daily.    [DISCONTINUED] prednisoLONE acetate (PRED FORTE) 1 % DrpS Place 1 drop into both eyes 3 (three) times daily.       Review of patient's allergies indicates:   Allergen Reactions    Ace inhibitors Other (See Comments)     cough       Past Medical History:   Diagnosis Date    A-fib 5/8/2023    Arthritis     Chronic hypoxemic respiratory failure 11/9/2023    Chronic right heart failure 11/9/2023    GERD (gastroesophageal reflux disease)     History of HCV, s/p successful treatment w/ SVR12 5/2015     Failed treatment (stage I/II) - followed by hepatology     Joint pain     Lung disease caused by breathing particles     Widespread essentially symmetric interstitial lung disease    Obesity     Polycythemia vera     Prediabetes      Past Surgical History:   Procedure Laterality Date    ABLATION, ATRIAL FLUTTER, TYPICAL N/A 9/29/2023    Procedure: Ablation, Atrial Flutter, Typical;  Surgeon: Jonh Mcgill MD;  Location: [facility] EP LAB;  Service: Cardiology;  Laterality: N/A;  AFL, ELENA (Cx if SR), CTI, RFA, BETHANY, MAC, DM, 3 Prep    BUNIONECTOMY      bilateral    CARDIOVERSION N/A 05/08/2023    Procedure: Cardioversion;  Surgeon: David Cueva MD;  Location: [facility] CATH LAB;  Service: Cardiology;  Laterality: N/A;    CATARACT EXTRACTION Left 07/27/2023    COLONOSCOPY N/A 12/09/2015     Procedure: COLONOSCOPY;  Surgeon: Conrad Iqbal MD;  Location: [facility] ENDO;  Service: Endoscopy;  Laterality: N/A;    Liver biopsy x2      rotator cuff Right     TRANSESOPHAGEAL ECHOCARDIOGRAM WITH POSSIBLE CARDIOVERSION (ELENA W/ POSS CARDIOVERSION) N/A 05/08/2023    Procedure: TRANSESOPHAGEAL ECHOCARDIOGRAM WITH POSSIBLE CARDIOVERSION (ELENA W/ POSS CARDIOVERSION);  Surgeon: David Cueva MD;  Location: [facility] CATH LAB;  Service: Cardiology;  Laterality: N/A;  12:30PM    RN PREOP 5/4/2023---EKG ON ARRIVAL    TRANSESOPHAGEAL ECHOCARDIOGRAM WITH POSSIBLE CARDIOVERSION (ELENA W/ POSS CARDIOVERSION) N/A 7/29/2023    Procedure: Transesophageal echo (ELENA) intra-procedure log documentation;  Surgeon: David Cueva MD;  Location: [facility] CATH LAB;  Service: Cardiology;  Laterality: N/A;    TRANSESOPHAGEAL ECHOCARDIOGRAPHY N/A 05/08/2023    Procedure: ECHOCARDIOGRAM, TRANSESOPHAGEAL;  Surgeon: David Cueva MD;  Location: [facility] CATH LAB;  Service: Cardiology;  Laterality: N/A;    TRANSESOPHAGEAL ECHOCARDIOGRAPHY N/A 7/29/2023    Procedure: ECHOCARDIOGRAM, TRANSESOPHAGEAL;  Surgeon: David Cueva MD;  Location: [facility] CATH LAB;  Service: Cardiology;  Laterality: N/A;    TREATMENT OF CARDIAC ARRHYTHMIA N/A 8/7/2023    Procedure: Cardioversion or Defibrillation;  Surgeon: David Cueva MD;  Location: [facility] CATH LAB;  Service: Cardiology;  Laterality: N/A;     Family History       Problem Relation (Age of Onset)    Chronic back pain Brother    Deep vein thrombosis Sister    Heart attack Sister    Heart disease Mother    Kidney disease Mother    Osteoarthritis Sister    Parkinsonism Father    Prostate cancer Cousin    Pulmonary embolism Sister          Tobacco Use    Smoking status: Former     Current packs/day: 1.00     Average packs/day: 1 pack/day for 30.2 years (30.2 ttl pk-yrs)     Types: Cigarettes     Start date: 11/9/1993    Smokeless tobacco: Never    Tobacco comments:     pt. smokes 10 cigars per day.   Substance and  Sexual Activity    Alcohol use: Not Currently     Comment: Rarely    Drug use: Yes     Types: Marijuana     Comment: daily    Sexual activity: Yes     Partners: Female     Review of Systems   Constitutional:  Positive for appetite change and fatigue. Negative for activity change, chills and fever.   Respiratory:  Positive for shortness of breath. Negative for cough and chest tightness.    Cardiovascular:  Negative for chest pain and palpitations.   Gastrointestinal:  Positive for abdominal distention, abdominal pain, nausea and vomiting. Negative for anal bleeding, blood in stool, constipation, diarrhea and rectal pain.   Genitourinary:  Negative for difficulty urinating, dysuria, flank pain and penile discharge.   Musculoskeletal:  Negative for arthralgias.   Neurological:  Negative for dizziness and light-headedness.   Hematological:  Bruises/bleeds easily.     Objective:     Vital Signs (Most Recent):  Temp: 97.8 °F (36.6 °C) (01/10/24 0601)  Pulse: 105 (01/10/24 1200)  Resp: 19 (01/10/24 1200)  BP: 114/69 (01/10/24 1200)  SpO2: 96 % (01/10/24 1200) Vital Signs (24h Range):  Temp:  [97.6 °F (36.4 °C)-97.8 °F (36.6 °C)] 97.8 °F (36.6 °C)  Pulse:  [] 105  Resp:  [14-25] 19  SpO2:  [95 %-100 %] 96 %  BP: (114-152)/(58-94) 114/69     Weight: 92.5 kg (204 lb)  Body mass index is 24.19 kg/m².     Physical Exam  Constitutional:       General: He is not in acute distress.     Appearance: Normal appearance. He is not ill-appearing.   HENT:      Head: Normocephalic and atraumatic.      Nose:      Comments: NGT in place with bilious output  Eyes:      Extraocular Movements: Extraocular movements intact.      Pupils: Pupils are equal, round, and reactive to light.   Cardiovascular:      Rate and Rhythm: Tachycardia present. Rhythm irregular.   Pulmonary:      Effort: Pulmonary effort is normal. No respiratory distress.   Abdominal:      General: Abdomen is flat. There is no distension.      Palpations: Abdomen is soft.       Tenderness: There is no abdominal tenderness. There is no guarding.      Comments: Abdomen is soft, not particularly distended and non-tender on my exam. I do not appreciate any significant hernias as well   Musculoskeletal:         General: No swelling. Normal range of motion.   Skin:     General: Skin is warm and dry.   Neurological:      General: No focal deficit present.      Mental Status: He is alert.            I have reviewed all pertinent lab results within the past 24 hours.  CBC:   Recent Labs   Lab 01/09/24  2139   WBC 7.80   RBC 7.43*   HGB 14.7   HCT 49.3      MCV 66*   MCH 19.8*   MCHC 29.8*     CMP:   Recent Labs   Lab 01/10/24  0750   *   CALCIUM 10.1   ALBUMIN 3.0*   PROT 8.4      K 4.7   CO2 36*   CL 96   BUN 14   CREATININE 0.9   ALKPHOS 92   ALT 24   AST 25   BILITOT 0.8       Significant Diagnostics:  I have reviewed all pertinent imaging results/findings within the past 24 hours.    Assessment/Plan:     * Small bowel obstruction  This is a 67 yo M with significant comorbidities including pulm HTN, Afib, HFpEF, severe ILD on 5L NC at home, recent diagnosis of metastatic lung cancer who presents with concerns for SBO. The etiology of this bowel obstruction is unclear given no previous abdominal surgery and no hernia. I am concerned this may represent an intra-abdominal malignancy, though do not readily see this on CTA. He is an incredibly high risk surgical patient given his extensive comorbidities and I would like to avoid an operation if at all possible for this patient.     - continue NGT decompression for the next 24 hours.   - If NGT output is sufficiently low tomorrow, may proceed with ggfn challenge  - rest of care per primary      VTE Risk Mitigation (From admission, onward)           Ordered     heparin (porcine) injection 5,000 Units  Every 8 hours         01/10/24 0213     IP VTE HIGH RISK PATIENT  Once         01/10/24 0213     Place sequential compression  device  Until discontinued         01/10/24 0213                    Thank you for your consult. I will follow-up with patient. Please contact us if you have any additional questions.    Danny Licea MD  General Surgery  [facility] - Emergency Dept

## 2024-01-10 NOTE — H&P
[facility]  History & Physical    Patient Name: Josiah Orosco Jr.  MRN: [id number]  Patient Class: IP- Inpatient  Admission Date: 1/9/2024  Attending Physician: Magdi Devine DO   Primary Care Provider: Elaine Landry MD         Patient information was obtained from patient and ER records.     Subjective:     Principal Problem:Small bowel obstruction    Chief Complaint:   Chief Complaint   Patient presents with    Abdominal Pain    Vomiting     Arrives to ER with complaints abdominal pain n/v since around this lunch time today. Reports change in diet recently normally eats a healthy diet has had some changes in food choices. Pt. Also reporting some SOB, ears 5L home oxygen baseline. Hx of afib        HPI: 68-year-old male with a past medical history of ILD (on 5L O2), HFpEF (EF: 55%, reduced RV systolic function), pulmonary hypertension (sPAP of 74 mmHg) hypertension, atrial flutter (s/p ablation in 09/2023, not on AC anymore), polycythemia, recently dx lung cancer presents with abdominal pain for one day, associated with nausea and vomiting. Last bowel movement was one day ago, and stools were hard. Prior to that, he states it was both hard and liquid form for about a week.  Denies any history of abdominal surgeries.  No recent travel new medications.     In the ED, the patient was tachycardic (up to 130s, and AFib with RVR).  Labs were remarkable for an elevated BNP (837), elevated lactic acid (2.5).  CTA chest, abdomen and pelvis showed multiple pulmonary masses and nodules consistent with malignancy/metastatic disease, pulmonary emphysema and chronic interstitial lung disease findings, along with fluid distention of the stomach and proximal to mid small small bowel loops with air-fluid levels reflecting at least a partial developing small-bowel obstruction.  Patient was given Lopressor 5 mg IV x1, morphine 8 mg IV, Zofran 4 mg IV x2, and 500 mL of NS.  Patient admitted  to hospital medicine for further evaluation and management    Past Medical History:   Diagnosis Date    A-fib 5/8/2023    Arthritis     Chronic hypoxemic respiratory failure 11/9/2023    Chronic right heart failure 11/9/2023    GERD (gastroesophageal reflux disease)     History of HCV, s/p successful treatment w/ SVR12 5/2015     Failed treatment (stage I/II) - followed by hepatology     Joint pain     Lung disease caused by breathing particles     Widespread essentially symmetric interstitial lung disease    Obesity     Polycythemia vera     Prediabetes        Past Surgical History:   Procedure Laterality Date    ABLATION, ATRIAL FLUTTER, TYPICAL N/A 9/29/2023    Procedure: Ablation, Atrial Flutter, Typical;  Surgeon: Jonh Mcgill MD;  Location: [facility] EP LAB;  Service: Cardiology;  Laterality: N/A;  AFL, ELENA (Cx if SR), CTI, RFA, BETHANY, MAC, DM, 3 Prep    BUNIONECTOMY      bilateral    CARDIOVERSION N/A 05/08/2023    Procedure: Cardioversion;  Surgeon: David Cueva MD;  Location: [facility] CATH LAB;  Service: Cardiology;  Laterality: N/A;    CATARACT EXTRACTION Left 07/27/2023    COLONOSCOPY N/A 12/09/2015    Procedure: COLONOSCOPY;  Surgeon: Conrad Iqbal MD;  Location: [facility] ENDO;  Service: Endoscopy;  Laterality: N/A;    Liver biopsy x2      rotator cuff Right     TRANSESOPHAGEAL ECHOCARDIOGRAM WITH POSSIBLE CARDIOVERSION (ELENA W/ POSS CARDIOVERSION) N/A 05/08/2023    Procedure: TRANSESOPHAGEAL ECHOCARDIOGRAM WITH POSSIBLE CARDIOVERSION (ELENA W/ POSS CARDIOVERSION);  Surgeon: David Cueva MD;  Location: [facility] CATH LAB;  Service: Cardiology;  Laterality: N/A;  12:30PM    RN PREOP 5/4/2023---EKG ON ARRIVAL    TRANSESOPHAGEAL ECHOCARDIOGRAM WITH POSSIBLE CARDIOVERSION (ELENA W/ POSS CARDIOVERSION) N/A 7/29/2023    Procedure: Transesophageal echo (ELENA) intra-procedure log documentation;  Surgeon: David Cueva MD;  Location: [facility] CATH LAB;  Service: Cardiology;  Laterality: N/A;    TRANSESOPHAGEAL ECHOCARDIOGRAPHY  N/A 05/08/2023    Procedure: ECHOCARDIOGRAM, TRANSESOPHAGEAL;  Surgeon: David Cueva MD;  Location: [facility] CATH LAB;  Service: Cardiology;  Laterality: N/A;    TRANSESOPHAGEAL ECHOCARDIOGRAPHY N/A 7/29/2023    Procedure: ECHOCARDIOGRAM, TRANSESOPHAGEAL;  Surgeon: David Cueva MD;  Location: [facility] CATH LAB;  Service: Cardiology;  Laterality: N/A;    TREATMENT OF CARDIAC ARRHYTHMIA N/A 8/7/2023    Procedure: Cardioversion or Defibrillation;  Surgeon: David Cueva MD;  Location: [facility] CATH LAB;  Service: Cardiology;  Laterality: N/A;       Review of patient's allergies indicates:   Allergen Reactions    Ace inhibitors Other (See Comments)     cough       No current facility-administered medications on file prior to encounter.     Current Outpatient Medications on File Prior to Encounter   Medication Sig    allopurinoL (ZYLOPRIM) 100 MG tablet Take 2 tablets (200 mg total) by mouth once daily.    fluticasone propionate (FLONASE) 50 mcg/actuation nasal spray SHAKE LIQUID AND USE 1 SPRAY(50 MCG) IN EACH NOSTRIL EVERY DAY    furosemide (LASIX) 20 MG tablet Take 1 tablet (20 mg total) by mouth 2 (two) times daily as needed (1-2 pills as needed for fluid overload).    metoprolol succinate (TOPROL-XL) 50 MG 24 hr tablet Take 1 tablet (50 mg total) by mouth 2 (two) times daily.    multivitamin with folic acid 400 mcg Tab Take 1 tablet by mouth once daily.    potassium chloride (KLOR-CON) 10 MEQ TbSR Take 1 tablet by mouth once daily.    predniSONE (DELTASONE) 5 MG tablet Take by mouth.    TRELEGY ELLIPTA 100-62.5-25 mcg DsDv Inhale 1 puff into the lungs Daily.    [DISCONTINUED] multivitamin capsule Take 1 capsule by mouth once daily.    [DISCONTINUED] ofloxacin (OCUFLOX) 0.3 % ophthalmic solution Place 1 drop into both eyes 4 (four) times daily.    [DISCONTINUED] prednisoLONE acetate (PRED FORTE) 1 % DrpS Place 1 drop into both eyes 3 (three) times daily.     Family History       Problem Relation (Age of Onset)     Chronic back pain Brother    Deep vein thrombosis Sister    Heart attack Sister    Heart disease Mother    Kidney disease Mother    Osteoarthritis Sister    Parkinsonism Father    Prostate cancer Cousin    Pulmonary embolism Sister          Tobacco Use    Smoking status: Former     Current packs/day: 1.00     Average packs/day: 1 pack/day for 30.2 years (30.2 ttl pk-yrs)     Types: Cigarettes     Start date: 11/9/1993    Smokeless tobacco: Never    Tobacco comments:     pt. smokes 10 cigars per day.   Substance and Sexual Activity    Alcohol use: Not Currently     Comment: Rarely    Drug use: Yes     Types: Marijuana     Comment: daily    Sexual activity: Yes     Partners: Female     Review of Systems   Constitutional:  Negative for chills, fatigue and fever.   Eyes:  Negative for visual disturbance.   Respiratory:  Positive for shortness of breath (chronic, on 5L NC at home). Negative for cough, chest tightness and wheezing.    Cardiovascular:  Negative for chest pain, palpitations and leg swelling.   Gastrointestinal:  Positive for abdominal distention, abdominal pain, constipation, nausea and vomiting. Negative for blood in stool and diarrhea.   Genitourinary:  Negative for difficulty urinating and dysuria.   Musculoskeletal:  Negative for back pain.   Skin:  Negative for wound.   Neurological:  Negative for dizziness, weakness and headaches.   Psychiatric/Behavioral:  Negative for confusion and hallucinations.      Objective:     Vital Signs (Most Recent):  Temp: 97.8 °F (36.6 °C) (01/10/24 0601)  Pulse: 110 (01/10/24 1102)  Resp: (!) 21 (01/10/24 1102)  BP: 115/78 (01/10/24 1102)  SpO2: 96 % (01/10/24 1102) Vital Signs (24h Range):  Temp:  [97.6 °F (36.4 °C)-97.8 °F (36.6 °C)] 97.8 °F (36.6 °C)  Pulse:  [] 110  Resp:  [14-25] 21  SpO2:  [95 %-100 %] 96 %  BP: (114-152)/(58-94) 115/78     Weight: 92.5 kg (204 lb)  Body mass index is 24.19 kg/m².     Physical Exam  Vitals and nursing note reviewed.    Constitutional:       General: He is not in acute distress.     Appearance: He is ill-appearing (chronically ill appearing).   HENT:      Head: Atraumatic.      Nose:      Comments: +NGT     Mouth/Throat:      Mouth: Mucous membranes are dry.   Eyes:      Extraocular Movements: Extraocular movements intact.   Cardiovascular:      Rate and Rhythm: Normal rate and regular rhythm.   Pulmonary:      Effort: No respiratory distress.      Breath sounds: Decreased air movement present. Examination of the right-lower field reveals decreased breath sounds. Examination of the left-lower field reveals decreased breath sounds. Decreased breath sounds present. No wheezing.      Comments: On 5 L NC  Abdominal:      General: There is distension.      Palpations: Abdomen is soft.      Tenderness: There is no abdominal tenderness.      Comments: +hypoactive bowel sounds   Musculoskeletal:         General: No swelling or tenderness.      Right lower leg: No edema.      Left lower leg: No edema.      Comments: Clubbing nails   Skin:     General: Skin is warm and dry.   Neurological:      General: No focal deficit present.      Mental Status: He is alert and oriented to person, place, and time.   Psychiatric:         Mood and Affect: Mood normal.         Thought Content: Thought content normal.                Significant Labs: All pertinent labs within the past 24 hours have been reviewed.    CBC:   Recent Labs   Lab 01/09/24 2139   WBC 7.80   HGB 14.7   HCT 49.3        CMP:   Recent Labs   Lab 01/09/24  2139 01/10/24  0750    143   K 4.6 4.7   CL 97 96   CO2 28 36*   * 124*   BUN 11 14   CREATININE 0.8 0.9   CALCIUM 10.4 10.1   PROT 8.7* 8.4   ALBUMIN 3.2* 3.0*   BILITOT 0.8 0.8   ALKPHOS 109 92   AST 32 25   ALT 29 24   ANIONGAP 16 11     Cardiac Markers:   Recent Labs   Lab 01/09/24 2139   *     Lactic Acid:   Recent Labs   Lab 01/09/24  2139 01/10/24  0750   LACTATE 2.5* 1.4     Magnesium:   Recent  Labs   Lab 01/10/24  0750   MG 2.0     Troponin:   Recent Labs   Lab 01/09/24  2139   TROPONINI 0.023     TSH:   Recent Labs   Lab 01/10/24  0750   TSH 0.745     Urine Studies:   Recent Labs   Lab 01/09/24  2259   COLORU Yellow   APPEARANCEUA Clear   PHUR 7.0   SPECGRAV 1.030   PROTEINUA 1+*   GLUCUA Negative   KETONESU Negative   BILIRUBINUA Negative   OCCULTUA Negative   NITRITE Negative   UROBILINOGEN 2.0-3.0*   LEUKOCYTESUR Negative   RBCUA 6*   WBCUA 6*   BACTERIA Rare   SQUAMEPITHEL 2   HYALINECASTS 38*       Significant Imaging: I have reviewed all pertinent imaging results/findings within the past 24 hours.    Imaging Results              XR Gastric tube check, non-radiologist performed (Final result)  Result time 01/10/24 10:34:34      Final result by Damon Alcantara III, MD (01/10/24 10:34:34)                   Narrative:    EXAMINATION:  XR GASTRIC TUBE CHECK, NON-RADIOLOGIST PERFORMED    CLINICAL HISTORY:  SBO;    FINDINGS:  NG tip fundus.  There is a chronic interstitial lung changes.  No obstruction, ileus, or perforation seen.      Electronically signed by: Damon Alcantara MD  Date:    01/10/2024  Time:    10:34                                      CTA Chest Abdomen Pelvis (Final result)  Result time 01/10/24 00:18:42      Final result by Galileo Segal MD (01/10/24 00:18:42)                   Impression:      1. Multiple pulmonary masses and nodules consistent with malignancy and known metastatic disease.  Abnormal mediastinal and hilar lymphadenopathy consistent with local spread of disease.  Possible left adrenal metastasis.  Continued outpatient follow-up is advised.  Recommend comparison with previous outside imaging.  2. Severe pulmonary emphysema and suspected chronic interstitial lung disease.  Nonspecific mild diffuse mosaic appearance of ground-glass density seen within the lungs, more pronounced in the lower lobes.  3. Fluid distention of the stomach and proximal to mid small bowel  loops with air-fluid levels likely reflecting at least partial developing small bowel obstruction.  Apparent transition point in the midline lower abdomen with no obvious mechanical cause for obstruction seen.  Small bowel loops are completely decompressed distally from this region.  4. No evidence of aortic aneurysm or dissection.  5. No evidence of PE.  6. Additional findings as detailed above.  This report was flagged in Epic as abnormal.      Electronically signed by: Galileo Segal MD  Date:    01/10/2024  Time:    00:18               Narrative:    EXAMINATION:  CTA CHEST ABDOMEN PELVIS    CLINICAL HISTORY:  r/o mesenteric ischemia but also PE/PTX as patient just admitted at  with complications;    TECHNIQUE:  CTA chest abdomen and pelvis was obtained following administration of 100 cc Omnipaque 350 IV contrast.  Sagittal and coronal reformats were obtained.    COMPARISON:  Previous outside facility CT images from 12/18/2023 are unavailable for review.    FINDINGS:  Examination was performed in a STAT emergency setting and will not serve as an official restaging exam.    Multiple pulmonary masses and nodules are visualized.  Largest spiculated mass measures approximately 5.5 cm in the right upper lobe.  There is mediastinal and hilar lymphadenopathy consistent with local spread of disease.  Severe emphysematous changes are seen with additional suspected chronic interstitial lung disease.  Mild diffuse mosaic appearance of ground-glass density is seen throughout the lungs, more pronounced within the lower lobes.  No pleural effusion or pneumothorax.    Heart is normal in size with no significant pericardial effusion.  No significant abnormalities are seen along the esophageal course.  No evidence of aortic aneurysm or dissection.  Branch vessels of the aortic arch are patent.  Pulmonary arteries are well opacified with no evidence of PE.    No significant focal hepatic abnormalities are identified.  There is  no intra-or extrahepatic biliary ductal dilatation.  The gallbladder is unremarkable.  Stomach is significantly distended with fluid.  Spleen, pancreas, and right adrenal gland are unremarkable.  There is asymmetric abnormal left adrenal thickening with questionable underlying mass lesion.    Kidneys enhance normally with no evidence of hydronephrosis.  No abnormalities are seen along the ureteral courses.  Urinary bladder is nondistended.  Dystrophic calcifications are seen within the prostate.    Appendix is visualized and is unremarkable.  There is fluid distention of proximal to mid small bowel loops with air-fluid levels measuring upwards of 3.5 cm in caliber likely reflecting at least partial developing small bowel obstruction.  Apparent transition point is seen in the midline lower abdomen.  No definite mechanical cause for obstruction is seen.  Small bowel loops are decompressed distal to this region.  No significant bowel wall thickening or surrounding inflammatory changes seen.  No free air or free fluid.    Aorta tapers normally.  Abdominal aortic branch vessels are patent.    No acute osseous abnormality identified. Subcutaneous soft tissues show no significant abnormalities.                                       X-Ray Chest PA And Lateral (Final result)  Result time 01/09/24 22:18:05      Final result by Gray Romero MD (01/09/24 22:18:05)                   Impression:      Diffuse increased interstitial changes seen throughout the lungs, similar compared to prior.    Persistent focal area of masslike consolidation versus mass lesion in the right upper lobe, similar compared to prior.  Neoplasm not excluded.  See prior report for recommendations.      Electronically signed by: Gray Romero MD  Date:    01/09/2024  Time:    22:18               Narrative:    EXAMINATION:  XR CHEST PA AND LATERAL    CLINICAL HISTORY:  Tachycardia, unspecified    TECHNIQUE:  PA and lateral views of the chest were  "performed.    COMPARISON:  11/09/2023.    FINDINGS:  Diffuse increased interstitial changes seen throughout the lungs, similar compared to prior.  Focal area of masslike consolidation versus mass lesion in the right upper lobe, similar compared to prior.    No pleural effusion.  No pneumothorax.    Cardiomediastinal silhouette is similar to prior.    Regional osseous structures are similar to prior.                                      Assessment/Plan:     * Small bowel obstruction  -presented with abdominal pain associated with N/V for one day  -NGT placed in the ED given persistent vomiting  -CT chest abdomen: Fluid distention of the stomach and proximal to mid small bowel loops with air-fluid levels likely reflecting at least partial developing small bowel obstruction.  Apparent transition point in the midline lower abdomen with no obvious mechanical cause for obstruction seen.   -Caution with zofran given prolonged Qtc  -Surgery consulted  -NPO      ILD (interstitial lung disease)  -follows with pulmonology outpatient.  -Per pulm note in 11/2023: "ILD with worsening subjective symptoms, worsening PFT, worsening imaging findings"  -Chronically on 5L NC at home  -Currently at baseline   -Monitor     Chronic hypoxemic respiratory failure  Patient with Hypoxic Respiratory failure which is Chronic.  he is on home oxygen at 5 LPM. Supplemental oxygen was provided and noted-      .   Signs/symptoms of respiratory failure include- tachypnea and increased work of breathing. Contributing diagnoses includes - Interstitial lung disease and adenocarcinoma of the lung  Labs and images were reviewed. Patient Has not had a recent ABG. Will treat underlying causes and adjust management of respiratory failure as follows-     -follows with pulmonology outpatient.  -due to history of interstitial lung disease and adenocarcinoma of the lung  -Chronically on 5L NC at home  -Currently at baseline   -Monitor     Atrial flutter  -Hx of " Aflutter s/p ablation 9/29/2023  -Resume home metoprolol  -Home eliquis was discontinud in 12/2023  -Cardiology consulted  -Monitor on telemetry     Lung cancer  -recently diagnosed with Adenocarcinoma of lung in 12/2023  -Patient has been referred to oncology outpatient, has not established care yet  -CTA chest: Multiple pulmonary masses and nodules consistent with malignancy and known metastatic disease.  Abnormal mediastinal and hilar lymphadenopathy consistent with local spread of disease.  Possible left adrenal metastasis.   -Follows with pulmonology   -need close follow up on discharge     Pulmonary hypertension  -echo on 01/05/2024: There is severe pulmonary hypertension. The estimated pulmonary artery systolic pressure is 74 mmHg.  -Due to chronic ILD  -BP control    Prolonged Q-T interval on ECG  -EKG in ED with Qtc 461  -Repeat EKG on 01/10/24 with Qtc of 513  -Caution with QT prolonging medications   -Hold zofran at this time       HTN, goal below 140/90  Chronic, controlled.   -resume home Toprolol    Latest blood pressure and vitals reviewed-     Temp:  [97.6 °F (36.4 °C)-97.8 °F (36.6 °C)]   Pulse:  []   Resp:  [14-25]   BP: (114-152)/(58-94)   SpO2:  [95 %-100 %] .   Home meds for hypertension were reviewed and noted below.   Hypertension Medications               furosemide (LASIX) 20 MG tablet Take 1 tablet (20 mg total) by mouth 2 (two) times daily as needed (1-2 pills as needed for fluid overload).    metoprolol succinate (TOPROL-XL) 50 MG 24 hr tablet Take 1 tablet (50 mg total) by mouth 2 (two) times daily.            While in the hospital, will manage blood pressure as follows; Continue home antihypertensive regimen    Will utilize p.r.n. blood pressure medication only if patient's blood pressure greater than 180/110 and he develops symptoms such as worsening chest pain or shortness of breath.    Polycythemia  -Followed by Hematology outpatient.    -Stable.    Hyperuricemia  -resume home  allopurinol       Chronic right heart failure  -likely due to ILD  -Continue BP control      Advanced care planning/counseling discussion  Advance Care Planning    Date: 01/10/2024    Code Status  I engaged the the patient in a voluntary conversation about the patient's preferences for care  at the very end of life. The patient wishes to have CPR and other invasive treatments performed when his heart and/or breathing stops. I communicated to the patient that his wishes align with full code status and he agrees.  I spent a total of 16 minutes engaging the patient in this advance care planning discussion.       Code Status: Full Code               VTE Risk Mitigation (From admission, onward)           Ordered     heparin (porcine) injection 5,000 Units  Every 8 hours         01/10/24 0213     IP VTE HIGH RISK PATIENT  Once         01/10/24 0213     Place sequential compression device  Until discontinued         01/10/24 0213                               AdmissionCare    Guideline: Intestinal Obstruction, Inpatient    Based on the indications selected for the patient, the bed status of Admit to Inpatient was determined to be MET    The following indications were selected as present at the time of evaluation of the patient:      Signs and symptoms of bowel obstruction (eg, vomiting, inability to tolerate PO intake, pain, distention) that are severe (feculent vomiting, Hypotension, electrolyte abnormality, evidence of bowel ischemia or suspected perforation), or persistent (eg, NG tube placed and will need to be continued, IV hydration support required)   -    - Imaging study consistent with bowel obstruction (ie, alternative diagnosis not more likely)    AdmissionCare documentation entered by: ERWIN Simmons    [facility] [place] [employer], 27th edition, Copyright © 2023 [facility] [place] [employer], [employer] All Rights Reserved.  [id number]:00    Magdi Devine DO  Department of Hospital Medicine  [facility]

## 2024-01-10 NOTE — ASSESSMENT & PLAN NOTE
Patient with Hypoxic Respiratory failure which is Chronic.  he is on home oxygen at 5 LPM. Supplemental oxygen was provided and noted-      .   Signs/symptoms of respiratory failure include- tachypnea and increased work of breathing. Contributing diagnoses includes - Interstitial lung disease and adenocarcinoma of the lung  Labs and images were reviewed. Patient Has not had a recent ABG. Will treat underlying causes and adjust management of respiratory failure as follows-     -follows with pulmonology outpatient.  -due to history of interstitial lung disease and adenocarcinoma of the lung  -Chronically on 5L NC at home  -Currently at baseline   -Monitor

## 2024-01-10 NOTE — ED NOTES
"Pt alert this RN that he had an episode of vomit and now endorses that he feels better after occurrence, stating "that's what I've been needing to do all day." MD made aware, verbal order to place 4mg Zofran IV  "

## 2024-01-10 NOTE — CARE UPDATE
This is a 68-year-old male with a past medical history of ILD (on 5L O2), HFpEF (EF: 55%, reduced RV systolic function), pulmonary hypertension (sPAP of 74 mmHg) hypertension, atrial flutter (not on AC), polycythemia, presents with abdominal pain.    Patient presents with abdominal pain, nausea and vomiting that started the day of presentation. His last BM was this AM, and he hasn't passed gas since. He reports improvement of his symptoms while in the ED. His sotalol was recently discontinued.     In the ED, the patient was tachycardic (up to 130s, and AFib with RVR).  Labs were remarkable for an elevated BNP (837), elevated lactic acid (2.5).  CTA chest, abdomen and pelvis showed multiple pulmonary masses and nodules consistent with malignancy/metastatic disease, pulmonary emphysema and chronic interstitial lung disease findings, along with fluid distention of the stomach and proximal to mid small small bowel loops with air-fluid levels reflecting at least a partial developing small-bowel obstruction.  Patient was given Lopressor 5 mg IV x1, morphine 8 mg IV, Zofran 4 mg IV x2, and 500 mL of NS.    Plan to keep NPO, consult surgery for SBO. NG tube if vomiting again. Consult cardiology for Afib w/ RVR.

## 2024-01-10 NOTE — TELEPHONE ENCOUNTER
----- Message from Myriam Esquivel sent at 1/10/2024 11:05 AM CST -----  Regarding: Appt r/s  Contact: Doreen [phone]  Wife / Doreen is calling to speak to someone in the office to r/s pt appt; no available appts in Epic. Please call to advise. Thanks.     Patient's DX:      Additional Info:   Wife states pt is in hospital to please call her to schedule appt     
I received a message from Mrs Orosco that her  is in [facility] and she is cancelling tomorrow's appointment with Dr Ortiz. I left a voicemail message that I will call later to discuss a new appointment. Dr Ortiz does have availability in late January and February. Melisa Lucio LPN    
Show Aperture Variable?: Yes
Number Of Freeze-Thaw Cycles: 1 freeze-thaw cycle
Duration Of Freeze Thaw-Cycle (Seconds): 3
Post-Care Instructions: I reviewed with the patient in detail post-care instructions. Patient is to wear sunprotection, and avoid picking at any of the treated lesions. Pt may apply Vaseline to crusted or scabbing areas.
Consent: The patient's consent was obtained including but not limited to risks of crusting, scabbing, blistering, scarring, darker or lighter pigmentary change, recurrence, incomplete removal and infection.
Render Note In Bullet Format When Appropriate: No
Detail Level: Simple

## 2024-01-10 NOTE — HPI
Josiah Orosco is a 67 yo M with history of ILD (on 5L O2), HFpEF (EF: 55%, reduced RV systolic function), pulmonary hypertension (sPAP of 74 mmHg) hypertension, atrial flutter (s/p ablation in 09/2023, not on AC anymore), polycythemia, and recently diagnosed metastatic lung cancer who presents to the ED with complaints of generalized abdominal pain, nausea and vomiting. This began yesterday. Since arrival, his pain has completely resolved though he continues to have nausea/vomiting. CT is concerning for SBO. NGT was placed this morning with approximately 700cc of bilious output. He is in atrial fibrillation, somewhat rate controlled (90s-low 100s) but is stable. He denies any previous abdominal surgery.

## 2024-01-10 NOTE — ED NOTES
"Called to room, patient vomiting. Reports feeling better after episode, but states "My stomach just doesn't feel right." Dr. Devine notified.   "

## 2024-01-10 NOTE — PHARMACY MED REC
"Admission Medication History     The home medication history was taken by Nena Ferreira CPhT.      You may go to "Admission" then "Reconcile Home Medications" tabs to review and/or act upon these items.     The home medication list has been updated by the Pharmacy department.   Please read ALL comments highlighted in yellow.   Please address this information as you see fit.    Feel free to contact us if you have any questions or require assistance.      The medications listed below were removed from the home medication list. Please reorder if appropriate:  Patient reports no longer taking the following medication(s):  Ocuflox 0.3% ophthalmic  Pred Forte 1% drps      Medications listed below were obtained from: Patient/family and Analytic software- [employer]  (Not in a hospital admission)            Nena Ferreira CPhT.  [phone]                  .          "

## 2024-01-10 NOTE — ED TRIAGE NOTES
Pt presents to ED with complaint of abdominal pain accompanied with n/v since lunch time. Pt states that he recently change his diet. Pt usually eat healthy and had some changes in food choices. Pt has history of lung  cancer and wear 5L of oxygen at home. Pt denies chest pain or sob.

## 2024-01-10 NOTE — SUBJECTIVE & OBJECTIVE
No current facility-administered medications on file prior to encounter.     Current Outpatient Medications on File Prior to Encounter   Medication Sig    allopurinoL (ZYLOPRIM) 100 MG tablet Take 2 tablets (200 mg total) by mouth once daily. (Patient taking differently: Take 100 mg by mouth once daily.)    fluticasone propionate (FLONASE) 50 mcg/actuation nasal spray SHAKE LIQUID AND USE 1 SPRAY(50 MCG) IN EACH NOSTRIL EVERY DAY    furosemide (LASIX) 20 MG tablet Take 1 tablet (20 mg total) by mouth 2 (two) times daily as needed (1-2 pills as needed for fluid overload). (Patient taking differently: Take 40 mg by mouth 2 (two) times daily as needed (1-2 pills as needed for fluid overload).)    metoprolol succinate (TOPROL-XL) 50 MG 24 hr tablet Take 1 tablet (50 mg total) by mouth 2 (two) times daily.    multivitamin with folic acid 400 mcg Tab Take 1 tablet by mouth once daily.    potassium chloride (KLOR-CON) 10 MEQ TbSR Take 1 tablet by mouth once daily.    predniSONE (DELTASONE) 5 MG tablet Take 10 mg by mouth once daily.    TRELEGY ELLIPTA 100-62.5-25 mcg DsDv Inhale 1 puff into the lungs Daily.    [DISCONTINUED] multivitamin capsule Take 1 capsule by mouth once daily.    [DISCONTINUED] ofloxacin (OCUFLOX) 0.3 % ophthalmic solution Place 1 drop into both eyes 4 (four) times daily.    [DISCONTINUED] prednisoLONE acetate (PRED FORTE) 1 % DrpS Place 1 drop into both eyes 3 (three) times daily.       Review of patient's allergies indicates:   Allergen Reactions    Ace inhibitors Other (See Comments)     cough       Past Medical History:   Diagnosis Date    A-fib 5/8/2023    Arthritis     Chronic hypoxemic respiratory failure 11/9/2023    Chronic right heart failure 11/9/2023    GERD (gastroesophageal reflux disease)     History of HCV, s/p successful treatment w/ SVR12 5/2015     Failed treatment (stage I/II) - followed by hepatology     Joint pain     Lung disease caused by breathing particles     Widespread  essentially symmetric interstitial lung disease    Obesity     Polycythemia vera     Prediabetes      Past Surgical History:   Procedure Laterality Date    ABLATION, ATRIAL FLUTTER, TYPICAL N/A 9/29/2023    Procedure: Ablation, Atrial Flutter, Typical;  Surgeon: Jonh Mcgill MD;  Location: [facility] EP LAB;  Service: Cardiology;  Laterality: N/A;  AFL, ELENA (Cx if SR), CTI, RFA, BETHANY, MAC, DM, 3 Prep    BUNIONECTOMY      bilateral    CARDIOVERSION N/A 05/08/2023    Procedure: Cardioversion;  Surgeon: David Cueva MD;  Location: [facility] CATH LAB;  Service: Cardiology;  Laterality: N/A;    CATARACT EXTRACTION Left 07/27/2023    COLONOSCOPY N/A 12/09/2015    Procedure: COLONOSCOPY;  Surgeon: Conrad Iqbal MD;  Location: [facility] ENDO;  Service: Endoscopy;  Laterality: N/A;    Liver biopsy x2      rotator cuff Right     TRANSESOPHAGEAL ECHOCARDIOGRAM WITH POSSIBLE CARDIOVERSION (ELENA W/ POSS CARDIOVERSION) N/A 05/08/2023    Procedure: TRANSESOPHAGEAL ECHOCARDIOGRAM WITH POSSIBLE CARDIOVERSION (ELENA W/ POSS CARDIOVERSION);  Surgeon: David Cueva MD;  Location: [facility] CATH LAB;  Service: Cardiology;  Laterality: N/A;  12:30PM    RN PREOP 5/4/2023---EKG ON ARRIVAL    TRANSESOPHAGEAL ECHOCARDIOGRAM WITH POSSIBLE CARDIOVERSION (ELENA W/ POSS CARDIOVERSION) N/A 7/29/2023    Procedure: Transesophageal echo (ELENA) intra-procedure log documentation;  Surgeon: David Cueva MD;  Location: [facility] CATH LAB;  Service: Cardiology;  Laterality: N/A;    TRANSESOPHAGEAL ECHOCARDIOGRAPHY N/A 05/08/2023    Procedure: ECHOCARDIOGRAM, TRANSESOPHAGEAL;  Surgeon: David Cueva MD;  Location: [facility] CATH LAB;  Service: Cardiology;  Laterality: N/A;    TRANSESOPHAGEAL ECHOCARDIOGRAPHY N/A 7/29/2023    Procedure: ECHOCARDIOGRAM, TRANSESOPHAGEAL;  Surgeon: David Cueva MD;  Location: [facility] CATH LAB;  Service: Cardiology;  Laterality: N/A;    TREATMENT OF CARDIAC ARRHYTHMIA N/A 8/7/2023    Procedure: Cardioversion or Defibrillation;  Surgeon: Anay  David ROTH MD;  Location: [facility] CATH LAB;  Service: Cardiology;  Laterality: N/A;     Family History       Problem Relation (Age of Onset)    Chronic back pain Brother    Deep vein thrombosis Sister    Heart attack Sister    Heart disease Mother    Kidney disease Mother    Osteoarthritis Sister    Parkinsonism Father    Prostate cancer Cousin    Pulmonary embolism Sister          Tobacco Use    Smoking status: Former     Current packs/day: 1.00     Average packs/day: 1 pack/day for 30.2 years (30.2 ttl pk-yrs)     Types: Cigarettes     Start date: 11/9/1993    Smokeless tobacco: Never    Tobacco comments:     pt. smokes 10 cigars per day.   Substance and Sexual Activity    Alcohol use: Not Currently     Comment: Rarely    Drug use: Yes     Types: Marijuana     Comment: daily    Sexual activity: Yes     Partners: Female     Review of Systems   Constitutional:  Positive for appetite change and fatigue. Negative for activity change, chills and fever.   Respiratory:  Positive for shortness of breath. Negative for cough and chest tightness.    Cardiovascular:  Negative for chest pain and palpitations.   Gastrointestinal:  Positive for abdominal distention, abdominal pain, nausea and vomiting. Negative for anal bleeding, blood in stool, constipation, diarrhea and rectal pain.   Genitourinary:  Negative for difficulty urinating, dysuria, flank pain and penile discharge.   Musculoskeletal:  Negative for arthralgias.   Neurological:  Negative for dizziness and light-headedness.   Hematological:  Bruises/bleeds easily.     Objective:     Vital Signs (Most Recent):  Temp: 97.8 °F (36.6 °C) (01/10/24 0601)  Pulse: 105 (01/10/24 1200)  Resp: 19 (01/10/24 1200)  BP: 114/69 (01/10/24 1200)  SpO2: 96 % (01/10/24 1200) Vital Signs (24h Range):  Temp:  [97.6 °F (36.4 °C)-97.8 °F (36.6 °C)] 97.8 °F (36.6 °C)  Pulse:  [] 105  Resp:  [14-25] 19  SpO2:  [95 %-100 %] 96 %  BP: (114-152)/(58-94) 114/69     Weight: 92.5 kg (204  lb)  Body mass index is 24.19 kg/m².     Physical Exam  Constitutional:       General: He is not in acute distress.     Appearance: Normal appearance. He is not ill-appearing.   HENT:      Head: Normocephalic and atraumatic.      Nose:      Comments: NGT in place with bilious output  Eyes:      Extraocular Movements: Extraocular movements intact.      Pupils: Pupils are equal, round, and reactive to light.   Cardiovascular:      Rate and Rhythm: Tachycardia present. Rhythm irregular.   Pulmonary:      Effort: Pulmonary effort is normal. No respiratory distress.   Abdominal:      General: Abdomen is flat. There is no distension.      Palpations: Abdomen is soft.      Tenderness: There is no abdominal tenderness. There is no guarding.      Comments: Abdomen is soft, not particularly distended and non-tender on my exam. I do not appreciate any significant hernias as well   Musculoskeletal:         General: No swelling. Normal range of motion.   Skin:     General: Skin is warm and dry.   Neurological:      General: No focal deficit present.      Mental Status: He is alert.            I have reviewed all pertinent lab results within the past 24 hours.  CBC:   Recent Labs   Lab 01/09/24  2139   WBC 7.80   RBC 7.43*   HGB 14.7   HCT 49.3      MCV 66*   MCH 19.8*   MCHC 29.8*     CMP:   Recent Labs   Lab 01/10/24  0750   *   CALCIUM 10.1   ALBUMIN 3.0*   PROT 8.4      K 4.7   CO2 36*   CL 96   BUN 14   CREATININE 0.9   ALKPHOS 92   ALT 24   AST 25   BILITOT 0.8       Significant Diagnostics:  I have reviewed all pertinent imaging results/findings within the past 24 hours.

## 2024-01-10 NOTE — TELEPHONE ENCOUNTER
Spoke with wife to notify that patient is in the hospital @ [facility]. Provider will be notified.

## 2024-01-10 NOTE — PLAN OF CARE
Case Management Assessment     PCP: Elaine Landry   Pharmacy: [facility]    Patient Arrived From: home   Existing Help at Home: spouse    Barriers to Discharge: none    Discharge Plan:    A. Home with family   B. Home      SW completed initial assessment and discussed discharge planning with patient and his spouse at his bedside. Patient lives with his spouse who is his main support. Patient's spouse will provide transportation for him to get home when discharge from the hospital.        01/10/24 1421   Discharge Assessment   Assessment Type Discharge Planning Assessment   Confirmed/corrected address, phone number and insurance Yes   Confirmed Demographics Correct on Facesheet   Source of Information patient;family   Communicated THADDEUS with patient/caregiver Date not available/Unable to determine   Reason For Admission Small Bowel Obstruction   People in Home spouse   Do you expect to return to your current living situation? Yes   Do you have help at home or someone to help you manage your care at home? Yes   Who are your caregiver(s) and their phone number(s)? Doreen Orosco (Spouse) [phone] (Mobile)   Prior to hospitilization cognitive status: Alert/Oriented   Current cognitive status: Alert/Oriented   Walking or Climbing Stairs Difficulty no   Dressing/Bathing Difficulty no   Equipment Currently Used at Home oxygen   Readmission within 30 days? No   Patient currently being followed by outpatient case management? No   Do you currently have service(s) that help you manage your care at home? No   Do you take prescription medications? Yes   Do you have prescription coverage? Yes   Coverage BCBS   Do you have any problems affording any of your prescribed medications? No   Is the patient taking medications as prescribed? yes   Who is going to help you get home at discharge? Doreen Orosco (Spouse) [phone] (Mobile)   How do you get to doctors appointments? family or friend will provide   Are  you on dialysis? No   Do you take coumadin? No   Discharge Plan A Home with family   Discharge Plan B Home with family   DME Needed Upon Discharge  none   Discharge Plan discussed with: Spouse/sig other;Patient   Name(s) and Number(s) Doreen Orosco (Spouse) [phone] (Mobile)   OTHER   Name(s) of People in Home Doreen Orosco (Spouse) [phone] (Mobile)

## 2024-01-10 NOTE — PROGRESS NOTES
CHW - Initial Contact    This Community Health Worker updated and verified the Social Determinant of Health questionnaire with patient  and relative/caregiver at bedside  today.    Pt identified barriers of most importance are: issues with utility bill payments.   Referrals to community agencies completed with patient/caregiver consent outside of [facility] include: no: none at this time.  Referrals were put through [facility] - yes: [facility].  Support and Services: has no support at this time.  Other information discussed the patient needs / wants help with: Updated and verified SDOH with patient, pt has issues with utility bill payments. Will follow up in one week to check status of referrals and pt's future needs.    Follow up required: yes.  Follow-up Outreach - Due: 1/23/2024

## 2024-01-10 NOTE — ASSESSMENT & PLAN NOTE
-echo on 01/05/2024: There is severe pulmonary hypertension. The estimated pulmonary artery systolic pressure is 74 mmHg.  -Due to chronic ILD  -BP control

## 2024-01-10 NOTE — ASSESSMENT & PLAN NOTE
History of atrial flutter status post ablation.  Currently in sinus rhythm with PACs.  Continue medical management.

## 2024-01-10 NOTE — TELEPHONE ENCOUNTER
----- Message from Lise Brian sent at 1/10/2024 10:01 AM CST -----  Type: Call Back      Who called: Doreen - spouse       What is the request in detail: Patient is requesting a call back. She states that patient has been admitted into [facility].   Please advise.     [facility]      Would the patient rather a call back or a response via [facility]? Call back       Best call back number: [phone] (home) [phone] (work)      Additional Information:

## 2024-01-10 NOTE — ASSESSMENT & PLAN NOTE
-recently diagnosed with Adenocarcinoma of lung in 12/2023  -Patient has been referred to oncology outpatient, has not established care yet  -CTA chest: Multiple pulmonary masses and nodules consistent with malignancy and known metastatic disease.  Abnormal mediastinal and hilar lymphadenopathy consistent with local spread of disease.  Possible left adrenal metastasis.   -Follows with pulmonology   -need close follow up on discharge

## 2024-01-10 NOTE — SUBJECTIVE & OBJECTIVE
Past Medical History:   Diagnosis Date    A-fib 5/8/2023    Arthritis     Chronic hypoxemic respiratory failure 11/9/2023    Chronic right heart failure 11/9/2023    GERD (gastroesophageal reflux disease)     History of HCV, s/p successful treatment w/ SVR12 5/2015     Failed treatment (stage I/II) - followed by hepatology     Joint pain     Lung disease caused by breathing particles     Widespread essentially symmetric interstitial lung disease    Obesity     Polycythemia vera     Prediabetes        Past Surgical History:   Procedure Laterality Date    ABLATION, ATRIAL FLUTTER, TYPICAL N/A 9/29/2023    Procedure: Ablation, Atrial Flutter, Typical;  Surgeon: Jonh Mcgill MD;  Location: [facility] EP LAB;  Service: Cardiology;  Laterality: N/A;  AFL, ELENA (Cx if SR), CTI, RFA, BETHANY, MAC, DM, 3 Prep    BUNIONECTOMY      bilateral    CARDIOVERSION N/A 05/08/2023    Procedure: Cardioversion;  Surgeon: David Cueva MD;  Location: [facility] CATH LAB;  Service: Cardiology;  Laterality: N/A;    CATARACT EXTRACTION Left 07/27/2023    COLONOSCOPY N/A 12/09/2015    Procedure: COLONOSCOPY;  Surgeon: Conrad Iqbal MD;  Location: [facility] ENDO;  Service: Endoscopy;  Laterality: N/A;    Liver biopsy x2      rotator cuff Right     TRANSESOPHAGEAL ECHOCARDIOGRAM WITH POSSIBLE CARDIOVERSION (ELENA W/ POSS CARDIOVERSION) N/A 05/08/2023    Procedure: TRANSESOPHAGEAL ECHOCARDIOGRAM WITH POSSIBLE CARDIOVERSION (ELENA W/ POSS CARDIOVERSION);  Surgeon: David Cueva MD;  Location: [facility] CATH LAB;  Service: Cardiology;  Laterality: N/A;  12:30PM    RN PREOP 5/4/2023---EKG ON ARRIVAL    TRANSESOPHAGEAL ECHOCARDIOGRAM WITH POSSIBLE CARDIOVERSION (ELENA W/ POSS CARDIOVERSION) N/A 7/29/2023    Procedure: Transesophageal echo (ELENA) intra-procedure log documentation;  Surgeon: David Cueva MD;  Location: [facility] CATH LAB;  Service: Cardiology;  Laterality: N/A;    TRANSESOPHAGEAL ECHOCARDIOGRAPHY N/A 05/08/2023    Procedure: ECHOCARDIOGRAM, TRANSESOPHAGEAL;   Surgeon: David Cueva MD;  Location: [facility] CATH LAB;  Service: Cardiology;  Laterality: N/A;    TRANSESOPHAGEAL ECHOCARDIOGRAPHY N/A 7/29/2023    Procedure: ECHOCARDIOGRAM, TRANSESOPHAGEAL;  Surgeon: David Cueva MD;  Location: [facility] CATH LAB;  Service: Cardiology;  Laterality: N/A;    TREATMENT OF CARDIAC ARRHYTHMIA N/A 8/7/2023    Procedure: Cardioversion or Defibrillation;  Surgeon: David Cueva MD;  Location: [facility] CATH LAB;  Service: Cardiology;  Laterality: N/A;       Review of patient's allergies indicates:   Allergen Reactions    Ace inhibitors Other (See Comments)     cough       No current facility-administered medications on file prior to encounter.     Current Outpatient Medications on File Prior to Encounter   Medication Sig    allopurinoL (ZYLOPRIM) 100 MG tablet Take 2 tablets (200 mg total) by mouth once daily.    fluticasone propionate (FLONASE) 50 mcg/actuation nasal spray SHAKE LIQUID AND USE 1 SPRAY(50 MCG) IN EACH NOSTRIL EVERY DAY    furosemide (LASIX) 20 MG tablet Take 1 tablet (20 mg total) by mouth 2 (two) times daily as needed (1-2 pills as needed for fluid overload).    metoprolol succinate (TOPROL-XL) 50 MG 24 hr tablet Take 1 tablet (50 mg total) by mouth 2 (two) times daily.    multivitamin with folic acid 400 mcg Tab Take 1 tablet by mouth once daily.    potassium chloride (KLOR-CON) 10 MEQ TbSR Take 1 tablet by mouth once daily.    predniSONE (DELTASONE) 5 MG tablet Take by mouth.    TRELEGY ELLIPTA 100-62.5-25 mcg DsDv Inhale 1 puff into the lungs Daily.    [DISCONTINUED] multivitamin capsule Take 1 capsule by mouth once daily.    [DISCONTINUED] ofloxacin (OCUFLOX) 0.3 % ophthalmic solution Place 1 drop into both eyes 4 (four) times daily.    [DISCONTINUED] prednisoLONE acetate (PRED FORTE) 1 % DrpS Place 1 drop into both eyes 3 (three) times daily.     Family History       Problem Relation (Age of Onset)    Chronic back pain Brother    Deep vein thrombosis Sister    Heart  attack Sister    Heart disease Mother    Kidney disease Mother    Osteoarthritis Sister    Parkinsonism Father    Prostate cancer Cousin    Pulmonary embolism Sister          Tobacco Use    Smoking status: Former     Current packs/day: 1.00     Average packs/day: 1 pack/day for 30.2 years (30.2 ttl pk-yrs)     Types: Cigarettes     Start date: 11/9/1993    Smokeless tobacco: Never    Tobacco comments:     pt. smokes 10 cigars per day.   Substance and Sexual Activity    Alcohol use: Not Currently     Comment: Rarely    Drug use: Yes     Types: Marijuana     Comment: daily    Sexual activity: Yes     Partners: Female     Review of Systems   Constitutional:  Negative for chills, fatigue and fever.   Eyes:  Negative for visual disturbance.   Respiratory:  Positive for shortness of breath (chronic, on 5L NC at home). Negative for cough, chest tightness and wheezing.    Cardiovascular:  Negative for chest pain, palpitations and leg swelling.   Gastrointestinal:  Positive for abdominal distention, abdominal pain, constipation, nausea and vomiting. Negative for blood in stool and diarrhea.   Genitourinary:  Negative for difficulty urinating and dysuria.   Musculoskeletal:  Negative for back pain.   Skin:  Negative for wound.   Neurological:  Negative for dizziness, weakness and headaches.   Psychiatric/Behavioral:  Negative for confusion and hallucinations.      Objective:     Vital Signs (Most Recent):  Temp: 97.8 °F (36.6 °C) (01/10/24 0601)  Pulse: 110 (01/10/24 1102)  Resp: (!) 21 (01/10/24 1102)  BP: 115/78 (01/10/24 1102)  SpO2: 96 % (01/10/24 1102) Vital Signs (24h Range):  Temp:  [97.6 °F (36.4 °C)-97.8 °F (36.6 °C)] 97.8 °F (36.6 °C)  Pulse:  [] 110  Resp:  [14-25] 21  SpO2:  [95 %-100 %] 96 %  BP: (114-152)/(58-94) 115/78     Weight: 92.5 kg (204 lb)  Body mass index is 24.19 kg/m².     Physical Exam  Vitals and nursing note reviewed.   Constitutional:       General: He is not in acute distress.      Appearance: He is ill-appearing (chronically ill appearing).   HENT:      Head: Atraumatic.      Nose:      Comments: +NGT     Mouth/Throat:      Mouth: Mucous membranes are dry.   Eyes:      Extraocular Movements: Extraocular movements intact.   Cardiovascular:      Rate and Rhythm: Normal rate and regular rhythm.   Pulmonary:      Effort: No respiratory distress.      Breath sounds: Decreased air movement present. Examination of the right-lower field reveals decreased breath sounds. Examination of the left-lower field reveals decreased breath sounds. Decreased breath sounds present. No wheezing.      Comments: On 5 L NC  Abdominal:      General: There is distension.      Palpations: Abdomen is soft.      Tenderness: There is no abdominal tenderness.      Comments: +hypoactive bowel sounds   Musculoskeletal:         General: No swelling or tenderness.      Right lower leg: No edema.      Left lower leg: No edema.      Comments: Clubbing nails   Skin:     General: Skin is warm and dry.   Neurological:      General: No focal deficit present.      Mental Status: He is alert and oriented to person, place, and time.   Psychiatric:         Mood and Affect: Mood normal.         Thought Content: Thought content normal.                Significant Labs: All pertinent labs within the past 24 hours have been reviewed.    CBC:   Recent Labs   Lab 01/09/24 2139   WBC 7.80   HGB 14.7   HCT 49.3        CMP:   Recent Labs   Lab 01/09/24  2139 01/10/24  0750    143   K 4.6 4.7   CL 97 96   CO2 28 36*   * 124*   BUN 11 14   CREATININE 0.8 0.9   CALCIUM 10.4 10.1   PROT 8.7* 8.4   ALBUMIN 3.2* 3.0*   BILITOT 0.8 0.8   ALKPHOS 109 92   AST 32 25   ALT 29 24   ANIONGAP 16 11     Cardiac Markers:   Recent Labs   Lab 01/09/24 2139   *     Lactic Acid:   Recent Labs   Lab 01/09/24  2139 01/10/24  0750   LACTATE 2.5* 1.4     Magnesium:   Recent Labs   Lab 01/10/24  0750   MG 2.0     Troponin:   Recent Labs   Lab  01/09/24  2139   TROPONINI 0.023     TSH:   Recent Labs   Lab 01/10/24  0750   TSH 0.745     Urine Studies:   Recent Labs   Lab 01/09/24  2259   COLORU Yellow   APPEARANCEUA Clear   PHUR 7.0   SPECGRAV 1.030   PROTEINUA 1+*   GLUCUA Negative   KETONESU Negative   BILIRUBINUA Negative   OCCULTUA Negative   NITRITE Negative   UROBILINOGEN 2.0-3.0*   LEUKOCYTESUR Negative   RBCUA 6*   WBCUA 6*   BACTERIA Rare   SQUAMEPITHEL 2   HYALINECASTS 38*       Significant Imaging: I have reviewed all pertinent imaging results/findings within the past 24 hours.    Imaging Results              XR Gastric tube check, non-radiologist performed (Final result)  Result time 01/10/24 10:34:34      Final result by Damon Alcantara III, MD (01/10/24 10:34:34)                   Narrative:    EXAMINATION:  XR GASTRIC TUBE CHECK, NON-RADIOLOGIST PERFORMED    CLINICAL HISTORY:  SBO;    FINDINGS:  NG tip fundus.  There is a chronic interstitial lung changes.  No obstruction, ileus, or perforation seen.      Electronically signed by: Damon Alcantara MD  Date:    01/10/2024  Time:    10:34                                      CTA Chest Abdomen Pelvis (Final result)  Result time 01/10/24 00:18:42      Final result by Galileo Segal MD (01/10/24 00:18:42)                   Impression:      1. Multiple pulmonary masses and nodules consistent with malignancy and known metastatic disease.  Abnormal mediastinal and hilar lymphadenopathy consistent with local spread of disease.  Possible left adrenal metastasis.  Continued outpatient follow-up is advised.  Recommend comparison with previous outside imaging.  2. Severe pulmonary emphysema and suspected chronic interstitial lung disease.  Nonspecific mild diffuse mosaic appearance of ground-glass density seen within the lungs, more pronounced in the lower lobes.  3. Fluid distention of the stomach and proximal to mid small bowel loops with air-fluid levels likely reflecting at least partial  developing small bowel obstruction.  Apparent transition point in the midline lower abdomen with no obvious mechanical cause for obstruction seen.  Small bowel loops are completely decompressed distally from this region.  4. No evidence of aortic aneurysm or dissection.  5. No evidence of PE.  6. Additional findings as detailed above.  This report was flagged in Epic as abnormal.      Electronically signed by: Galileo Segal MD  Date:    01/10/2024  Time:    00:18               Narrative:    EXAMINATION:  CTA CHEST ABDOMEN PELVIS    CLINICAL HISTORY:  r/o mesenteric ischemia but also PE/PTX as patient just admitted at  with complications;    TECHNIQUE:  CTA chest abdomen and pelvis was obtained following administration of 100 cc Omnipaque 350 IV contrast.  Sagittal and coronal reformats were obtained.    COMPARISON:  Previous outside facility CT images from 12/18/2023 are unavailable for review.    FINDINGS:  Examination was performed in a STAT emergency setting and will not serve as an official restaging exam.    Multiple pulmonary masses and nodules are visualized.  Largest spiculated mass measures approximately 5.5 cm in the right upper lobe.  There is mediastinal and hilar lymphadenopathy consistent with local spread of disease.  Severe emphysematous changes are seen with additional suspected chronic interstitial lung disease.  Mild diffuse mosaic appearance of ground-glass density is seen throughout the lungs, more pronounced within the lower lobes.  No pleural effusion or pneumothorax.    Heart is normal in size with no significant pericardial effusion.  No significant abnormalities are seen along the esophageal course.  No evidence of aortic aneurysm or dissection.  Branch vessels of the aortic arch are patent.  Pulmonary arteries are well opacified with no evidence of PE.    No significant focal hepatic abnormalities are identified.  There is no intra-or extrahepatic biliary ductal dilatation.  The  gallbladder is unremarkable.  Stomach is significantly distended with fluid.  Spleen, pancreas, and right adrenal gland are unremarkable.  There is asymmetric abnormal left adrenal thickening with questionable underlying mass lesion.    Kidneys enhance normally with no evidence of hydronephrosis.  No abnormalities are seen along the ureteral courses.  Urinary bladder is nondistended.  Dystrophic calcifications are seen within the prostate.    Appendix is visualized and is unremarkable.  There is fluid distention of proximal to mid small bowel loops with air-fluid levels measuring upwards of 3.5 cm in caliber likely reflecting at least partial developing small bowel obstruction.  Apparent transition point is seen in the midline lower abdomen.  No definite mechanical cause for obstruction is seen.  Small bowel loops are decompressed distal to this region.  No significant bowel wall thickening or surrounding inflammatory changes seen.  No free air or free fluid.    Aorta tapers normally.  Abdominal aortic branch vessels are patent.    No acute osseous abnormality identified. Subcutaneous soft tissues show no significant abnormalities.                                       X-Ray Chest PA And Lateral (Final result)  Result time 01/09/24 22:18:05      Final result by Gary Romero MD (01/09/24 22:18:05)                   Impression:      Diffuse increased interstitial changes seen throughout the lungs, similar compared to prior.    Persistent focal area of masslike consolidation versus mass lesion in the right upper lobe, similar compared to prior.  Neoplasm not excluded.  See prior report for recommendations.      Electronically signed by: Gray Romero MD  Date:    01/09/2024  Time:    22:18               Narrative:    EXAMINATION:  XR CHEST PA AND LATERAL    CLINICAL HISTORY:  Tachycardia, unspecified    TECHNIQUE:  PA and lateral views of the chest were  performed.    COMPARISON:  11/09/2023.    FINDINGS:  Diffuse increased interstitial changes seen throughout the lungs, similar compared to prior.  Focal area of masslike consolidation versus mass lesion in the right upper lobe, similar compared to prior.    No pleural effusion.  No pneumothorax.    Cardiomediastinal silhouette is similar to prior.    Regional osseous structures are similar to prior.

## 2024-01-10 NOTE — HPI
Patient is a pleasant 68-year-old man.  Well known to me from Cardiology Clinic.  Past medical history of atrial fibrillation.  Came in with intestinal symptoms.  Recently had ablation done for atrial flutter.  Recently diagnosed with lung cancer.  Cardiology has been consulted.  Currently in sinus rhythm with PACs.  Otherwise denies chest pains at rest on exertion, orthopnea, PND.  Sinus tachycardia with PACs.  On tele monitoring          HPI: 68-year-old male with a past medical history of ILD (on 5L O2), HFpEF (EF: 55%, reduced RV systolic function), pulmonary hypertension (sPAP of 74 mmHg) hypertension, atrial flutter (s/p ablation in 09/2023, not on AC anymore), polycythemia, recently dx lung cancer presents with abdominal pain for one day, associated with nausea and vomiting. Last bowel movement was one day ago, and stools were hard. Prior to that, he states it was both hard and liquid form for about a week.  Denies any history of abdominal surgeries.  No recent travel new medications.     In the ED, the patient was tachycardic (up to 130s, and AFib with RVR).  Labs were remarkable for an elevated BNP (837), elevated lactic acid (2.5).  CTA chest, abdomen and pelvis showed multiple pulmonary masses and nodules consistent with malignancy/metastatic disease, pulmonary emphysema and chronic interstitial lung disease findings, along with fluid distention of the stomach and proximal to mid small small bowel loops with air-fluid levels reflecting at least a partial developing small-bowel obstruction.  Patient was given Lopressor 5 mg IV x1, morphine 8 mg IV, Zofran 4 mg IV x2, and 500 mL of NS.  Patient admitted to hospital medicine for further evaluation and management

## 2024-01-10 NOTE — HPI
68-year-old male with a past medical history of ILD (on 5L O2), HFpEF (EF: 55%, reduced RV systolic function), pulmonary hypertension (sPAP of 74 mmHg) hypertension, atrial flutter (s/p ablation in 09/2023, not on AC anymore), polycythemia, recently dx lung cancer presents with abdominal pain for one day, associated with nausea and vomiting. Last bowel movement was one day ago, and stools were hard. Prior to that, he states it was both hard and liquid form for about a week.  Denies any history of abdominal surgeries.  No recent travel new medications.     In the ED, the patient was tachycardic (up to 130s, and AFib with RVR).  Labs were remarkable for an elevated BNP (837), elevated lactic acid (2.5).  CTA chest, abdomen and pelvis showed multiple pulmonary masses and nodules consistent with malignancy/metastatic disease, pulmonary emphysema and chronic interstitial lung disease findings, along with fluid distention of the stomach and proximal to mid small small bowel loops with air-fluid levels reflecting at least a partial developing small-bowel obstruction.  Patient was given Lopressor 5 mg IV x1, morphine 8 mg IV, Zofran 4 mg IV x2, and 500 mL of NS.  Patient admitted to hospital medicine for further evaluation and management

## 2024-01-10 NOTE — SUBJECTIVE & OBJECTIVE
Past Medical History:   Diagnosis Date    A-fib 5/8/2023    Arthritis     Chronic hypoxemic respiratory failure 11/9/2023    Chronic right heart failure 11/9/2023    GERD (gastroesophageal reflux disease)     History of HCV, s/p successful treatment w/ SVR12 5/2015     Failed treatment (stage I/II) - followed by hepatology     Joint pain     Lung disease caused by breathing particles     Widespread essentially symmetric interstitial lung disease    Obesity     Polycythemia vera     Prediabetes        Past Surgical History:   Procedure Laterality Date    ABLATION, ATRIAL FLUTTER, TYPICAL N/A 9/29/2023    Procedure: Ablation, Atrial Flutter, Typical;  Surgeon: Jonh Mcgill MD;  Location: [facility] EP LAB;  Service: Cardiology;  Laterality: N/A;  AFL, ELENA (Cx if SR), CTI, RFA, BETHANY, MAC, DM, 3 Prep    BUNIONECTOMY      bilateral    CARDIOVERSION N/A 05/08/2023    Procedure: Cardioversion;  Surgeon: David Cueva MD;  Location: [facility] CATH LAB;  Service: Cardiology;  Laterality: N/A;    CATARACT EXTRACTION Left 07/27/2023    COLONOSCOPY N/A 12/09/2015    Procedure: COLONOSCOPY;  Surgeon: Conrad Iqbal MD;  Location: [facility] ENDO;  Service: Endoscopy;  Laterality: N/A;    Liver biopsy x2      rotator cuff Right     TRANSESOPHAGEAL ECHOCARDIOGRAM WITH POSSIBLE CARDIOVERSION (ELENA W/ POSS CARDIOVERSION) N/A 05/08/2023    Procedure: TRANSESOPHAGEAL ECHOCARDIOGRAM WITH POSSIBLE CARDIOVERSION (ELENA W/ POSS CARDIOVERSION);  Surgeon: David Cueva MD;  Location: [facility] CATH LAB;  Service: Cardiology;  Laterality: N/A;  12:30PM    RN PREOP 5/4/2023---EKG ON ARRIVAL    TRANSESOPHAGEAL ECHOCARDIOGRAM WITH POSSIBLE CARDIOVERSION (ELENA W/ POSS CARDIOVERSION) N/A 7/29/2023    Procedure: Transesophageal echo (ELENA) intra-procedure log documentation;  Surgeon: David Cueva MD;  Location: [facility] CATH LAB;  Service: Cardiology;  Laterality: N/A;    TRANSESOPHAGEAL ECHOCARDIOGRAPHY N/A 05/08/2023    Procedure: ECHOCARDIOGRAM, TRANSESOPHAGEAL;   Surgeon: David Cueva MD;  Location: [facility] CATH LAB;  Service: Cardiology;  Laterality: N/A;    TRANSESOPHAGEAL ECHOCARDIOGRAPHY N/A 7/29/2023    Procedure: ECHOCARDIOGRAM, TRANSESOPHAGEAL;  Surgeon: David Cueva MD;  Location: [facility] CATH LAB;  Service: Cardiology;  Laterality: N/A;    TREATMENT OF CARDIAC ARRHYTHMIA N/A 8/7/2023    Procedure: Cardioversion or Defibrillation;  Surgeon: David Cueva MD;  Location: [facility] CATH LAB;  Service: Cardiology;  Laterality: N/A;       Review of patient's allergies indicates:   Allergen Reactions    Ace inhibitors Other (See Comments)     cough       No current facility-administered medications on file prior to encounter.     Current Outpatient Medications on File Prior to Encounter   Medication Sig    allopurinoL (ZYLOPRIM) 100 MG tablet Take 2 tablets (200 mg total) by mouth once daily. (Patient taking differently: Take 100 mg by mouth once daily.)    fluticasone propionate (FLONASE) 50 mcg/actuation nasal spray SHAKE LIQUID AND USE 1 SPRAY(50 MCG) IN EACH NOSTRIL EVERY DAY    furosemide (LASIX) 20 MG tablet Take 1 tablet (20 mg total) by mouth 2 (two) times daily as needed (1-2 pills as needed for fluid overload). (Patient taking differently: Take 40 mg by mouth 2 (two) times daily as needed (1-2 pills as needed for fluid overload).)    metoprolol succinate (TOPROL-XL) 50 MG 24 hr tablet Take 1 tablet (50 mg total) by mouth 2 (two) times daily.    multivitamin with folic acid 400 mcg Tab Take 1 tablet by mouth once daily.    potassium chloride (KLOR-CON) 10 MEQ TbSR Take 1 tablet by mouth once daily.    predniSONE (DELTASONE) 5 MG tablet Take 10 mg by mouth once daily.    TRELEGY ELLIPTA 100-62.5-25 mcg DsDv Inhale 1 puff into the lungs Daily.    [DISCONTINUED] multivitamin capsule Take 1 capsule by mouth once daily.    [DISCONTINUED] ofloxacin (OCUFLOX) 0.3 % ophthalmic solution Place 1 drop into both eyes 4 (four) times daily.    [DISCONTINUED] prednisoLONE acetate  (PRED FORTE) 1 % DrpS Place 1 drop into both eyes 3 (three) times daily.     Family History       Problem Relation (Age of Onset)    Chronic back pain Brother    Deep vein thrombosis Sister    Heart attack Sister    Heart disease Mother    Kidney disease Mother    Osteoarthritis Sister    Parkinsonism Father    Prostate cancer Cousin    Pulmonary embolism Sister          Tobacco Use    Smoking status: Former     Current packs/day: 1.00     Average packs/day: 1 pack/day for 30.2 years (30.2 ttl pk-yrs)     Types: Cigarettes     Start date: 11/9/1993    Smokeless tobacco: Never    Tobacco comments:     pt. smokes 10 cigars per day.   Substance and Sexual Activity    Alcohol use: Not Currently     Comment: Rarely    Drug use: Yes     Types: Marijuana     Comment: daily    Sexual activity: Yes     Partners: Female     Review of Systems   Constitutional: Positive for malaise/fatigue.   HENT: Negative.     Eyes: Negative.    Cardiovascular: Negative.    Respiratory: Negative.     Endocrine: Negative.    Hematologic/Lymphatic: Negative.    Skin: Negative.    Musculoskeletal: Negative.    Gastrointestinal: Negative.    Genitourinary: Negative.    Neurological: Negative.    Psychiatric/Behavioral: Negative.     Allergic/Immunologic: Negative.      Objective:     Vital Signs (Most Recent):  Temp: 97.8 °F (36.6 °C) (01/10/24 0601)  Pulse: 105 (01/10/24 1200)  Resp: 19 (01/10/24 1200)  BP: 114/69 (01/10/24 1200)  SpO2: 96 % (01/10/24 1200) Vital Signs (24h Range):  Temp:  [97.6 °F (36.4 °C)-97.8 °F (36.6 °C)] 97.8 °F (36.6 °C)  Pulse:  [] 105  Resp:  [14-25] 19  SpO2:  [95 %-100 %] 96 %  BP: (114-152)/(58-94) 114/69     Weight: 92.5 kg (204 lb)  Body mass index is 24.19 kg/m².    SpO2: 96 %         Intake/Output Summary (Last 24 hours) at 1/10/2024 1301  Last data filed at 1/10/2024 0630  Gross per 24 hour   Intake 500 ml   Output 500 ml   Net 0 ml       Lines/Drains/Airways       Drain  Duration                  NG/OG  "Tube 01/10/24 1028 nasogastric;Sammy sump 16 Fr. Right nostril <1 day              Peripheral Intravenous Line  Duration                  Peripheral IV - Single Lumen 01/09/24 2138 20 G Anterior;Right Forearm <1 day                     Physical Exam  Vitals reviewed.   Constitutional:       Appearance: He is well-developed.   HENT:      Head: Normocephalic.   Eyes:      Conjunctiva/sclera: Conjunctivae normal.      Pupils: Pupils are equal, round, and reactive to light.   Cardiovascular:      Rate and Rhythm: Regular rhythm. Tachycardia present.      Heart sounds: Normal heart sounds.   Pulmonary:      Effort: Pulmonary effort is normal.      Breath sounds: Normal breath sounds.   Abdominal:      General: Bowel sounds are normal.      Palpations: Abdomen is soft.   Musculoskeletal:      Cervical back: Normal range of motion and neck supple.   Skin:     General: Skin is warm.   Neurological:      Mental Status: He is alert and oriented to person, place, and time.          Significant Labs: BMP:   Recent Labs   Lab 01/09/24  2139 01/10/24  0750   * 124*    143   K 4.6 4.7   CL 97 96   CO2 28 36*   BUN 11 14   CREATININE 0.8 0.9   CALCIUM 10.4 10.1   MG  --  2.0   , CMP   Recent Labs   Lab 01/09/24  2139 01/10/24  0750    143   K 4.6 4.7   CL 97 96   CO2 28 36*   * 124*   BUN 11 14   CREATININE 0.8 0.9   CALCIUM 10.4 10.1   PROT 8.7* 8.4   ALBUMIN 3.2* 3.0*   BILITOT 0.8 0.8   ALKPHOS 109 92   AST 32 25   ALT 29 24   ANIONGAP 16 11   , CBC   Recent Labs   Lab 01/09/24 2139   WBC 7.80   HGB 14.7   HCT 49.3      , INR   Recent Labs   Lab 01/09/24 2139   INR 1.1   , Lipid Panel No results for input(s): "CHOL", "HDL", "LDLCALC", "TRIG", "CHOLHDL" in the last 48 hours., Troponin   Recent Labs   Lab 01/09/24 2139   TROPONINI 0.023   , and All pertinent lab results from the last 24 hours have been reviewed.    Significant Imaging: Echocardiogram: Transthoracic echo (TTE) complete ([facility] " Only):   Results for orders placed or performed during the hospital encounter of 01/05/24   Echo   Result Value Ref Range    BSA 2.25 m2    LVOT stroke volume 70.56 cm3    LVIDd 4.33 3.5 - 6.0 cm    LV Systolic Volume 26.72 mL    LV Systolic Volume Index 11.8 mL/m2    LVIDs 2.69 2.1 - 4.0 cm    LV Diastolic Volume 84.31 mL    LV Diastolic Volume Index 37.31 mL/m2    IVS 1.22 (A) 0.6 - 1.1 cm    LVOT diameter 2.32 cm    LVOT area 4.2 cm2    FS 38 28 - 44 %    Left Ventricle Relative Wall Thickness 0.56 cm    Posterior Wall 1.21 (A) 0.6 - 1.1 cm    LV mass 190.07 g    LV Mass Index 84 g/m2    MV Peak E Delio 0.74 m/s    TDI LATERAL 0.11 m/s    TDI SEPTAL 0.08 m/s    E/E' ratio 7.79 m/s    MV Peak A Delio 0.68 m/s    TR Max Delio 4.22 m/s    E/A ratio 1.09     IVRT 97.05 msec    E wave deceleration time 193.64 msec    LV SEPTAL E/E' RATIO 9.25 m/s    LV LATERAL E/E' RATIO 6.73 m/s    LVOT peak delio 0.93 m/s    Left Ventricular Outflow Tract Mean Velocity 0.73 cm/s    Left Ventricular Outflow Tract Mean Gradient 2.31 mmHg    RVDD 5.41 cm    RV S' 12.92 cm/s    TAPSE 1.85 cm    LA size 4.00 cm    Left Atrium Minor Axis 5.47 cm    Left Atrium Major Axis 5.62 cm    RA Major Axis 6.05 cm    AV mean gradient 3 mmHg    AV peak gradient 4 mmHg    Ao peak delio 1.04 m/s    Ao VTI 18.60 cm    LVOT peak VTI 16.70 cm    AV valve area 3.79 cm²    AV Velocity Ratio 0.89     AV index (prosthetic) 0.90     LIBAN by Velocity Ratio 3.78 cm²    MV stenosis pressure 1/2 time 56.16 ms    MV valve area p 1/2 method 3.92 cm2    TV peak gradient 3 mmHg    Triscuspid Valve Regurgitation Peak Gradient 71 mmHg    PV PEAK VELOCITY 0.89 m/s    PV peak gradient 3 mmHg    Sinus 3.77 cm    STJ 3.11 cm    Ascending aorta 3.50 cm    IVC diameter 1.73 cm    Mean e' 0.10 m/s    ZLVIDS -4.93     ZLVIDD -6.50     LA Volume Index 33.4 mL/m2    LA volume 75.40 cm3    LA WIDTH 4.0 cm    RA Width 4.9 cm    TV resting pulmonary artery pressure 74 mmHg    RV TB RVSP 7 mmHg     Est. RA pres 3 mmHg    Narrative      Left Ventricle: The left ventricle is normal in size. Mildly increased   wall thickness. There is concentric remodeling. Normal wall motion. Septal   flattening in diastole and systole consistent with right ventricular   volume and pressure overload. There is normal systolic function with a   visually estimated ejection fraction of 55 - 60%.    Right Ventricle: Severe right ventricular enlargement. Systolic   function is reduced.    Left Atrium: Left atrium is moderately dilated.    Right Atrium: Right atrium is severely dilated.    Mitral Valve: There is mild regurgitation.    Tricuspid Valve: There is mild regurgitation.    Pulmonary Artery: There is severe pulmonary hypertension. The estimated   pulmonary artery systolic pressure is 74 mmHg.    IVC/SVC: Normal venous pressure at 3 mmHg.

## 2024-01-10 NOTE — ASSESSMENT & PLAN NOTE
"-follows with pulmonology outpatient.  -Per pulm note in 11/2023: "ILD with worsening subjective symptoms, worsening PFT, worsening imaging findings"  -Chronically on 5L NC at home  -Currently at baseline   -Monitor   "

## 2024-01-10 NOTE — ASSESSMENT & PLAN NOTE
-EKG in ED with Qtc 461  -Repeat EKG on 01/10/24 with Qtc of 513  -Caution with QT prolonging medications   -Hold zofran at this time

## 2024-01-10 NOTE — ASSESSMENT & PLAN NOTE
-Hx of Aflutter s/p ablation 9/29/2023  -Resume home metoprolol  -Home eliquis was discontinud in 12/2023  -Cardiology consulted  -Monitor on telemetry

## 2024-01-10 NOTE — CONSULTS
[facility] Emergency Dept  Cardiology  Consult Note    Patient Name: Josiah Orosco Jr.  MRN: [id number]  Admission Date: 1/9/2024  Hospital Length of Stay: 0 days  Code Status: Full Code   Attending Provider: patient and ER records  Consulting Provider: David Cueva MD  Primary Care Physician: Elaine Landry MD  Principal Problem:Small bowel obstruction    Patient information was obtained from patient and ER records.     Inpatient consult to Cardiology  Consult performed by: David Cueva MD  Consult ordered by: Adeel Montaño MD        Subjective:     Chief Complaint:  tachy     HPI:   Patient is a pleasant 68-year-old man.  Well known to me from Cardiology Clinic.  Past medical history of atrial fibrillation.  Came in with intestinal symptoms.  Recently had ablation done for atrial flutter.  Recently diagnosed with lung cancer.  Cardiology has been consulted.  Currently in sinus rhythm with PACs.  Otherwise denies chest pains at rest on exertion, orthopnea, PND.  Sinus tachycardia with PACs.  On tele monitoring          HPI: 68-year-old male with a past medical history of ILD (on 5L O2), HFpEF (EF: 55%, reduced RV systolic function), pulmonary hypertension (sPAP of 74 mmHg) hypertension, atrial flutter (s/p ablation in 09/2023, not on AC anymore), polycythemia, recently dx lung cancer presents with abdominal pain for one day, associated with nausea and vomiting. Last bowel movement was one day ago, and stools were hard. Prior to that, he states it was both hard and liquid form for about a week.  Denies any history of abdominal surgeries.  No recent travel new medications.     In the ED, the patient was tachycardic (up to 130s, and AFib with RVR).  Labs were remarkable for an elevated BNP (837), elevated lactic acid (2.5).  CTA chest, abdomen and pelvis showed multiple pulmonary masses and nodules consistent with malignancy/metastatic disease, pulmonary emphysema and chronic interstitial lung disease  findings, along with fluid distention of the stomach and proximal to mid small small bowel loops with air-fluid levels reflecting at least a partial developing small-bowel obstruction.  Patient was given Lopressor 5 mg IV x1, morphine 8 mg IV, Zofran 4 mg IV x2, and 500 mL of NS.  Patient admitted to hospital medicine for further evaluation and management    Past Medical History:   Diagnosis Date    A-fib 5/8/2023    Arthritis     Chronic hypoxemic respiratory failure 11/9/2023    Chronic right heart failure 11/9/2023    GERD (gastroesophageal reflux disease)     History of HCV, s/p successful treatment w/ SVR12 5/2015     Failed treatment (stage I/II) - followed by hepatology     Joint pain     Lung disease caused by breathing particles     Widespread essentially symmetric interstitial lung disease    Obesity     Polycythemia vera     Prediabetes        Past Surgical History:   Procedure Laterality Date    ABLATION, ATRIAL FLUTTER, TYPICAL N/A 9/29/2023    Procedure: Ablation, Atrial Flutter, Typical;  Surgeon: Jonh Mcgill MD;  Location: [facility] EP LAB;  Service: Cardiology;  Laterality: N/A;  AFL, ELENA (Cx if SR), CTI, RFA, BETHANY, MAC, DM, 3 Prep    BUNIONECTOMY      bilateral    CARDIOVERSION N/A 05/08/2023    Procedure: Cardioversion;  Surgeon: David Cueva MD;  Location: [facility] CATH LAB;  Service: Cardiology;  Laterality: N/A;    CATARACT EXTRACTION Left 07/27/2023    COLONOSCOPY N/A 12/09/2015    Procedure: COLONOSCOPY;  Surgeon: Conrad Iqbal MD;  Location: [facility] ENDO;  Service: Endoscopy;  Laterality: N/A;    Liver biopsy x2      rotator cuff Right     TRANSESOPHAGEAL ECHOCARDIOGRAM WITH POSSIBLE CARDIOVERSION (ELENA W/ POSS CARDIOVERSION) N/A 05/08/2023    Procedure: TRANSESOPHAGEAL ECHOCARDIOGRAM WITH POSSIBLE CARDIOVERSION (ELENA W/ POSS CARDIOVERSION);  Surgeon: David Cueva MD;  Location: [facility] CATH LAB;  Service: Cardiology;  Laterality: N/A;  12:30PM    RN PREOP 5/4/2023---EKG ON ARRIVAL     TRANSESOPHAGEAL ECHOCARDIOGRAM WITH POSSIBLE CARDIOVERSION (ELENA W/ POSS CARDIOVERSION) N/A 7/29/2023    Procedure: Transesophageal echo (ELENA) intra-procedure log documentation;  Surgeon: David Cueva MD;  Location: [facility] CATH LAB;  Service: Cardiology;  Laterality: N/A;    TRANSESOPHAGEAL ECHOCARDIOGRAPHY N/A 05/08/2023    Procedure: ECHOCARDIOGRAM, TRANSESOPHAGEAL;  Surgeon: David Cueva MD;  Location: [facility] CATH LAB;  Service: Cardiology;  Laterality: N/A;    TRANSESOPHAGEAL ECHOCARDIOGRAPHY N/A 7/29/2023    Procedure: ECHOCARDIOGRAM, TRANSESOPHAGEAL;  Surgeon: David Cueva MD;  Location: [facility] CATH LAB;  Service: Cardiology;  Laterality: N/A;    TREATMENT OF CARDIAC ARRHYTHMIA N/A 8/7/2023    Procedure: Cardioversion or Defibrillation;  Surgeon: David Cueva MD;  Location: [facility] CATH LAB;  Service: Cardiology;  Laterality: N/A;       Review of patient's allergies indicates:   Allergen Reactions    Ace inhibitors Other (See Comments)     cough       No current facility-administered medications on file prior to encounter.     Current Outpatient Medications on File Prior to Encounter   Medication Sig    allopurinoL (ZYLOPRIM) 100 MG tablet Take 2 tablets (200 mg total) by mouth once daily. (Patient taking differently: Take 100 mg by mouth once daily.)    fluticasone propionate (FLONASE) 50 mcg/actuation nasal spray SHAKE LIQUID AND USE 1 SPRAY(50 MCG) IN EACH NOSTRIL EVERY DAY    furosemide (LASIX) 20 MG tablet Take 1 tablet (20 mg total) by mouth 2 (two) times daily as needed (1-2 pills as needed for fluid overload). (Patient taking differently: Take 40 mg by mouth 2 (two) times daily as needed (1-2 pills as needed for fluid overload).)    metoprolol succinate (TOPROL-XL) 50 MG 24 hr tablet Take 1 tablet (50 mg total) by mouth 2 (two) times daily.    multivitamin with folic acid 400 mcg Tab Take 1 tablet by mouth once daily.    potassium chloride (KLOR-CON) 10 MEQ TbSR Take 1 tablet by mouth once daily.     predniSONE (DELTASONE) 5 MG tablet Take 10 mg by mouth once daily.    TRELEGY ELLIPTA 100-62.5-25 mcg DsDv Inhale 1 puff into the lungs Daily.    [DISCONTINUED] multivitamin capsule Take 1 capsule by mouth once daily.    [DISCONTINUED] ofloxacin (OCUFLOX) 0.3 % ophthalmic solution Place 1 drop into both eyes 4 (four) times daily.    [DISCONTINUED] prednisoLONE acetate (PRED FORTE) 1 % DrpS Place 1 drop into both eyes 3 (three) times daily.     Family History       Problem Relation (Age of Onset)    Chronic back pain Brother    Deep vein thrombosis Sister    Heart attack Sister    Heart disease Mother    Kidney disease Mother    Osteoarthritis Sister    Parkinsonism Father    Prostate cancer Cousin    Pulmonary embolism Sister          Tobacco Use    Smoking status: Former     Current packs/day: 1.00     Average packs/day: 1 pack/day for 30.2 years (30.2 ttl pk-yrs)     Types: Cigarettes     Start date: 11/9/1993    Smokeless tobacco: Never    Tobacco comments:     pt. smokes 10 cigars per day.   Substance and Sexual Activity    Alcohol use: Not Currently     Comment: Rarely    Drug use: Yes     Types: Marijuana     Comment: daily    Sexual activity: Yes     Partners: Female     Review of Systems   Constitutional: Positive for malaise/fatigue.   HENT: Negative.     Eyes: Negative.    Cardiovascular: Negative.    Respiratory: Negative.     Endocrine: Negative.    Hematologic/Lymphatic: Negative.    Skin: Negative.    Musculoskeletal: Negative.    Gastrointestinal: Negative.    Genitourinary: Negative.    Neurological: Negative.    Psychiatric/Behavioral: Negative.     Allergic/Immunologic: Negative.      Objective:     Vital Signs (Most Recent):  Temp: 97.8 °F (36.6 °C) (01/10/24 0601)  Pulse: 105 (01/10/24 1200)  Resp: 19 (01/10/24 1200)  BP: 114/69 (01/10/24 1200)  SpO2: 96 % (01/10/24 1200) Vital Signs (24h Range):  Temp:  [97.6 °F (36.4 °C)-97.8 °F (36.6 °C)] 97.8 °F (36.6 °C)  Pulse:  [] 105  Resp:  [14-25]  19  SpO2:  [95 %-100 %] 96 %  BP: (114-152)/(58-94) 114/69     Weight: 92.5 kg (204 lb)  Body mass index is 24.19 kg/m².    SpO2: 96 %         Intake/Output Summary (Last 24 hours) at 1/10/2024 1301  Last data filed at 1/10/2024 0630  Gross per 24 hour   Intake 500 ml   Output 500 ml   Net 0 ml       Lines/Drains/Airways       Drain  Duration                  NG/OG Tube 01/10/24 1028 nasogastric;[facility] sump 16 Fr. Right nostril <1 day              Peripheral Intravenous Line  Duration                  Peripheral IV - Single Lumen 01/09/24 2138 20 G Anterior;Right Forearm <1 day                     Physical Exam  Vitals reviewed.   Constitutional:       Appearance: He is well-developed.   HENT:      Head: Normocephalic.   Eyes:      Conjunctiva/sclera: Conjunctivae normal.      Pupils: Pupils are equal, round, and reactive to light.   Cardiovascular:      Rate and Rhythm: Regular rhythm. Tachycardia present.      Heart sounds: Normal heart sounds.   Pulmonary:      Effort: Pulmonary effort is normal.      Breath sounds: Normal breath sounds.   Abdominal:      General: Bowel sounds are normal.      Palpations: Abdomen is soft.   Musculoskeletal:      Cervical back: Normal range of motion and neck supple.   Skin:     General: Skin is warm.   Neurological:      Mental Status: He is alert and oriented to person, place, and time.          Significant Labs: BMP:   Recent Labs   Lab 01/09/24  2139 01/10/24  0750   * 124*    143   K 4.6 4.7   CL 97 96   CO2 28 36*   BUN 11 14   CREATININE 0.8 0.9   CALCIUM 10.4 10.1   MG  --  2.0   , CMP   Recent Labs   Lab 01/09/24  2139 01/10/24  0750    143   K 4.6 4.7   CL 97 96   CO2 28 36*   * 124*   BUN 11 14   CREATININE 0.8 0.9   CALCIUM 10.4 10.1   PROT 8.7* 8.4   ALBUMIN 3.2* 3.0*   BILITOT 0.8 0.8   ALKPHOS 109 92   AST 32 25   ALT 29 24   ANIONGAP 16 11   , CBC   Recent Labs   Lab 01/09/24 2139   WBC 7.80   HGB 14.7   HCT 49.3      , INR   Recent  "Labs   Lab 01/09/24 2139   INR 1.1   , Lipid Panel No results for input(s): "CHOL", "HDL", "LDLCALC", "TRIG", "CHOLHDL" in the last 48 hours., Troponin   Recent Labs   Lab 01/09/24 2139   TROPONINI 0.023   , and All pertinent lab results from the last 24 hours have been reviewed.    Significant Imaging: Echocardiogram: Transthoracic echo (TTE) complete (Cupid Only):   Results for orders placed or performed during the hospital encounter of 01/05/24   Echo   Result Value Ref Range    BSA 2.25 m2    LVOT stroke volume 70.56 cm3    LVIDd 4.33 3.5 - 6.0 cm    LV Systolic Volume 26.72 mL    LV Systolic Volume Index 11.8 mL/m2    LVIDs 2.69 2.1 - 4.0 cm    LV Diastolic Volume 84.31 mL    LV Diastolic Volume Index 37.31 mL/m2    IVS 1.22 (A) 0.6 - 1.1 cm    LVOT diameter 2.32 cm    LVOT area 4.2 cm2    FS 38 28 - 44 %    Left Ventricle Relative Wall Thickness 0.56 cm    Posterior Wall 1.21 (A) 0.6 - 1.1 cm    LV mass 190.07 g    LV Mass Index 84 g/m2    MV Peak E Delio 0.74 m/s    TDI LATERAL 0.11 m/s    TDI SEPTAL 0.08 m/s    E/E' ratio 7.79 m/s    MV Peak A Delio 0.68 m/s    TR Max Delio 4.22 m/s    E/A ratio 1.09     IVRT 97.05 msec    E wave deceleration time 193.64 msec    LV SEPTAL E/E' RATIO 9.25 m/s    LV LATERAL E/E' RATIO 6.73 m/s    LVOT peak delio 0.93 m/s    Left Ventricular Outflow Tract Mean Velocity 0.73 cm/s    Left Ventricular Outflow Tract Mean Gradient 2.31 mmHg    RVDD 5.41 cm    RV S' 12.92 cm/s    TAPSE 1.85 cm    LA size 4.00 cm    Left Atrium Minor Axis 5.47 cm    Left Atrium Major Axis 5.62 cm    RA Major Axis 6.05 cm    AV mean gradient 3 mmHg    AV peak gradient 4 mmHg    Ao peak delio 1.04 m/s    Ao VTI 18.60 cm    LVOT peak VTI 16.70 cm    AV valve area 3.79 cm²    AV Velocity Ratio 0.89     AV index (prosthetic) 0.90     LIBAN by Velocity Ratio 3.78 cm²    MV stenosis pressure 1/2 time 56.16 ms    MV valve area p 1/2 method 3.92 cm2    TV peak gradient 3 mmHg    Triscuspid Valve Regurgitation Peak " Gradient 71 mmHg    PV PEAK VELOCITY 0.89 m/s    PV peak gradient 3 mmHg    Sinus 3.77 cm    STJ 3.11 cm    Ascending aorta 3.50 cm    IVC diameter 1.73 cm    Mean e' 0.10 m/s    ZLVIDS -4.93     ZLVIDD -6.50     LA Volume Index 33.4 mL/m2    LA volume 75.40 cm3    LA WIDTH 4.0 cm    RA Width 4.9 cm    TV resting pulmonary artery pressure 74 mmHg    RV TB RVSP 7 mmHg    Est. RA pres 3 mmHg    Narrative      Left Ventricle: The left ventricle is normal in size. Mildly increased   wall thickness. There is concentric remodeling. Normal wall motion. Septal   flattening in diastole and systole consistent with right ventricular   volume and pressure overload. There is normal systolic function with a   visually estimated ejection fraction of 55 - 60%.    Right Ventricle: Severe right ventricular enlargement. Systolic   function is reduced.    Left Atrium: Left atrium is moderately dilated.    Right Atrium: Right atrium is severely dilated.    Mitral Valve: There is mild regurgitation.    Tricuspid Valve: There is mild regurgitation.    Pulmonary Artery: There is severe pulmonary hypertension. The estimated   pulmonary artery systolic pressure is 74 mmHg.    IVC/SVC: Normal venous pressure at 3 mmHg.       Assessment and Plan:     Lung cancer        Atrial flutter   History of atrial flutter status post ablation.  Currently in sinus rhythm with PACs.  Continue medical management.    Pulmonary hypertension        HTN, goal below 140/90        ILD (interstitial lung disease)        Polycythemia            VTE Risk Mitigation (From admission, onward)           Ordered     heparin (porcine) injection 5,000 Units  Every 8 hours         01/10/24 0213     IP VTE HIGH RISK PATIENT  Once         01/10/24 0213     Place sequential compression device  Until discontinued         01/10/24 0213                    Thank you for your consult. I will follow-up with patient. Please contact us if you have any additional questions.    David HERRERA  MD Anay  Cardiology   [facility] [place] - Emergency Dept

## 2024-01-10 NOTE — ASSESSMENT & PLAN NOTE
Advance Care Planning     Date: 01/10/2024    Code Status  I engaged the the patient in a voluntary conversation about the patient's preferences for care  at the very end of life. The patient wishes to have CPR and other invasive treatments performed when his heart and/or breathing stops. I communicated to the patient that his wishes align with full code status and he agrees.  I spent a total of 16 minutes engaging the patient in this advance care planning discussion.       Code Status: Full Code

## 2024-01-10 NOTE — ED PROVIDER NOTES
Encounter Date: 1/9/2024       History     Chief Complaint   Patient presents with    Abdominal Pain    Vomiting     Arrives to ER with complaints abdominal pain n/v since around this lunch time today. Reports change in diet recently normally eats a healthy diet has had some changes in food choices. Pt. Also reporting some SOB, ears 5L home oxygen baseline. Hx of afib     67 yo male with chronic lung disease on home O2 at 5L, atrial flutter s/p ablation not currently on anticoagulation, presents via wife to [facility] ER with acute severe constant band-like mid-abdominal pain associated with nausea/vomiting.  Patient states he has noticed less passing of flatus and stool over the last few days.  He did have a normal BM this morning, but stool since then has been minimal.  Patient had a normal breakfast (oatmeal, thakkar), which wife ate as well; she is not ill.  Around noon today (Tuesday 1/9/24), patient developed abdominal pain associated emesis of gastric contents and then bile.  He has never had similar in the past.  Patient tried Mylanta around 2pm and 6pm but vomited it up.  He has been unable to take regular meds today due to vomiting.    Patient reports shortness of breath is slightly worse than baseline.  He is short of breath with minimal activity in the ER.    At follow-up 4 days ago (1/5/24), electrophysiologist Dr. Jonh Mcgill ([facility]) stopped patient's Sotalol and Eliquis, and increased Metoprolol from QD to BID.  Patient had radioflequency ablation (RFA) of typical atrial flutter 10/2/23.      Patient was just admitted (Dr. Shadi Johnson) to [facility] 12/18/23-12/23/23 for acute on chronic respiratory failure and CHF exacerbation.  At the time, he had IR biopsy of R lung mass and subsequent pneumothorax.      PMH: idiopathic pulmonary fibrosis (IPF) on chronic O2 by NC at 5L, chronic right heart failure, afib/flutter s/p ablation not currently on anticoagulation, lung cancer (diagnosed  12/2023), polycythemia vera, pre-diabetes, hep C, GERD, arthritis    TTE 12/20/23    Moderate left ventricular hypertrophy.     Normal left ventricular systolic function.     Left ventricular ejection fraction is estimated at 55-60%.     Flattened septum in diastole consistent with right ventricle volume/pressure overload.     Severely increased right ventricular size.     Severely decreased right ventricular systolic function.     Severe pulmonary hypertension.     Severely increased right atrial size.     Mildly increased left atrial size.     Trace aortic valve regurgitation.     Moderate-to-severe tricuspid valve regurgitation.     Mild pulmonary valve regurgitation.                 Review of patient's allergies indicates:   Allergen Reactions    Ace inhibitors Other (See Comments)     cough     Past Medical History:   Diagnosis Date    A-fib 5/8/2023    Arthritis     Chronic hypoxemic respiratory failure 11/9/2023    Chronic right heart failure 11/9/2023    GERD (gastroesophageal reflux disease)     History of HCV, s/p successful treatment w/ SVR12 5/2015     Failed treatment (stage I/II) - followed by hepatology     Joint pain     Lung disease caused by breathing particles     Widespread essentially symmetric interstitial lung disease    Obesity     Polycythemia vera     Prediabetes      Past Surgical History:   Procedure Laterality Date    ABLATION, ATRIAL FLUTTER, TYPICAL N/A 9/29/2023    Procedure: Ablation, Atrial Flutter, Typical;  Surgeon: Jonh Mcgill MD;  Location: [facility] EP LAB;  Service: Cardiology;  Laterality: N/A;  AFL, ELENA (Cx if SR), CTI, RFA, BETHANY, MAC, DM, 3 Prep    BUNIONECTOMY      bilateral    CARDIOVERSION N/A 05/08/2023    Procedure: Cardioversion;  Surgeon: David Cueva MD;  Location: [facility] CATH LAB;  Service: Cardiology;  Laterality: N/A;    CATARACT EXTRACTION Left 07/27/2023    COLONOSCOPY N/A 12/09/2015    Procedure: COLONOSCOPY;  Surgeon: Conrad Iqbal MD;  Location: [facility]  ENDO;  Service: Endoscopy;  Laterality: N/A;    Liver biopsy x2      rotator cuff Right     TRANSESOPHAGEAL ECHOCARDIOGRAM WITH POSSIBLE CARDIOVERSION (ELENA W/ POSS CARDIOVERSION) N/A 05/08/2023    Procedure: TRANSESOPHAGEAL ECHOCARDIOGRAM WITH POSSIBLE CARDIOVERSION (ELENA W/ POSS CARDIOVERSION);  Surgeon: David Cueva MD;  Location: [facility] CATH LAB;  Service: Cardiology;  Laterality: N/A;  12:30PM    RN PREOP 5/4/2023---EKG ON ARRIVAL    TRANSESOPHAGEAL ECHOCARDIOGRAM WITH POSSIBLE CARDIOVERSION (ELENA W/ POSS CARDIOVERSION) N/A 7/29/2023    Procedure: Transesophageal echo (ELENA) intra-procedure log documentation;  Surgeon: David Cueva MD;  Location: [facility] CATH LAB;  Service: Cardiology;  Laterality: N/A;    TRANSESOPHAGEAL ECHOCARDIOGRAPHY N/A 05/08/2023    Procedure: ECHOCARDIOGRAM, TRANSESOPHAGEAL;  Surgeon: David Cueva MD;  Location: [facility] CATH LAB;  Service: Cardiology;  Laterality: N/A;    TRANSESOPHAGEAL ECHOCARDIOGRAPHY N/A 7/29/2023    Procedure: ECHOCARDIOGRAM, TRANSESOPHAGEAL;  Surgeon: David Cueva MD;  Location: [facility] CATH LAB;  Service: Cardiology;  Laterality: N/A;    TREATMENT OF CARDIAC ARRHYTHMIA N/A 8/7/2023    Procedure: Cardioversion or Defibrillation;  Surgeon: David Cueva MD;  Location: [facility] CATH LAB;  Service: Cardiology;  Laterality: N/A;     Family History   Problem Relation Age of Onset    Heart disease Mother     Kidney disease Mother     Parkinsonism Father     Prostate cancer Cousin         maternal side    Heart attack Sister         CABG    Deep vein thrombosis Sister     Pulmonary embolism Sister     Osteoarthritis Sister         s/p knee replacement    Chronic back pain Brother     Liver disease Neg Hx     Diabetes Neg Hx     Melanoma Neg Hx      Social History     Tobacco Use    Smoking status: Former     Current packs/day: 1.00     Average packs/day: 1 pack/day for 30.2 years (30.2 ttl pk-yrs)     Types: Cigarettes     Start date: 11/9/1993    Smokeless tobacco: Never     "Tobacco comments:     pt. smokes 10 cigars per day.   Substance Use Topics    Alcohol use: Not Currently     Comment: Rarely    Drug use: Yes     Types: Marijuana     Comment: daily     Review of Systems   Constitutional:  Positive for fever (wife states "felt hot").   HENT:  Negative for sore throat.    Eyes:  Negative for visual disturbance.   Respiratory:  Positive for shortness of breath (minimal above baseline).    Cardiovascular:  Negative for chest pain.   Gastrointestinal:  Positive for abdominal pain, nausea and vomiting.   Genitourinary:  Negative for dysuria.   Musculoskeletal:  Negative for gait problem.   Skin:  Negative for rash.   Neurological:  Negative for syncope.       Physical Exam     Initial Vitals [01/09/24 2112]   BP Pulse Resp Temp SpO2   122/80 (!) 131 (!) 22 97.6 °F (36.4 °C) 100 %      MAP       --         Physical Exam    Nursing note and vitals reviewed.  Constitutional: He appears well-developed and well-nourished. He is not diaphoretic.   Awake, alert, uncomfortable-appearing   HENT:   Head: Normocephalic and atraumatic.   Eyes: Conjunctivae and EOM are normal. Pupils are equal, round, and reactive to light.   Neck: Neck supple.   Normal range of motion.  Cardiovascular:  Intact distal pulses.           Murmur heard.  Irregularly irregular tachycardia.   Pulmonary/Chest: He is in respiratory distress. He has no wheezes. He has no rales.   Coarse breath sounds bilaterally.  Patient mildly tachypneic after being asked to sit up in stretcher.   Abdominal: Abdomen is soft. There is abdominal tenderness.   Minimally tender in epigastrium.  Patient indicates pain to periumbilical region and laterally. There is no rebound and no guarding.   Musculoskeletal:         General: No tenderness or edema. Normal range of motion.      Cervical back: Normal range of motion and neck supple.     Neurological: He is alert and oriented to person, place, and time. He has normal strength.   Moving all " extremities   Skin: Skin is warm and dry.   Psychiatric: He has a normal mood and affect.         ED Course   Procedures  Labs Reviewed   CBC W/ AUTO DIFFERENTIAL - Abnormal; Notable for the following components:       Result Value    RBC 7.43 (*)     MCV 66 (*)     MCH 19.8 (*)     MCHC 29.8 (*)     RDW 22.2 (*)     All other components within normal limits   COMPREHENSIVE METABOLIC PANEL - Abnormal; Notable for the following components:    Glucose 120 (*)     Total Protein 8.7 (*)     Albumin 3.2 (*)     All other components within normal limits   B-TYPE NATRIURETIC PEPTIDE - Abnormal; Notable for the following components:     (*)     All other components within normal limits   URINALYSIS, REFLEX TO URINE CULTURE - Abnormal; Notable for the following components:    Protein, UA 1+ (*)     Urobilinogen, UA 2.0-3.0 (*)     All other components within normal limits    Narrative:     Specimen Source->Urine   LACTIC ACID, PLASMA - Abnormal; Notable for the following components:    Lactate (Lactic Acid) 2.5 (*)     All other components within normal limits   URINALYSIS MICROSCOPIC - Abnormal; Notable for the following components:    RBC, UA 6 (*)     WBC, UA 6 (*)     Hyaline Casts, UA 38 (*)     All other components within normal limits    Narrative:     Specimen Source->Urine   COMPREHENSIVE METABOLIC PANEL - Abnormal; Notable for the following components:    CO2 36 (*)     Glucose 124 (*)     Albumin 3.0 (*)     All other components within normal limits   PHOSPHORUS - Abnormal; Notable for the following components:    Phosphorus 5.5 (*)     All other components within normal limits   POCT GLUCOSE - Abnormal; Notable for the following components:    POCT Glucose 119 (*)     All other components within normal limits   LIPASE   TROPONIN I   PROTIME-INR   MAGNESIUM   TSH   LACTIC ACID, PLASMA    Narrative:     Suspect COVID 19   SARS-COV-2 RDRP GENE    Narrative:     .ra   POCT INFLUENZA A/B MOLECULAR   POCT  GLUCOSE MONITORING CONTINUOUS     EKG Readings: (Independently Interpreted)   21:21:  AFib with RVR vs sinus tach with PACs/PVCs, .  RBBB.  LVH.  No STEMI.  Morphology similar to 12/18/2023.  00:56:  NSR, HR 86.  PACs.  RBBB.  LVH.  No STEMI.         Imaging Results              XR Gastric tube check, non-radiologist performed (Final result)  Result time 01/10/24 10:34:34      Final result by Damon Alcantara III, MD (01/10/24 10:34:34)                   Narrative:    EXAMINATION:  XR GASTRIC TUBE CHECK, NON-RADIOLOGIST PERFORMED    CLINICAL HISTORY:  SBO;    FINDINGS:  NG tip fundus.  There is a chronic interstitial lung changes.  No obstruction, ileus, or perforation seen.      Electronically signed by: Damon Alcantara MD  Date:    01/10/2024  Time:    10:34                                      CTA Chest Abdomen Pelvis (Final result)  Result time 01/10/24 00:18:42      Final result by Galileo Segal MD (01/10/24 00:18:42)                   Impression:      1. Multiple pulmonary masses and nodules consistent with malignancy and known metastatic disease.  Abnormal mediastinal and hilar lymphadenopathy consistent with local spread of disease.  Possible left adrenal metastasis.  Continued outpatient follow-up is advised.  Recommend comparison with previous outside imaging.  2. Severe pulmonary emphysema and suspected chronic interstitial lung disease.  Nonspecific mild diffuse mosaic appearance of ground-glass density seen within the lungs, more pronounced in the lower lobes.  3. Fluid distention of the stomach and proximal to mid small bowel loops with air-fluid levels likely reflecting at least partial developing small bowel obstruction.  Apparent transition point in the midline lower abdomen with no obvious mechanical cause for obstruction seen.  Small bowel loops are completely decompressed distally from this region.  4. No evidence of aortic aneurysm or dissection.  5. No evidence of PE.  6. Additional  findings as detailed above.  This report was flagged in Epic as abnormal.      Electronically signed by: Galileo Segal MD  Date:    01/10/2024  Time:    00:18               Narrative:    EXAMINATION:  CTA CHEST ABDOMEN PELVIS    CLINICAL HISTORY:  r/o mesenteric ischemia but also PE/PTX as patient just admitted at  with complications;    TECHNIQUE:  CTA chest abdomen and pelvis was obtained following administration of 100 cc Omnipaque 350 IV contrast.  Sagittal and coronal reformats were obtained.    COMPARISON:  Previous outside facility CT images from 12/18/2023 are unavailable for review.    FINDINGS:  Examination was performed in a STAT emergency setting and will not serve as an official restaging exam.    Multiple pulmonary masses and nodules are visualized.  Largest spiculated mass measures approximately 5.5 cm in the right upper lobe.  There is mediastinal and hilar lymphadenopathy consistent with local spread of disease.  Severe emphysematous changes are seen with additional suspected chronic interstitial lung disease.  Mild diffuse mosaic appearance of ground-glass density is seen throughout the lungs, more pronounced within the lower lobes.  No pleural effusion or pneumothorax.    Heart is normal in size with no significant pericardial effusion.  No significant abnormalities are seen along the esophageal course.  No evidence of aortic aneurysm or dissection.  Branch vessels of the aortic arch are patent.  Pulmonary arteries are well opacified with no evidence of PE.    No significant focal hepatic abnormalities are identified.  There is no intra-or extrahepatic biliary ductal dilatation.  The gallbladder is unremarkable.  Stomach is significantly distended with fluid.  Spleen, pancreas, and right adrenal gland are unremarkable.  There is asymmetric abnormal left adrenal thickening with questionable underlying mass lesion.    Kidneys enhance normally with no evidence of hydronephrosis.  No abnormalities  are seen along the ureteral courses.  Urinary bladder is nondistended.  Dystrophic calcifications are seen within the prostate.    Appendix is visualized and is unremarkable.  There is fluid distention of proximal to mid small bowel loops with air-fluid levels measuring upwards of 3.5 cm in caliber likely reflecting at least partial developing small bowel obstruction.  Apparent transition point is seen in the midline lower abdomen.  No definite mechanical cause for obstruction is seen.  Small bowel loops are decompressed distal to this region.  No significant bowel wall thickening or surrounding inflammatory changes seen.  No free air or free fluid.    Aorta tapers normally.  Abdominal aortic branch vessels are patent.    No acute osseous abnormality identified. Subcutaneous soft tissues show no significant abnormalities.                                       X-Ray Chest PA And Lateral (Final result)  Result time 01/09/24 22:18:05      Final result by Gray Romero MD (01/09/24 22:18:05)                   Impression:      Diffuse increased interstitial changes seen throughout the lungs, similar compared to prior.    Persistent focal area of masslike consolidation versus mass lesion in the right upper lobe, similar compared to prior.  Neoplasm not excluded.  See prior report for recommendations.      Electronically signed by: Gray Romero MD  Date:    01/09/2024  Time:    22:18               Narrative:    EXAMINATION:  XR CHEST PA AND LATERAL    CLINICAL HISTORY:  Tachycardia, unspecified    TECHNIQUE:  PA and lateral views of the chest were performed.    COMPARISON:  11/09/2023.    FINDINGS:  Diffuse increased interstitial changes seen throughout the lungs, similar compared to prior.  Focal area of masslike consolidation versus mass lesion in the right upper lobe, similar compared to prior.    No pleural effusion.  No pneumothorax.    Cardiomediastinal silhouette is similar to prior.    Regional osseous  structures are similar to prior.                                       Medications   metoprolol injection 5 mg (has no administration in time range)   allopurinoL tablet 200 mg (0 mg Oral Hold 1/10/24 0900)   metoprolol succinate (TOPROL-XL) 24 hr tablet 50 mg (0 mg Oral Hold 1/10/24 0900)   furosemide tablet 20 mg (has no administration in time range)   sodium chloride 0.9% flush 10 mL (has no administration in time range)   heparin (porcine) injection 5,000 Units (5,000 Units Subcutaneous Given 1/10/24 0631)   albuterol-ipratropium 2.5 mg-0.5 mg/3 mL nebulizer solution 3 mL (has no administration in time range)   melatonin tablet 6 mg (has no administration in time range)   ondansetron injection 4 mg (4 mg Intravenous Given 1/10/24 0631)   prochlorperazine injection Soln 5 mg (has no administration in time range)   acetaminophen tablet 650 mg (has no administration in time range)   simethicone chewable tablet 80 mg (has no administration in time range)   aluminum-magnesium hydroxide-simethicone 200-200-20 mg/5 mL suspension 30 mL (has no administration in time range)   acetaminophen tablet 650 mg (has no administration in time range)   naloxone 0.4 mg/mL injection 0.02 mg (has no administration in time range)   potassium bicarbonate disintegrating tablet 50 mEq (has no administration in time range)   potassium bicarbonate disintegrating tablet 35 mEq (has no administration in time range)   potassium bicarbonate disintegrating tablet 60 mEq (has no administration in time range)   magnesium oxide tablet 800 mg (has no administration in time range)   magnesium oxide tablet 800 mg (has no administration in time range)   potassium, sodium phosphates 280-160-250 mg packet 2 packet (has no administration in time range)   potassium, sodium phosphates 280-160-250 mg packet 2 packet (has no administration in time range)   potassium, sodium phosphates 280-160-250 mg packet 2 packet (has no administration in time range)    glucose chewable tablet 16 g (has no administration in time range)   glucose chewable tablet 24 g (has no administration in time range)   glucagon (human recombinant) injection 1 mg (has no administration in time range)   dextrose 10% bolus 125 mL 125 mL (has no administration in time range)   dextrose 10% bolus 250 mL 250 mL (has no administration in time range)   sodium chloride 0.9% bolus 500 mL 500 mL (0 mLs Intravenous Stopped 1/9/24 2332)   metoprolol injection 5 mg (5 mg Intravenous Given 1/9/24 2230)   morphine injection 8 mg (8 mg Intravenous Given 1/9/24 2227)   ondansetron injection 4 mg (4 mg Intravenous Given 1/9/24 2229)   iohexoL (OMNIPAQUE 350) injection 100 mL (100 mLs Intravenous Given 1/9/24 2326)   ondansetron injection 4 mg (4 mg Intravenous Given 1/10/24 0020)     Medical Decision Making  68-year-old male with abdominal pain associated with nausea and vomiting, acute onset today.  Recent admission to [facility] for CHF exacerbation.  Had R lung mass biopsy with pneumothorax at that time.  Patient does report mildly increased shortness of breath.   No prior abdominal surgeries.  Chronically on 5L O2 for ILD.  Previously on anticoagulation for afib/flutter but cards d/c'ed Friday (had ablation in Oct).      Differential broad including bowel obstruction, mesenteric ischemia, malignancy, dehydration, TUSHAR, ACS, CHF, other.    On exam, awake and alert.    Irregularly irregular tachycardia.  Faint coarse breath sounds bilat.    Diffuse abdominal TTP, ?distended.    No LE edema.     EKG afib with RVR, RBBB, vs sinus tach with very frequent PACs.    CXR shows ILD, R lung mass.     Reassuring CBC, CMP.  No leukocytosis.  No bandemia.  Renal function reassuring.  BNP elevated at 837, which is down from most recent prior (2,328 on 12/15/2023).  Troponin negative.  UA without infection.     CTA chest (obtained due to recent pneumothorax, recent hospital stay, worsening shortness of breath) shows no acute  pathology.  Chest CT shows known interstitial lung disease, known right lung mass, and known metastatic disease.      Abdominal CT shows no evidence of mesenteric ischemia.  Patient has a bowel obstruction without mechanical etiology.     Patient had IV NS 500mL, IV metoprolol 5mg for HR as high as 131, IV morphine 8mg, IV zofran 4mg x 2.  HR down from 130s to 110s.      I discussed patient via [employer] secure chat with virtual [facility] medicine Dr. Abram Simmons and nocturnist for [facility] medicine Dr. Adeel Montaño.    I updated patient and wife as to diagnosis and plan.    Amount and/or Complexity of Data Reviewed  Labs: ordered.  Radiology: ordered.    Risk  Prescription drug management.  Decision regarding hospitalization.                                      Clinical Impression:  Final diagnoses:  [R00.0] Tachycardia  [K56.609] Small bowel obstruction (Primary)  [R10.9] Abdominal pain, unspecified abdominal location  [R11.2] Nausea and vomiting, unspecified vomiting type  [R06.02] Shortness of breath          ED Disposition Condition    Admit                 Tracey Montoya MD  01/10/24 [id number]

## 2024-01-10 NOTE — ASSESSMENT & PLAN NOTE
Chronic, controlled.   -resume home Toprolol    Latest blood pressure and vitals reviewed-     Temp:  [97.6 °F (36.4 °C)-97.8 °F (36.6 °C)]   Pulse:  []   Resp:  [14-25]   BP: (114-152)/(58-94)   SpO2:  [95 %-100 %] .   Home meds for hypertension were reviewed and noted below.   Hypertension Medications               furosemide (LASIX) 20 MG tablet Take 1 tablet (20 mg total) by mouth 2 (two) times daily as needed (1-2 pills as needed for fluid overload).    metoprolol succinate (TOPROL-XL) 50 MG 24 hr tablet Take 1 tablet (50 mg total) by mouth 2 (two) times daily.            While in the hospital, will manage blood pressure as follows; Continue home antihypertensive regimen    Will utilize p.r.n. blood pressure medication only if patient's blood pressure greater than 180/110 and he develops symptoms such as worsening chest pain or shortness of breath.

## 2024-01-10 NOTE — ASSESSMENT & PLAN NOTE
-presented with abdominal pain associated with N/V for one day  -NGT placed in the ED given persistent vomiting  -CT chest abdomen: Fluid distention of the stomach and proximal to mid small bowel loops with air-fluid levels likely reflecting at least partial developing small bowel obstruction.  Apparent transition point in the midline lower abdomen with no obvious mechanical cause for obstruction seen.   -Caution with zofran given prolonged Qtc  -Surgery consulted  -NPO

## 2024-01-10 NOTE — ED NOTES
Pt states that he needs to  have a BM. Pt requested to go to the bathroom. Bedside commode at bedside. Pt unhooked from monitor to ambulate to bedside commode.

## 2024-01-11 LAB
ALBUMIN SERPL BCP-MCNC: 2.8 G/DL (ref 3.5–5.2)
ALP SERPL-CCNC: 88 U/L (ref 55–135)
ALT SERPL W/O P-5'-P-CCNC: 20 U/L (ref 10–44)
ANION GAP SERPL CALC-SCNC: 7 MMOL/L (ref 8–16)
AST SERPL-CCNC: 21 U/L (ref 10–40)
BASOPHILS # BLD AUTO: 0.02 K/UL (ref 0–0.2)
BASOPHILS NFR BLD: 0.3 % (ref 0–1.9)
BILIRUB SERPL-MCNC: 1 MG/DL (ref 0.1–1)
BUN SERPL-MCNC: 16 MG/DL (ref 8–23)
CALCIUM SERPL-MCNC: 9.7 MG/DL (ref 8.7–10.5)
CHLORIDE SERPL-SCNC: 101 MMOL/L (ref 95–110)
CO2 SERPL-SCNC: 33 MMOL/L (ref 23–29)
CREAT SERPL-MCNC: 0.8 MG/DL (ref 0.5–1.4)
DIFFERENTIAL METHOD BLD: ABNORMAL
EOSINOPHIL # BLD AUTO: 0.1 K/UL (ref 0–0.5)
EOSINOPHIL NFR BLD: 1.2 % (ref 0–8)
ERYTHROCYTE [DISTWIDTH] IN BLOOD BY AUTOMATED COUNT: 21.7 % (ref 11.5–14.5)
EST. GFR  (NO RACE VARIABLE): >60 ML/MIN/1.73 M^2
GLUCOSE SERPL-MCNC: 138 MG/DL (ref 70–110)
HCT VFR BLD AUTO: 47.4 % (ref 40–54)
HGB BLD-MCNC: 13.5 G/DL (ref 14–18)
IMM GRANULOCYTES # BLD AUTO: 0.02 K/UL (ref 0–0.04)
IMM GRANULOCYTES NFR BLD AUTO: 0.3 % (ref 0–0.5)
LYMPHOCYTES # BLD AUTO: 1.4 K/UL (ref 1–4.8)
LYMPHOCYTES NFR BLD: 22.4 % (ref 18–48)
MAGNESIUM SERPL-MCNC: 2 MG/DL (ref 1.6–2.6)
MCH RBC QN AUTO: 19.5 PG (ref 27–31)
MCHC RBC AUTO-ENTMCNC: 28.5 G/DL (ref 32–36)
MCV RBC AUTO: 68 FL (ref 82–98)
MONOCYTES # BLD AUTO: 0.7 K/UL (ref 0.3–1)
MONOCYTES NFR BLD: 11 % (ref 4–15)
NEUTROPHILS # BLD AUTO: 3.9 K/UL (ref 1.8–7.7)
NEUTROPHILS NFR BLD: 64.8 % (ref 38–73)
NRBC BLD-RTO: 0 /100 WBC
PHOSPHATE SERPL-MCNC: 4.2 MG/DL (ref 2.7–4.5)
PLATELET # BLD AUTO: 270 K/UL (ref 150–450)
PMV BLD AUTO: 9.4 FL (ref 9.2–12.9)
POCT GLUCOSE: 144 MG/DL (ref 70–110)
POTASSIUM SERPL-SCNC: 4.3 MMOL/L (ref 3.5–5.1)
PROT SERPL-MCNC: 7.6 G/DL (ref 6–8.4)
RBC # BLD AUTO: 6.93 M/UL (ref 4.6–6.2)
SODIUM SERPL-SCNC: 141 MMOL/L (ref 136–145)
TROPONIN I SERPL DL<=0.01 NG/ML-MCNC: 0.01 NG/ML (ref 0–0.03)
WBC # BLD AUTO: 6.07 K/UL (ref 3.9–12.7)

## 2024-01-11 PROCEDURE — 93005 ELECTROCARDIOGRAM TRACING: CPT

## 2024-01-11 PROCEDURE — 25000003 PHARM REV CODE 250: Performed by: STUDENT IN AN ORGANIZED HEALTH CARE EDUCATION/TRAINING PROGRAM

## 2024-01-11 PROCEDURE — 84484 ASSAY OF TROPONIN QUANT: CPT | Performed by: PHYSICIAN ASSISTANT

## 2024-01-11 PROCEDURE — 94761 N-INVAS EAR/PLS OXIMETRY MLT: CPT

## 2024-01-11 PROCEDURE — 63600175 PHARM REV CODE 636 W HCPCS: Performed by: STUDENT IN AN ORGANIZED HEALTH CARE EDUCATION/TRAINING PROGRAM

## 2024-01-11 PROCEDURE — 85025 COMPLETE CBC W/AUTO DIFF WBC: CPT | Performed by: STUDENT IN AN ORGANIZED HEALTH CARE EDUCATION/TRAINING PROGRAM

## 2024-01-11 PROCEDURE — 11000001 HC ACUTE MED/SURG PRIVATE ROOM

## 2024-01-11 PROCEDURE — 99223 1ST HOSP IP/OBS HIGH 75: CPT | Mod: ,,, | Performed by: NURSE PRACTITIONER

## 2024-01-11 PROCEDURE — 99900035 HC TECH TIME PER 15 MIN (STAT)

## 2024-01-11 PROCEDURE — 84100 ASSAY OF PHOSPHORUS: CPT | Performed by: STUDENT IN AN ORGANIZED HEALTH CARE EDUCATION/TRAINING PROGRAM

## 2024-01-11 PROCEDURE — 83735 ASSAY OF MAGNESIUM: CPT | Performed by: STUDENT IN AN ORGANIZED HEALTH CARE EDUCATION/TRAINING PROGRAM

## 2024-01-11 PROCEDURE — 25500020 PHARM REV CODE 255: Performed by: HOSPITALIST

## 2024-01-11 PROCEDURE — 99497 ADVNCD CARE PLAN 30 MIN: CPT | Mod: 25,,, | Performed by: NURSE PRACTITIONER

## 2024-01-11 PROCEDURE — 99223 1ST HOSP IP/OBS HIGH 75: CPT | Mod: ,,, | Performed by: STUDENT IN AN ORGANIZED HEALTH CARE EDUCATION/TRAINING PROGRAM

## 2024-01-11 PROCEDURE — 99233 SBSQ HOSP IP/OBS HIGH 50: CPT | Mod: ,,, | Performed by: SURGERY

## 2024-01-11 PROCEDURE — 80053 COMPREHEN METABOLIC PANEL: CPT | Performed by: STUDENT IN AN ORGANIZED HEALTH CARE EDUCATION/TRAINING PROGRAM

## 2024-01-11 PROCEDURE — 27000221 HC OXYGEN, UP TO 24 HOURS

## 2024-01-11 PROCEDURE — 93010 ELECTROCARDIOGRAM REPORT: CPT | Mod: 76,,, | Performed by: INTERNAL MEDICINE

## 2024-01-11 PROCEDURE — 36415 COLL VENOUS BLD VENIPUNCTURE: CPT | Performed by: STUDENT IN AN ORGANIZED HEALTH CARE EDUCATION/TRAINING PROGRAM

## 2024-01-11 PROCEDURE — 99233 SBSQ HOSP IP/OBS HIGH 50: CPT | Mod: ,,, | Performed by: INTERNAL MEDICINE

## 2024-01-11 PROCEDURE — 25000003 PHARM REV CODE 250: Performed by: INTERNAL MEDICINE

## 2024-01-11 PROCEDURE — 36415 COLL VENOUS BLD VENIPUNCTURE: CPT | Mod: XB | Performed by: PHYSICIAN ASSISTANT

## 2024-01-11 RX ORDER — ALPRAZOLAM 0.5 MG/1
0.5 TABLET ORAL 3 TIMES DAILY PRN
Status: DISCONTINUED | OUTPATIENT
Start: 2024-01-11 | End: 2024-01-13 | Stop reason: HOSPADM

## 2024-01-11 RX ORDER — DEXTROSE MONOHYDRATE AND SODIUM CHLORIDE 5; .9 G/100ML; G/100ML
INJECTION, SOLUTION INTRAVENOUS CONTINUOUS
Status: DISCONTINUED | OUTPATIENT
Start: 2024-01-11 | End: 2024-01-13 | Stop reason: HOSPADM

## 2024-01-11 RX ADMIN — METOPROLOL TARTRATE 50 MG: 50 TABLET, FILM COATED ORAL at 09:01

## 2024-01-11 RX ADMIN — DIATRIZOATE MEGLUMINE AND DIATRIZOATE SODIUM 120 ML: 660; 100 LIQUID ORAL; RECTAL at 07:01

## 2024-01-11 RX ADMIN — HEPARIN SODIUM 5000 UNITS: 5000 INJECTION INTRAVENOUS; SUBCUTANEOUS at 01:01

## 2024-01-11 RX ADMIN — HEPARIN SODIUM 5000 UNITS: 5000 INJECTION INTRAVENOUS; SUBCUTANEOUS at 05:01

## 2024-01-11 RX ADMIN — ALLOPURINOL 200 MG: 100 TABLET ORAL at 09:01

## 2024-01-11 RX ADMIN — HEPARIN SODIUM 5000 UNITS: 5000 INJECTION INTRAVENOUS; SUBCUTANEOUS at 09:01

## 2024-01-11 RX ADMIN — DEXTROSE AND SODIUM CHLORIDE: 5; 900 INJECTION, SOLUTION INTRAVENOUS at 09:01

## 2024-01-11 NOTE — NURSING
Gastrograffin Challenge has begun. Pt will not be hooked up to suction for 8 hours.   [facility]  Nurses Note -- 4 Eyes      1/11/2024       Skin assessed on: Q Shift      [x] No Pressure Injuries Present    [x]Prevention Measures Documented    [] Yes LDA  for Pressure Injury Previously documented     [] Yes New Pressure Injury Discovered   [] LDA for New Pressure Injury Added      Attending RN:  Cathryn Browning RN     Second RN:  Teresa Fernández LPN

## 2024-01-11 NOTE — PROGRESS NOTES
[facility]  General Surgery  Progress Note    Subjective:     History of Present Illness:  Josiah Orosco is a 67 yo M with history of ILD (on 5L O2), HFpEF (EF: 55%, reduced RV systolic function), pulmonary hypertension (sPAP of 74 mmHg) hypertension, atrial flutter (s/p ablation in 09/2023, not on AC anymore), polycythemia, and recently diagnosed metastatic lung cancer who presents to the ED with complaints of generalized abdominal pain, nausea and vomiting. This began yesterday. Since arrival, his pain has completely resolved though he continues to have nausea/vomiting. CT is concerning for SBO. NGT was placed this morning with approximately 700cc of bilious output. He is in atrial fibrillation, somewhat rate controlled (90s-low 100s) but is stable. He denies any previous abdominal surgery.     Post-Op Info:  * No surgery found *         Interval History: NAEO. NGT output not accurately recorded though believe he had ~150cc over the last 12 hours. Pain is resolved. No N/V. Passing flatus, no BM. AF, tachycardic but HDS    Medications:  Continuous Infusions:   dextrose 5 % and 0.9 % NaCl       Scheduled Meds:   allopurinoL  200 mg Oral Daily    heparin (porcine)  5,000 Units Subcutaneous Q8H    metoprolol tartrate  50 mg Oral BID     PRN Meds:acetaminophen, acetaminophen, albuterol-ipratropium, aluminum-magnesium hydroxide-simethicone, dextrose 10%, dextrose 10%, dextrose 10%, dextrose 10%, furosemide, glucagon (human recombinant), glucagon (human recombinant), glucose, glucose, glucose, glucose, magnesium oxide, magnesium oxide, melatonin, metoprolol, naloxone, potassium bicarbonate, potassium bicarbonate, potassium bicarbonate, potassium, sodium phosphates, potassium, sodium phosphates, potassium, sodium phosphates, prochlorperazine, simethicone, sodium chloride 0.9%, sodium chloride 0.9%     Review of patient's allergies indicates:   Allergen Reactions    Ace inhibitors Other (See Comments)     cough      Objective:     Vital Signs (Most Recent):  Temp: 97.5 °F (36.4 °C) (01/11/24 0759)  Pulse: (!) 111 (01/11/24 0902)  Resp: 20 (01/11/24 0902)  BP: 104/67 (01/11/24 0759)  SpO2: 97 % (01/11/24 0902) Vital Signs (24h Range):  Temp:  [97.4 °F (36.3 °C)-98.8 °F (37.1 °C)] 97.5 °F (36.4 °C)  Pulse:  [] 111  Resp:  [17-25] 20  SpO2:  [94 %-99 %] 97 %  BP: (104-151)/(67-94) 104/67     Weight: 89.2 kg (196 lb 10.4 oz)  Body mass index is 23.32 kg/m².    Intake/Output - Last 3 Shifts         01/09 0700  01/10 0659 01/10 0700  01/11 0659 01/11 0700  01/12 0659    P.O.  60 0    I.V. (mL/kg)  744.3 (8.3)     IV Piggyback 500      Total Intake(mL/kg) 500 (5.4) 804.3 (9) 0 (0)    Urine (mL/kg/hr) 200 300 (0.1)     Emesis/NG output 300      Drains  150     Other  300     Total Output 500 750     Net 0 +54.3 0                    Physical Exam  Constitutional:       General: He is not in acute distress.     Appearance: Normal appearance. He is not ill-appearing.   HENT:      Head: Normocephalic and atraumatic.      Nose:      Comments: NGT in place with bilious output  Eyes:      Extraocular Movements: Extraocular movements intact.      Pupils: Pupils are equal, round, and reactive to light.   Cardiovascular:      Rate and Rhythm: Tachycardia present. Rhythm irregular.   Pulmonary:      Effort: Pulmonary effort is normal. No respiratory distress.   Abdominal:      General: Abdomen is flat. There is no distension.      Palpations: Abdomen is soft.      Tenderness: There is no abdominal tenderness. There is no guarding.      Comments: Abdomen is soft, not particularly distended and non-tender on my exam. I do not appreciate any significant hernias as well   Musculoskeletal:         General: No swelling. Normal range of motion.   Skin:     General: Skin is warm and dry.   Neurological:      General: No focal deficit present.      Mental Status: He is alert.          Significant Labs:  I have reviewed all pertinent lab results  within the past 24 hours.  CBC:   Recent Labs   Lab 01/11/24  0507   WBC 6.07   RBC 6.93*   HGB 13.5*   HCT 47.4      MCV 68*   MCH 19.5*   MCHC 28.5*     CMP:   Recent Labs   Lab 01/11/24  0507   *   CALCIUM 9.7   ALBUMIN 2.8*   PROT 7.6      K 4.3   CO2 33*      BUN 16   CREATININE 0.8   ALKPHOS 88   ALT 20   AST 21   BILITOT 1.0       Significant Diagnostics:  I have reviewed all pertinent imaging results/findings within the past 24 hours.  Assessment/Plan:     * Small bowel obstruction  This is a 67 yo M with significant comorbidities including pulm HTN, Afib, HFpEF, severe ILD on 5L NC at home, recent diagnosis of metastatic lung cancer who presents with concerns for SBO. The etiology of this bowel obstruction is unclear given no previous abdominal surgery and no hernia. I am concerned this may represent an intra-abdominal malignancy, though do not readily see this on CTA. He is an incredibly high risk surgical patient given his extensive comorbidities and I would like to avoid an operation if at all possible for this patient.     - NGT output ~150 cc overnight, he is passing flatus  - plan for gastrograffin challenge today  - rest of care per primary        Danny Licea MD  General Surgery  [facility] [place] [facility]

## 2024-01-11 NOTE — SUBJECTIVE & OBJECTIVE
Past Medical History:   Diagnosis Date    A-fib 05/08/2023    Arthritis     Chronic hypoxemic respiratory failure 11/09/2023    Chronic right heart failure 11/09/2023    GERD (gastroesophageal reflux disease)     History of HCV, s/p successful treatment w/ SVR12 5/2015     Failed treatment (stage I/II) - followed by hepatology     Joint pain     Lung cancer     Lung disease caused by breathing particles     Widespread essentially symmetric interstitial lung disease    Obesity     On home oxygen therapy     Polycythemia vera     Prediabetes        Past Surgical History:   Procedure Laterality Date    ABLATION, ATRIAL FLUTTER, TYPICAL N/A 9/29/2023    Procedure: Ablation, Atrial Flutter, Typical;  Surgeon: Jonh Mcgill MD;  Location: [facility] EP LAB;  Service: Cardiology;  Laterality: N/A;  AFL, ELENA (Cx if SR), CTI, RFA, BETHANY, MAC, DM, 3 Prep    BUNIONECTOMY      bilateral    CARDIOVERSION N/A 05/08/2023    Procedure: Cardioversion;  Surgeon: David Cueva MD;  Location: [facility] CATH LAB;  Service: Cardiology;  Laterality: N/A;    CATARACT EXTRACTION Left 07/27/2023    COLONOSCOPY N/A 12/09/2015    Procedure: COLONOSCOPY;  Surgeon: Conrad Iqbal MD;  Location: [facility] ENDO;  Service: Endoscopy;  Laterality: N/A;    Liver biopsy x2      rotator cuff Right     TRANSESOPHAGEAL ECHOCARDIOGRAM WITH POSSIBLE CARDIOVERSION (ELENA W/ POSS CARDIOVERSION) N/A 05/08/2023    Procedure: TRANSESOPHAGEAL ECHOCARDIOGRAM WITH POSSIBLE CARDIOVERSION (ELENA W/ POSS CARDIOVERSION);  Surgeon: David Cueva MD;  Location: [facility] CATH LAB;  Service: Cardiology;  Laterality: N/A;  12:30PM    RN PREOP 5/4/2023---EKG ON ARRIVAL    TRANSESOPHAGEAL ECHOCARDIOGRAM WITH POSSIBLE CARDIOVERSION (ELENA W/ POSS CARDIOVERSION) N/A 7/29/2023    Procedure: Transesophageal echo (ELENA) intra-procedure log documentation;  Surgeon: David Cueva MD;  Location: [facility] CATH LAB;  Service: Cardiology;  Laterality: N/A;    TRANSESOPHAGEAL ECHOCARDIOGRAPHY N/A  05/08/2023    Procedure: ECHOCARDIOGRAM, TRANSESOPHAGEAL;  Surgeon: David Cueva MD;  Location: [facility] CATH LAB;  Service: Cardiology;  Laterality: N/A;    TRANSESOPHAGEAL ECHOCARDIOGRAPHY N/A 7/29/2023    Procedure: ECHOCARDIOGRAM, TRANSESOPHAGEAL;  Surgeon: David Cueva MD;  Location: [facility] CATH LAB;  Service: Cardiology;  Laterality: N/A;    TREATMENT OF CARDIAC ARRHYTHMIA N/A 8/7/2023    Procedure: Cardioversion or Defibrillation;  Surgeon: David Cueva MD;  Location: [facility] CATH LAB;  Service: Cardiology;  Laterality: N/A;       Review of patient's allergies indicates:   Allergen Reactions    Ace inhibitors Other (See Comments)     cough       Medications:  Continuous Infusions:   dextrose 5 % and 0.9 % NaCl       Scheduled Meds:   allopurinoL  200 mg Oral Daily    heparin (porcine)  5,000 Units Subcutaneous Q8H    metoprolol tartrate  50 mg Oral BID     PRN Meds:acetaminophen, acetaminophen, albuterol-ipratropium, aluminum-magnesium hydroxide-simethicone, dextrose 10%, dextrose 10%, dextrose 10%, dextrose 10%, furosemide, glucagon (human recombinant), glucagon (human recombinant), glucose, glucose, glucose, glucose, magnesium oxide, magnesium oxide, melatonin, metoprolol, naloxone, potassium bicarbonate, potassium bicarbonate, potassium bicarbonate, potassium, sodium phosphates, potassium, sodium phosphates, potassium, sodium phosphates, prochlorperazine, simethicone, sodium chloride 0.9%, sodium chloride 0.9%    Family History       Problem Relation (Age of Onset)    Chronic back pain Brother    Deep vein thrombosis Sister    Heart attack Sister    Heart disease Mother    Kidney disease Mother    Osteoarthritis Sister    Parkinsonism Father    Prostate cancer Cousin    Pulmonary embolism Sister          Tobacco Use    Smoking status: Former     Current packs/day: 1.00     Average packs/day: 1 pack/day for 30.2 years (30.2 ttl pk-yrs)     Types: Cigarettes     Start date: 11/9/1993    Smokeless tobacco:  Never    Tobacco comments:     pt. smokes 10 cigars per day.   Substance and Sexual Activity    Alcohol use: Not Currently     Comment: Rarely    Drug use: Yes     Types: Marijuana     Comment: daily    Sexual activity: Yes     Partners: Female       Review of Systems   Constitutional:  Positive for activity change and appetite change.   Respiratory:  Positive for shortness of breath.    Gastrointestinal:  Positive for abdominal distention and abdominal pain.     Objective:     Vital Signs (Most Recent):  Temp: 97.5 °F (36.4 °C) (01/11/24 0759)  Pulse: (!) 111 (01/11/24 0902)  Resp: 20 (01/11/24 0902)  BP: 104/67 (01/11/24 0759)  SpO2: 97 % (01/11/24 0902) Vital Signs (24h Range):  Temp:  [97.4 °F (36.3 °C)-98.8 °F (37.1 °C)] 97.5 °F (36.4 °C)  Pulse:  [] 111  Resp:  [17-25] 20  SpO2:  [94 %-97 %] 97 %  BP: (104-140)/(67-84) 104/67     Weight: 89.2 kg (196 lb 10.4 oz)  Body mass index is 23.32 kg/m².       Physical Exam  Vitals and nursing note reviewed.   Constitutional:       General: He is not in acute distress.     Appearance: He is ill-appearing. He is not toxic-appearing or diaphoretic.   HENT:      Nose:      Comments: NGT to decompression  Cardiovascular:      Rate and Rhythm: Tachycardia present.   Pulmonary:      Effort: No respiratory distress.      Comments: Increased work of breathing with oxygen on  Abdominal:      General: There is no distension.      Palpations: Abdomen is soft.      Tenderness: There is no abdominal tenderness.   Musculoskeletal:      Right lower leg: No edema.      Left lower leg: No edema.   Skin:     General: Skin is warm and dry.   Neurological:      Mental Status: He is alert and oriented to person, place, and time.   Psychiatric:         Mood and Affect: Mood normal.         Behavior: Behavior normal.                Advance Care Planning   Advance Directives:   Living Will: Yes        Copy on chart: Yes    Medical Power of : Yes      Decision Making:  Patient  answered questions  Goals of Care: The patient endorses that what is most important right now is to focus on curative/life-prolongation (regardless of treatment burdens)    Accordingly, we have decided that the best plan to meet the patient's goals includes continuing with treatment         Significant Labs: All pertinent labs within the past 24 hours have been reviewed.  CBC:   Recent Labs   Lab 01/11/24  0507   WBC 6.07   HGB 13.5*   HCT 47.4   MCV 68*        BMP:  Recent Labs   Lab 01/11/24  0507   *      K 4.3      CO2 33*   BUN 16   CREATININE 0.8   CALCIUM 9.7   MG 2.0     LFT:  Lab Results   Component Value Date    AST 21 01/11/2024    ALKPHOS 88 01/11/2024    BILITOT 1.0 01/11/2024     Albumin:   Albumin   Date Value Ref Range Status   01/11/2024 2.8 (L) 3.5 - 5.2 g/dL Final     Protein:   Total Protein   Date Value Ref Range Status   01/11/2024 7.6 6.0 - 8.4 g/dL Final     Lactic acid:   Lab Results   Component Value Date    LACTATE 1.4 01/10/2024    LACTATE 2.5 (H) 01/09/2024       Significant Imaging: I have reviewed all pertinent imaging results/findings within the past 24 hours. CT chest/abdomen, CXR

## 2024-01-11 NOTE — CONSULTS
[facility]  Palliative Medicine  Consult Note    Patient Name: Josiah Orosco Jr.  MRN: [id number]  Admission Date: 1/9/2024  Hospital Length of Stay: 1 days  Code Status: Full Code   Attending Provider: Srini Vu, *  Consulting Provider: Doreen Love NP  Primary Care Physician: Elaine Landry MD  Principal Problem:Small bowel obstruction    Patient information was obtained from patient, past medical records, ER records, and primary team.      Inpatient consult to Palliative Care  Consult performed by: Doreen Love NP  Consult ordered by: Srini Vu MD  Reason for consult: GOC        Assessment/Plan:   Palliative encounter:  Advance Care Planning   -palliative consult received   -chart reviewed in detail  -discussed with Dr Erna SUTHERLAND  -at time of consult patient in room. Introduced myself and my role in his hospitalization. He denies pain or nausea this am.    -we discussed his current clinical condition SBO along with his multiple co morbids of CHF, IDL, and newly dx lung cancer. He is trying to establish care with [facility] but has not been seen yet so he has no idea as what to expect from this.   -we did discuss his AD Living Will and HPOA (his spouse), and no changes. I did let him know that if he were intubated he may have a difficult time weaning from it with his IDL and Lung Cancer. He would NOT want a tracheostomy  -at this time he will remain full code and open to more discussion once he has more information. He will discuss with his spouse. I left my card for him in room so he or spouse could call me if questions  -addressed all questions  -emotional support provided  -updated Dr Erna SUTHERLAND          Small bowel obstruction       -presented with abdominal pain associated with N/V for one day       -NGT placed in the ED given persistent vomiting       -CT chest abdomen: Fluid distention of the stomach and proximal to mid small bowel loops with air-fluid  "levels likely reflecting at least partial developing small bowel obstruction.        -Surgery consulted        -NPO with NGT to decompression         Lung cancer  -recently diagnosed with Adenocarcinoma of lung in 12/2023  -Patient has been referred to oncology outpatient, has not established care yet. Would like to establish care [facility]at [facility]  -CTA chest: Multiple pulmonary masses and nodules consistent with malignancy and known metastatic disease.  Abnormal mediastinal and hilar lymphadenopathy consistent with local spread of disease.  Possible left adrenal metastasis.   -pulmonology and oncology outpatient f/u             ILD (interstitial lung disease)       -follows with pulmonology outpatient.        -Per pulm note in 11/2023: "ILD with worsening subjective symptoms,   worsening PFT, worsening imaging findings"        -Chronically on 5L NC at home             Chronic hypoxemic respiratory failure    -see above     -follows with pulmonology outpatient.     -due to history of interstitial lung disease and adenocarcinoma of the lung     -Chronically on 5L NC at home            Atrial flutter    -Cardiology consulted' spoke with them and patient no longer in A fib    -mgmt per primary            Lung cancer    -recently diagnosed with Adenocarcinoma of lung in 12/2023    -Patient has been referred to oncology outpatient, has not established care yet    -CTA chest: Multiple pulmonary masses and nodules consistent with malignancy and known metastatic disease.  Abnormal mediastinal and hilar lymphadenopathy consistent with local spread of disease.  Possible left adrenal metastasis.     -pulmonology and oncology outpatient f/u            Pulmonary hypertension    -Due to chronic ILD          Polycythemia  -Followed by Hematology outpatient.             Hyperuricemia          Chronic right heart failure  -likely due to ILD        Thank you for your consult. I will follow-up with patient. Please contact us if you have " any additional questions.    Subjective:       HPI: per chart review 68-year-old male with a past medical history of ILD (on 5L O2), HFpEF (EF: 55%, reduced RV systolic function), pulmonary hypertension (sPAP of 74 mmHg) hypertension, atrial flutter (s/p ablation in 09/2023, not on AC anymore), polycythemia, recently dx lung cancer presents with abdominal pain for one day, associated with nausea and vomiting. Last bowel movement was one day ago, and stools were hard. Prior to that, he states it was both hard and liquid form for about a week.  Denies any history of abdominal surgeries.  No recent travel new medications.     In the ED, the patient was tachycardic (up to 130s, and AFib with RVR).  Labs were remarkable for an elevated BNP (837), elevated lactic acid (2.5).  CTA chest, abdomen and pelvis showed multiple pulmonary masses and nodules consistent with malignancy/metastatic disease, pulmonary emphysema and chronic interstitial lung disease findings, along with fluid distention of the stomach and proximal to mid small small bowel loops with air-fluid levels reflecting at least a partial developing small-bowel obstruction.  Patient was given Lopressor 5 mg IV x1, morphine 8 mg IV, Zofran 4 mg IV x2, and 500 mL of NS.  Patient admitted to hospital medicine for further evaluation and management    Hospital Course:  No notes on file        Past Medical History:   Diagnosis Date    A-fib 05/08/2023    Arthritis     Chronic hypoxemic respiratory failure 11/09/2023    Chronic right heart failure 11/09/2023    GERD (gastroesophageal reflux disease)     History of HCV, s/p successful treatment w/ SVR12 5/2015     Failed treatment (stage I/II) - followed by hepatology     Joint pain     Lung cancer     Lung disease caused by breathing particles     Widespread essentially symmetric interstitial lung disease    Obesity     On home oxygen therapy     Polycythemia vera     Prediabetes        Past Surgical History:    Procedure Laterality Date    ABLATION, ATRIAL FLUTTER, TYPICAL N/A 9/29/2023    Procedure: Ablation, Atrial Flutter, Typical;  Surgeon: Jonh Mcgill MD;  Location: [facility] EP LAB;  Service: Cardiology;  Laterality: N/A;  AFL, ELENA (Cx if SR), CTI, RFA, BETHANY, MAC, DM, 3 Prep    BUNIONECTOMY      bilateral    CARDIOVERSION N/A 05/08/2023    Procedure: Cardioversion;  Surgeon: David Cueva MD;  Location: [facility] CATH LAB;  Service: Cardiology;  Laterality: N/A;    CATARACT EXTRACTION Left 07/27/2023    COLONOSCOPY N/A 12/09/2015    Procedure: COLONOSCOPY;  Surgeon: Conrad Iqbal MD;  Location: [facility] ENDO;  Service: Endoscopy;  Laterality: N/A;    Liver biopsy x2      rotator cuff Right     TRANSESOPHAGEAL ECHOCARDIOGRAM WITH POSSIBLE CARDIOVERSION (ELENA W/ POSS CARDIOVERSION) N/A 05/08/2023    Procedure: TRANSESOPHAGEAL ECHOCARDIOGRAM WITH POSSIBLE CARDIOVERSION (ELENA W/ POSS CARDIOVERSION);  Surgeon: David Cueva MD;  Location: [facility] CATH LAB;  Service: Cardiology;  Laterality: N/A;  12:30PM    RN PREOP 5/4/2023---EKG ON ARRIVAL    TRANSESOPHAGEAL ECHOCARDIOGRAM WITH POSSIBLE CARDIOVERSION (ELENA W/ POSS CARDIOVERSION) N/A 7/29/2023    Procedure: Transesophageal echo (ELENA) intra-procedure log documentation;  Surgeon: David Cueva MD;  Location: [facility] CATH LAB;  Service: Cardiology;  Laterality: N/A;    TRANSESOPHAGEAL ECHOCARDIOGRAPHY N/A 05/08/2023    Procedure: ECHOCARDIOGRAM, TRANSESOPHAGEAL;  Surgeon: David Cueva MD;  Location: [facility] CATH LAB;  Service: Cardiology;  Laterality: N/A;    TRANSESOPHAGEAL ECHOCARDIOGRAPHY N/A 7/29/2023    Procedure: ECHOCARDIOGRAM, TRANSESOPHAGEAL;  Surgeon: David Cueva MD;  Location: [facility] CATH LAB;  Service: Cardiology;  Laterality: N/A;    TREATMENT OF CARDIAC ARRHYTHMIA N/A 8/7/2023    Procedure: Cardioversion or Defibrillation;  Surgeon: David Cueva MD;  Location: [facility] CATH LAB;  Service: Cardiology;  Laterality: N/A;       Review of patient's allergies indicates:    Allergen Reactions    Ace inhibitors Other (See Comments)     cough       Medications:  Continuous Infusions:   dextrose 5 % and 0.9 % NaCl       Scheduled Meds:   allopurinoL  200 mg Oral Daily    heparin (porcine)  5,000 Units Subcutaneous Q8H    metoprolol tartrate  50 mg Oral BID     PRN Meds:acetaminophen, acetaminophen, albuterol-ipratropium, aluminum-magnesium hydroxide-simethicone, dextrose 10%, dextrose 10%, dextrose 10%, dextrose 10%, furosemide, glucagon (human recombinant), glucagon (human recombinant), glucose, glucose, glucose, glucose, magnesium oxide, magnesium oxide, melatonin, metoprolol, naloxone, potassium bicarbonate, potassium bicarbonate, potassium bicarbonate, potassium, sodium phosphates, potassium, sodium phosphates, potassium, sodium phosphates, prochlorperazine, simethicone, sodium chloride 0.9%, sodium chloride 0.9%    Family History       Problem Relation (Age of Onset)    Chronic back pain Brother    Deep vein thrombosis Sister    Heart attack Sister    Heart disease Mother    Kidney disease Mother    Osteoarthritis Sister    Parkinsonism Father    Prostate cancer Cousin    Pulmonary embolism Sister          Tobacco Use    Smoking status: Former     Current packs/day: 1.00     Average packs/day: 1 pack/day for 30.2 years (30.2 ttl pk-yrs)     Types: Cigarettes     Start date: 11/9/1993    Smokeless tobacco: Never    Tobacco comments:     pt. smokes 10 cigars per day.   Substance and Sexual Activity    Alcohol use: Not Currently     Comment: Rarely    Drug use: Yes     Types: Marijuana     Comment: daily    Sexual activity: Yes     Partners: Female       Review of Systems   Constitutional:  Positive for activity change and appetite change.   Respiratory:  Positive for shortness of breath.    Gastrointestinal:  Positive for abdominal distention and abdominal pain.     Objective:     Vital Signs (Most Recent):  Temp: 97.5 °F (36.4 °C) (01/11/24 0759)  Pulse: (!) 111 (01/11/24  0902)  Resp: 20 (01/11/24 0902)  BP: 104/67 (01/11/24 0759)  SpO2: 97 % (01/11/24 0902) Vital Signs (24h Range):  Temp:  [97.4 °F (36.3 °C)-98.8 °F (37.1 °C)] 97.5 °F (36.4 °C)  Pulse:  [] 111  Resp:  [17-25] 20  SpO2:  [94 %-97 %] 97 %  BP: (104-140)/(67-84) 104/67     Weight: 89.2 kg (196 lb 10.4 oz)  Body mass index is 23.32 kg/m².       Physical Exam  Vitals and nursing note reviewed.   Constitutional:       General: He is not in acute distress.     Appearance: He is ill-appearing. He is not toxic-appearing or diaphoretic.   HENT:      Nose:      Comments: NGT to decompression  Cardiovascular:      Rate and Rhythm: Tachycardia present.   Pulmonary:      Effort: No respiratory distress.      Comments: Increased work of breathing with oxygen on  Abdominal:      General: There is no distension.      Palpations: Abdomen is soft.      Tenderness: There is no abdominal tenderness.   Musculoskeletal:      Right lower leg: No edema.      Left lower leg: No edema.   Skin:     General: Skin is warm and dry.   Neurological:      Mental Status: He is alert and oriented to person, place, and time.   Psychiatric:         Mood and Affect: Mood normal.         Behavior: Behavior normal.                Advance Care Planning  Advance Directives:   Living Will: Yes        Copy on chart: Yes    Medical Power of : Yes      Decision Making:  Patient answered questions  Goals of Care: The patient endorses that what is most important right now is to focus on curative/life-prolongation (regardless of treatment burdens)    Accordingly, we have decided that the best plan to meet the patient's goals includes continuing with treatment         Significant Labs: All pertinent labs within the past 24 hours have been reviewed.  CBC:   Recent Labs   Lab 01/11/24  0507   WBC 6.07   HGB 13.5*   HCT 47.4   MCV 68*        BMP:  Recent Labs   Lab 01/11/24  0507   *      K 4.3      CO2 33*   BUN 16   CREATININE  0.8   CALCIUM 9.7   MG 2.0     LFT:  Lab Results   Component Value Date    AST 21 01/11/2024    ALKPHOS 88 01/11/2024    BILITOT 1.0 01/11/2024     Albumin:   Albumin   Date Value Ref Range Status   01/11/2024 2.8 (L) 3.5 - 5.2 g/dL Final     Protein:   Total Protein   Date Value Ref Range Status   01/11/2024 7.6 6.0 - 8.4 g/dL Final     Lactic acid:   Lab Results   Component Value Date    LACTATE 1.4 01/10/2024    LACTATE 2.5 (H) 01/09/2024       Significant Imaging: I have reviewed all pertinent imaging results/findings within the past 24 hours. CT chest/abdomen, CXR      I spent a total of 90 minutes on the day of the visit. This includes face to face time in discussion of goals of care, symptom assessment, coordination of care and emotional support.  This also includes non-face to face time preparing to see the patient (eg, review of tests/imaging), obtaining and/or reviewing separately obtained history, documenting clinical information in the electronic or other health record, independently interpreting results and communicating results to the patient/family/caregiver, or care coordinator.    20 mins of the total time in ACP      Doreen Love NP  Palliative Medicine  [facility]

## 2024-01-11 NOTE — HPI
HPI: per chart review 68-year-old male with a past medical history of ILD (on 5L O2), HFpEF (EF: 55%, reduced RV systolic function), pulmonary hypertension (sPAP of 74 mmHg) hypertension, atrial flutter (s/p ablation in 09/2023, not on AC anymore), polycythemia, recently dx lung cancer presents with abdominal pain for one day, associated with nausea and vomiting. Last bowel movement was one day ago, and stools were hard. Prior to that, he states it was both hard and liquid form for about a week.  Denies any history of abdominal surgeries.  No recent travel new medications.     In the ED, the patient was tachycardic (up to 130s, and AFib with RVR).  Labs were remarkable for an elevated BNP (837), elevated lactic acid (2.5).  CTA chest, abdomen and pelvis showed multiple pulmonary masses and nodules consistent with malignancy/metastatic disease, pulmonary emphysema and chronic interstitial lung disease findings, along with fluid distention of the stomach and proximal to mid small small bowel loops with air-fluid levels reflecting at least a partial developing small-bowel obstruction.  Patient was given Lopressor 5 mg IV x1, morphine 8 mg IV, Zofran 4 mg IV x2, and 500 mL of NS.  Patient admitted to hospital medicine for further evaluation and management

## 2024-01-11 NOTE — ASSESSMENT & PLAN NOTE
This is a 69 yo M with significant comorbidities including pulm HTN, Afib, HFpEF, severe ILD on 5L NC at home, recent diagnosis of metastatic lung cancer who presents with concerns for SBO. The etiology of this bowel obstruction is unclear given no previous abdominal surgery and no hernia. I am concerned this may represent an intra-abdominal malignancy, though do not readily see this on CTA. He is an incredibly high risk surgical patient given his extensive comorbidities and I would like to avoid an operation if at all possible for this patient.     - NGT output ~150 cc overnight, he is passing flatus  - plan for gastrograffin challenge today  - rest of care per primary

## 2024-01-11 NOTE — HOSPITAL COURSE
68-year-old male with a past medical history of ILD (on 5L O2), HFpEF (EF: 55%, reduced RV systolic function), pulmonary hypertension (sPAP of 74 mmHg) hypertension, atrial flutter (s/p ablation in 09/2023, not on AC anymore), polycythemia, recently dx lung cancer,not yet started on therapy, presents with abdominal pain for one day, associated with nausea and vomiting.   In the ED, the patient was tachycardic (up to 130s, and AFib with RVR).  Labs were remarkable for an elevated BNP (837), elevated lactic acid (2.5).  CTA chest, abdomen and pelvis showed multiple pulmonary masses and nodules consistent with malignancy/metastatic disease, pulmonary emphysema and chronic interstitial lung disease findings, along with fluid distention of the stomach and proximal to mid small small bowel loops with air-fluid levels reflecting at least a partial developing small-bowel obstruction.  Patient was given Lopressor 5 mg IV x1, morphine 8 mg IV, Zofran 4 mg IV x2, and 500 mL of NS. Patient was started on IVF and NG is placed and surgery is consulted for SBO,had BM,on NG tube with a lot of out put.  Gastrografin study show,contrast on stomach,  Consulted palliative ,pulmonology and oncology.  Spoke at lane with patient and wife,they agree with DNR status,nurse clayton witnessed.  Seen by oncology,patient has poor prognosis,patient and family agree with home hospice.  Patient was discharged home with Hospice with NG suction as needed.

## 2024-01-11 NOTE — ASSESSMENT & PLAN NOTE
-presented with abdominal pain associated with N/V for one day  -NGT placed in the ED given persistent vomiting  -CT chest abdomen: Fluid distention of the stomach and proximal to mid small bowel loops with air-fluid levels likely reflecting at least partial developing small bowel obstruction.  Apparent transition point in the midline lower abdomen with no obvious mechanical cause for obstruction seen.   -Caution with zofran given prolonged Qtc  -Surgery consulted  -NPO  Patient was started on IVF and NG is placed and surgery is consulted for SBO,had BM,NG tube is out,will have gastrografin study today

## 2024-01-11 NOTE — NURSING
Report received . Care assumed when arrived from ED via stretcher. Discussed plan of care and safety with patient and his wife . Reviewed call system. No acute distress noted. Oxygen at 5 LPM via mask. NGT intact to connected LIWS with return of green secretions      1/10/2024       Skin assessed on: Admit      [x] No Pressure Injuries Present    [x]Prevention Measures Documented    [] Yes LDA  for Pressure Injury Previously documented     [] Yes New Pressure Injury Discovered   [] LDA for New Pressure Injury Added      Attending RN:  Aida Villegas, RN     Second  staff Azucena VILLAGOMEZ

## 2024-01-11 NOTE — PLAN OF CARE
Problem: Activity Intolerance (Pulmonary Impairment)  Goal: Improved Activity Tolerance  Intervention: Facilitate Activity Tolerance  Flowsheets (Taken 1/10/2024 1844)  Energy Conservation Techniques:   breathing techniques encouraged   relaxation techniques promoted   regular rest breaks encouraged     Problem: Airway Clearance Ineffective (Pulmonary Impairment)  Goal: Effective Airway Clearance  Intervention: Promote Airway Secretion Clearance  Flowsheets (Taken 1/10/2024 1844)  Airway Clearance/Ventilation Strategies: oxygen therapy in use

## 2024-01-11 NOTE — SUBJECTIVE & OBJECTIVE
Interval History: NAEO. NGT output not accurately recorded though believe he had ~150cc over the last 12 hours. Pain is resolved. No N/V. Passing flatus, no BM. AF, tachycardic but HDS    Medications:  Continuous Infusions:   dextrose 5 % and 0.9 % NaCl       Scheduled Meds:   allopurinoL  200 mg Oral Daily    heparin (porcine)  5,000 Units Subcutaneous Q8H    metoprolol tartrate  50 mg Oral BID     PRN Meds:acetaminophen, acetaminophen, albuterol-ipratropium, aluminum-magnesium hydroxide-simethicone, dextrose 10%, dextrose 10%, dextrose 10%, dextrose 10%, furosemide, glucagon (human recombinant), glucagon (human recombinant), glucose, glucose, glucose, glucose, magnesium oxide, magnesium oxide, melatonin, metoprolol, naloxone, potassium bicarbonate, potassium bicarbonate, potassium bicarbonate, potassium, sodium phosphates, potassium, sodium phosphates, potassium, sodium phosphates, prochlorperazine, simethicone, sodium chloride 0.9%, sodium chloride 0.9%     Review of patient's allergies indicates:   Allergen Reactions    Ace inhibitors Other (See Comments)     cough     Objective:     Vital Signs (Most Recent):  Temp: 97.5 °F (36.4 °C) (01/11/24 0759)  Pulse: (!) 111 (01/11/24 0902)  Resp: 20 (01/11/24 0902)  BP: 104/67 (01/11/24 0759)  SpO2: 97 % (01/11/24 0902) Vital Signs (24h Range):  Temp:  [97.4 °F (36.3 °C)-98.8 °F (37.1 °C)] 97.5 °F (36.4 °C)  Pulse:  [] 111  Resp:  [17-25] 20  SpO2:  [94 %-99 %] 97 %  BP: (104-151)/(67-94) 104/67     Weight: 89.2 kg (196 lb 10.4 oz)  Body mass index is 23.32 kg/m².    Intake/Output - Last 3 Shifts         01/09 0700  01/10 0659 01/10 0700 01/11 0659 01/11 0700 01/12 0659    P.O.  60 0    I.V. (mL/kg)  744.3 (8.3)     IV Piggyback 500      Total Intake(mL/kg) 500 (5.4) 804.3 (9) 0 (0)    Urine (mL/kg/hr) 200 300 (0.1)     Emesis/NG output 300      Drains  150     Other  300     Total Output 500 750     Net 0 +54.3 0                    Physical  Exam  Constitutional:       General: He is not in acute distress.     Appearance: Normal appearance. He is not ill-appearing.   HENT:      Head: Normocephalic and atraumatic.      Nose:      Comments: NGT in place with bilious output  Eyes:      Extraocular Movements: Extraocular movements intact.      Pupils: Pupils are equal, round, and reactive to light.   Cardiovascular:      Rate and Rhythm: Tachycardia present. Rhythm irregular.   Pulmonary:      Effort: Pulmonary effort is normal. No respiratory distress.   Abdominal:      General: Abdomen is flat. There is no distension.      Palpations: Abdomen is soft.      Tenderness: There is no abdominal tenderness. There is no guarding.      Comments: Abdomen is soft, not particularly distended and non-tender on my exam. I do not appreciate any significant hernias as well   Musculoskeletal:         General: No swelling. Normal range of motion.   Skin:     General: Skin is warm and dry.   Neurological:      General: No focal deficit present.      Mental Status: He is alert.          Significant Labs:  I have reviewed all pertinent lab results within the past 24 hours.  CBC:   Recent Labs   Lab 01/11/24  0507   WBC 6.07   RBC 6.93*   HGB 13.5*   HCT 47.4      MCV 68*   MCH 19.5*   MCHC 28.5*     CMP:   Recent Labs   Lab 01/11/24  0507   *   CALCIUM 9.7   ALBUMIN 2.8*   PROT 7.6      K 4.3   CO2 33*      BUN 16   CREATININE 0.8   ALKPHOS 88   ALT 20   AST 21   BILITOT 1.0       Significant Diagnostics:  I have reviewed all pertinent imaging results/findings within the past 24 hours.

## 2024-01-11 NOTE — PROGRESS NOTES
[facility] Medicine  Progress Note    Patient Name: Josiah Orosco Jr.  MRN: [id number]  Patient Class: IP- Inpatient   Admission Date: 1/9/2024  Length of Stay: 1 days  Attending Physician: Srini Vu, *  Primary Care Provider: Elaine Landry MD        Subjective:     Principal Problem:Small bowel obstruction        HPI:  68-year-old male with a past medical history of ILD (on 5L O2), HFpEF (EF: 55%, reduced RV systolic function), pulmonary hypertension (sPAP of 74 mmHg) hypertension, atrial flutter (s/p ablation in 09/2023, not on AC anymore), polycythemia, recently dx lung cancer presents with abdominal pain for one day, associated with nausea and vomiting. Last bowel movement was one day ago, and stools were hard. Prior to that, he states it was both hard and liquid form for about a week.  Denies any history of abdominal surgeries.  No recent travel new medications.     In the ED, the patient was tachycardic (up to 130s, and AFib with RVR).  Labs were remarkable for an elevated BNP (837), elevated lactic acid (2.5).  CTA chest, abdomen and pelvis showed multiple pulmonary masses and nodules consistent with malignancy/metastatic disease, pulmonary emphysema and chronic interstitial lung disease findings, along with fluid distention of the stomach and proximal to mid small small bowel loops with air-fluid levels reflecting at least a partial developing small-bowel obstruction.  Patient was given Lopressor 5 mg IV x1, morphine 8 mg IV, Zofran 4 mg IV x2, and 500 mL of NS.  Patient admitted to hospital medicine for further evaluation and management    Overview/Hospital Course:  68-year-old male with a past medical history of ILD (on 5L O2), HFpEF (EF: 55%, reduced RV systolic function), pulmonary hypertension (sPAP of 74 mmHg) hypertension, atrial flutter (s/p ablation in 09/2023, not on AC anymore), polycythemia, recently dx lung cancer presents with abdominal pain for one day,  associated with nausea and vomiting.   In the ED, the patient was tachycardic (up to 130s, and AFib with RVR).  Labs were remarkable for an elevated BNP (837), elevated lactic acid (2.5).  CTA chest, abdomen and pelvis showed multiple pulmonary masses and nodules consistent with malignancy/metastatic disease, pulmonary emphysema and chronic interstitial lung disease findings, along with fluid distention of the stomach and proximal to mid small small bowel loops with air-fluid levels reflecting at least a partial developing small-bowel obstruction.  Patient was given Lopressor 5 mg IV x1, morphine 8 mg IV, Zofran 4 mg IV x2, and 500 mL of NS. Patient was started on IVF and NG is placed and surgery is consulted for SBO,had BM,NG tube is out,will have gastrografin study today.    Past Medical History:   Diagnosis Date    A-fib 05/08/2023    Arthritis     Chronic hypoxemic respiratory failure 11/09/2023    Chronic right heart failure 11/09/2023    GERD (gastroesophageal reflux disease)     History of HCV, s/p successful treatment w/ SVR12 5/2015     Failed treatment (stage I/II) - followed by hepatology     Joint pain     Lung cancer     Lung disease caused by breathing particles     Widespread essentially symmetric interstitial lung disease    Obesity     On home oxygen therapy     Polycythemia vera     Prediabetes        Past Surgical History:   Procedure Laterality Date    ABLATION, ATRIAL FLUTTER, TYPICAL N/A 9/29/2023    Procedure: Ablation, Atrial Flutter, Typical;  Surgeon: Jonh Mcgill MD;  Location: [facility] EP LAB;  Service: Cardiology;  Laterality: N/A;  AFL, ELENA (Cx if SR), CTI, RFA, BETHANY, MAC, DM, 3 Prep    BUNIONECTOMY      bilateral    CARDIOVERSION N/A 05/08/2023    Procedure: Cardioversion;  Surgeon: David Cueva MD;  Location: [facility] CATH LAB;  Service: Cardiology;  Laterality: N/A;    CATARACT EXTRACTION Left 07/27/2023    COLONOSCOPY N/A 12/09/2015    Procedure: COLONOSCOPY;  Surgeon: Conrad MORENO  MD Rasheed;  Location: [facility] ENDO;  Service: Endoscopy;  Laterality: N/A;    Liver biopsy x2      rotator cuff Right     TRANSESOPHAGEAL ECHOCARDIOGRAM WITH POSSIBLE CARDIOVERSION (ELENA W/ POSS CARDIOVERSION) N/A 05/08/2023    Procedure: TRANSESOPHAGEAL ECHOCARDIOGRAM WITH POSSIBLE CARDIOVERSION (ELENA W/ POSS CARDIOVERSION);  Surgeon: David Cueva MD;  Location: [facility] CATH LAB;  Service: Cardiology;  Laterality: N/A;  12:30PM    RN PREOP 5/4/2023---EKG ON ARRIVAL    TRANSESOPHAGEAL ECHOCARDIOGRAM WITH POSSIBLE CARDIOVERSION (ELENA W/ POSS CARDIOVERSION) N/A 7/29/2023    Procedure: Transesophageal echo (ELENA) intra-procedure log documentation;  Surgeon: David Cueva MD;  Location: [facility] CATH LAB;  Service: Cardiology;  Laterality: N/A;    TRANSESOPHAGEAL ECHOCARDIOGRAPHY N/A 05/08/2023    Procedure: ECHOCARDIOGRAM, TRANSESOPHAGEAL;  Surgeon: David Cueva MD;  Location: [facility] CATH LAB;  Service: Cardiology;  Laterality: N/A;    TRANSESOPHAGEAL ECHOCARDIOGRAPHY N/A 7/29/2023    Procedure: ECHOCARDIOGRAM, TRANSESOPHAGEAL;  Surgeon: David Cueva MD;  Location: [facility] CATH LAB;  Service: Cardiology;  Laterality: N/A;    TREATMENT OF CARDIAC ARRHYTHMIA N/A 8/7/2023    Procedure: Cardioversion or Defibrillation;  Surgeon: David Cueva MD;  Location: [facility] CATH LAB;  Service: Cardiology;  Laterality: N/A;       Review of patient's allergies indicates:   Allergen Reactions    Ace inhibitors Other (See Comments)     cough       No current facility-administered medications on file prior to encounter.     Current Outpatient Medications on File Prior to Encounter   Medication Sig    allopurinoL (ZYLOPRIM) 100 MG tablet Take 2 tablets (200 mg total) by mouth once daily. (Patient taking differently: Take 100 mg by mouth once daily.)    fluticasone propionate (FLONASE) 50 mcg/actuation nasal spray SHAKE LIQUID AND USE 1 SPRAY(50 MCG) IN EACH NOSTRIL EVERY DAY    furosemide (LASIX) 20 MG tablet Take 1 tablet (20 mg total) by  mouth 2 (two) times daily as needed (1-2 pills as needed for fluid overload). (Patient taking differently: Take 40 mg by mouth 2 (two) times daily as needed (1-2 pills as needed for fluid overload).)    metoprolol succinate (TOPROL-XL) 50 MG 24 hr tablet Take 1 tablet (50 mg total) by mouth 2 (two) times daily.    multivitamin with folic acid 400 mcg Tab Take 1 tablet by mouth once daily.    potassium chloride (KLOR-CON) 10 MEQ TbSR Take 1 tablet by mouth once daily.    predniSONE (DELTASONE) 5 MG tablet Take 10 mg by mouth once daily.    TRELEGY ELLIPTA 100-62.5-25 mcg DsDv Inhale 1 puff into the lungs Daily.     Family History       Problem Relation (Age of Onset)    Chronic back pain Brother    Deep vein thrombosis Sister    Heart attack Sister    Heart disease Mother    Kidney disease Mother    Osteoarthritis Sister    Parkinsonism Father    Prostate cancer Cousin    Pulmonary embolism Sister          Tobacco Use    Smoking status: Former     Current packs/day: 1.00     Average packs/day: 1 pack/day for 30.2 years (30.2 ttl pk-yrs)     Types: Cigarettes     Start date: 11/9/1993    Smokeless tobacco: Never    Tobacco comments:     pt. smokes 10 cigars per day.   Substance and Sexual Activity    Alcohol use: Not Currently     Comment: Rarely    Drug use: Yes     Types: Marijuana     Comment: daily    Sexual activity: Yes     Partners: Female     Review of Systems   Constitutional:  Negative for chills, fatigue and fever.   Eyes:  Negative for visual disturbance.   Respiratory:  Positive for shortness of breath (chronic, on 5L NC at home). Negative for cough, chest tightness and wheezing.    Cardiovascular:  Negative for chest pain, palpitations and leg swelling.   Gastrointestinal:  Positive for abdominal distention, abdominal pain, constipation, nausea and vomiting. Negative for blood in stool and diarrhea.   Genitourinary:  Negative for difficulty urinating and dysuria.   Musculoskeletal:  Negative for back  pain.   Skin:  Negative for wound.   Neurological:  Negative for dizziness, weakness and headaches.   Psychiatric/Behavioral:  Negative for confusion and hallucinations.      Objective:     Vital Signs (Most Recent):  Temp: 97.9 °F (36.6 °C) (01/11/24 1059)  Pulse: 106 (01/11/24 1059)  Resp: 20 (01/11/24 1059)  BP: (!) 123/59 (01/11/24 1059)  SpO2: 98 % (01/11/24 1059) Vital Signs (24h Range):  Temp:  [97.4 °F (36.3 °C)-98.8 °F (37.1 °C)] 97.9 °F (36.6 °C)  Pulse:  [] 106  Resp:  [17-20] 20  SpO2:  [94 %-98 %] 98 %  BP: (104-140)/(59-84) 123/59     Weight: 89.2 kg (196 lb 10.4 oz)  Body mass index is 23.32 kg/m².     Physical Exam  Vitals and nursing note reviewed.   Constitutional:       General: He is not in acute distress.     Appearance: He is ill-appearing (chronically ill appearing).   HENT:      Head: Atraumatic.      Nose:      Comments: +NGT     Mouth/Throat:      Mouth: Mucous membranes are dry.   Eyes:      Extraocular Movements: Extraocular movements intact.   Cardiovascular:      Rate and Rhythm: Normal rate and regular rhythm.   Pulmonary:      Effort: No respiratory distress.      Breath sounds: Decreased air movement present. Examination of the right-lower field reveals decreased breath sounds. Examination of the left-lower field reveals decreased breath sounds. Decreased breath sounds present. No wheezing.      Comments: On 5 L NC  Abdominal:      General: There is distension.      Palpations: Abdomen is soft.      Tenderness: There is no abdominal tenderness.      Comments: +hypoactive bowel sounds   Musculoskeletal:         General: No swelling or tenderness.      Right lower leg: No edema.      Left lower leg: No edema.      Comments: Clubbing nails   Skin:     General: Skin is warm and dry.   Neurological:      General: No focal deficit present.      Mental Status: He is alert and oriented to person, place, and time.   Psychiatric:         Mood and Affect: Mood normal.         Thought  Content: Thought content normal.                Significant Labs: All pertinent labs within the past 24 hours have been reviewed.    CBC:   Recent Labs   Lab 01/09/24 2139 01/11/24  0507   WBC 7.80 6.07   HGB 14.7 13.5*   HCT 49.3 47.4    270       CMP:   Recent Labs   Lab 01/09/24  2139 01/10/24  0750 01/11/24  0507    143 141   K 4.6 4.7 4.3   CL 97 96 101   CO2 28 36* 33*   * 124* 138*   BUN 11 14 16   CREATININE 0.8 0.9 0.8   CALCIUM 10.4 10.1 9.7   PROT 8.7* 8.4 7.6   ALBUMIN 3.2* 3.0* 2.8*   BILITOT 0.8 0.8 1.0   ALKPHOS 109 92 88   AST 32 25 21   ALT 29 24 20   ANIONGAP 16 11 7*       Cardiac Markers:   Recent Labs   Lab 01/09/24 2139   *       Lactic Acid:   Recent Labs   Lab 01/09/24  2139 01/10/24  0750   LACTATE 2.5* 1.4       Magnesium:   Recent Labs   Lab 01/10/24  0750 01/11/24  0507   MG 2.0 2.0       Troponin:   Recent Labs   Lab 01/09/24 2139   TROPONINI 0.023       TSH:   Recent Labs   Lab 01/10/24  0750   TSH 0.745       Urine Studies:   Recent Labs   Lab 01/09/24  2259   COLORU Yellow   APPEARANCEUA Clear   PHUR 7.0   SPECGRAV 1.030   PROTEINUA 1+*   GLUCUA Negative   KETONESU Negative   BILIRUBINUA Negative   OCCULTUA Negative   NITRITE Negative   UROBILINOGEN 2.0-3.0*   LEUKOCYTESUR Negative   RBCUA 6*   WBCUA 6*   BACTERIA Rare   SQUAMEPITHEL 2   HYALINECASTS 38*         Significant Imaging: I have reviewed all pertinent imaging results/findings within the past 24 hours.    Imaging Results              XR Gastric tube check, non-radiologist performed (Final result)  Result time 01/10/24 10:34:34      Final result by Damon Alcantara III, MD (01/10/24 10:34:34)                   Narrative:    EXAMINATION:  XR GASTRIC TUBE CHECK, NON-RADIOLOGIST PERFORMED    CLINICAL HISTORY:  SBO;    FINDINGS:  NG tip fundus.  There is a chronic interstitial lung changes.  No obstruction, ileus, or perforation seen.      Electronically signed by: Damon Alcantara,  MD  Date:    01/10/2024  Time:    10:34                                      CTA Chest Abdomen Pelvis (Final result)  Result time 01/10/24 00:18:42      Final result by Galileo Segal MD (01/10/24 00:18:42)                   Impression:      1. Multiple pulmonary masses and nodules consistent with malignancy and known metastatic disease.  Abnormal mediastinal and hilar lymphadenopathy consistent with local spread of disease.  Possible left adrenal metastasis.  Continued outpatient follow-up is advised.  Recommend comparison with previous outside imaging.  2. Severe pulmonary emphysema and suspected chronic interstitial lung disease.  Nonspecific mild diffuse mosaic appearance of ground-glass density seen within the lungs, more pronounced in the lower lobes.  3. Fluid distention of the stomach and proximal to mid small bowel loops with air-fluid levels likely reflecting at least partial developing small bowel obstruction.  Apparent transition point in the midline lower abdomen with no obvious mechanical cause for obstruction seen.  Small bowel loops are completely decompressed distally from this region.  4. No evidence of aortic aneurysm or dissection.  5. No evidence of PE.  6. Additional findings as detailed above.  This report was flagged in Epic as abnormal.      Electronically signed by: Galileo Segal MD  Date:    01/10/2024  Time:    00:18               Narrative:    EXAMINATION:  CTA CHEST ABDOMEN PELVIS    CLINICAL HISTORY:  r/o mesenteric ischemia but also PE/PTX as patient just admitted at  with complications;    TECHNIQUE:  CTA chest abdomen and pelvis was obtained following administration of 100 cc Omnipaque 350 IV contrast.  Sagittal and coronal reformats were obtained.    COMPARISON:  Previous outside facility CT images from 12/18/2023 are unavailable for review.    FINDINGS:  Examination was performed in a STAT emergency setting and will not serve as an official restaging exam.    Multiple  pulmonary masses and nodules are visualized.  Largest spiculated mass measures approximately 5.5 cm in the right upper lobe.  There is mediastinal and hilar lymphadenopathy consistent with local spread of disease.  Severe emphysematous changes are seen with additional suspected chronic interstitial lung disease.  Mild diffuse mosaic appearance of ground-glass density is seen throughout the lungs, more pronounced within the lower lobes.  No pleural effusion or pneumothorax.    Heart is normal in size with no significant pericardial effusion.  No significant abnormalities are seen along the esophageal course.  No evidence of aortic aneurysm or dissection.  Branch vessels of the aortic arch are patent.  Pulmonary arteries are well opacified with no evidence of PE.    No significant focal hepatic abnormalities are identified.  There is no intra-or extrahepatic biliary ductal dilatation.  The gallbladder is unremarkable.  Stomach is significantly distended with fluid.  Spleen, pancreas, and right adrenal gland are unremarkable.  There is asymmetric abnormal left adrenal thickening with questionable underlying mass lesion.    Kidneys enhance normally with no evidence of hydronephrosis.  No abnormalities are seen along the ureteral courses.  Urinary bladder is nondistended.  Dystrophic calcifications are seen within the prostate.    Appendix is visualized and is unremarkable.  There is fluid distention of proximal to mid small bowel loops with air-fluid levels measuring upwards of 3.5 cm in caliber likely reflecting at least partial developing small bowel obstruction.  Apparent transition point is seen in the midline lower abdomen.  No definite mechanical cause for obstruction is seen.  Small bowel loops are decompressed distal to this region.  No significant bowel wall thickening or surrounding inflammatory changes seen.  No free air or free fluid.    Aorta tapers normally.  Abdominal aortic branch vessels are  patent.    No acute osseous abnormality identified. Subcutaneous soft tissues show no significant abnormalities.                                       X-Ray Chest PA And Lateral (Final result)  Result time 01/09/24 22:18:05      Final result by Gray Romero MD (01/09/24 22:18:05)                   Impression:      Diffuse increased interstitial changes seen throughout the lungs, similar compared to prior.    Persistent focal area of masslike consolidation versus mass lesion in the right upper lobe, similar compared to prior.  Neoplasm not excluded.  See prior report for recommendations.      Electronically signed by: Gray Romero MD  Date:    01/09/2024  Time:    22:18               Narrative:    EXAMINATION:  XR CHEST PA AND LATERAL    CLINICAL HISTORY:  Tachycardia, unspecified    TECHNIQUE:  PA and lateral views of the chest were performed.    COMPARISON:  11/09/2023.    FINDINGS:  Diffuse increased interstitial changes seen throughout the lungs, similar compared to prior.  Focal area of masslike consolidation versus mass lesion in the right upper lobe, similar compared to prior.    No pleural effusion.  No pneumothorax.    Cardiomediastinal silhouette is similar to prior.    Regional osseous structures are similar to prior.                                        Assessment/Plan:      * Small bowel obstruction  -presented with abdominal pain associated with N/V for one day  -NGT placed in the ED given persistent vomiting  -CT chest abdomen: Fluid distention of the stomach and proximal to mid small bowel loops with air-fluid levels likely reflecting at least partial developing small bowel obstruction.  Apparent transition point in the midline lower abdomen with no obvious mechanical cause for obstruction seen.   -Caution with zofran given prolonged Qtc  -Surgery consulted  -NPO  Patient was started on IVF and NG is placed and surgery is consulted for SBO,had BM,NG tube is out,will have gastrografin study  today      Advanced care planning/counseling discussion  Advance Care Planning    Date: 01/10/2024    Code Status  I engaged the the patient in a voluntary conversation about the patient's preferences for care  at the very end of life. The patient wishes to have CPR and other invasive treatments performed when his heart and/or breathing stops. I communicated to the patient that his wishes align with full code status and he agrees.  I spent a total of 16 minutes engaging the patient in this advance care planning discussion.       Code Status: Full Code             Prolonged Q-T interval on ECG  -EKG in ED with Qtc 461  -Repeat EKG on 01/10/24 with Qtc of 513  -Caution with QT prolonging medications   -Hold zofran at this time       Lung cancer  -recently diagnosed with Adenocarcinoma of lung in 12/2023  -Patient has been referred to oncology outpatient, has not established care yet  -CTA chest: Multiple pulmonary masses and nodules consistent with malignancy and known metastatic disease.  Abnormal mediastinal and hilar lymphadenopathy consistent with local spread of disease.  Possible left adrenal metastasis.   -Follows with pulmonology   -need close follow up on discharge   Consulted palliative,remains full code.    Chronic right heart failure  -likely due to ILD  -Continue BP control      Chronic hypoxemic respiratory failure  Patient with Hypoxic Respiratory failure which is Chronic.  he is on home oxygen at 5 LPM. Supplemental oxygen was provided and noted-      .   Signs/symptoms of respiratory failure include- tachypnea and increased work of breathing. Contributing diagnoses includes - Interstitial lung disease and adenocarcinoma of the lung  Labs and images were reviewed. Patient Has not had a recent ABG. Will treat underlying causes and adjust management of respiratory failure as follows-     -follows with pulmonology outpatient.  -due to history of interstitial lung disease and adenocarcinoma of the  "lung  -Chronically on 5L NC at home  -Currently at baseline   -Monitor     Atrial flutter  -Hx of Aflutter s/p ablation 9/29/2023  -Resume home metoprolol  -Home eliquis was discontinud in 12/2023  -Cardiology consulted  -Monitor on telemetry     Pulmonary hypertension  -echo on 01/05/2024: There is severe pulmonary hypertension. The estimated pulmonary artery systolic pressure is 74 mmHg.  -Due to chronic ILD  -BP control    HTN, goal below 140/90  Chronic, controlled.   -resume home Toprolol    Latest blood pressure and vitals reviewed-     Temp:  [97.6 °F (36.4 °C)-97.8 °F (36.6 °C)]   Pulse:  []   Resp:  [14-25]   BP: (114-152)/(58-94)   SpO2:  [95 %-100 %] .   Home meds for hypertension were reviewed and noted below.   Hypertension Medications               furosemide (LASIX) 20 MG tablet Take 1 tablet (20 mg total) by mouth 2 (two) times daily as needed (1-2 pills as needed for fluid overload).    metoprolol succinate (TOPROL-XL) 50 MG 24 hr tablet Take 1 tablet (50 mg total) by mouth 2 (two) times daily.            While in the hospital, will manage blood pressure as follows; Continue home antihypertensive regimen    Will utilize p.r.n. blood pressure medication only if patient's blood pressure greater than 180/110 and he develops symptoms such as worsening chest pain or shortness of breath.    ILD (interstitial lung disease)  -follows with pulmonology outpatient.  -Per pulm note in 11/2023: "ILD with worsening subjective symptoms, worsening PFT, worsening imaging findings"  -Chronically on 5L NC at home  -Currently at baseline   -Monitor     Hyperuricemia  -resume home allopurinol       Polycythemia  -Followed by Hematology outpatient.    -Stable.    History of HCV, s/p successful treatment w/ SVR12 5/2015  Per record        VTE Risk Mitigation (From admission, onward)           Ordered     heparin (porcine) injection 5,000 Units  Every 8 hours         01/10/24 0213     IP VTE HIGH RISK PATIENT  Once   "       01/10/24 0213     Place sequential compression device  Until discontinued         01/10/24 0213                    Discharge Planning   THADDEUS:      Code Status: Full Code   Is the patient medically ready for discharge?:     Reason for patient still in hospital (select all that apply): Patient trending condition  Discharge Plan A: Home with family                  Srini Vu MD  Department of Hospital Medicine   [facility] [place] [facility]

## 2024-01-11 NOTE — SUBJECTIVE & OBJECTIVE
Past Medical History:   Diagnosis Date    A-fib 05/08/2023    Arthritis     Chronic hypoxemic respiratory failure 11/09/2023    Chronic right heart failure 11/09/2023    GERD (gastroesophageal reflux disease)     History of HCV, s/p successful treatment w/ SVR12 5/2015     Failed treatment (stage I/II) - followed by hepatology     Joint pain     Lung cancer     Lung disease caused by breathing particles     Widespread essentially symmetric interstitial lung disease    Obesity     On home oxygen therapy     Polycythemia vera     Prediabetes        Past Surgical History:   Procedure Laterality Date    ABLATION, ATRIAL FLUTTER, TYPICAL N/A 9/29/2023    Procedure: Ablation, Atrial Flutter, Typical;  Surgeon: Jonh Mcgill MD;  Location: [facility] EP LAB;  Service: Cardiology;  Laterality: N/A;  AFL, ELENA (Cx if SR), CTI, RFA, BETHANY, MAC, DM, 3 Prep    BUNIONECTOMY      bilateral    CARDIOVERSION N/A 05/08/2023    Procedure: Cardioversion;  Surgeon: David Cueva MD;  Location: [facility] CATH LAB;  Service: Cardiology;  Laterality: N/A;    CATARACT EXTRACTION Left 07/27/2023    COLONOSCOPY N/A 12/09/2015    Procedure: COLONOSCOPY;  Surgeon: Conrad Iqbal MD;  Location: [facility] ENDO;  Service: Endoscopy;  Laterality: N/A;    Liver biopsy x2      rotator cuff Right     TRANSESOPHAGEAL ECHOCARDIOGRAM WITH POSSIBLE CARDIOVERSION (ELENA W/ POSS CARDIOVERSION) N/A 05/08/2023    Procedure: TRANSESOPHAGEAL ECHOCARDIOGRAM WITH POSSIBLE CARDIOVERSION (ELENA W/ POSS CARDIOVERSION);  Surgeon: David Cueva MD;  Location: [facility] CATH LAB;  Service: Cardiology;  Laterality: N/A;  12:30PM    RN PREOP 5/4/2023---EKG ON ARRIVAL    TRANSESOPHAGEAL ECHOCARDIOGRAM WITH POSSIBLE CARDIOVERSION (ELENA W/ POSS CARDIOVERSION) N/A 7/29/2023    Procedure: Transesophageal echo (ELENA) intra-procedure log documentation;  Surgeon: David Cueva MD;  Location: [facility] CATH LAB;  Service: Cardiology;  Laterality: N/A;    TRANSESOPHAGEAL ECHOCARDIOGRAPHY N/A 05/08/2023     Procedure: ECHOCARDIOGRAM, TRANSESOPHAGEAL;  Surgeon: David Cueva MD;  Location: [facility] CATH LAB;  Service: Cardiology;  Laterality: N/A;    TRANSESOPHAGEAL ECHOCARDIOGRAPHY N/A 7/29/2023    Procedure: ECHOCARDIOGRAM, TRANSESOPHAGEAL;  Surgeon: David Cueva MD;  Location: [facility] CATH LAB;  Service: Cardiology;  Laterality: N/A;    TREATMENT OF CARDIAC ARRHYTHMIA N/A 8/7/2023    Procedure: Cardioversion or Defibrillation;  Surgeon: David Cueva MD;  Location: [facility] CATH LAB;  Service: Cardiology;  Laterality: N/A;       Review of patient's allergies indicates:   Allergen Reactions    Ace inhibitors Other (See Comments)     cough       No current facility-administered medications on file prior to encounter.     Current Outpatient Medications on File Prior to Encounter   Medication Sig    allopurinoL (ZYLOPRIM) 100 MG tablet Take 2 tablets (200 mg total) by mouth once daily. (Patient taking differently: Take 100 mg by mouth once daily.)    fluticasone propionate (FLONASE) 50 mcg/actuation nasal spray SHAKE LIQUID AND USE 1 SPRAY(50 MCG) IN EACH NOSTRIL EVERY DAY    furosemide (LASIX) 20 MG tablet Take 1 tablet (20 mg total) by mouth 2 (two) times daily as needed (1-2 pills as needed for fluid overload). (Patient taking differently: Take 40 mg by mouth 2 (two) times daily as needed (1-2 pills as needed for fluid overload).)    metoprolol succinate (TOPROL-XL) 50 MG 24 hr tablet Take 1 tablet (50 mg total) by mouth 2 (two) times daily.    multivitamin with folic acid 400 mcg Tab Take 1 tablet by mouth once daily.    potassium chloride (KLOR-CON) 10 MEQ TbSR Take 1 tablet by mouth once daily.    predniSONE (DELTASONE) 5 MG tablet Take 10 mg by mouth once daily.    TRELEGY ELLIPTA 100-62.5-25 mcg DsDv Inhale 1 puff into the lungs Daily.     Family History       Problem Relation (Age of Onset)    Chronic back pain Brother    Deep vein thrombosis Sister    Heart attack Sister    Heart disease Mother    Kidney  disease Mother    Osteoarthritis Sister    Parkinsonism Father    Prostate cancer Cousin    Pulmonary embolism Sister          Tobacco Use    Smoking status: Former     Current packs/day: 1.00     Average packs/day: 1 pack/day for 30.2 years (30.2 ttl pk-yrs)     Types: Cigarettes     Start date: 11/9/1993    Smokeless tobacco: Never    Tobacco comments:     pt. smokes 10 cigars per day.   Substance and Sexual Activity    Alcohol use: Not Currently     Comment: Rarely    Drug use: Yes     Types: Marijuana     Comment: daily    Sexual activity: Yes     Partners: Female     Review of Systems   Constitutional:  Negative for chills, fatigue and fever.   Eyes:  Negative for visual disturbance.   Respiratory:  Positive for shortness of breath (chronic, on 5L NC at home). Negative for cough, chest tightness and wheezing.    Cardiovascular:  Negative for chest pain, palpitations and leg swelling.   Gastrointestinal:  Positive for abdominal distention, abdominal pain, constipation, nausea and vomiting. Negative for blood in stool and diarrhea.   Genitourinary:  Negative for difficulty urinating and dysuria.   Musculoskeletal:  Negative for back pain.   Skin:  Negative for wound.   Neurological:  Negative for dizziness, weakness and headaches.   Psychiatric/Behavioral:  Negative for confusion and hallucinations.      Objective:     Vital Signs (Most Recent):  Temp: 97.9 °F (36.6 °C) (01/11/24 1059)  Pulse: 106 (01/11/24 1059)  Resp: 20 (01/11/24 1059)  BP: (!) 123/59 (01/11/24 1059)  SpO2: 98 % (01/11/24 1059) Vital Signs (24h Range):  Temp:  [97.4 °F (36.3 °C)-98.8 °F (37.1 °C)] 97.9 °F (36.6 °C)  Pulse:  [] 106  Resp:  [17-20] 20  SpO2:  [94 %-98 %] 98 %  BP: (104-140)/(59-84) 123/59     Weight: 89.2 kg (196 lb 10.4 oz)  Body mass index is 23.32 kg/m².     Physical Exam  Vitals and nursing note reviewed.   Constitutional:       General: He is not in acute distress.     Appearance: He is ill-appearing (chronically ill  appearing).   HENT:      Head: Atraumatic.      Nose:      Comments: +NGT     Mouth/Throat:      Mouth: Mucous membranes are dry.   Eyes:      Extraocular Movements: Extraocular movements intact.   Cardiovascular:      Rate and Rhythm: Normal rate and regular rhythm.   Pulmonary:      Effort: No respiratory distress.      Breath sounds: Decreased air movement present. Examination of the right-lower field reveals decreased breath sounds. Examination of the left-lower field reveals decreased breath sounds. Decreased breath sounds present. No wheezing.      Comments: On 5 L NC  Abdominal:      General: There is distension.      Palpations: Abdomen is soft.      Tenderness: There is no abdominal tenderness.      Comments: +hypoactive bowel sounds   Musculoskeletal:         General: No swelling or tenderness.      Right lower leg: No edema.      Left lower leg: No edema.      Comments: Clubbing nails   Skin:     General: Skin is warm and dry.   Neurological:      General: No focal deficit present.      Mental Status: He is alert and oriented to person, place, and time.   Psychiatric:         Mood and Affect: Mood normal.         Thought Content: Thought content normal.                Significant Labs: All pertinent labs within the past 24 hours have been reviewed.    CBC:   Recent Labs   Lab 01/09/24 2139 01/11/24  0507   WBC 7.80 6.07   HGB 14.7 13.5*   HCT 49.3 47.4    270       CMP:   Recent Labs   Lab 01/09/24  2139 01/10/24  0750 01/11/24  0507    143 141   K 4.6 4.7 4.3   CL 97 96 101   CO2 28 36* 33*   * 124* 138*   BUN 11 14 16   CREATININE 0.8 0.9 0.8   CALCIUM 10.4 10.1 9.7   PROT 8.7* 8.4 7.6   ALBUMIN 3.2* 3.0* 2.8*   BILITOT 0.8 0.8 1.0   ALKPHOS 109 92 88   AST 32 25 21   ALT 29 24 20   ANIONGAP 16 11 7*       Cardiac Markers:   Recent Labs   Lab 01/09/24 2139   *       Lactic Acid:   Recent Labs   Lab 01/09/24  2139 01/10/24  0750   LACTATE 2.5* 1.4       Magnesium:   Recent  Labs   Lab 01/10/24  0750 01/11/24  0507   MG 2.0 2.0       Troponin:   Recent Labs   Lab 01/09/24  2139   TROPONINI 0.023       TSH:   Recent Labs   Lab 01/10/24  0750   TSH 0.745       Urine Studies:   Recent Labs   Lab 01/09/24  2259   COLORU Yellow   APPEARANCEUA Clear   PHUR 7.0   SPECGRAV 1.030   PROTEINUA 1+*   GLUCUA Negative   KETONESU Negative   BILIRUBINUA Negative   OCCULTUA Negative   NITRITE Negative   UROBILINOGEN 2.0-3.0*   LEUKOCYTESUR Negative   RBCUA 6*   WBCUA 6*   BACTERIA Rare   SQUAMEPITHEL 2   HYALINECASTS 38*         Significant Imaging: I have reviewed all pertinent imaging results/findings within the past 24 hours.    Imaging Results              XR Gastric tube check, non-radiologist performed (Final result)  Result time 01/10/24 10:34:34      Final result by Damon Alcantara III, MD (01/10/24 10:34:34)                   Narrative:    EXAMINATION:  XR GASTRIC TUBE CHECK, NON-RADIOLOGIST PERFORMED    CLINICAL HISTORY:  SBO;    FINDINGS:  NG tip fundus.  There is a chronic interstitial lung changes.  No obstruction, ileus, or perforation seen.      Electronically signed by: Damon Alcantara MD  Date:    01/10/2024  Time:    10:34                                      CTA Chest Abdomen Pelvis (Final result)  Result time 01/10/24 00:18:42      Final result by Galileo Segal MD (01/10/24 00:18:42)                   Impression:      1. Multiple pulmonary masses and nodules consistent with malignancy and known metastatic disease.  Abnormal mediastinal and hilar lymphadenopathy consistent with local spread of disease.  Possible left adrenal metastasis.  Continued outpatient follow-up is advised.  Recommend comparison with previous outside imaging.  2. Severe pulmonary emphysema and suspected chronic interstitial lung disease.  Nonspecific mild diffuse mosaic appearance of ground-glass density seen within the lungs, more pronounced in the lower lobes.  3. Fluid distention of the stomach and  proximal to mid small bowel loops with air-fluid levels likely reflecting at least partial developing small bowel obstruction.  Apparent transition point in the midline lower abdomen with no obvious mechanical cause for obstruction seen.  Small bowel loops are completely decompressed distally from this region.  4. No evidence of aortic aneurysm or dissection.  5. No evidence of PE.  6. Additional findings as detailed above.  This report was flagged in Epic as abnormal.      Electronically signed by: Galileo Segal MD  Date:    01/10/2024  Time:    00:18               Narrative:    EXAMINATION:  CTA CHEST ABDOMEN PELVIS    CLINICAL HISTORY:  r/o mesenteric ischemia but also PE/PTX as patient just admitted at  with complications;    TECHNIQUE:  CTA chest abdomen and pelvis was obtained following administration of 100 cc Omnipaque 350 IV contrast.  Sagittal and coronal reformats were obtained.    COMPARISON:  Previous outside facility CT images from 12/18/2023 are unavailable for review.    FINDINGS:  Examination was performed in a STAT emergency setting and will not serve as an official restaging exam.    Multiple pulmonary masses and nodules are visualized.  Largest spiculated mass measures approximately 5.5 cm in the right upper lobe.  There is mediastinal and hilar lymphadenopathy consistent with local spread of disease.  Severe emphysematous changes are seen with additional suspected chronic interstitial lung disease.  Mild diffuse mosaic appearance of ground-glass density is seen throughout the lungs, more pronounced within the lower lobes.  No pleural effusion or pneumothorax.    Heart is normal in size with no significant pericardial effusion.  No significant abnormalities are seen along the esophageal course.  No evidence of aortic aneurysm or dissection.  Branch vessels of the aortic arch are patent.  Pulmonary arteries are well opacified with no evidence of PE.    No significant focal hepatic abnormalities  are identified.  There is no intra-or extrahepatic biliary ductal dilatation.  The gallbladder is unremarkable.  Stomach is significantly distended with fluid.  Spleen, pancreas, and right adrenal gland are unremarkable.  There is asymmetric abnormal left adrenal thickening with questionable underlying mass lesion.    Kidneys enhance normally with no evidence of hydronephrosis.  No abnormalities are seen along the ureteral courses.  Urinary bladder is nondistended.  Dystrophic calcifications are seen within the prostate.    Appendix is visualized and is unremarkable.  There is fluid distention of proximal to mid small bowel loops with air-fluid levels measuring upwards of 3.5 cm in caliber likely reflecting at least partial developing small bowel obstruction.  Apparent transition point is seen in the midline lower abdomen.  No definite mechanical cause for obstruction is seen.  Small bowel loops are decompressed distal to this region.  No significant bowel wall thickening or surrounding inflammatory changes seen.  No free air or free fluid.    Aorta tapers normally.  Abdominal aortic branch vessels are patent.    No acute osseous abnormality identified. Subcutaneous soft tissues show no significant abnormalities.                                       X-Ray Chest PA And Lateral (Final result)  Result time 01/09/24 22:18:05      Final result by Gray Romero MD (01/09/24 22:18:05)                   Impression:      Diffuse increased interstitial changes seen throughout the lungs, similar compared to prior.    Persistent focal area of masslike consolidation versus mass lesion in the right upper lobe, similar compared to prior.  Neoplasm not excluded.  See prior report for recommendations.      Electronically signed by: Gray Romero MD  Date:    01/09/2024  Time:    22:18               Narrative:    EXAMINATION:  XR CHEST PA AND LATERAL    CLINICAL HISTORY:  Tachycardia, unspecified    TECHNIQUE:  PA and  lateral views of the chest were performed.    COMPARISON:  11/09/2023.    FINDINGS:  Diffuse increased interstitial changes seen throughout the lungs, similar compared to prior.  Focal area of masslike consolidation versus mass lesion in the right upper lobe, similar compared to prior.    No pleural effusion.  No pneumothorax.    Cardiomediastinal silhouette is similar to prior.    Regional osseous structures are similar to prior.

## 2024-01-11 NOTE — PLAN OF CARE
Problem: Adult Inpatient Plan of Care  Goal: Plan of Care Review  Outcome: Ongoing, Progressing  Goal: Patient-Specific Goal (Individualized)  Outcome: Ongoing, Progressing  Goal: Absence of Hospital-Acquired Illness or Injury  Outcome: Ongoing, Progressing  Goal: Optimal Comfort and Wellbeing  Outcome: Ongoing, Progressing  Goal: Readiness for Transition of Care  Outcome: Ongoing, Progressing     Problem: Pain Acute  Goal: Acceptable Pain Control and Functional Ability  Outcome: Ongoing, Progressing     Problem: Fall Injury Risk  Goal: Absence of Fall and Fall-Related Injury  Outcome: Ongoing, Progressing     Problem: Activity Intolerance (Pulmonary Impairment)  Goal: Improved Activity Tolerance  Outcome: Ongoing, Progressing     Problem: Airway Clearance Ineffective (Pulmonary Impairment)  Goal: Effective Airway Clearance  Outcome: Ongoing, Progressing     Problem: Gas Exchange Impaired (Pulmonary Impairment)  Goal: Optimal Gas Exchange  Outcome: Ongoing, Progressing     Problem: Skin Injury Risk Increased  Goal: Skin Health and Integrity  Outcome: Ongoing, Progressing     Problem: Coping Ineffective  Goal: Effective Coping  Outcome: Ongoing, Progressing

## 2024-01-11 NOTE — ASSESSMENT & PLAN NOTE
-recently diagnosed with Adenocarcinoma of lung in 12/2023  -Patient has been referred to oncology outpatient, has not established care yet  -CTA chest: Multiple pulmonary masses and nodules consistent with malignancy and known metastatic disease.  Abnormal mediastinal and hilar lymphadenopathy consistent with local spread of disease.  Possible left adrenal metastasis.   -Follows with pulmonology   -need close follow up on discharge   Consulted palliative,remains full code.

## 2024-01-12 ENCOUNTER — TELEPHONE (OUTPATIENT)
Dept: ELECTROPHYSIOLOGY | Facility: CLINIC | Age: 69
End: 2024-01-12
Payer: MEDICARE

## 2024-01-12 ENCOUNTER — TELEPHONE (OUTPATIENT)
Dept: HEMATOLOGY/ONCOLOGY | Facility: CLINIC | Age: 69
End: 2024-01-12
Payer: MEDICARE

## 2024-01-12 VITALS
TEMPERATURE: 98 F | WEIGHT: 196.63 LBS | BODY MASS INDEX: 23.22 KG/M2 | HEIGHT: 77 IN | HEART RATE: 102 BPM | OXYGEN SATURATION: 93 % | SYSTOLIC BLOOD PRESSURE: 132 MMHG | DIASTOLIC BLOOD PRESSURE: 77 MMHG | RESPIRATION RATE: 18 BRPM

## 2024-01-12 PROBLEM — Z51.5 COMFORT MEASURES ONLY STATUS: Status: ACTIVE | Noted: 2024-01-12

## 2024-01-12 PROBLEM — Z51.5 PALLIATIVE CARE ENCOUNTER: Status: ACTIVE | Noted: 2024-01-10

## 2024-01-12 LAB
ALBUMIN SERPL BCP-MCNC: 2.5 G/DL (ref 3.5–5.2)
ALP SERPL-CCNC: 82 U/L (ref 55–135)
ALT SERPL W/O P-5'-P-CCNC: 20 U/L (ref 10–44)
ANION GAP SERPL CALC-SCNC: 10 MMOL/L (ref 8–16)
AST SERPL-CCNC: 23 U/L (ref 10–40)
BASOPHILS # BLD AUTO: 0.02 K/UL (ref 0–0.2)
BASOPHILS NFR BLD: 0.4 % (ref 0–1.9)
BILIRUB SERPL-MCNC: 1.6 MG/DL (ref 0.1–1)
BUN SERPL-MCNC: 11 MG/DL (ref 8–23)
CALCIUM SERPL-MCNC: 9.1 MG/DL (ref 8.7–10.5)
CHLORIDE SERPL-SCNC: 105 MMOL/L (ref 95–110)
CO2 SERPL-SCNC: 32 MMOL/L (ref 23–29)
CREAT SERPL-MCNC: 0.8 MG/DL (ref 0.5–1.4)
DIFFERENTIAL METHOD BLD: ABNORMAL
EOSINOPHIL # BLD AUTO: 0.1 K/UL (ref 0–0.5)
EOSINOPHIL NFR BLD: 1.1 % (ref 0–8)
ERYTHROCYTE [DISTWIDTH] IN BLOOD BY AUTOMATED COUNT: 21.5 % (ref 11.5–14.5)
EST. GFR  (NO RACE VARIABLE): >60 ML/MIN/1.73 M^2
GLUCOSE SERPL-MCNC: 117 MG/DL (ref 70–110)
HCT VFR BLD AUTO: 45.6 % (ref 40–54)
HGB BLD-MCNC: 12.8 G/DL (ref 14–18)
IMM GRANULOCYTES # BLD AUTO: 0.02 K/UL (ref 0–0.04)
IMM GRANULOCYTES NFR BLD AUTO: 0.4 % (ref 0–0.5)
LYMPHOCYTES # BLD AUTO: 1.1 K/UL (ref 1–4.8)
LYMPHOCYTES NFR BLD: 19.7 % (ref 18–48)
MAGNESIUM SERPL-MCNC: 2 MG/DL (ref 1.6–2.6)
MCH RBC QN AUTO: 19.4 PG (ref 27–31)
MCHC RBC AUTO-ENTMCNC: 28.1 G/DL (ref 32–36)
MCV RBC AUTO: 69 FL (ref 82–98)
MONOCYTES # BLD AUTO: 0.9 K/UL (ref 0.3–1)
MONOCYTES NFR BLD: 16 % (ref 4–15)
NEUTROPHILS # BLD AUTO: 3.3 K/UL (ref 1.8–7.7)
NEUTROPHILS NFR BLD: 62.4 % (ref 38–73)
NRBC BLD-RTO: 0 /100 WBC
PHOSPHATE SERPL-MCNC: 3.5 MG/DL (ref 2.7–4.5)
PLATELET # BLD AUTO: 243 K/UL (ref 150–450)
PMV BLD AUTO: 10.1 FL (ref 9.2–12.9)
POCT GLUCOSE: 108 MG/DL (ref 70–110)
POTASSIUM SERPL-SCNC: 4.1 MMOL/L (ref 3.5–5.1)
PROT SERPL-MCNC: 7.1 G/DL (ref 6–8.4)
RBC # BLD AUTO: 6.61 M/UL (ref 4.6–6.2)
SODIUM SERPL-SCNC: 147 MMOL/L (ref 136–145)
TROPONIN I SERPL DL<=0.01 NG/ML-MCNC: 0.03 NG/ML (ref 0–0.03)
WBC # BLD AUTO: 5.32 K/UL (ref 3.9–12.7)

## 2024-01-12 PROCEDURE — 99233 SBSQ HOSP IP/OBS HIGH 50: CPT | Mod: ,,, | Performed by: SURGERY

## 2024-01-12 PROCEDURE — 63600175 PHARM REV CODE 636 W HCPCS: Performed by: HOSPITALIST

## 2024-01-12 PROCEDURE — 84100 ASSAY OF PHOSPHORUS: CPT | Performed by: STUDENT IN AN ORGANIZED HEALTH CARE EDUCATION/TRAINING PROGRAM

## 2024-01-12 PROCEDURE — 94761 N-INVAS EAR/PLS OXIMETRY MLT: CPT

## 2024-01-12 PROCEDURE — 99233 SBSQ HOSP IP/OBS HIGH 50: CPT | Mod: ,,, | Performed by: NURSE PRACTITIONER

## 2024-01-12 PROCEDURE — 80053 COMPREHEN METABOLIC PANEL: CPT | Performed by: STUDENT IN AN ORGANIZED HEALTH CARE EDUCATION/TRAINING PROGRAM

## 2024-01-12 PROCEDURE — 83735 ASSAY OF MAGNESIUM: CPT | Performed by: STUDENT IN AN ORGANIZED HEALTH CARE EDUCATION/TRAINING PROGRAM

## 2024-01-12 PROCEDURE — 99497 ADVNCD CARE PLAN 30 MIN: CPT | Mod: ,,, | Performed by: NURSE PRACTITIONER

## 2024-01-12 PROCEDURE — 84484 ASSAY OF TROPONIN QUANT: CPT | Performed by: PHYSICIAN ASSISTANT

## 2024-01-12 PROCEDURE — 99900035 HC TECH TIME PER 15 MIN (STAT)

## 2024-01-12 PROCEDURE — 85025 COMPLETE CBC W/AUTO DIFF WBC: CPT | Performed by: STUDENT IN AN ORGANIZED HEALTH CARE EDUCATION/TRAINING PROGRAM

## 2024-01-12 PROCEDURE — 25000003 PHARM REV CODE 250

## 2024-01-12 PROCEDURE — 99223 1ST HOSP IP/OBS HIGH 75: CPT | Mod: ,,, | Performed by: STUDENT IN AN ORGANIZED HEALTH CARE EDUCATION/TRAINING PROGRAM

## 2024-01-12 PROCEDURE — 36415 COLL VENOUS BLD VENIPUNCTURE: CPT | Performed by: PHYSICIAN ASSISTANT

## 2024-01-12 PROCEDURE — 27000221 HC OXYGEN, UP TO 24 HOURS

## 2024-01-12 PROCEDURE — 11000001 HC ACUTE MED/SURG PRIVATE ROOM

## 2024-01-12 PROCEDURE — 25000003 PHARM REV CODE 250: Performed by: INTERNAL MEDICINE

## 2024-01-12 PROCEDURE — 25000003 PHARM REV CODE 250: Performed by: STUDENT IN AN ORGANIZED HEALTH CARE EDUCATION/TRAINING PROGRAM

## 2024-01-12 PROCEDURE — 63600175 PHARM REV CODE 636 W HCPCS: Performed by: STUDENT IN AN ORGANIZED HEALTH CARE EDUCATION/TRAINING PROGRAM

## 2024-01-12 RX ORDER — SYRING-NEEDL,DISP,INSUL,0.3 ML 29 G X1/2"
296 SYRINGE, EMPTY DISPOSABLE MISCELLANEOUS
Status: COMPLETED | OUTPATIENT
Start: 2024-01-12 | End: 2024-01-12

## 2024-01-12 RX ORDER — PSEUDOEPHEDRINE/ACETAMINOPHEN 30MG-500MG
100 TABLET ORAL
Status: COMPLETED | OUTPATIENT
Start: 2024-01-12 | End: 2024-01-12

## 2024-01-12 RX ADMIN — METOPROLOL TARTRATE 50 MG: 50 TABLET, FILM COATED ORAL at 09:01

## 2024-01-12 RX ADMIN — HEPARIN SODIUM 5000 UNITS: 5000 INJECTION INTRAVENOUS; SUBCUTANEOUS at 02:01

## 2024-01-12 RX ADMIN — SODIUM CHLORIDE 500 ML: 9 INJECTION, SOLUTION INTRAVENOUS at 09:01

## 2024-01-12 RX ADMIN — DEXTROSE AND SODIUM CHLORIDE: 5; 900 INJECTION, SOLUTION INTRAVENOUS at 05:01

## 2024-01-12 RX ADMIN — MAGNESIUM CITRATE 296 ML: 1.75 LIQUID ORAL at 10:01

## 2024-01-12 RX ADMIN — METOPROLOL TARTRATE 50 MG: 50 TABLET, FILM COATED ORAL at 08:01

## 2024-01-12 RX ADMIN — Medication 100 ML: at 10:01

## 2024-01-12 RX ADMIN — ALLOPURINOL 200 MG: 100 TABLET ORAL at 09:01

## 2024-01-12 RX ADMIN — HEPARIN SODIUM 5000 UNITS: 5000 INJECTION INTRAVENOUS; SUBCUTANEOUS at 09:01

## 2024-01-12 RX ADMIN — HEPARIN SODIUM 5000 UNITS: 5000 INJECTION INTRAVENOUS; SUBCUTANEOUS at 05:01

## 2024-01-12 NOTE — HPI
68-year-old male with chronic hypoxic respiratory failure on 5 L home O2 2/2 emphysema and fibrotic ILD complicated by pulmonary HTN (PASP 74), a flutter s/p ablation 2023 no longer anticoagulated, polycythemia secondary lung disease, and recently diagnosed lung adenocarcinoma presents with nausea vomiting and abdominal pain for about 1 day.  Also reporting liquid stool for about 1 week.  Recently admitted to [facility] where he underwent transthoracic biopsy of newly discovered lung masses.  Pathology showing adenocarcinoma based on chart review (can not see pack) but has not had the chance to follow-up with Pulmonary or oncology

## 2024-01-12 NOTE — PROGRESS NOTES
[facility]  General Surgery  Progress Note    Subjective:     History of Present Illness:  Josiah Orosco is a 67 yo M with history of ILD (on 5L O2), HFpEF (EF: 55%, reduced RV systolic function), pulmonary hypertension (sPAP of 74 mmHg) hypertension, atrial flutter (s/p ablation in 09/2023, not on AC anymore), polycythemia, and recently diagnosed metastatic lung cancer who presents to the ED with complaints of generalized abdominal pain, nausea and vomiting. This began yesterday. Since arrival, his pain has completely resolved though he continues to have nausea/vomiting. CT is concerning for SBO. NGT was placed this morning with approximately 700cc of bilious output. He is in atrial fibrillation, somewhat rate controlled (90s-low 100s) but is stable. He denies any previous abdominal surgery.     Post-Op Info:  * No surgery found *         Interval History: NAEO. Gastrograffin challenge yesterday. Was tolerated fairly well through the day, had 2 small BM. Though became distended, suction was reconnected and he had 1.9L output rapidly. He had a larger bowel movement this morning. Images from gastrograffin challenge are difficult to interpret but may demonstrate contrast within the colon. Afebrile, HDS    Medications:  Continuous Infusions:   dextrose 5 % and 0.9 % NaCl 100 mL/hr at 01/12/24 0635     Scheduled Meds:   allopurinoL  200 mg Oral Daily    glycerin 99.5%  100 mL Rectal ED 1 Time    And    magnesium citrate  296 mL Rectal ED 1 Time    And    sodium chloride 0.9%  500 mL Rectal ED 1 Time    heparin (porcine)  5,000 Units Subcutaneous Q8H    metoprolol tartrate  50 mg Oral BID     PRN Meds:acetaminophen, acetaminophen, albuterol-ipratropium, ALPRAZolam, aluminum-magnesium hydroxide-simethicone, dextrose 10%, dextrose 10%, dextrose 10%, dextrose 10%, furosemide, glucagon (human recombinant), glucagon (human recombinant), glucose, glucose, glucose, glucose, magnesium oxide, magnesium oxide,  melatonin, metoprolol, naloxone, potassium bicarbonate, potassium bicarbonate, potassium bicarbonate, potassium, sodium phosphates, potassium, sodium phosphates, potassium, sodium phosphates, prochlorperazine, simethicone, sodium chloride 0.9%, sodium chloride 0.9%     Review of patient's allergies indicates:   Allergen Reactions    Ace inhibitors Other (See Comments)     cough     Objective:     Vital Signs (Most Recent):  Temp: 97.6 °F (36.4 °C) (01/12/24 0744)  Pulse: (!) 112 (01/12/24 0744)  Resp: 20 (01/12/24 0744)  BP: 133/80 (01/12/24 0744)  SpO2: 95 % (01/12/24 0744) Vital Signs (24h Range):  Temp:  [97.4 °F (36.3 °C)-98.4 °F (36.9 °C)] 97.6 °F (36.4 °C)  Pulse:  [] 112  Resp:  [18-20] 20  SpO2:  [94 %-99 %] 95 %  BP: (116-133)/(59-80) 133/80     Weight: 89.2 kg (196 lb 10.4 oz)  Body mass index is 23.32 kg/m².    Intake/Output - Last 3 Shifts         01/10 0700 01/11 0659 01/11 0700 01/12 0659 01/12 0700 01/13 0659    P.O. 60 0     I.V. (mL/kg) 744.3 (8.3) 86.9 (1)     IV Piggyback       Total Intake(mL/kg) 804.3 (9) 86.9 (1)     Urine (mL/kg/hr) 300 (0.1) 100 (0)     Emesis/NG output       Drains 150 2400     Other 300      Stool  0     Total Output 750 2500     Net +54.3 -2413.1            Urine Occurrence  2 x     Stool Occurrence  2 x              Physical Exam  Constitutional:       General: He is not in acute distress.     Appearance: Normal appearance. He is not ill-appearing.   HENT:      Head: Normocephalic and atraumatic.      Nose:      Comments: NGT in place with bilious output  Eyes:      Extraocular Movements: Extraocular movements intact.      Pupils: Pupils are equal, round, and reactive to light.   Cardiovascular:      Rate and Rhythm: Tachycardia present. Rhythm irregular.   Pulmonary:      Effort: Pulmonary effort is normal. No respiratory distress.   Abdominal:      General: Abdomen is flat. There is no distension.      Palpations: Abdomen is soft.      Tenderness: There is no  abdominal tenderness. There is no guarding.      Comments: Abdomen is soft, not particularly distended and non-tender on my exam. I do not appreciate any significant hernias as well   Musculoskeletal:         General: No swelling. Normal range of motion.   Skin:     General: Skin is warm and dry.   Neurological:      General: No focal deficit present.      Mental Status: He is alert.          Significant Labs:  I have reviewed all pertinent lab results within the past 24 hours.  CBC:   Recent Labs   Lab 01/12/24  0301   WBC 5.32   RBC 6.61*   HGB 12.8*   HCT 45.6      MCV 69*   MCH 19.4*   MCHC 28.1*     CMP:   Recent Labs   Lab 01/12/24  0301   *   CALCIUM 9.1   ALBUMIN 2.5*   PROT 7.1   *   K 4.1   CO2 32*      BUN 11   CREATININE 0.8   ALKPHOS 82   ALT 20   AST 23   BILITOT 1.6*       Significant Diagnostics:  I have reviewed all pertinent imaging results/findings within the past 24 hours.  Assessment/Plan:     * Small bowel obstruction  This is a 67 yo M with significant comorbidities including pulm HTN, Afib, HFpEF, severe ILD on 5L NC at home, recent diagnosis of metastatic lung cancer who presents with concerns for SBO. The etiology of this bowel obstruction is unclear given no previous abdominal surgery and no hernia. I am concerned this may represent an intra-abdominal malignancy, though do not readily see this on CTA. He is an incredibly high risk surgical patient given his extensive comorbidities.     - I am encouraged that he has begun to have some bowel function and had a larger BM this morning, though 1.9L output suggests ongoing partial obstruction  - reviewed imaging with radiology as the most likely cause of this obstruction is intra-abdominal mass or bulky adenopathy from metastatic spread. There is nothing discretely identifiable, though there is some abnormal contouring of the small bowel near the area of obstruction that may represent a mass  - PET may help elucidate  the cause, though likely will not change treatment options  - His NSQIP operative risk of mortality is 70% which I believe to underestimate his actual risk as this does not include his interstitial lung disease, home oxygen requirement and significant pulmonary hypertension. I think an exploratory laparotomy is not the right option for this patient. He and his wife are in agreement. I am encourage that he is now having some bowel function. We will plan for enema today, ongoing NGT decompression  - should his symptoms continue and bowel function decrease, I recommend GI consultation for palliative venting PEG tube for symptom relief  - Surgery will continue to follow  - rest of care per primary        Danny Licea MD  General Surgery  [facility] [place] [facility]

## 2024-01-12 NOTE — DISCHARGE SUMMARY
[facility]  Discharge Summary      Patient Name: Josiah Orosco Jr.  MRN: [id number]  JOHANA: [id number]  Patient Class: IP- Inpatient  Admission Date: 1/9/2024  Hospital Length of Stay: 2 days  Discharge Date and Time:  01/12/2024 11:12 AM  Attending Physician: Srini Vu, *   Discharging Provider: Srini Vu MD  Primary Care Provider: Elaine Landry MD    Primary Care Team: Networked reference to record PCT     HPI:   68-year-old male with a past medical history of ILD (on 5L O2), HFpEF (EF: 55%, reduced RV systolic function), pulmonary hypertension (sPAP of 74 mmHg) hypertension, atrial flutter (s/p ablation in 09/2023, not on AC anymore), polycythemia, recently dx lung cancer presents with abdominal pain for one day, associated with nausea and vomiting. Last bowel movement was one day ago, and stools were hard. Prior to that, he states it was both hard and liquid form for about a week.  Denies any history of abdominal surgeries.  No recent travel new medications.     In the ED, the patient was tachycardic (up to 130s, and AFib with RVR).  Labs were remarkable for an elevated BNP (837), elevated lactic acid (2.5).  CTA chest, abdomen and pelvis showed multiple pulmonary masses and nodules consistent with malignancy/metastatic disease, pulmonary emphysema and chronic interstitial lung disease findings, along with fluid distention of the stomach and proximal to mid small small bowel loops with air-fluid levels reflecting at least a partial developing small-bowel obstruction.  Patient was given Lopressor 5 mg IV x1, morphine 8 mg IV, Zofran 4 mg IV x2, and 500 mL of NS.  Patient admitted to hospital medicine for further evaluation and management    * No surgery found *      Hospital Course:   68-year-old male with a past medical history of ILD (on 5L O2), HFpEF (EF: 55%, reduced RV systolic function), pulmonary hypertension (sPAP of 74 mmHg) hypertension, atrial  flutter (s/p ablation in 09/2023, not on AC anymore), polycythemia, recently dx lung cancer,not yet started on therapy, presents with abdominal pain for one day, associated with nausea and vomiting.   In the ED, the patient was tachycardic (up to 130s, and AFib with RVR).  Labs were remarkable for an elevated BNP (837), elevated lactic acid (2.5).  CTA chest, abdomen and pelvis showed multiple pulmonary masses and nodules consistent with malignancy/metastatic disease, pulmonary emphysema and chronic interstitial lung disease findings, along with fluid distention of the stomach and proximal to mid small small bowel loops with air-fluid levels reflecting at least a partial developing small-bowel obstruction.  Patient was given Lopressor 5 mg IV x1, morphine 8 mg IV, Zofran 4 mg IV x2, and 500 mL of NS. Patient was started on IVF and NG is placed and surgery is consulted for SBO,had BM,on NG tube with a lot of out put.  Gastrografin study show,contrast on stomach,  Consulted palliative ,pulmonology and oncology.  Spoke at lnae with patient and wife,they agree with DNR status,nurse clayton witnessed.  Seen by oncology,patient has poor prognosis,patient and family agree with home hospice.  Patient was discharged home with Hospice with NG suction as needed.     Goals of Care Treatment Preferences:  Code Status: DNR    Living Will: Yes     What is most important right now is to focus on quality of life, even if it means sacrificing a little time, comfort and QOL .  Accordingly, we have decided that the best plan to meet the patient's goals includes pivot to comfort-focused care.      Consults:   Consults (From admission, onward)          Status Ordering Provider     Inpatient consult to Hospice  Once        Provider:  (Not yet assigned)    Acknowledged AFIA DIAZ     Inpatient consult to Social Work  Once        Provider:  (Not yet assigned)    Acknowledged AFIA DIAZ     Inpatient consult to Oncology  Once         Provider:  Jose Antonio Quintanilla MD    Completed MAGGIE ABDOLAMEGHAN     Inpatient consult to Pulmonology  Once        Provider:  Brant Michaels MD    Completed MAGGIE ABDOLAMEGHAN     Inpatient consult to Palliative Care  Once        Provider:  (Not yet assigned)    Completed MAGGIE, ABDOLAZIM     Inpatient consult to Cardiology  Once        Provider:  David Cueva MD    Completed DORA WEAVER     Inpatient consult to General Surgery  Once        Provider:  Hiren Ovalle MD    Completed DORA WEAVER            No new Assessment & Plan notes have been filed under this hospital service since the last note was generated.  Service: Hospital Medicine    Final Active Diagnoses:    Diagnosis Date Noted POA    PRINCIPAL PROBLEM:  Small bowel obstruction [K56.609] 01/10/2024 Yes    Comfort measures only status [Z51.5] 01/12/2024 Not Applicable    Prolonged Q-T interval on ECG [R94.31] 01/10/2024 Yes    Palliative care encounter [Z51.5] 01/10/2024 Not Applicable    Lung cancer [C34.90] 01/05/2024 Yes    Chronic hypoxemic respiratory failure [J96.11] 11/09/2023 Yes    Chronic right heart failure [I50.812] 11/09/2023 Yes    Atrial flutter [I48.92] 07/27/2023 Yes    Pulmonary hypertension [I27.20] 10/12/2018 Yes    Pulmonary interstitial fibrosis [J84.10]  Yes    HTN, goal below 140/90 [I10] 01/30/2017 Yes    ILD (interstitial lung disease) [J84.9] 11/16/2015 Yes    Hyperuricemia [E79.0] 01/19/2015 Yes    Former smoker [Z87.891] 12/10/2014 Not Applicable    Polycythemia [D75.1] 08/06/2013 Yes    History of HCV, s/p successful treatment w/ SVR12 5/2015 [Z86.19]  Yes      Problems Resolved During this Admission:    Diagnosis Date Noted Date Resolved POA    Thoracic aorta atherosclerosis [I70.0] 03/17/2022 01/10/2024 Yes       Discharged Condition: stable    Disposition: Hospice/Home    Follow Up:   Follow-up Information       Elaine Landry MD Follow up in 1 week(s).    Specialties: Internal Medicine,  Pediatrics  Contact information:  [address] [place] [place] [address]  [phone]                           Patient Instructions:      Activity as tolerated       Significant Diagnostic Studies: Labs: BMP:   Recent Labs   Lab 01/11/24  0507 01/12/24  0301   * 117*    147*   K 4.3 4.1    105   CO2 33* 32*   BUN 16 11   CREATININE 0.8 0.8   CALCIUM 9.7 9.1   MG 2.0 2.0   , CMP   Recent Labs   Lab 01/11/24  0507 01/12/24  0301    147*   K 4.3 4.1    105   CO2 33* 32*   * 117*   BUN 16 11   CREATININE 0.8 0.8   CALCIUM 9.7 9.1   PROT 7.6 7.1   ALBUMIN 2.8* 2.5*   BILITOT 1.0 1.6*   ALKPHOS 88 82   AST 21 23   ALT 20 20   ANIONGAP 7* 10   , and CBC   Recent Labs   Lab 01/11/24  0507 01/12/24  0301   WBC 6.07 5.32   HGB 13.5* 12.8*   HCT 47.4 45.6    243     Microbiology: Blood Culture   Lab Results   Component Value Date    LABBLOO NO GROWTH AFTER 5 DAYS. FINAL REPORT 11/05/2012    LABBLOO NO GROWTH AFTER 5 DAYS. FINAL REPORT 11/05/2012     Radiology: X-Ray: CXR: X-Ray Chest 1 View (CXR): No results found for this visit on 01/09/24. and X-Ray Chest PA and Lateral (CXR):   Results for orders placed or performed during the hospital encounter of 01/09/24   X-Ray Chest PA And Lateral    Narrative    EXAMINATION:  XR CHEST PA AND LATERAL    CLINICAL HISTORY:  Tachycardia, unspecified    TECHNIQUE:  PA and lateral views of the chest were performed.    COMPARISON:  11/09/2023.    FINDINGS:  Diffuse increased interstitial changes seen throughout the lungs, similar compared to prior.  Focal area of masslike consolidation versus mass lesion in the right upper lobe, similar compared to prior.    No pleural effusion.  No pneumothorax.    Cardiomediastinal silhouette is similar to prior.    Regional osseous structures are similar to prior.      Impression    Diffuse increased interstitial changes seen throughout the lungs, similar compared to prior.    Persistent focal area of masslike  consolidation versus mass lesion in the right upper lobe, similar compared to prior.  Neoplasm not excluded.  See prior report for recommendations.      Electronically signed by: Gray Romero MD  Date:    01/09/2024  Time:    22:18     CT scan: CT ABDOMEN PELVIS WITH CONTRAST: No results found for this visit on 01/09/24. and CT ABDOMEN PELVIS WITHOUT CONTRAST: No results found for this visit on 01/09/24.    Pending Diagnostic Studies:       None           Medications:  Reconciled Home Medications:      Medication List        CONTINUE taking these medications      allopurinoL 100 MG tablet  Commonly known as: ZYLOPRIM  Take 2 tablets (200 mg total) by mouth once daily.     fluticasone propionate 50 mcg/actuation nasal spray  Commonly known as: FLONASE  SHAKE LIQUID AND USE 1 SPRAY(50 MCG) IN EACH NOSTRIL EVERY DAY     furosemide 20 MG tablet  Commonly known as: LASIX  Take 1 tablet (20 mg total) by mouth 2 (two) times daily as needed (1-2 pills as needed for fluid overload).     metoprolol succinate 50 MG 24 hr tablet  Commonly known as: TOPROL-XL  Take 1 tablet (50 mg total) by mouth 2 (two) times daily.     multivitamin with folic acid 400 mcg Tab  Take 1 tablet by mouth once daily.     potassium chloride 10 MEQ Tbsr  Commonly known as: KLOR-CON  Take 1 tablet by mouth once daily.     predniSONE 5 MG tablet  Commonly known as: DELTASONE  Take 10 mg by mouth once daily.     TRELEGY ELLIPTA 100-62.5-25 mcg Dsdv  Generic drug: fluticasone-umeclidin-vilanter  Inhale 1 puff into the lungs Daily.              Indwelling Lines/Drains at time of discharge:   Lines/Drains/Airways       Drain  Duration                  NG/OG Tube 01/10/24 1028 nasogastric;Marinette sump 16 Fr. Right nostril 2 days                    Time spent on the discharge of patient:  over 30  minutes         Srini Vu MD  Department of Hospital Medicine  [facility]

## 2024-01-12 NOTE — SUBJECTIVE & OBJECTIVE
Past Medical History:   Diagnosis Date    A-fib 05/08/2023    Arthritis     Chronic hypoxemic respiratory failure 11/09/2023    Chronic right heart failure 11/09/2023    GERD (gastroesophageal reflux disease)     History of HCV, s/p successful treatment w/ SVR12 5/2015     Failed treatment (stage I/II) - followed by hepatology     Joint pain     Lung cancer     Lung disease caused by breathing particles     Widespread essentially symmetric interstitial lung disease    Obesity     On home oxygen therapy     Polycythemia vera     Prediabetes        Past Surgical History:   Procedure Laterality Date    ABLATION, ATRIAL FLUTTER, TYPICAL N/A 9/29/2023    Procedure: Ablation, Atrial Flutter, Typical;  Surgeon: Jonh Mcgill MD;  Location: [facility] EP LAB;  Service: Cardiology;  Laterality: N/A;  AFL, ELENA (Cx if SR), CTI, RFA, BETHANY, MAC, DM, 3 Prep    BUNIONECTOMY      bilateral    CARDIOVERSION N/A 05/08/2023    Procedure: Cardioversion;  Surgeon: David Cueva MD;  Location: [facility] CATH LAB;  Service: Cardiology;  Laterality: N/A;    CATARACT EXTRACTION Left 07/27/2023    COLONOSCOPY N/A 12/09/2015    Procedure: COLONOSCOPY;  Surgeon: Conrad Iqbal MD;  Location: [facility] ENDO;  Service: Endoscopy;  Laterality: N/A;    Liver biopsy x2      rotator cuff Right     TRANSESOPHAGEAL ECHOCARDIOGRAM WITH POSSIBLE CARDIOVERSION (ELENA W/ POSS CARDIOVERSION) N/A 05/08/2023    Procedure: TRANSESOPHAGEAL ECHOCARDIOGRAM WITH POSSIBLE CARDIOVERSION (ELENA W/ POSS CARDIOVERSION);  Surgeon: David Cueva MD;  Location: [facility] CATH LAB;  Service: Cardiology;  Laterality: N/A;  12:30PM    RN PREOP 5/4/2023---EKG ON ARRIVAL    TRANSESOPHAGEAL ECHOCARDIOGRAM WITH POSSIBLE CARDIOVERSION (ELENA W/ POSS CARDIOVERSION) N/A 7/29/2023    Procedure: Transesophageal echo (ELENA) intra-procedure log documentation;  Surgeon: David Cueva MD;  Location: [facility] CATH LAB;  Service: Cardiology;  Laterality: N/A;    TRANSESOPHAGEAL ECHOCARDIOGRAPHY N/A 05/08/2023     Procedure: ECHOCARDIOGRAM, TRANSESOPHAGEAL;  Surgeon: David Cueva MD;  Location: [facility] CATH LAB;  Service: Cardiology;  Laterality: N/A;    TRANSESOPHAGEAL ECHOCARDIOGRAPHY N/A 7/29/2023    Procedure: ECHOCARDIOGRAM, TRANSESOPHAGEAL;  Surgeon: David Cueva MD;  Location: [facility] CATH LAB;  Service: Cardiology;  Laterality: N/A;    TREATMENT OF CARDIAC ARRHYTHMIA N/A 8/7/2023    Procedure: Cardioversion or Defibrillation;  Surgeon: David Cueva MD;  Location: [facility] CATH LAB;  Service: Cardiology;  Laterality: N/A;       Review of patient's allergies indicates:   Allergen Reactions    Ace inhibitors Other (See Comments)     cough       No current facility-administered medications on file prior to encounter.     Current Outpatient Medications on File Prior to Encounter   Medication Sig    allopurinoL (ZYLOPRIM) 100 MG tablet Take 2 tablets (200 mg total) by mouth once daily. (Patient taking differently: Take 100 mg by mouth once daily.)    fluticasone propionate (FLONASE) 50 mcg/actuation nasal spray SHAKE LIQUID AND USE 1 SPRAY(50 MCG) IN EACH NOSTRIL EVERY DAY    furosemide (LASIX) 20 MG tablet Take 1 tablet (20 mg total) by mouth 2 (two) times daily as needed (1-2 pills as needed for fluid overload). (Patient taking differently: Take 40 mg by mouth 2 (two) times daily as needed (1-2 pills as needed for fluid overload).)    metoprolol succinate (TOPROL-XL) 50 MG 24 hr tablet Take 1 tablet (50 mg total) by mouth 2 (two) times daily.    multivitamin with folic acid 400 mcg Tab Take 1 tablet by mouth once daily.    potassium chloride (KLOR-CON) 10 MEQ TbSR Take 1 tablet by mouth once daily.    predniSONE (DELTASONE) 5 MG tablet Take 10 mg by mouth once daily.    TRELEGY ELLIPTA 100-62.5-25 mcg DsDv Inhale 1 puff into the lungs Daily.     Family History       Problem Relation (Age of Onset)    Chronic back pain Brother    Deep vein thrombosis Sister    Heart attack Sister    Heart disease Mother    Kidney  disease Mother    Osteoarthritis Sister    Parkinsonism Father    Prostate cancer Cousin    Pulmonary embolism Sister          Tobacco Use    Smoking status: Former     Current packs/day: 1.00     Average packs/day: 1 pack/day for 30.2 years (30.2 ttl pk-yrs)     Types: Cigarettes     Start date: 11/9/1993    Smokeless tobacco: Never    Tobacco comments:     pt. smokes 10 cigars per day.   Substance and Sexual Activity    Alcohol use: Not Currently     Comment: Rarely    Drug use: Yes     Types: Marijuana     Comment: daily    Sexual activity: Yes     Partners: Female     Review of Systems   Constitutional:  Negative for chills, fatigue and fever.   Eyes:  Negative for visual disturbance.   Respiratory:  Positive for shortness of breath (chronic, on 5L NC at home). Negative for cough, chest tightness and wheezing.    Cardiovascular:  Negative for chest pain, palpitations and leg swelling.   Gastrointestinal:  Positive for abdominal distention, abdominal pain, constipation, nausea and vomiting. Negative for blood in stool and diarrhea.   Genitourinary:  Negative for difficulty urinating and dysuria.   Musculoskeletal:  Negative for back pain.   Skin:  Negative for wound.   Neurological:  Negative for dizziness, weakness and headaches.   Psychiatric/Behavioral:  Negative for confusion and hallucinations.      Objective:     Vital Signs (Most Recent):  Temp: 97.6 °F (36.4 °C) (01/12/24 0744)  Pulse: (!) 112 (01/12/24 0744)  Resp: 20 (01/12/24 0744)  BP: 133/80 (01/12/24 0744)  SpO2: 95 % (01/12/24 0744) Vital Signs (24h Range):  Temp:  [97.4 °F (36.3 °C)-98.4 °F (36.9 °C)] 97.6 °F (36.4 °C)  Pulse:  [] 112  Resp:  [18-20] 20  SpO2:  [94 %-99 %] 95 %  BP: (116-133)/(59-80) 133/80     Weight: 89.2 kg (196 lb 10.4 oz)  Body mass index is 23.32 kg/m².     Physical Exam  Vitals and nursing note reviewed.   Constitutional:       General: He is not in acute distress.     Appearance: He is ill-appearing (chronically ill  "appearing).   HENT:      Head: Atraumatic.      Nose:      Comments: +NGT     Mouth/Throat:      Mouth: Mucous membranes are dry.   Eyes:      Extraocular Movements: Extraocular movements intact.   Cardiovascular:      Rate and Rhythm: Normal rate and regular rhythm.   Pulmonary:      Effort: No respiratory distress.      Breath sounds: Decreased air movement present. Examination of the right-lower field reveals decreased breath sounds. Examination of the left-lower field reveals decreased breath sounds. Decreased breath sounds present. No wheezing.      Comments: On 5 L NC  Abdominal:      General: There is distension.      Palpations: Abdomen is soft.      Tenderness: There is no abdominal tenderness.      Comments: +hypoactive bowel sounds   Musculoskeletal:         General: No swelling or tenderness.      Right lower leg: No edema.      Left lower leg: No edema.      Comments: Clubbing nails   Skin:     General: Skin is warm and dry.   Neurological:      General: No focal deficit present.      Mental Status: He is alert and oriented to person, place, and time.   Psychiatric:         Mood and Affect: Mood normal.         Thought Content: Thought content normal.                Significant Labs: All pertinent labs within the past 24 hours have been reviewed.    CBC:   Recent Labs   Lab 01/11/24  0507 01/12/24  0301   WBC 6.07 5.32   HGB 13.5* 12.8*   HCT 47.4 45.6    243       CMP:   Recent Labs   Lab 01/11/24  0507 01/12/24  0301    147*   K 4.3 4.1    105   CO2 33* 32*   * 117*   BUN 16 11   CREATININE 0.8 0.8   CALCIUM 9.7 9.1   PROT 7.6 7.1   ALBUMIN 2.8* 2.5*   BILITOT 1.0 1.6*   ALKPHOS 88 82   AST 21 23   ALT 20 20   ANIONGAP 7* 10       Cardiac Markers:   No results for input(s): "CKMB", "MYOGLOBIN", "BNP", "TROPISTAT" in the last 48 hours.    Lactic Acid:   No results for input(s): "LACTATE" in the last 48 hours.    Magnesium:   Recent Labs   Lab 01/11/24  0507 01/12/24  0301   MG " "2.0 2.0       Troponin:   Recent Labs   Lab 01/11/24  1811 01/12/24  0301   TROPONINI 0.006 0.029*       TSH:   Recent Labs   Lab 01/10/24  0750   TSH 0.745       Urine Studies:   No results for input(s): "COLORU", "APPEARANCEUA", "PHUR", "SPECGRAV", "PROTEINUA", "GLUCUA", "KETONESU", "BILIRUBINUA", "OCCULTUA", "NITRITE", "UROBILINOGEN", "LEUKOCYTESUR", "RBCUA", "WBCUA", "BACTERIA", "SQUAMEPITHEL", "HYALINECASTS" in the last 48 hours.    Invalid input(s): "WRIGHTSUR"      Significant Imaging: I have reviewed all pertinent imaging results/findings within the past 24 hours.    Imaging Results              XR Gastric tube check, non-radiologist performed (Final result)  Result time 01/10/24 10:34:34      Final result by Damon Alcantara III, MD (01/10/24 10:34:34)                   Narrative:    EXAMINATION:  XR GASTRIC TUBE CHECK, NON-RADIOLOGIST PERFORMED    CLINICAL HISTORY:  SBO;    FINDINGS:  NG tip fundus.  There is a chronic interstitial lung changes.  No obstruction, ileus, or perforation seen.      Electronically signed by: Damon Alcantara MD  Date:    01/10/2024  Time:    10:34                                      CTA Chest Abdomen Pelvis (Final result)  Result time 01/10/24 00:18:42      Final result by Galileo Segal MD (01/10/24 00:18:42)                   Impression:      1. Multiple pulmonary masses and nodules consistent with malignancy and known metastatic disease.  Abnormal mediastinal and hilar lymphadenopathy consistent with local spread of disease.  Possible left adrenal metastasis.  Continued outpatient follow-up is advised.  Recommend comparison with previous outside imaging.  2. Severe pulmonary emphysema and suspected chronic interstitial lung disease.  Nonspecific mild diffuse mosaic appearance of ground-glass density seen within the lungs, more pronounced in the lower lobes.  3. Fluid distention of the stomach and proximal to mid small bowel loops with air-fluid levels likely reflecting " at least partial developing small bowel obstruction.  Apparent transition point in the midline lower abdomen with no obvious mechanical cause for obstruction seen.  Small bowel loops are completely decompressed distally from this region.  4. No evidence of aortic aneurysm or dissection.  5. No evidence of PE.  6. Additional findings as detailed above.  This report was flagged in Epic as abnormal.      Electronically signed by: Galileo Segal MD  Date:    01/10/2024  Time:    00:18               Narrative:    EXAMINATION:  CTA CHEST ABDOMEN PELVIS    CLINICAL HISTORY:  r/o mesenteric ischemia but also PE/PTX as patient just admitted at  with complications;    TECHNIQUE:  CTA chest abdomen and pelvis was obtained following administration of 100 cc Omnipaque 350 IV contrast.  Sagittal and coronal reformats were obtained.    COMPARISON:  Previous outside facility CT images from 12/18/2023 are unavailable for review.    FINDINGS:  Examination was performed in a STAT emergency setting and will not serve as an official restaging exam.    Multiple pulmonary masses and nodules are visualized.  Largest spiculated mass measures approximately 5.5 cm in the right upper lobe.  There is mediastinal and hilar lymphadenopathy consistent with local spread of disease.  Severe emphysematous changes are seen with additional suspected chronic interstitial lung disease.  Mild diffuse mosaic appearance of ground-glass density is seen throughout the lungs, more pronounced within the lower lobes.  No pleural effusion or pneumothorax.    Heart is normal in size with no significant pericardial effusion.  No significant abnormalities are seen along the esophageal course.  No evidence of aortic aneurysm or dissection.  Branch vessels of the aortic arch are patent.  Pulmonary arteries are well opacified with no evidence of PE.    No significant focal hepatic abnormalities are identified.  There is no intra-or extrahepatic biliary ductal  dilatation.  The gallbladder is unremarkable.  Stomach is significantly distended with fluid.  Spleen, pancreas, and right adrenal gland are unremarkable.  There is asymmetric abnormal left adrenal thickening with questionable underlying mass lesion.    Kidneys enhance normally with no evidence of hydronephrosis.  No abnormalities are seen along the ureteral courses.  Urinary bladder is nondistended.  Dystrophic calcifications are seen within the prostate.    Appendix is visualized and is unremarkable.  There is fluid distention of proximal to mid small bowel loops with air-fluid levels measuring upwards of 3.5 cm in caliber likely reflecting at least partial developing small bowel obstruction.  Apparent transition point is seen in the midline lower abdomen.  No definite mechanical cause for obstruction is seen.  Small bowel loops are decompressed distal to this region.  No significant bowel wall thickening or surrounding inflammatory changes seen.  No free air or free fluid.    Aorta tapers normally.  Abdominal aortic branch vessels are patent.    No acute osseous abnormality identified. Subcutaneous soft tissues show no significant abnormalities.                                       X-Ray Chest PA And Lateral (Final result)  Result time 01/09/24 22:18:05      Final result by Gray Romero MD (01/09/24 22:18:05)                   Impression:      Diffuse increased interstitial changes seen throughout the lungs, similar compared to prior.    Persistent focal area of masslike consolidation versus mass lesion in the right upper lobe, similar compared to prior.  Neoplasm not excluded.  See prior report for recommendations.      Electronically signed by: Gray Romero MD  Date:    01/09/2024  Time:    22:18               Narrative:    EXAMINATION:  XR CHEST PA AND LATERAL    CLINICAL HISTORY:  Tachycardia, unspecified    TECHNIQUE:  PA and lateral views of the chest were  performed.    COMPARISON:  11/09/2023.    FINDINGS:  Diffuse increased interstitial changes seen throughout the lungs, similar compared to prior.  Focal area of masslike consolidation versus mass lesion in the right upper lobe, similar compared to prior.    No pleural effusion.  No pneumothorax.    Cardiomediastinal silhouette is similar to prior.    Regional osseous structures are similar to prior.

## 2024-01-12 NOTE — CONSULTS
[facility] Surg  Pulmonology  Consult Note    Patient Name: Josiah Orosco Jr.  MRN: [id number]  Admission Date: 1/9/2024  Hospital Length of Stay: 1 days  Code Status: Full Code  Attending Physician: Srini Vu, *  Primary Care Provider: Elaine Landry MD   Principal Problem: Small bowel obstruction    Inpatient consult to Pulmonology  Consult performed by: Brant Michaels MD  Consult ordered by: Srini Vu MD        Subjective:     HPI:  68-year-old male with chronic hypoxic respiratory failure on 5 L home O2 2/2 emphysema and fibrotic ILD complicated by pulmonary HTN (PASP 74), a flutter s/p ablation 2023 no longer anticoagulated, polycythemia secondary lung disease, and recently diagnosed lung adenocarcinoma presents with nausea vomiting and abdominal pain for about 1 day.  Also reporting liquid stool for about 1 week.  Recently admitted to [facility] where he underwent transthoracic biopsy of newly discovered lung masses.  Pathology showing adenocarcinoma based on chart review (can not see pack) but has not had the chance to follow-up with Pulmonary or oncology    Past Medical History:   Diagnosis Date    A-fib 05/08/2023    Arthritis     Chronic hypoxemic respiratory failure 11/09/2023    Chronic right heart failure 11/09/2023    GERD (gastroesophageal reflux disease)     History of HCV, s/p successful treatment w/ SVR12 5/2015     Failed treatment (stage I/II) - followed by hepatology     Joint pain     Lung cancer     Lung disease caused by breathing particles     Widespread essentially symmetric interstitial lung disease    Obesity     On home oxygen therapy     Polycythemia vera     Prediabetes        Past Surgical History:   Procedure Laterality Date    ABLATION, ATRIAL FLUTTER, TYPICAL N/A 9/29/2023    Procedure: Ablation, Atrial Flutter, Typical;  Surgeon: Jonh Mcgill MD;  Location: [facility];  Service: Cardiology;  Laterality: N/A;  AFL, ELENA (Cx if SR), CTI, RFA,  BETHANY, MAC, DM, 3 Prep    BUNIONECTOMY      bilateral    CARDIOVERSION N/A 05/08/2023    Procedure: Cardioversion;  Surgeon: David Cueva MD;  Location: [facility] CATH LAB;  Service: Cardiology;  Laterality: N/A;    CATARACT EXTRACTION Left 07/27/2023    COLONOSCOPY N/A 12/09/2015    Procedure: COLONOSCOPY;  Surgeon: Conrad Iqbal MD;  Location: [facility] ENDO;  Service: Endoscopy;  Laterality: N/A;    Liver biopsy x2      rotator cuff Right     TRANSESOPHAGEAL ECHOCARDIOGRAM WITH POSSIBLE CARDIOVERSION (ELENA W/ POSS CARDIOVERSION) N/A 05/08/2023    Procedure: TRANSESOPHAGEAL ECHOCARDIOGRAM WITH POSSIBLE CARDIOVERSION (ELENA W/ POSS CARDIOVERSION);  Surgeon: David Cueva MD;  Location: [facility] CATH LAB;  Service: Cardiology;  Laterality: N/A;  12:30PM    RN PREOP 5/4/2023---EKG ON ARRIVAL    TRANSESOPHAGEAL ECHOCARDIOGRAM WITH POSSIBLE CARDIOVERSION (ELENA W/ POSS CARDIOVERSION) N/A 7/29/2023    Procedure: Transesophageal echo (ELENA) intra-procedure log documentation;  Surgeon: David Cueva MD;  Location: [facility] CATH LAB;  Service: Cardiology;  Laterality: N/A;    TRANSESOPHAGEAL ECHOCARDIOGRAPHY N/A 05/08/2023    Procedure: ECHOCARDIOGRAM, TRANSESOPHAGEAL;  Surgeon: David Cueva MD;  Location: [facility] CATH LAB;  Service: Cardiology;  Laterality: N/A;    TRANSESOPHAGEAL ECHOCARDIOGRAPHY N/A 7/29/2023    Procedure: ECHOCARDIOGRAM, TRANSESOPHAGEAL;  Surgeon: David Cueva MD;  Location: [facility] CATH LAB;  Service: Cardiology;  Laterality: N/A;    TREATMENT OF CARDIAC ARRHYTHMIA N/A 8/7/2023    Procedure: Cardioversion or Defibrillation;  Surgeon: David Cueva MD;  Location: [facility] CATH LAB;  Service: Cardiology;  Laterality: N/A;       Review of patient's allergies indicates:   Allergen Reactions    Ace inhibitors Other (See Comments)     cough       Family History       Problem Relation (Age of Onset)    Chronic back pain Brother    Deep vein thrombosis Sister    Heart attack Sister    Heart disease Mother    Kidney disease  Mother    Osteoarthritis Sister    Parkinsonism Father    Prostate cancer Cousin    Pulmonary embolism Sister          Tobacco Use    Smoking status: Former     Current packs/day: 1.00     Average packs/day: 1 pack/day for 30.2 years (30.2 ttl pk-yrs)     Types: Cigarettes     Start date: 11/9/1993    Smokeless tobacco: Never    Tobacco comments:     pt. smokes 10 cigars per day.   Substance and Sexual Activity    Alcohol use: Not Currently     Comment: Rarely    Drug use: Yes     Types: Marijuana     Comment: daily    Sexual activity: Yes     Partners: Female         Objective:     Vital Signs (Most Recent):  Temp: 97.8 °F (36.6 °C) (01/11/24 1737)  Pulse: 87 (01/11/24 1737)  Resp: 20 (01/11/24 1737)  BP: 117/71 (01/11/24 1737)  SpO2: (!) 94 % (01/11/24 1737) Vital Signs (24h Range):  Temp:  [97.5 °F (36.4 °C)-98.8 °F (37.1 °C)] 97.8 °F (36.6 °C)  Pulse:  [] 87  Resp:  [18-20] 20  SpO2:  [94 %-98 %] 94 %  BP: (104-123)/(59-81) 117/71     Weight: 89.2 kg (196 lb 10.4 oz)  Body mass index is 23.32 kg/m².      Intake/Output Summary (Last 24 hours) at 1/11/2024 1806  Last data filed at 1/11/2024 1742  Gross per 24 hour   Intake 804.26 ml   Output 750 ml   Net 54.26 ml        Physical Exam  Vitals and nursing note reviewed.   Constitutional:       General: He is not in acute distress.     Appearance: He is ill-appearing. He is not diaphoretic.      Comments: Appears uncomfortable   HENT:      Nose:      Comments: NGT     Mouth/Throat:      Comments: Oxygen mask  Eyes:      General: No scleral icterus.     Extraocular Movements: Extraocular movements intact.   Cardiovascular:      Rate and Rhythm: Tachycardia present.   Pulmonary:      Effort: Tachypnea present. No accessory muscle usage or retractions.   Abdominal:      General: There is no distension.   Skin:     General: Skin is warm and dry.      Coloration: Skin is not jaundiced.      Findings: No rash.   Neurological:      General: No focal deficit present.  "     Mental Status: Mental status is at baseline.          Vents:       Lines/Drains/Airways       Drain  Duration                  NG/OG Tube 01/10/24 1028 nasogastric;Sammy sump 16 Fr. Right nostril 1 day              Peripheral Intravenous Line  Duration                  Peripheral IV - Single Lumen 01/09/24 2138 20 G Anterior;Right Forearm 1 day                    Significant Labs:    CBC/Anemia Profile:  Recent Labs   Lab 01/09/24 2139 01/11/24  0507   WBC 7.80 6.07   HGB 14.7 13.5*   HCT 49.3 47.4    270   MCV 66* 68*   RDW 22.2* 21.7*        Chemistries:  Recent Labs   Lab 01/09/24  2139 01/10/24  0750 01/11/24  0507    143 141   K 4.6 4.7 4.3   CL 97 96 101   CO2 28 36* 33*   BUN 11 14 16   CREATININE 0.8 0.9 0.8   CALCIUM 10.4 10.1 9.7   ALBUMIN 3.2* 3.0* 2.8*   PROT 8.7* 8.4 7.6   BILITOT 0.8 0.8 1.0   ALKPHOS 109 92 88   ALT 29 24 20   AST 32 25 21   MG  --  2.0 2.0   PHOS  --  5.5* 4.2       All pertinent labs within the past 24 hours have been reviewed.    Significant Imaging:   I have reviewed all pertinent imaging results/findings within the past 24 hours.    ABG  No results for input(s): "PH", "PO2", "PCO2", "HCO3", "BE" in the last 168 hours.  Assessment/Plan:     Pulmonary  Chronic hypoxemic respiratory failure  CT chest with scattered fibrosis and emphysematous changes.  Most recent PFTs remarkably unimpressive, except severely low DLCO on 5 L home O2.  Suspected occupational exposure history.  Progressive phenotype disease.  Now complicated by newly diagnosed lung adenocarcinoma with several large masses, largest 5.5 cm.  Hilar and mediastinal adenopathy.  At least radiographic stage 3A/3B.  Very poor prognosis overall.  Agree with palliative care consult.  Recommend Oncology consult.  Spent a good amount of time discussing expectations with chemo/radiation and quality vs quantity of time.  Does not appear to have obstruction on PFTs, so no indication for empiric steroids.  Not many " modifiable factors.  Duo nebs q.4 PRN as reasonable.  Suspect his GI issues distention or creating some degree of dyspnea for him.  At this stage in his disease, as well as the new diagnosis of cancer, would recommend discussing with palliative Care inpatient the option of opioids for air hunger.  SpO2 goal greater 88%.    ILD (interstitial lung disease)  Scattered emphysema with fibrosis and GGOs.  Rheumatologic workup unremarkable.  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  See the section on chronic respiratory    Oncology  Lung cancer  Recently diagnosed via transthoracic biopsy.  Lung adenocarcinoma.  Can not see receptors or markers..  Appears to be radiographically stage 3A/3B.  Oncology consult and palliative Care as noted.          Thank you for your consult. I will follow-up with patient. Please contact us if you have any additional questions.     Brant Michaels MD  Pulmonology  [facility] Surg

## 2024-01-12 NOTE — SUBJECTIVE & OBJECTIVE
Interval History:  Patient chest pain-free.  States passing gas now.  in sinus rhythm      Past Medical History:   Diagnosis Date    A-fib 05/08/2023    Arthritis     Chronic hypoxemic respiratory failure 11/09/2023    Chronic right heart failure 11/09/2023    GERD (gastroesophageal reflux disease)     History of HCV, s/p successful treatment w/ SVR12 5/2015     Failed treatment (stage I/II) - followed by hepatology     Joint pain     Lung cancer     Lung disease caused by breathing particles     Widespread essentially symmetric interstitial lung disease    Obesity     On home oxygen therapy     Polycythemia vera     Prediabetes        Past Surgical History:   Procedure Laterality Date    ABLATION, ATRIAL FLUTTER, TYPICAL N/A 9/29/2023    Procedure: Ablation, Atrial Flutter, Typical;  Surgeon: Jonh Mcgill MD;  Location: [facility] EP LAB;  Service: Cardiology;  Laterality: N/A;  AFL, ELENA (Cx if SR), CTI, RFA, BETHANY, MAC, DM, 3 Prep    BUNIONECTOMY      bilateral    CARDIOVERSION N/A 05/08/2023    Procedure: Cardioversion;  Surgeon: David Cueva MD;  Location: [facility] CATH LAB;  Service: Cardiology;  Laterality: N/A;    CATARACT EXTRACTION Left 07/27/2023    COLONOSCOPY N/A 12/09/2015    Procedure: COLONOSCOPY;  Surgeon: Conrad Iqbal MD;  Location: [facility] ENDO;  Service: Endoscopy;  Laterality: N/A;    Liver biopsy x2      rotator cuff Right     TRANSESOPHAGEAL ECHOCARDIOGRAM WITH POSSIBLE CARDIOVERSION (ELENA W/ POSS CARDIOVERSION) N/A 05/08/2023    Procedure: TRANSESOPHAGEAL ECHOCARDIOGRAM WITH POSSIBLE CARDIOVERSION (ELENA W/ POSS CARDIOVERSION);  Surgeon: David Cueva MD;  Location: [facility] CATH LAB;  Service: Cardiology;  Laterality: N/A;  12:30PM    RN PREOP 5/4/2023---EKG ON ARRIVAL    TRANSESOPHAGEAL ECHOCARDIOGRAM WITH POSSIBLE CARDIOVERSION (ELENA W/ POSS CARDIOVERSION) N/A 7/29/2023    Procedure: Transesophageal echo (ELENA) intra-procedure log documentation;  Surgeon: David Cueva MD;  Location: [facility] CATH LAB;   Service: Cardiology;  Laterality: N/A;    TRANSESOPHAGEAL ECHOCARDIOGRAPHY N/A 05/08/2023    Procedure: ECHOCARDIOGRAM, TRANSESOPHAGEAL;  Surgeon: David Cueva MD;  Location: [facility] CATH LAB;  Service: Cardiology;  Laterality: N/A;    TRANSESOPHAGEAL ECHOCARDIOGRAPHY N/A 7/29/2023    Procedure: ECHOCARDIOGRAM, TRANSESOPHAGEAL;  Surgeon: David Cueva MD;  Location: [facility] CATH LAB;  Service: Cardiology;  Laterality: N/A;    TREATMENT OF CARDIAC ARRHYTHMIA N/A 8/7/2023    Procedure: Cardioversion or Defibrillation;  Surgeon: David Cueva MD;  Location: [facility] CATH LAB;  Service: Cardiology;  Laterality: N/A;       Review of patient's allergies indicates:   Allergen Reactions    Ace inhibitors Other (See Comments)     cough       No current facility-administered medications on file prior to encounter.     Current Outpatient Medications on File Prior to Encounter   Medication Sig    allopurinoL (ZYLOPRIM) 100 MG tablet Take 2 tablets (200 mg total) by mouth once daily. (Patient taking differently: Take 100 mg by mouth once daily.)    fluticasone propionate (FLONASE) 50 mcg/actuation nasal spray SHAKE LIQUID AND USE 1 SPRAY(50 MCG) IN EACH NOSTRIL EVERY DAY    furosemide (LASIX) 20 MG tablet Take 1 tablet (20 mg total) by mouth 2 (two) times daily as needed (1-2 pills as needed for fluid overload). (Patient taking differently: Take 40 mg by mouth 2 (two) times daily as needed (1-2 pills as needed for fluid overload).)    metoprolol succinate (TOPROL-XL) 50 MG 24 hr tablet Take 1 tablet (50 mg total) by mouth 2 (two) times daily.    multivitamin with folic acid 400 mcg Tab Take 1 tablet by mouth once daily.    potassium chloride (KLOR-CON) 10 MEQ TbSR Take 1 tablet by mouth once daily.    predniSONE (DELTASONE) 5 MG tablet Take 10 mg by mouth once daily.    TRELEGY ELLIPTA 100-62.5-25 mcg DsDv Inhale 1 puff into the lungs Daily.     Family History       Problem Relation (Age of Onset)    Chronic back pain Brother     Deep vein thrombosis Sister    Heart attack Sister    Heart disease Mother    Kidney disease Mother    Osteoarthritis Sister    Parkinsonism Father    Prostate cancer Cousin    Pulmonary embolism Sister          Tobacco Use    Smoking status: Former     Current packs/day: 1.00     Average packs/day: 1 pack/day for 30.2 years (30.2 ttl pk-yrs)     Types: Cigarettes     Start date: 11/9/1993    Smokeless tobacco: Never    Tobacco comments:     pt. smokes 10 cigars per day.   Substance and Sexual Activity    Alcohol use: Not Currently     Comment: Rarely    Drug use: Yes     Types: Marijuana     Comment: daily    Sexual activity: Yes     Partners: Female     Review of Systems   Constitutional: Positive for malaise/fatigue.   HENT: Negative.     Eyes: Negative.    Cardiovascular: Negative.    Respiratory: Negative.     Endocrine: Negative.    Hematologic/Lymphatic: Negative.    Skin: Negative.    Musculoskeletal: Negative.    Gastrointestinal: Negative.    Genitourinary: Negative.    Neurological: Negative.    Psychiatric/Behavioral: Negative.     Allergic/Immunologic: Negative.      Objective:     Vital Signs (Most Recent):  Temp: 97.9 °F (36.6 °C) (01/11/24 1935)  Pulse: (!) 116 (01/11/24 1935)  Resp: 20 (01/11/24 1935)  BP: 132/60 (01/11/24 1935)  SpO2: 99 % (01/11/24 1935) Vital Signs (24h Range):  Temp:  [97.5 °F (36.4 °C)-98.8 °F (37.1 °C)] 97.9 °F (36.6 °C)  Pulse:  [] 116  Resp:  [18-20] 20  SpO2:  [94 %-99 %] 99 %  BP: (104-132)/(59-81) 132/60     Weight: 89.2 kg (196 lb 10.4 oz)  Body mass index is 23.32 kg/m².    SpO2: 99 %         Intake/Output Summary (Last 24 hours) at 1/11/2024 2101  Last data filed at 1/11/2024 1905  Gross per 24 hour   Intake 0 ml   Output 2400 ml   Net -2400 ml         Lines/Drains/Airways       Drain  Duration                  NG/OG Tube 01/10/24 1028 nasogastric;[place] sump 16 Fr. Right nostril 1 day              Peripheral Intravenous Line  Duration                  Peripheral  "IV - Single Lumen 01/09/24 2138 20 G Anterior;Right Forearm 1 day                     Physical Exam  Vitals reviewed.   Constitutional:       Appearance: He is well-developed.   HENT:      Head: Normocephalic.   Eyes:      Conjunctiva/sclera: Conjunctivae normal.      Pupils: Pupils are equal, round, and reactive to light.   Cardiovascular:      Rate and Rhythm: Regular rhythm. Tachycardia present.      Heart sounds: Normal heart sounds.   Pulmonary:      Effort: Pulmonary effort is normal.      Breath sounds: Normal breath sounds.   Abdominal:      General: Bowel sounds are normal.      Palpations: Abdomen is soft.   Musculoskeletal:      Cervical back: Normal range of motion and neck supple.   Skin:     General: Skin is warm.   Neurological:      Mental Status: He is alert and oriented to person, place, and time.          Significant Labs: BMP:   Recent Labs   Lab 01/09/24  2139 01/10/24  0750 01/11/24  0507   * 124* 138*    143 141   K 4.6 4.7 4.3   CL 97 96 101   CO2 28 36* 33*   BUN 11 14 16   CREATININE 0.8 0.9 0.8   CALCIUM 10.4 10.1 9.7   MG  --  2.0 2.0     , CMP   Recent Labs   Lab 01/09/24  2139 01/10/24  0750 01/11/24  0507    143 141   K 4.6 4.7 4.3   CL 97 96 101   CO2 28 36* 33*   * 124* 138*   BUN 11 14 16   CREATININE 0.8 0.9 0.8   CALCIUM 10.4 10.1 9.7   PROT 8.7* 8.4 7.6   ALBUMIN 3.2* 3.0* 2.8*   BILITOT 0.8 0.8 1.0   ALKPHOS 109 92 88   AST 32 25 21   ALT 29 24 20   ANIONGAP 16 11 7*     , CBC   Recent Labs   Lab 01/09/24 2139 01/11/24  0507   WBC 7.80 6.07   HGB 14.7 13.5*   HCT 49.3 47.4    270     , INR   Recent Labs   Lab 01/09/24 2139   INR 1.1     , Lipid Panel No results for input(s): "CHOL", "HDL", "LDLCALC", "TRIG", "CHOLHDL" in the last 48 hours., Troponin   Recent Labs   Lab 01/09/24 2139 01/11/24  1811   TROPONINI 0.023 0.006     , and All pertinent lab results from the last 24 hours have been reviewed.    Significant Imaging: Echocardiogram: " Transthoracic echo (TTE) complete (Cupid Only):   Results for orders placed or performed during the hospital encounter of 01/05/24   Echo   Result Value Ref Range    BSA 2.25 m2    LVOT stroke volume 70.56 cm3    LVIDd 4.33 3.5 - 6.0 cm    LV Systolic Volume 26.72 mL    LV Systolic Volume Index 11.8 mL/m2    LVIDs 2.69 2.1 - 4.0 cm    LV Diastolic Volume 84.31 mL    LV Diastolic Volume Index 37.31 mL/m2    IVS 1.22 (A) 0.6 - 1.1 cm    LVOT diameter 2.32 cm    LVOT area 4.2 cm2    FS 38 28 - 44 %    Left Ventricle Relative Wall Thickness 0.56 cm    Posterior Wall 1.21 (A) 0.6 - 1.1 cm    LV mass 190.07 g    LV Mass Index 84 g/m2    MV Peak E Delio 0.74 m/s    TDI LATERAL 0.11 m/s    TDI SEPTAL 0.08 m/s    E/E' ratio 7.79 m/s    MV Peak A Delio 0.68 m/s    TR Max Delio 4.22 m/s    E/A ratio 1.09     IVRT 97.05 msec    E wave deceleration time 193.64 msec    LV SEPTAL E/E' RATIO 9.25 m/s    LV LATERAL E/E' RATIO 6.73 m/s    LVOT peak delio 0.93 m/s    Left Ventricular Outflow Tract Mean Velocity 0.73 cm/s    Left Ventricular Outflow Tract Mean Gradient 2.31 mmHg    RVDD 5.41 cm    RV S' 12.92 cm/s    TAPSE 1.85 cm    LA size 4.00 cm    Left Atrium Minor Axis 5.47 cm    Left Atrium Major Axis 5.62 cm    RA Major Axis 6.05 cm    AV mean gradient 3 mmHg    AV peak gradient 4 mmHg    Ao peak delio 1.04 m/s    Ao VTI 18.60 cm    LVOT peak VTI 16.70 cm    AV valve area 3.79 cm²    AV Velocity Ratio 0.89     AV index (prosthetic) 0.90     LIBAN by Velocity Ratio 3.78 cm²    MV stenosis pressure 1/2 time 56.16 ms    MV valve area p 1/2 method 3.92 cm2    TV peak gradient 3 mmHg    Triscuspid Valve Regurgitation Peak Gradient 71 mmHg    PV PEAK VELOCITY 0.89 m/s    PV peak gradient 3 mmHg    Sinus 3.77 cm    STJ 3.11 cm    Ascending aorta 3.50 cm    IVC diameter 1.73 cm    Mean e' 0.10 m/s    ZLVIDS -4.93     ZLVIDD -6.50     LA Volume Index 33.4 mL/m2    LA volume 75.40 cm3    LA WIDTH 4.0 cm    RA Width 4.9 cm    TV resting pulmonary  artery pressure 74 mmHg    RV TB RVSP 7 mmHg    Est. RA pres 3 mmHg    Narrative      Left Ventricle: The left ventricle is normal in size. Mildly increased   wall thickness. There is concentric remodeling. Normal wall motion. Septal   flattening in diastole and systole consistent with right ventricular   volume and pressure overload. There is normal systolic function with a   visually estimated ejection fraction of 55 - 60%.    Right Ventricle: Severe right ventricular enlargement. Systolic   function is reduced.    Left Atrium: Left atrium is moderately dilated.    Right Atrium: Right atrium is severely dilated.    Mitral Valve: There is mild regurgitation.    Tricuspid Valve: There is mild regurgitation.    Pulmonary Artery: There is severe pulmonary hypertension. The estimated   pulmonary artery systolic pressure is 74 mmHg.    IVC/SVC: Normal venous pressure at 3 mmHg.

## 2024-01-12 NOTE — PROGRESS NOTES
[facility] Surg  Cardiology  Progress Note    Patient Name: Josiah Orosco Jr.  MRN: [id number]  Admission Date: 1/9/2024  Hospital Length of Stay: 1 days  Code Status: Full Code   Attending Physician: Srini Vu, *   Primary Care Physician: Elaine Landry MD  Expected Discharge Date:   Principal Problem:Small bowel obstruction    Subjective:       Interval History:  Patient chest pain-free.  States passing gas now.  in sinus rhythm      Past Medical History:   Diagnosis Date    A-fib 05/08/2023    Arthritis     Chronic hypoxemic respiratory failure 11/09/2023    Chronic right heart failure 11/09/2023    GERD (gastroesophageal reflux disease)     History of HCV, s/p successful treatment w/ SVR12 5/2015     Failed treatment (stage I/II) - followed by hepatology     Joint pain     Lung cancer     Lung disease caused by breathing particles     Widespread essentially symmetric interstitial lung disease    Obesity     On home oxygen therapy     Polycythemia vera     Prediabetes        Past Surgical History:   Procedure Laterality Date    ABLATION, ATRIAL FLUTTER, TYPICAL N/A 9/29/2023    Procedure: Ablation, Atrial Flutter, Typical;  Surgeon: Jonh Mcgill MD;  Location: [facility] EP LAB;  Service: Cardiology;  Laterality: N/A;  AFL, ELENA (Cx if SR), CTI, RFA, BETHANY, MAC, DM, 3 Prep    BUNIONECTOMY      bilateral    CARDIOVERSION N/A 05/08/2023    Procedure: Cardioversion;  Surgeon: David Cueva MD;  Location: [facility] CATH LAB;  Service: Cardiology;  Laterality: N/A;    CATARACT EXTRACTION Left 07/27/2023    COLONOSCOPY N/A 12/09/2015    Procedure: COLONOSCOPY;  Surgeon: Conrad Iqbal MD;  Location: [facility] ENDO;  Service: Endoscopy;  Laterality: N/A;    Liver biopsy x2      rotator cuff Right     TRANSESOPHAGEAL ECHOCARDIOGRAM WITH POSSIBLE CARDIOVERSION (ELENA W/ POSS CARDIOVERSION) N/A 05/08/2023    Procedure: TRANSESOPHAGEAL ECHOCARDIOGRAM WITH POSSIBLE CARDIOVERSION (ELENA W/ POSS CARDIOVERSION);  Surgeon:  David Cueva MD;  Location: [facility] CATH LAB;  Service: Cardiology;  Laterality: N/A;  12:30PM    RN PREOP 5/4/2023---EKG ON ARRIVAL    TRANSESOPHAGEAL ECHOCARDIOGRAM WITH POSSIBLE CARDIOVERSION (ELENA W/ POSS CARDIOVERSION) N/A 7/29/2023    Procedure: Transesophageal echo (ELENA) intra-procedure log documentation;  Surgeon: David Cueva MD;  Location: [facility] CATH LAB;  Service: Cardiology;  Laterality: N/A;    TRANSESOPHAGEAL ECHOCARDIOGRAPHY N/A 05/08/2023    Procedure: ECHOCARDIOGRAM, TRANSESOPHAGEAL;  Surgeon: David Cueva MD;  Location: [facility] CATH LAB;  Service: Cardiology;  Laterality: N/A;    TRANSESOPHAGEAL ECHOCARDIOGRAPHY N/A 7/29/2023    Procedure: ECHOCARDIOGRAM, TRANSESOPHAGEAL;  Surgeon: David Cueva MD;  Location: [facility] CATH LAB;  Service: Cardiology;  Laterality: N/A;    TREATMENT OF CARDIAC ARRHYTHMIA N/A 8/7/2023    Procedure: Cardioversion or Defibrillation;  Surgeon: David Cueva MD;  Location: [facility] CATH LAB;  Service: Cardiology;  Laterality: N/A;       Review of patient's allergies indicates:   Allergen Reactions    Ace inhibitors Other (See Comments)     cough       No current facility-administered medications on file prior to encounter.     Current Outpatient Medications on File Prior to Encounter   Medication Sig    allopurinoL (ZYLOPRIM) 100 MG tablet Take 2 tablets (200 mg total) by mouth once daily. (Patient taking differently: Take 100 mg by mouth once daily.)    fluticasone propionate (FLONASE) 50 mcg/actuation nasal spray SHAKE LIQUID AND USE 1 SPRAY(50 MCG) IN EACH NOSTRIL EVERY DAY    furosemide (LASIX) 20 MG tablet Take 1 tablet (20 mg total) by mouth 2 (two) times daily as needed (1-2 pills as needed for fluid overload). (Patient taking differently: Take 40 mg by mouth 2 (two) times daily as needed (1-2 pills as needed for fluid overload).)    metoprolol succinate (TOPROL-XL) 50 MG 24 hr tablet Take 1 tablet (50 mg total) by mouth 2 (two) times daily.    multivitamin with  folic acid 400 mcg Tab Take 1 tablet by mouth once daily.    potassium chloride (KLOR-CON) 10 MEQ TbSR Take 1 tablet by mouth once daily.    predniSONE (DELTASONE) 5 MG tablet Take 10 mg by mouth once daily.    TRELEGY ELLIPTA 100-62.5-25 mcg DsDv Inhale 1 puff into the lungs Daily.     Family History       Problem Relation (Age of Onset)    Chronic back pain Brother    Deep vein thrombosis Sister    Heart attack Sister    Heart disease Mother    Kidney disease Mother    Osteoarthritis Sister    Parkinsonism Father    Prostate cancer Cousin    Pulmonary embolism Sister          Tobacco Use    Smoking status: Former     Current packs/day: 1.00     Average packs/day: 1 pack/day for 30.2 years (30.2 ttl pk-yrs)     Types: Cigarettes     Start date: 11/9/1993    Smokeless tobacco: Never    Tobacco comments:     pt. smokes 10 cigars per day.   Substance and Sexual Activity    Alcohol use: Not Currently     Comment: Rarely    Drug use: Yes     Types: Marijuana     Comment: daily    Sexual activity: Yes     Partners: Female     Review of Systems   Constitutional: Positive for malaise/fatigue.   HENT: Negative.     Eyes: Negative.    Cardiovascular: Negative.    Respiratory: Negative.     Endocrine: Negative.    Hematologic/Lymphatic: Negative.    Skin: Negative.    Musculoskeletal: Negative.    Gastrointestinal: Negative.    Genitourinary: Negative.    Neurological: Negative.    Psychiatric/Behavioral: Negative.     Allergic/Immunologic: Negative.      Objective:     Vital Signs (Most Recent):  Temp: 97.9 °F (36.6 °C) (01/11/24 1935)  Pulse: (!) 116 (01/11/24 1935)  Resp: 20 (01/11/24 1935)  BP: 132/60 (01/11/24 1935)  SpO2: 99 % (01/11/24 1935) Vital Signs (24h Range):  Temp:  [97.5 °F (36.4 °C)-98.8 °F (37.1 °C)] 97.9 °F (36.6 °C)  Pulse:  [] 116  Resp:  [18-20] 20  SpO2:  [94 %-99 %] 99 %  BP: (104-132)/(59-81) 132/60     Weight: 89.2 kg (196 lb 10.4 oz)  Body mass index is 23.32 kg/m².    SpO2: 99 %          Intake/Output Summary (Last 24 hours) at 1/11/2024 2101  Last data filed at 1/11/2024 1905  Gross per 24 hour   Intake 0 ml   Output 2400 ml   Net -2400 ml         Lines/Drains/Airways       Drain  Duration                  NG/OG Tube 01/10/24 1028 nasogastric;Creek sump 16 Fr. Right nostril 1 day              Peripheral Intravenous Line  Duration                  Peripheral IV - Single Lumen 01/09/24 2138 20 G Anterior;Right Forearm 1 day                     Physical Exam  Vitals reviewed.   Constitutional:       Appearance: He is well-developed.   HENT:      Head: Normocephalic.   Eyes:      Conjunctiva/sclera: Conjunctivae normal.      Pupils: Pupils are equal, round, and reactive to light.   Cardiovascular:      Rate and Rhythm: Regular rhythm. Tachycardia present.      Heart sounds: Normal heart sounds.   Pulmonary:      Effort: Pulmonary effort is normal.      Breath sounds: Normal breath sounds.   Abdominal:      General: Bowel sounds are normal.      Palpations: Abdomen is soft.   Musculoskeletal:      Cervical back: Normal range of motion and neck supple.   Skin:     General: Skin is warm.   Neurological:      Mental Status: He is alert and oriented to person, place, and time.          Significant Labs: BMP:   Recent Labs   Lab 01/09/24  2139 01/10/24  0750 01/11/24  0507   * 124* 138*    143 141   K 4.6 4.7 4.3   CL 97 96 101   CO2 28 36* 33*   BUN 11 14 16   CREATININE 0.8 0.9 0.8   CALCIUM 10.4 10.1 9.7   MG  --  2.0 2.0     , CMP   Recent Labs   Lab 01/09/24  2139 01/10/24  0750 01/11/24  0507    143 141   K 4.6 4.7 4.3   CL 97 96 101   CO2 28 36* 33*   * 124* 138*   BUN 11 14 16   CREATININE 0.8 0.9 0.8   CALCIUM 10.4 10.1 9.7   PROT 8.7* 8.4 7.6   ALBUMIN 3.2* 3.0* 2.8*   BILITOT 0.8 0.8 1.0   ALKPHOS 109 92 88   AST 32 25 21   ALT 29 24 20   ANIONGAP 16 11 7*     , CBC   Recent Labs   Lab 01/09/24 2139 01/11/24  0507   WBC 7.80 6.07   HGB 14.7 13.5*   HCT 49.3 47.4  "   270     , INR   Recent Labs   Lab 01/09/24 2139   INR 1.1     , Lipid Panel No results for input(s): "CHOL", "HDL", "LDLCALC", "TRIG", "CHOLHDL" in the last 48 hours., Troponin   Recent Labs   Lab 01/09/24 2139 01/11/24  1811   TROPONINI 0.023 0.006     , and All pertinent lab results from the last 24 hours have been reviewed.    Significant Imaging: Echocardiogram: Transthoracic echo (TTE) complete (Cupid Only):   Results for orders placed or performed during the hospital encounter of 01/05/24   Echo   Result Value Ref Range    BSA 2.25 m2    LVOT stroke volume 70.56 cm3    LVIDd 4.33 3.5 - 6.0 cm    LV Systolic Volume 26.72 mL    LV Systolic Volume Index 11.8 mL/m2    LVIDs 2.69 2.1 - 4.0 cm    LV Diastolic Volume 84.31 mL    LV Diastolic Volume Index 37.31 mL/m2    IVS 1.22 (A) 0.6 - 1.1 cm    LVOT diameter 2.32 cm    LVOT area 4.2 cm2    FS 38 28 - 44 %    Left Ventricle Relative Wall Thickness 0.56 cm    Posterior Wall 1.21 (A) 0.6 - 1.1 cm    LV mass 190.07 g    LV Mass Index 84 g/m2    MV Peak E Delio 0.74 m/s    TDI LATERAL 0.11 m/s    TDI SEPTAL 0.08 m/s    E/E' ratio 7.79 m/s    MV Peak A Delio 0.68 m/s    TR Max Delio 4.22 m/s    E/A ratio 1.09     IVRT 97.05 msec    E wave deceleration time 193.64 msec    LV SEPTAL E/E' RATIO 9.25 m/s    LV LATERAL E/E' RATIO 6.73 m/s    LVOT peak delio 0.93 m/s    Left Ventricular Outflow Tract Mean Velocity 0.73 cm/s    Left Ventricular Outflow Tract Mean Gradient 2.31 mmHg    RVDD 5.41 cm    RV S' 12.92 cm/s    TAPSE 1.85 cm    LA size 4.00 cm    Left Atrium Minor Axis 5.47 cm    Left Atrium Major Axis 5.62 cm    RA Major Axis 6.05 cm    AV mean gradient 3 mmHg    AV peak gradient 4 mmHg    Ao peak delio 1.04 m/s    Ao VTI 18.60 cm    LVOT peak VTI 16.70 cm    AV valve area 3.79 cm²    AV Velocity Ratio 0.89     AV index (prosthetic) 0.90     LIBAN by Velocity Ratio 3.78 cm²    MV stenosis pressure 1/2 time 56.16 ms    MV valve area p 1/2 method 3.92 cm2    TV peak " gradient 3 mmHg    Triscuspid Valve Regurgitation Peak Gradient 71 mmHg    PV PEAK VELOCITY 0.89 m/s    PV peak gradient 3 mmHg    Sinus 3.77 cm    STJ 3.11 cm    Ascending aorta 3.50 cm    IVC diameter 1.73 cm    Mean e' 0.10 m/s    ZLVIDS -4.93     ZLVIDD -6.50     LA Volume Index 33.4 mL/m2    LA volume 75.40 cm3    LA WIDTH 4.0 cm    RA Width 4.9 cm    TV resting pulmonary artery pressure 74 mmHg    RV TB RVSP 7 mmHg    Est. RA pres 3 mmHg    Narrative      Left Ventricle: The left ventricle is normal in size. Mildly increased   wall thickness. There is concentric remodeling. Normal wall motion. Septal   flattening in diastole and systole consistent with right ventricular   volume and pressure overload. There is normal systolic function with a   visually estimated ejection fraction of 55 - 60%.    Right Ventricle: Severe right ventricular enlargement. Systolic   function is reduced.    Left Atrium: Left atrium is moderately dilated.    Right Atrium: Right atrium is severely dilated.    Mitral Valve: There is mild regurgitation.    Tricuspid Valve: There is mild regurgitation.    Pulmonary Artery: There is severe pulmonary hypertension. The estimated   pulmonary artery systolic pressure is 74 mmHg.    IVC/SVC: Normal venous pressure at 3 mmHg.       Assessment and Plan:     Brief HPI:     Lung cancer        Atrial flutter   History of atrial flutter status post ablation.  Currently in sinus rhythm with PACs.  Continue medical management.    Pulmonary hypertension        HTN, goal below 140/90        ILD (interstitial lung disease)        Polycythemia            VTE Risk Mitigation (From admission, onward)           Ordered     heparin (porcine) injection 5,000 Units  Every 8 hours         01/10/24 0213     IP VTE HIGH RISK PATIENT  Once         01/10/24 0213     Place sequential compression device  Until discontinued         01/10/24 0213                    David Cueva MD  Cardiology  [facility]  Surg      Will sign off.  Follow-up in Cardiology Clinic.  Please reconsult as needed

## 2024-01-12 NOTE — PLAN OF CARE
Problem: Adult Inpatient Plan of Care  Goal: Plan of Care Review  Outcome: Ongoing, Progressing  Flowsheets (Taken 1/12/2024 0633)  Plan of Care Reviewed With: patient  Goal: Absence of Hospital-Acquired Illness or Injury  Outcome: Ongoing, Progressing  Goal: Optimal Comfort and Wellbeing  Outcome: Ongoing, Progressing  Intervention: Monitor Pain and Promote Comfort  Flowsheets (Taken 1/12/2024 0633)  Pain Management Interventions:   pillow support provided   relaxation techniques promoted   position adjusted     Problem: Pain Acute  Goal: Acceptable Pain Control and Functional Ability  Outcome: Ongoing, Progressing     Problem: Activity Intolerance (Pulmonary Impairment)  Goal: Improved Activity Tolerance  Outcome: Ongoing, Progressing     Problem: Airway Clearance Ineffective (Pulmonary Impairment)  Goal: Effective Airway Clearance  Outcome: Ongoing, Progressing  Intervention: Promote Airway Secretion Clearance  Flowsheets (Taken 1/12/2024 0633)  Airway Clearance/Ventilation Strategies: oxygen therapy in use     Problem: Skin Injury Risk Increased  Goal: Skin Health and Integrity  Outcome: Ongoing, Progressing

## 2024-01-12 NOTE — PLAN OF CARE
Problem: Adult Inpatient Plan of Care  Goal: Plan of Care Review  1/12/2024 1757 by Cathryn Browning RN  Outcome: Met  1/12/2024 1544 by Cathryn Browning RN  Outcome: Ongoing, Progressing  Goal: Patient-Specific Goal (Individualized)  1/12/2024 1757 by Cathryn Browning RN  Outcome: Met  1/12/2024 1544 by Cathryn Browning RN  Outcome: Ongoing, Progressing  Goal: Absence of Hospital-Acquired Illness or Injury  1/12/2024 1757 by Cathryn Browning RN  Outcome: Met  1/12/2024 1544 by Cathryn Browning RN  Outcome: Ongoing, Progressing  Goal: Optimal Comfort and Wellbeing  1/12/2024 1757 by Cathryn Browning RN  Outcome: Met  1/12/2024 1544 by Cathryn Browning RN  Outcome: Ongoing, Progressing  Goal: Readiness for Transition of Care  1/12/2024 1757 by Cathryn Browning RN  Outcome: Met  1/12/2024 1544 by Cathryn Browning RN  Outcome: Ongoing, Progressing     Problem: Pain Acute  Goal: Acceptable Pain Control and Functional Ability  1/12/2024 1757 by Cathryn Browning RN  Outcome: Met  1/12/2024 1544 by Cathryn Browning RN  Outcome: Ongoing, Progressing     Problem: Fall Injury Risk  Goal: Absence of Fall and Fall-Related Injury  1/12/2024 1757 by Cathryn Browning RN  Outcome: Met  1/12/2024 1544 by Cathryn Browning RN  Outcome: Ongoing, Progressing     Problem: Activity Intolerance (Pulmonary Impairment)  Goal: Improved Activity Tolerance  1/12/2024 1757 by Cathryn Browning RN  Outcome: Met  1/12/2024 1544 by Cathryn Browning RN  Outcome: Ongoing, Progressing     Problem: Airway Clearance Ineffective (Pulmonary Impairment)  Goal: Effective Airway Clearance  1/12/2024 1757 by Cathryn Browning RN  Outcome: Met  1/12/2024 1544 by Cathryn Browning RN  Outcome: Ongoing, Progressing     Problem: Gas Exchange Impaired (Pulmonary Impairment)  Goal: Optimal Gas Exchange  1/12/2024 1757 by Cathryn Browning RN  Outcome: Met  1/12/2024 1544 by Cathryn Browning, RN  Outcome: Ongoing, Progressing      Problem: Skin Injury Risk Increased  Goal: Skin Health and Integrity  1/12/2024 1757 by Cathryn Browning, RN  Outcome: Met  1/12/2024 1544 by Cathryn Browning, RN  Outcome: Ongoing, Progressing     Problem: Coping Ineffective  Goal: Effective Coping  1/12/2024 1757 by Cathryn Browning, RN  Outcome: Met  1/12/2024 1544 by Cathryn Browning, RN  Outcome: Ongoing, Progressing

## 2024-01-12 NOTE — CONSULTS
Hematology- Oncology Consult Note :     1/12/2024    Reason for consult: Suspicion for lung cancer        HPI    Pt is a 68-year-old AA male with a past medical history of ILD, HFpEF, pulmonary hypertension hypertension, atrial flutter (s/p ablation in 09/2023, not on AC ), polycythemia, recently dx lung cancer (at [facility]) presented to  ED due to N/V and abdominal pain. In the ED, the patient was tachycardic and labs were remarkable for an elevated BNP and elevated lactic acid.  CTA chest, abdomen and pelvis showed multiple pulmonary masses and nodules consistent with malignancy/metastatic disease, pulmonary emphysema and chronic interstitial lung disease findings, along with fluid distention of the stomach and proximal to mid small small bowel loops with air-fluid levels reflecting at least a partial developing small-bowel obstruction. General surgery was consulted and pt admitted.  Oncology consulted due to concerns of lung cancer.      Active Problem List with Overview Notes    Diagnosis Date Noted    Small bowel obstruction 01/10/2024    Prolonged Q-T interval on ECG 01/10/2024    Advanced care planning/counseling discussion 01/10/2024    Lung cancer 01/05/2024    Chronic hypoxemic respiratory failure 11/09/2023    Chronic right heart failure 11/09/2023    Cataract 07/27/2023    Atrial flutter 07/27/2023    Marijuana user 03/10/2021     - daily      Pulmonary hypertension 10/12/2018    Pulmonary interstitial fibrosis     HTN, goal below 140/90 01/30/2017    Allergic cough 06/07/2016    Arthritis 05/10/2016    ILD (interstitial lung disease) 11/16/2015    Prediabetes     Hyperuricemia 01/19/2015    Former smoker 12/10/2014    Polycythemia 08/06/2013     - getting regular phlebotomy (approximately every 3 weeks)  - followed by hematology, Dr. Johnson      History of HCV, s/p successful treatment w/ SVR12 5/2015      - has completed treatment x 3  - SVR12 following 12 weeks Harvoni      Lung disease caused by  breathing particles      Widespread essentially symmetric interstitial lung disease  - followed by pulmonary, Dr. Hunter      Overweight (BMI 25.0-29.9)     GERD (gastroesophageal reflux disease)        Patient Active Problem List    Diagnosis Date Noted    Small bowel obstruction 01/10/2024    Prolonged Q-T interval on ECG 01/10/2024    Advanced care planning/counseling discussion 01/10/2024    Lung cancer 01/05/2024    Chronic hypoxemic respiratory failure 11/09/2023    Chronic right heart failure 11/09/2023    Cataract 07/27/2023    Atrial flutter 07/27/2023    Marijuana user 03/10/2021    Pulmonary hypertension 10/12/2018    Pulmonary interstitial fibrosis     HTN, goal below 140/90 01/30/2017    Allergic cough 06/07/2016    Arthritis 05/10/2016    ILD (interstitial lung disease) 11/16/2015    Prediabetes     Hyperuricemia 01/19/2015    Former smoker 12/10/2014    Polycythemia 08/06/2013    History of HCV, s/p successful treatment w/ SVR12 5/2015     Lung disease caused by breathing particles     Overweight (BMI 25.0-29.9)     GERD (gastroesophageal reflux disease)      Past Medical History:   Diagnosis Date    A-fib 05/08/2023    Arthritis     Chronic hypoxemic respiratory failure 11/09/2023    Chronic right heart failure 11/09/2023    GERD (gastroesophageal reflux disease)     History of HCV, s/p successful treatment w/ SVR12 5/2015     Failed treatment (stage I/II) - followed by hepatology     Joint pain     Lung cancer     Lung disease caused by breathing particles     Widespread essentially symmetric interstitial lung disease    Obesity     On home oxygen therapy     Polycythemia vera     Prediabetes       Past Surgical History:   Procedure Laterality Date    ABLATION, ATRIAL FLUTTER, TYPICAL N/A 9/29/2023    Procedure: Ablation, Atrial Flutter, Typical;  Surgeon: Jonh Mcgill MD;  Location: [facility] EP LAB;  Service: Cardiology;  Laterality: N/A;  AFL, ELENA (Cx if SR), CTI, RFA, BETHANY, MAC, DM, 3 Prep     BUNIONECTOMY      bilateral    CARDIOVERSION N/A 05/08/2023    Procedure: Cardioversion;  Surgeon: David Cueva MD;  Location: [facility] CATH LAB;  Service: Cardiology;  Laterality: N/A;    CATARACT EXTRACTION Left 07/27/2023    COLONOSCOPY N/A 12/09/2015    Procedure: COLONOSCOPY;  Surgeon: Conrad Iqbal MD;  Location: [facility] ENDO;  Service: Endoscopy;  Laterality: N/A;    Liver biopsy x2      rotator cuff Right     TRANSESOPHAGEAL ECHOCARDIOGRAM WITH POSSIBLE CARDIOVERSION (ELENA W/ POSS CARDIOVERSION) N/A 05/08/2023    Procedure: TRANSESOPHAGEAL ECHOCARDIOGRAM WITH POSSIBLE CARDIOVERSION (ELENA W/ POSS CARDIOVERSION);  Surgeon: David Cueva MD;  Location: [facility] CATH LAB;  Service: Cardiology;  Laterality: N/A;  12:30PM    RN PREOP 5/4/2023---EKG ON ARRIVAL    TRANSESOPHAGEAL ECHOCARDIOGRAM WITH POSSIBLE CARDIOVERSION (ELENA W/ POSS CARDIOVERSION) N/A 7/29/2023    Procedure: Transesophageal echo (ELENA) intra-procedure log documentation;  Surgeon: David Cueva MD;  Location: [facility] CATH LAB;  Service: Cardiology;  Laterality: N/A;    TRANSESOPHAGEAL ECHOCARDIOGRAPHY N/A 05/08/2023    Procedure: ECHOCARDIOGRAM, TRANSESOPHAGEAL;  Surgeon: David Cueva MD;  Location: [facility] CATH LAB;  Service: Cardiology;  Laterality: N/A;    TRANSESOPHAGEAL ECHOCARDIOGRAPHY N/A 7/29/2023    Procedure: ECHOCARDIOGRAM, TRANSESOPHAGEAL;  Surgeon: David Cueva MD;  Location: [facility] CATH LAB;  Service: Cardiology;  Laterality: N/A;    TREATMENT OF CARDIAC ARRHYTHMIA N/A 8/7/2023    Procedure: Cardioversion or Defibrillation;  Surgeon: David Cueva MD;  Location: [facility] CATH LAB;  Service: Cardiology;  Laterality: N/A;      Medications Prior to Admission   Medication Sig Dispense Refill Last Dose    allopurinoL (ZYLOPRIM) 100 MG tablet Take 2 tablets (200 mg total) by mouth once daily. (Patient taking differently: Take 100 mg by mouth once daily.) 180 tablet 3 1/9/2024    fluticasone propionate (FLONASE) 50 mcg/actuation nasal spray SHAKE  LIQUID AND USE 1 SPRAY(50 MCG) IN EACH NOSTRIL EVERY DAY 16 g 0 1/9/2024    furosemide (LASIX) 20 MG tablet Take 1 tablet (20 mg total) by mouth 2 (two) times daily as needed (1-2 pills as needed for fluid overload). (Patient taking differently: Take 40 mg by mouth 2 (two) times daily as needed (1-2 pills as needed for fluid overload).) 180 tablet 3 1/9/2024    metoprolol succinate (TOPROL-XL) 50 MG 24 hr tablet Take 1 tablet (50 mg total) by mouth 2 (two) times daily. 180 tablet 3 1/9/2024    multivitamin with folic acid 400 mcg Tab Take 1 tablet by mouth once daily.   1/9/2024    potassium chloride (KLOR-CON) 10 MEQ TbSR Take 1 tablet by mouth once daily.   1/9/2024    predniSONE (DELTASONE) 5 MG tablet Take 10 mg by mouth once daily.   1/9/2024    TRELEGY ELLIPTA 100-62.5-25 mcg DsDv Inhale 1 puff into the lungs Daily.   1/9/2024     Review of patient's allergies indicates:   Allergen Reactions    Ace inhibitors Other (See Comments)     cough      Social History     Tobacco Use    Smoking status: Former     Current packs/day: 1.00     Average packs/day: 1 pack/day for 30.2 years (30.2 ttl pk-yrs)     Types: Cigarettes     Start date: 11/9/1993    Smokeless tobacco: Never    Tobacco comments:     pt. smokes 10 cigars per day.   Substance Use Topics    Alcohol use: Not Currently     Comment: Rarely      Family History   Problem Relation Age of Onset    Heart disease Mother     Kidney disease Mother     Parkinsonism Father     Prostate cancer Cousin         maternal side    Heart attack Sister         CABG    Deep vein thrombosis Sister     Pulmonary embolism Sister     Osteoarthritis Sister         s/p knee replacement    Chronic back pain Brother     Liver disease Neg Hx     Diabetes Neg Hx     Melanoma Neg Hx         Review of Systems :  Review of Systems   Constitutional:  Positive for malaise/fatigue and weight loss. Negative for chills and fever.   HENT:  Negative for congestion, hearing loss and nosebleeds.     Eyes:  Negative for blurred vision.   Respiratory:  Positive for shortness of breath. Negative for cough.    Cardiovascular:  Negative for chest pain and leg swelling.   Gastrointestinal:  Positive for abdominal pain, constipation, nausea and vomiting. Negative for blood in stool, diarrhea, heartburn and melena.   Genitourinary:  Negative for dysuria and hematuria.   Musculoskeletal:  Negative for joint pain and myalgias.   Skin:  Negative for itching and rash.   Neurological:  Negative for dizziness and focal weakness.   Endo/Heme/Allergies:  Does not bruise/bleed easily.   Psychiatric/Behavioral:  Negative for depression. The patient is not nervous/anxious.        Physical Exam :  Wt Readings from Last 3 Encounters:   01/10/24 89.2 kg (196 lb 10.4 oz)   01/08/24 92.5 kg (204 lb 0.6 oz)   01/05/24 92.9 kg (204 lb 12.9 oz)     Temp Readings from Last 3 Encounters:   01/12/24 97.6 °F (36.4 °C) (Oral)   01/08/24 98 °F (36.7 °C) (Oral)   09/29/23 97.7 °F (36.5 °C)     BP Readings from Last 3 Encounters:   01/12/24 133/80   01/08/24 112/84   01/05/24 116/60     Pulse Readings from Last 3 Encounters:   01/12/24 (!) 112   01/08/24 102   01/05/24 (!) 122     Body mass index is 23.32 kg/m².    Physical Exam  Vitals reviewed.   Constitutional:       Appearance: He is ill-appearing.   HENT:      Head: Normocephalic.      Comments: Has NG tube and on O2     Nose: Nose normal.      Mouth/Throat:      Mouth: Mucous membranes are moist.   Eyes:      Pupils: Pupils are equal, round, and reactive to light.   Cardiovascular:      Rate and Rhythm: Normal rate and regular rhythm.   Pulmonary:      Breath sounds: Rales present.      Comments: On O2 poor inspiratory effort  Abdominal:      General: Abdomen is flat.   Musculoskeletal:         General: Normal range of motion.      Cervical back: Normal range of motion.   Skin:     General: Skin is warm and dry.   Neurological:      Mental Status: He is alert. Mental status is at  baseline.   Psychiatric:         Mood and Affect: Mood normal.         Behavior: Behavior normal.         Thought Content: Thought content normal.         Judgment: Judgment normal.           Pertinent Diagnostic studies:    Recent Results (from the past 24 hour(s))   Troponin I    Collection Time: 01/11/24  6:11 PM   Result Value Ref Range    Troponin I 0.006 0.000 - 0.026 ng/mL   Troponin I    Collection Time: 01/12/24  3:01 AM   Result Value Ref Range    Troponin I 0.029 (H) 0.000 - 0.026 ng/mL   Comprehensive Metabolic Panel    Collection Time: 01/12/24  3:01 AM   Result Value Ref Range    Sodium 147 (H) 136 - 145 mmol/L    Potassium 4.1 3.5 - 5.1 mmol/L    Chloride 105 95 - 110 mmol/L    CO2 32 (H) 23 - 29 mmol/L    Glucose 117 (H) 70 - 110 mg/dL    BUN 11 8 - 23 mg/dL    Creatinine 0.8 0.5 - 1.4 mg/dL    Calcium 9.1 8.7 - 10.5 mg/dL    Total Protein 7.1 6.0 - 8.4 g/dL    Albumin 2.5 (L) 3.5 - 5.2 g/dL    Total Bilirubin 1.6 (H) 0.1 - 1.0 mg/dL    Alkaline Phosphatase 82 55 - 135 U/L    AST 23 10 - 40 U/L    ALT 20 10 - 44 U/L    eGFR >60 >60 mL/min/1.73 m^2    Anion Gap 10 8 - 16 mmol/L   Magnesium    Collection Time: 01/12/24  3:01 AM   Result Value Ref Range    Magnesium 2.0 1.6 - 2.6 mg/dL   Phosphorus    Collection Time: 01/12/24  3:01 AM   Result Value Ref Range    Phosphorus 3.5 2.7 - 4.5 mg/dL   CBC with Automated Differential    Collection Time: 01/12/24  3:01 AM   Result Value Ref Range    WBC 5.32 3.90 - 12.70 K/uL    RBC 6.61 (H) 4.60 - 6.20 M/uL    Hemoglobin 12.8 (L) 14.0 - 18.0 g/dL    Hematocrit 45.6 40.0 - 54.0 %    MCV 69 (L) 82 - 98 fL    MCH 19.4 (L) 27.0 - 31.0 pg    MCHC 28.1 (L) 32.0 - 36.0 g/dL    RDW 21.5 (H) 11.5 - 14.5 %    Platelets 243 150 - 450 K/uL    MPV 10.1 9.2 - 12.9 fL    Immature Granulocytes 0.4 0.0 - 0.5 %    Gran # (ANC) 3.3 1.8 - 7.7 K/uL    Immature Grans (Abs) 0.02 0.00 - 0.04 K/uL    Lymph # 1.1 1.0 - 4.8 K/uL    Mono # 0.9 0.3 - 1.0 K/uL    Eos # 0.1 0.0 - 0.5 K/uL     Baso # 0.02 0.00 - 0.20 K/uL    nRBC 0 0 /100 WBC    Gran % 62.4 38.0 - 73.0 %    Lymph % 19.7 18.0 - 48.0 %    Mono % 16.0 (H) 4.0 - 15.0 %    Eosinophil % 1.1 0.0 - 8.0 %    Basophil % 0.4 0.0 - 1.9 %    Differential Method Automated    POCT glucose    Collection Time: 01/12/24  7:45 AM   Result Value Ref Range    POCT Glucose 108 70 - 110 mg/dL       Assessment/Recommendations  :     ECOG 3      Metastatic lung cancer  -Path obtained on 12/21/23 at [facility] which was concerning for lung cancer  -Imaging so far suggests metastatic disease   -Agree with palliative consult  -Had lengthy discussion with pt and wife along with palliative service  -Due to multiple acute issues and extensive co morbidities pt would be a poor candidate for any cancer tx   -All questions answered to pt's and wife's satisfaction  -Pt and wife decided on home hospice as pt would like to be comfortable and prefers quality of life over quantity        Time spent on case: 40 minutes      Thank you for this consult, please contact us for any questions or concerns.    Jose Antonio Quintanilla MD   Hematology/oncology, [facility] [place]

## 2024-01-12 NOTE — SUBJECTIVE & OBJECTIVE
Interval History: patient sitting up on [facility] this am. Reports feeling better    Medications:  Continuous Infusions:   dextrose 5 % and 0.9 % NaCl 100 mL/hr at 01/12/24 0635     Scheduled Meds:   allopurinoL  200 mg Oral Daily    glycerin 99.5%  100 mL Rectal ED 1 Time    And    magnesium citrate  296 mL Rectal ED 1 Time    And    sodium chloride 0.9%  500 mL Rectal ED 1 Time    heparin (porcine)  5,000 Units Subcutaneous Q8H    metoprolol tartrate  50 mg Oral BID     PRN Meds:acetaminophen, acetaminophen, albuterol-ipratropium, ALPRAZolam, aluminum-magnesium hydroxide-simethicone, dextrose 10%, dextrose 10%, dextrose 10%, dextrose 10%, furosemide, glucagon (human recombinant), glucagon (human recombinant), glucose, glucose, glucose, glucose, magnesium oxide, magnesium oxide, melatonin, metoprolol, naloxone, potassium bicarbonate, potassium bicarbonate, potassium bicarbonate, potassium, sodium phosphates, potassium, sodium phosphates, potassium, sodium phosphates, prochlorperazine, simethicone, sodium chloride 0.9%, sodium chloride 0.9%    Objective:     Vital Signs (Most Recent):  Temp: 97.6 °F (36.4 °C) (01/12/24 0744)  Pulse: (!) 112 (01/12/24 0744)  Resp: 20 (01/12/24 0744)  BP: 133/80 (01/12/24 0744)  SpO2: 95 % (01/12/24 0744) Vital Signs (24h Range):  Temp:  [97.4 °F (36.3 °C)-98.4 °F (36.9 °C)] 97.6 °F (36.4 °C)  Pulse:  [] 112  Resp:  [18-20] 20  SpO2:  [94 %-99 %] 95 %  BP: (116-133)/(59-80) 133/80     Weight: 89.2 kg (196 lb 10.4 oz)  Body mass index is 23.32 kg/m².       Physical Exam  Vitals and nursing note reviewed.   Constitutional:       General: He is not in acute distress.     Appearance: He is ill-appearing. He is not toxic-appearing or diaphoretic.   HENT:      Nose:      Comments: NGT to decompression  Cardiovascular:      Rate and Rhythm: Tachycardia present.   Pulmonary:      Effort: No respiratory distress.      Comments: Increased work of breathing with oxygen on  Abdominal:       General: There is no distension.      Palpations: Abdomen is soft.   Musculoskeletal:      Right lower leg: No edema.      Left lower leg: No edema.   Skin:     General: Skin is warm and dry.   Neurological:      Mental Status: He is alert and oriented to person, place, and time.   Psychiatric:         Mood and Affect: Mood normal.         Behavior: Behavior normal.                Advance Care Planning   Advance Directives:   Living Will: Yes        Copy on chart: Yes    Do Not Resuscitate Status: Yes    Medical Power of : Yes    Goals of Care: The patient and spouse endorses that what is most important right now is to focus on quality of life, even if it means sacrificing a little time and comfort and QOL     Accordingly, we have decided that the best plan to meet the patient's goals includes pivot to comfort-focused care           Significant Labs: All pertinent labs within the past 24 hours have been reviewed.  CBC:   Recent Labs   Lab 01/12/24  0301   WBC 5.32   HGB 12.8*   HCT 45.6   MCV 69*        BMP:  Recent Labs   Lab 01/12/24  0301   *   *   K 4.1      CO2 32*   BUN 11   CREATININE 0.8   CALCIUM 9.1   MG 2.0     LFT:  Lab Results   Component Value Date    AST 23 01/12/2024    ALKPHOS 82 01/12/2024    BILITOT 1.6 (H) 01/12/2024     Albumin:   Albumin   Date Value Ref Range Status   01/12/2024 2.5 (L) 3.5 - 5.2 g/dL Final     Protein:   Total Protein   Date Value Ref Range Status   01/12/2024 7.1 6.0 - 8.4 g/dL Final     Lactic acid:   Lab Results   Component Value Date    LACTATE 1.4 01/10/2024    LACTATE 2.5 (H) 01/09/2024       Significant Imaging: I have reviewed all pertinent imaging results/findings within the past 24 hours. XR Gastrogaffin

## 2024-01-12 NOTE — ASSESSMENT & PLAN NOTE
-recently diagnosed with Adenocarcinoma of lung in 12/2023  -Patient has been referred to oncology outpatient, has not established care yet  -CTA chest: Multiple pulmonary masses and nodules consistent with malignancy and known metastatic disease.  Abnormal mediastinal and hilar lymphadenopathy consistent with local spread of disease.  Possible left adrenal metastasis.   -Follows with pulmonology   -need close follow up on discharge   Consulted palliative,  Consulted oncology,made him DNR

## 2024-01-12 NOTE — PLAN OF CARE
2:10 PM  Sophia says she address all of family's questions, concerns regarding hospice.  [facility] hospice forms signed , patient is a DNR, will go home with NGT suction.    TN awaiting delivery of all DME to home and will then set up ambulance transportation.    12:33 PM TN talked with Palliative NP Doreen and Dr. Umanzor family and patient interested in home hospice with no preference.  [facility] rep Sophia c [phone]/palliative, on way to see family and patient to answer any questions and answer referral.   01/12/24 1231   Post-Acute Status   Post-Acute Authorization Hospice   Hospice Status Referrals Sent   Coverage [employer] [facility]   Patient choice form signed by patient/caregiver List with quality metrics by geographic area provided   Discharge Plan   Discharge Plan A Hospice/home   Discharge Plan B Hospice/home

## 2024-01-12 NOTE — NURSING
[facility]  Nurses Note -- 4 Eyes      1/11/2024       Skin assessed on: Q Shift      [x] No Pressure Injuries Present    []Prevention Measures Documented    [] Yes LDA  for Pressure Injury Previously documented     [] Yes New Pressure Injury Discovered   [] LDA for New Pressure Injury Added      Attending RN:  Rodo Crandall RN     Second RN:  ISAC Lockett

## 2024-01-12 NOTE — ASSESSMENT & PLAN NOTE
This is a 69 yo M with significant comorbidities including pulm HTN, Afib, HFpEF, severe ILD on 5L NC at home, recent diagnosis of metastatic lung cancer who presents with concerns for SBO. The etiology of this bowel obstruction is unclear given no previous abdominal surgery and no hernia. I am concerned this may represent an intra-abdominal malignancy, though do not readily see this on CTA. He is an incredibly high risk surgical patient given his extensive comorbidities.     - I am encouraged that he has begun to have some bowel function and had a larger BM this morning, though 1.9L output suggests ongoing partial obstruction  - reviewed imaging with radiology as the most likely cause of this obstruction is intra-abdominal mass or bulky adenopathy from metastatic spread. There is nothing discretely identifiable, though there is some abnormal contouring of the small bowel near the area of obstruction that may represent a mass  - PET may help elucidate the cause, though likely will not change treatment options  - His NSQIP operative risk of mortality is 70% which I believe to underestimate his actual risk as this does not include his interstitial lung disease, home oxygen requirement and significant pulmonary hypertension. I think an exploratory laparotomy is not the right option for this patient. He and his wife are in agreement. I am encourage that he is now having some bowel function. We will plan for enema today, ongoing NGT decompression  - should his symptoms continue and bowel function decrease, I recommend GI consultation for palliative venting PEG tube for symptom relief  - Surgery will continue to follow  - rest of care per primary

## 2024-01-12 NOTE — SUBJECTIVE & OBJECTIVE
Interval History: NAEO. Gastrograffin challenge yesterday. Was tolerated fairly well through the day, had 2 small BM. Though became distended, suction was reconnected and he had 1.9L output rapidly. He had a larger bowel movement this morning. Images from gastrograffin challenge are difficult to interpret but may demonstrate contrast within the colon. Afebrile, HDS    Medications:  Continuous Infusions:   dextrose 5 % and 0.9 % NaCl 100 mL/hr at 01/12/24 0635     Scheduled Meds:   allopurinoL  200 mg Oral Daily    glycerin 99.5%  100 mL Rectal ED 1 Time    And    magnesium citrate  296 mL Rectal ED 1 Time    And    sodium chloride 0.9%  500 mL Rectal ED 1 Time    heparin (porcine)  5,000 Units Subcutaneous Q8H    metoprolol tartrate  50 mg Oral BID     PRN Meds:acetaminophen, acetaminophen, albuterol-ipratropium, ALPRAZolam, aluminum-magnesium hydroxide-simethicone, dextrose 10%, dextrose 10%, dextrose 10%, dextrose 10%, furosemide, glucagon (human recombinant), glucagon (human recombinant), glucose, glucose, glucose, glucose, magnesium oxide, magnesium oxide, melatonin, metoprolol, naloxone, potassium bicarbonate, potassium bicarbonate, potassium bicarbonate, potassium, sodium phosphates, potassium, sodium phosphates, potassium, sodium phosphates, prochlorperazine, simethicone, sodium chloride 0.9%, sodium chloride 0.9%     Review of patient's allergies indicates:   Allergen Reactions    Ace inhibitors Other (See Comments)     cough     Objective:     Vital Signs (Most Recent):  Temp: 97.6 °F (36.4 °C) (01/12/24 0744)  Pulse: (!) 112 (01/12/24 0744)  Resp: 20 (01/12/24 0744)  BP: 133/80 (01/12/24 0744)  SpO2: 95 % (01/12/24 0744) Vital Signs (24h Range):  Temp:  [97.4 °F (36.3 °C)-98.4 °F (36.9 °C)] 97.6 °F (36.4 °C)  Pulse:  [] 112  Resp:  [18-20] 20  SpO2:  [94 %-99 %] 95 %  BP: (116-133)/(59-80) 133/80     Weight: 89.2 kg (196 lb 10.4 oz)  Body mass index is 23.32 kg/m².    Intake/Output - Last 3 Shifts          01/10 0700  01/11 0659 01/11 0700  01/12 0659 01/12 0700 01/13 0659    P.O. 60 0     I.V. (mL/kg) 744.3 (8.3) 86.9 (1)     IV Piggyback       Total Intake(mL/kg) 804.3 (9) 86.9 (1)     Urine (mL/kg/hr) 300 (0.1) 100 (0)     Emesis/NG output       Drains 150 2400     Other 300      Stool  0     Total Output 750 2500     Net +54.3 -2413.1            Urine Occurrence  2 x     Stool Occurrence  2 x              Physical Exam  Constitutional:       General: He is not in acute distress.     Appearance: Normal appearance. He is not ill-appearing.   HENT:      Head: Normocephalic and atraumatic.      Nose:      Comments: NGT in place with bilious output  Eyes:      Extraocular Movements: Extraocular movements intact.      Pupils: Pupils are equal, round, and reactive to light.   Cardiovascular:      Rate and Rhythm: Tachycardia present. Rhythm irregular.   Pulmonary:      Effort: Pulmonary effort is normal. No respiratory distress.   Abdominal:      General: Abdomen is flat. There is no distension.      Palpations: Abdomen is soft.      Tenderness: There is no abdominal tenderness. There is no guarding.      Comments: Abdomen is soft, not particularly distended and non-tender on my exam. I do not appreciate any significant hernias as well   Musculoskeletal:         General: No swelling. Normal range of motion.   Skin:     General: Skin is warm and dry.   Neurological:      General: No focal deficit present.      Mental Status: He is alert.          Significant Labs:  I have reviewed all pertinent lab results within the past 24 hours.  CBC:   Recent Labs   Lab 01/12/24  0301   WBC 5.32   RBC 6.61*   HGB 12.8*   HCT 45.6      MCV 69*   MCH 19.4*   MCHC 28.1*     CMP:   Recent Labs   Lab 01/12/24  0301   *   CALCIUM 9.1   ALBUMIN 2.5*   PROT 7.1   *   K 4.1   CO2 32*      BUN 11   CREATININE 0.8   ALKPHOS 82   ALT 20   AST 23   BILITOT 1.6*       Significant Diagnostics:  I have reviewed all  pertinent imaging results/findings within the past 24 hours.

## 2024-01-12 NOTE — ASSESSMENT & PLAN NOTE
-presented with abdominal pain associated with N/V for one day  -NGT placed in the ED given persistent vomiting  -CT chest abdomen: Fluid distention of the stomach and proximal to mid small bowel loops with air-fluid levels likely reflecting at least partial developing small bowel obstruction.  Apparent transition point in the midline lower abdomen with no obvious mechanical cause for obstruction seen.   -Caution with zofran given prolonged Qtc  -Surgery consulted  -NPO  Patient was started on IVF and NG is placed and surgery is consulted for SBO,had BM,on NG tube,gastrografin as above.

## 2024-01-12 NOTE — TELEPHONE ENCOUNTER
Spoke with spouse, pt being discharged today with hospice, will cancel ILR procedure, Dr Mcgill made aware

## 2024-01-12 NOTE — ASSESSMENT & PLAN NOTE
Recently diagnosed via transthoracic biopsy.  Lung adenocarcinoma.  Can not see receptors or markers..  Appears to be radiographically stage 3A/3B.  Oncology consult and palliative Care as noted.

## 2024-01-12 NOTE — ASSESSMENT & PLAN NOTE
Scattered emphysema with fibrosis and GGOs.  Rheumatologic workup unremarkable.  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  See the section on chronic respiratory

## 2024-01-12 NOTE — PROGRESS NOTES
[facility] Medicine  Progress Note    Patient Name: Josiah Orosco Jr.  MRN: [id number]  Patient Class: IP- Inpatient   Admission Date: 1/9/2024  Length of Stay: 2 days  Attending Physician: Srini Vu, *  Primary Care Provider: Elaine Landry MD        Subjective:     Principal Problem:Small bowel obstruction        HPI:  68-year-old male with a past medical history of ILD (on 5L O2), HFpEF (EF: 55%, reduced RV systolic function), pulmonary hypertension (sPAP of 74 mmHg) hypertension, atrial flutter (s/p ablation in 09/2023, not on AC anymore), polycythemia, recently dx lung cancer presents with abdominal pain for one day, associated with nausea and vomiting. Last bowel movement was one day ago, and stools were hard. Prior to that, he states it was both hard and liquid form for about a week.  Denies any history of abdominal surgeries.  No recent travel new medications.     In the ED, the patient was tachycardic (up to 130s, and AFib with RVR).  Labs were remarkable for an elevated BNP (837), elevated lactic acid (2.5).  CTA chest, abdomen and pelvis showed multiple pulmonary masses and nodules consistent with malignancy/metastatic disease, pulmonary emphysema and chronic interstitial lung disease findings, along with fluid distention of the stomach and proximal to mid small small bowel loops with air-fluid levels reflecting at least a partial developing small-bowel obstruction.  Patient was given Lopressor 5 mg IV x1, morphine 8 mg IV, Zofran 4 mg IV x2, and 500 mL of NS.  Patient admitted to hospital medicine for further evaluation and management    Overview/Hospital Course:  68-year-old male with a past medical history of ILD (on 5L O2), HFpEF (EF: 55%, reduced RV systolic function), pulmonary hypertension (sPAP of 74 mmHg) hypertension, atrial flutter (s/p ablation in 09/2023, not on AC anymore), polycythemia, recently dx lung cancer,not yet started on therapy, presents with  abdominal pain for one day, associated with nausea and vomiting.   In the ED, the patient was tachycardic (up to 130s, and AFib with RVR).  Labs were remarkable for an elevated BNP (837), elevated lactic acid (2.5).  CTA chest, abdomen and pelvis showed multiple pulmonary masses and nodules consistent with malignancy/metastatic disease, pulmonary emphysema and chronic interstitial lung disease findings, along with fluid distention of the stomach and proximal to mid small small bowel loops with air-fluid levels reflecting at least a partial developing small-bowel obstruction.  Patient was given Lopressor 5 mg IV x1, morphine 8 mg IV, Zofran 4 mg IV x2, and 500 mL of NS. Patient was started on IVF and NG is placed and surgery is consulted for SBO,had BM,on NG tube with a lot of out put.  Gastrografin study show,contrast on stomach,  Consulted palliative ,pulmonology and oncology.  Spoke at lane with patient and wife,they agree with DNR status,nurse forrest witnessed.      Past Medical History:   Diagnosis Date    A-fib 05/08/2023    Arthritis     Chronic hypoxemic respiratory failure 11/09/2023    Chronic right heart failure 11/09/2023    GERD (gastroesophageal reflux disease)     History of HCV, s/p successful treatment w/ SVR12 5/2015     Failed treatment (stage I/II) - followed by hepatology     Joint pain     Lung cancer     Lung disease caused by breathing particles     Widespread essentially symmetric interstitial lung disease    Obesity     On home oxygen therapy     Polycythemia vera     Prediabetes        Past Surgical History:   Procedure Laterality Date    ABLATION, ATRIAL FLUTTER, TYPICAL N/A 9/29/2023    Procedure: Ablation, Atrial Flutter, Typical;  Surgeon: Jonh Mcgill MD;  Location: [facility] EP LAB;  Service: Cardiology;  Laterality: N/A;  AFL, ELENA (Cx if SR), CTI, RFA, BETHANY, MAC, DM, 3 Prep    BUNIONECTOMY      bilateral    CARDIOVERSION N/A 05/08/2023    Procedure: Cardioversion;  Surgeon: David ROTH  MD Anay;  Location: [facility] CATH LAB;  Service: Cardiology;  Laterality: N/A;    CATARACT EXTRACTION Left 07/27/2023    COLONOSCOPY N/A 12/09/2015    Procedure: COLONOSCOPY;  Surgeon: Conrad Iqbal MD;  Location: [facility] ENDO;  Service: Endoscopy;  Laterality: N/A;    Liver biopsy x2      rotator cuff Right     TRANSESOPHAGEAL ECHOCARDIOGRAM WITH POSSIBLE CARDIOVERSION (ELENA W/ POSS CARDIOVERSION) N/A 05/08/2023    Procedure: TRANSESOPHAGEAL ECHOCARDIOGRAM WITH POSSIBLE CARDIOVERSION (ELENA W/ POSS CARDIOVERSION);  Surgeon: David Cueva MD;  Location: [facility] CATH LAB;  Service: Cardiology;  Laterality: N/A;  12:30PM    RN PREOP 5/4/2023---EKG ON ARRIVAL    TRANSESOPHAGEAL ECHOCARDIOGRAM WITH POSSIBLE CARDIOVERSION (ELENA W/ POSS CARDIOVERSION) N/A 7/29/2023    Procedure: Transesophageal echo (ELENA) intra-procedure log documentation;  Surgeon: David Cueva MD;  Location: [facility] CATH LAB;  Service: Cardiology;  Laterality: N/A;    TRANSESOPHAGEAL ECHOCARDIOGRAPHY N/A 05/08/2023    Procedure: ECHOCARDIOGRAM, TRANSESOPHAGEAL;  Surgeon: David Cueva MD;  Location: [facility] CATH LAB;  Service: Cardiology;  Laterality: N/A;    TRANSESOPHAGEAL ECHOCARDIOGRAPHY N/A 7/29/2023    Procedure: ECHOCARDIOGRAM, TRANSESOPHAGEAL;  Surgeon: David Cueva MD;  Location: [facility] CATH LAB;  Service: Cardiology;  Laterality: N/A;    TREATMENT OF CARDIAC ARRHYTHMIA N/A 8/7/2023    Procedure: Cardioversion or Defibrillation;  Surgeon: David Cueva MD;  Location: [facility] CATH LAB;  Service: Cardiology;  Laterality: N/A;       Review of patient's allergies indicates:   Allergen Reactions    Ace inhibitors Other (See Comments)     cough       No current facility-administered medications on file prior to encounter.     Current Outpatient Medications on File Prior to Encounter   Medication Sig    allopurinoL (ZYLOPRIM) 100 MG tablet Take 2 tablets (200 mg total) by mouth once daily. (Patient taking differently: Take 100 mg by mouth once daily.)     fluticasone propionate (FLONASE) 50 mcg/actuation nasal spray SHAKE LIQUID AND USE 1 SPRAY(50 MCG) IN EACH NOSTRIL EVERY DAY    furosemide (LASIX) 20 MG tablet Take 1 tablet (20 mg total) by mouth 2 (two) times daily as needed (1-2 pills as needed for fluid overload). (Patient taking differently: Take 40 mg by mouth 2 (two) times daily as needed (1-2 pills as needed for fluid overload).)    metoprolol succinate (TOPROL-XL) 50 MG 24 hr tablet Take 1 tablet (50 mg total) by mouth 2 (two) times daily.    multivitamin with folic acid 400 mcg Tab Take 1 tablet by mouth once daily.    potassium chloride (KLOR-CON) 10 MEQ TbSR Take 1 tablet by mouth once daily.    predniSONE (DELTASONE) 5 MG tablet Take 10 mg by mouth once daily.    TRELEGY ELLIPTA 100-62.5-25 mcg DsDv Inhale 1 puff into the lungs Daily.     Family History       Problem Relation (Age of Onset)    Chronic back pain Brother    Deep vein thrombosis Sister    Heart attack Sister    Heart disease Mother    Kidney disease Mother    Osteoarthritis Sister    Parkinsonism Father    Prostate cancer Cousin    Pulmonary embolism Sister          Tobacco Use    Smoking status: Former     Current packs/day: 1.00     Average packs/day: 1 pack/day for 30.2 years (30.2 ttl pk-yrs)     Types: Cigarettes     Start date: 11/9/1993    Smokeless tobacco: Never    Tobacco comments:     pt. smokes 10 cigars per day.   Substance and Sexual Activity    Alcohol use: Not Currently     Comment: Rarely    Drug use: Yes     Types: Marijuana     Comment: daily    Sexual activity: Yes     Partners: Female     Review of Systems   Constitutional:  Negative for chills, fatigue and fever.   Eyes:  Negative for visual disturbance.   Respiratory:  Positive for shortness of breath (chronic, on 5L NC at home). Negative for cough, chest tightness and wheezing.    Cardiovascular:  Negative for chest pain, palpitations and leg swelling.   Gastrointestinal:  Positive for abdominal distention,  abdominal pain, constipation, nausea and vomiting. Negative for blood in stool and diarrhea.   Genitourinary:  Negative for difficulty urinating and dysuria.   Musculoskeletal:  Negative for back pain.   Skin:  Negative for wound.   Neurological:  Negative for dizziness, weakness and headaches.   Psychiatric/Behavioral:  Negative for confusion and hallucinations.      Objective:     Vital Signs (Most Recent):  Temp: 97.6 °F (36.4 °C) (01/12/24 0744)  Pulse: (!) 112 (01/12/24 0744)  Resp: 20 (01/12/24 0744)  BP: 133/80 (01/12/24 0744)  SpO2: 95 % (01/12/24 0744) Vital Signs (24h Range):  Temp:  [97.4 °F (36.3 °C)-98.4 °F (36.9 °C)] 97.6 °F (36.4 °C)  Pulse:  [] 112  Resp:  [18-20] 20  SpO2:  [94 %-99 %] 95 %  BP: (116-133)/(59-80) 133/80     Weight: 89.2 kg (196 lb 10.4 oz)  Body mass index is 23.32 kg/m².     Physical Exam  Vitals and nursing note reviewed.   Constitutional:       General: He is not in acute distress.     Appearance: He is ill-appearing (chronically ill appearing).   HENT:      Head: Atraumatic.      Nose:      Comments: +NGT     Mouth/Throat:      Mouth: Mucous membranes are dry.   Eyes:      Extraocular Movements: Extraocular movements intact.   Cardiovascular:      Rate and Rhythm: Normal rate and regular rhythm.   Pulmonary:      Effort: No respiratory distress.      Breath sounds: Decreased air movement present. Examination of the right-lower field reveals decreased breath sounds. Examination of the left-lower field reveals decreased breath sounds. Decreased breath sounds present. No wheezing.      Comments: On 5 L NC  Abdominal:      General: There is distension.      Palpations: Abdomen is soft.      Tenderness: There is no abdominal tenderness.      Comments: +hypoactive bowel sounds   Musculoskeletal:         General: No swelling or tenderness.      Right lower leg: No edema.      Left lower leg: No edema.      Comments: Clubbing nails   Skin:     General: Skin is warm and dry.  "  Neurological:      General: No focal deficit present.      Mental Status: He is alert and oriented to person, place, and time.   Psychiatric:         Mood and Affect: Mood normal.         Thought Content: Thought content normal.                Significant Labs: All pertinent labs within the past 24 hours have been reviewed.    CBC:   Recent Labs   Lab 01/11/24  0507 01/12/24  0301   WBC 6.07 5.32   HGB 13.5* 12.8*   HCT 47.4 45.6    243       CMP:   Recent Labs   Lab 01/11/24  0507 01/12/24  0301    147*   K 4.3 4.1    105   CO2 33* 32*   * 117*   BUN 16 11   CREATININE 0.8 0.8   CALCIUM 9.7 9.1   PROT 7.6 7.1   ALBUMIN 2.8* 2.5*   BILITOT 1.0 1.6*   ALKPHOS 88 82   AST 21 23   ALT 20 20   ANIONGAP 7* 10       Cardiac Markers:   No results for input(s): "CKMB", "MYOGLOBIN", "BNP", "TROPISTAT" in the last 48 hours.    Lactic Acid:   No results for input(s): "LACTATE" in the last 48 hours.    Magnesium:   Recent Labs   Lab 01/11/24  0507 01/12/24  0301   MG 2.0 2.0       Troponin:   Recent Labs   Lab 01/11/24  1811 01/12/24  0301   TROPONINI 0.006 0.029*       TSH:   Recent Labs   Lab 01/10/24  0750   TSH 0.745       Urine Studies:   No results for input(s): "COLORU", "APPEARANCEUA", "PHUR", "SPECGRAV", "PROTEINUA", "GLUCUA", "KETONESU", "BILIRUBINUA", "OCCULTUA", "NITRITE", "UROBILINOGEN", "LEUKOCYTESUR", "RBCUA", "WBCUA", "BACTERIA", "SQUAMEPITHEL", "HYALINECASTS" in the last 48 hours.    Invalid input(s): "WRIGHTSUR"      Significant Imaging: I have reviewed all pertinent imaging results/findings within the past 24 hours.    Imaging Results              XR Gastric tube check, non-radiologist performed (Final result)  Result time 01/10/24 10:34:34      Final result by Damon Alcantara III, MD (01/10/24 10:34:34)                   Narrative:    EXAMINATION:  XR GASTRIC TUBE CHECK, NON-RADIOLOGIST PERFORMED    CLINICAL HISTORY:  SBO;    FINDINGS:  NG tip fundus.  There is a chronic " interstitial lung changes.  No obstruction, ileus, or perforation seen.      Electronically signed by: Damon Alcantara MD  Date:    01/10/2024  Time:    10:34                                      CTA Chest Abdomen Pelvis (Final result)  Result time 01/10/24 00:18:42      Final result by Galileo Segal MD (01/10/24 00:18:42)                   Impression:      1. Multiple pulmonary masses and nodules consistent with malignancy and known metastatic disease.  Abnormal mediastinal and hilar lymphadenopathy consistent with local spread of disease.  Possible left adrenal metastasis.  Continued outpatient follow-up is advised.  Recommend comparison with previous outside imaging.  2. Severe pulmonary emphysema and suspected chronic interstitial lung disease.  Nonspecific mild diffuse mosaic appearance of ground-glass density seen within the lungs, more pronounced in the lower lobes.  3. Fluid distention of the stomach and proximal to mid small bowel loops with air-fluid levels likely reflecting at least partial developing small bowel obstruction.  Apparent transition point in the midline lower abdomen with no obvious mechanical cause for obstruction seen.  Small bowel loops are completely decompressed distally from this region.  4. No evidence of aortic aneurysm or dissection.  5. No evidence of PE.  6. Additional findings as detailed above.  This report was flagged in Epic as abnormal.      Electronically signed by: Galileo Segal MD  Date:    01/10/2024  Time:    00:18               Narrative:    EXAMINATION:  CTA CHEST ABDOMEN PELVIS    CLINICAL HISTORY:  r/o mesenteric ischemia but also PE/PTX as patient just admitted at  with complications;    TECHNIQUE:  CTA chest abdomen and pelvis was obtained following administration of 100 cc Omnipaque 350 IV contrast.  Sagittal and coronal reformats were obtained.    COMPARISON:  Previous outside facility CT images from 12/18/2023 are unavailable for  review.    FINDINGS:  Examination was performed in a STAT emergency setting and will not serve as an official restaging exam.    Multiple pulmonary masses and nodules are visualized.  Largest spiculated mass measures approximately 5.5 cm in the right upper lobe.  There is mediastinal and hilar lymphadenopathy consistent with local spread of disease.  Severe emphysematous changes are seen with additional suspected chronic interstitial lung disease.  Mild diffuse mosaic appearance of ground-glass density is seen throughout the lungs, more pronounced within the lower lobes.  No pleural effusion or pneumothorax.    Heart is normal in size with no significant pericardial effusion.  No significant abnormalities are seen along the esophageal course.  No evidence of aortic aneurysm or dissection.  Branch vessels of the aortic arch are patent.  Pulmonary arteries are well opacified with no evidence of PE.    No significant focal hepatic abnormalities are identified.  There is no intra-or extrahepatic biliary ductal dilatation.  The gallbladder is unremarkable.  Stomach is significantly distended with fluid.  Spleen, pancreas, and right adrenal gland are unremarkable.  There is asymmetric abnormal left adrenal thickening with questionable underlying mass lesion.    Kidneys enhance normally with no evidence of hydronephrosis.  No abnormalities are seen along the ureteral courses.  Urinary bladder is nondistended.  Dystrophic calcifications are seen within the prostate.    Appendix is visualized and is unremarkable.  There is fluid distention of proximal to mid small bowel loops with air-fluid levels measuring upwards of 3.5 cm in caliber likely reflecting at least partial developing small bowel obstruction.  Apparent transition point is seen in the midline lower abdomen.  No definite mechanical cause for obstruction is seen.  Small bowel loops are decompressed distal to this region.  No significant bowel wall thickening or  surrounding inflammatory changes seen.  No free air or free fluid.    Aorta tapers normally.  Abdominal aortic branch vessels are patent.    No acute osseous abnormality identified. Subcutaneous soft tissues show no significant abnormalities.                                       X-Ray Chest PA And Lateral (Final result)  Result time 01/09/24 22:18:05      Final result by Gray Romero MD (01/09/24 22:18:05)                   Impression:      Diffuse increased interstitial changes seen throughout the lungs, similar compared to prior.    Persistent focal area of masslike consolidation versus mass lesion in the right upper lobe, similar compared to prior.  Neoplasm not excluded.  See prior report for recommendations.      Electronically signed by: Gray Romero MD  Date:    01/09/2024  Time:    22:18               Narrative:    EXAMINATION:  XR CHEST PA AND LATERAL    CLINICAL HISTORY:  Tachycardia, unspecified    TECHNIQUE:  PA and lateral views of the chest were performed.    COMPARISON:  11/09/2023.    FINDINGS:  Diffuse increased interstitial changes seen throughout the lungs, similar compared to prior.  Focal area of masslike consolidation versus mass lesion in the right upper lobe, similar compared to prior.    No pleural effusion.  No pneumothorax.    Cardiomediastinal silhouette is similar to prior.    Regional osseous structures are similar to prior.                                        Assessment/Plan:      * Small bowel obstruction  -presented with abdominal pain associated with N/V for one day  -NGT placed in the ED given persistent vomiting  -CT chest abdomen: Fluid distention of the stomach and proximal to mid small bowel loops with air-fluid levels likely reflecting at least partial developing small bowel obstruction.  Apparent transition point in the midline lower abdomen with no obvious mechanical cause for obstruction seen.   -Caution with zofran given prolonged Qtc  -Surgery  consulted  -NPO  Patient was started on IVF and NG is placed and surgery is consulted for SBO,had BM,on NG tube,gastrografin as above.        Palliative care encounter  Advance Care Planning    Date: 01/10/2024    Code Status  I engaged the the patient in a voluntary conversation about the patient's preferences for care  at the very end of life. The patient wishes to have CPR and other invasive treatments performed when his heart and/or breathing stops. I communicated to the patient that his wishes align with full code status and he agrees.  I spent a total of 16 minutes engaging the patient in this advance care planning discussion.       Code Status: DNR            Prolonged Q-T interval on ECG  -EKG in ED with Qtc 461  -Repeat EKG on 01/10/24 with Qtc of 513  -Caution with QT prolonging medications   -Hold zofran at this time       Lung cancer  -recently diagnosed with Adenocarcinoma of lung in 12/2023  -Patient has been referred to oncology outpatient, has not established care yet  -CTA chest: Multiple pulmonary masses and nodules consistent with malignancy and known metastatic disease.  Abnormal mediastinal and hilar lymphadenopathy consistent with local spread of disease.  Possible left adrenal metastasis.   -Follows with pulmonology   -need close follow up on discharge   Consulted palliative,  Consulted oncology,made him DNR    Chronic right heart failure  -likely due to ILD  -Continue BP control      Chronic hypoxemic respiratory failure  Patient with Hypoxic Respiratory failure which is Chronic.  he is on home oxygen at 5 LPM. Supplemental oxygen was provided and noted-      .   Signs/symptoms of respiratory failure include- tachypnea and increased work of breathing. Contributing diagnoses includes - Interstitial lung disease and adenocarcinoma of the lung  Labs and images were reviewed. Patient Has not had a recent ABG. Will treat underlying causes and adjust management of respiratory failure as follows-  "    -follows with pulmonology outpatient.  -due to history of interstitial lung disease and adenocarcinoma of the lung  -Chronically on 5L NC at home  -Currently at baseline   -Monitor     Atrial flutter  -Hx of Aflutter s/p ablation 9/29/2023  -Resume home metoprolol  -Home eliquis was discontinud in 12/2023  -Cardiology consulted  -Monitor on telemetry   On BB.    Pulmonary hypertension  -echo on 01/05/2024: There is severe pulmonary hypertension. The estimated pulmonary artery systolic pressure is 74 mmHg.  -Due to chronic ILD  -BP control    Pulmonary interstitial fibrosis  On home oxygen      HTN, goal below 140/90  Chronic, controlled.   -resume home Toprolol    Latest blood pressure and vitals reviewed-     Temp:  [97.6 °F (36.4 °C)-97.8 °F (36.6 °C)]   Pulse:  []   Resp:  [14-25]   BP: (114-152)/(58-94)   SpO2:  [95 %-100 %] .   Home meds for hypertension were reviewed and noted below.   Hypertension Medications               furosemide (LASIX) 20 MG tablet Take 1 tablet (20 mg total) by mouth 2 (two) times daily as needed (1-2 pills as needed for fluid overload).    metoprolol succinate (TOPROL-XL) 50 MG 24 hr tablet Take 1 tablet (50 mg total) by mouth 2 (two) times daily.            While in the hospital, will manage blood pressure as follows; Continue home antihypertensive regimen    Will utilize p.r.n. blood pressure medication only if patient's blood pressure greater than 180/110 and he develops symptoms such as worsening chest pain or shortness of breath.    ILD (interstitial lung disease)  -follows with pulmonology outpatient.  -Per pulm note in 11/2023: "ILD with worsening subjective symptoms, worsening PFT, worsening imaging findings"  -Chronically on 5L NC at home  -Currently at baseline   -Monitor     Hyperuricemia  -resume home allopurinol       Polycythemia  -Followed by Hematology outpatient.    -Stable.    History of HCV, s/p successful treatment w/ SVR12 5/2015  Per record        VTE " Risk Mitigation (From admission, onward)           Ordered     heparin (porcine) injection 5,000 Units  Every 8 hours         01/10/24 0213     IP VTE HIGH RISK PATIENT  Once         01/10/24 0213     Place sequential compression device  Until discontinued         01/10/24 0213                    Discharge Planning   THADDEUS:      Code Status: DNR   Is the patient medically ready for discharge?:     Reason for patient still in hospital (select all that apply): Patient trending condition  Discharge Plan A: Home with family                  Srini Vu MD  Department of Hospital Medicine   [facility]

## 2024-01-12 NOTE — ASSESSMENT & PLAN NOTE
-Hx of Aflutter s/p ablation 9/29/2023  -Resume home metoprolol  -Home eliquis was discontinud in 12/2023  -Cardiology consulted  -Monitor on telemetry   On BB.

## 2024-01-12 NOTE — ASSESSMENT & PLAN NOTE
CT chest with scattered fibrosis and emphysematous changes.  Most recent PFTs remarkably unimpressive, except severely low DLCO on 5 L home O2.  Suspected occupational exposure history.  Progressive phenotype disease.  Now complicated by newly diagnosed lung adenocarcinoma with several large masses, largest 5.5 cm.  Hilar and mediastinal adenopathy.  At least radiographic stage 3A/3B.  Very poor prognosis overall.  Agree with palliative care consult.  Recommend Oncology consult.  Spent a good amount of time discussing expectations with chemo/radiation and quality vs quantity of time.  Does not appear to have obstruction on PFTs, so no indication for empiric steroids.  Not many modifiable factors.  Duo nebs q.4 PRN as reasonable.  Suspect his GI issues distention or creating some degree of dyspnea for him.  At this stage in his disease, as well as the new diagnosis of cancer, would recommend discussing with palliative Care inpatient the option of opioids for air hunger.  SpO2 goal greater 88%.

## 2024-01-12 NOTE — PLAN OF CARE
[facility]  Department of Hospital Medicine  [address] [place] [address]  [phone] after hours  [phone] fax    HOSPICE  ORDERS    01/12/2024    Admit to Hospice:  Home Service       Diagnoses:   Active Hospital Problems    Diagnosis  POA    *Small bowel obstruction [K56.609]  Yes    Comfort measures only status [Z51.5]  Not Applicable    Prolonged Q-T interval on ECG [R94.31]  Yes    Palliative care encounter [Z51.5]  Not Applicable    Lung cancer [C34.90]  Yes    Chronic hypoxemic respiratory failure [J96.11]  Yes    Chronic right heart failure [I50.812]  Yes    Atrial flutter [I48.92]  Yes    Pulmonary hypertension [I27.20]  Yes    Pulmonary interstitial fibrosis [J84.10]  Yes    HTN, goal below 140/90 [I10]  Yes    ILD (interstitial lung disease) [J84.9]  Yes    Hyperuricemia [E79.0]  Yes    Former smoker [Z87.891]  Not Applicable    Polycythemia [D75.1]  Yes     - getting regular phlebotomy (approximately every 3 weeks)  - followed by hematology, Dr. Johnson      History of HCV, s/p successful treatment w/ SVR12 5/2015 [Z86.19]  Yes     - has completed treatment x 3  - SVR12 following 12 weeks Harvoni        Resolved Hospital Problems    Diagnosis Date Resolved POA    Thoracic aorta atherosclerosis [I70.0] 01/10/2024 Yes     - noted on CXR - 5/9/2019         Hospice Qualifying Diagnoses: metastatic lung cance       Life expectancy less than 6 month    Vital Signs: Routine per Hospice Protocol.    Code Status: DNR       Allergies:   Review of patient's allergies indicates:   Allergen Reactions    Ace inhibitors Other (See Comments)     cough       Diet: pleasure diet   NG tube in place,low intermittent suction as needede,hold for feeding     Activities: As tolerated    Goals of Care Treatment Preferences:  Code Status: DNR    Living Will: Yes     What is most important right now is to focus on quality of life, even if it means sacrificing a little time,  comfort and QOL .  Accordingly, we have decided that the best plan to meet the patient's goals includes pivot to comfort-focused care.      Nursing: Per Hospice Routine.      NG tube suction, care     Routine Skin for Bedridden Patients: Apply moisture barrier cream to all skin folds and   wet areas in perineal area daily and after baths and all bowel movements.          Oxygen: NC O 2 at 5 liter     Other Miscellaneous Care:             Medications:          Medication List        CONTINUE taking these medications      allopurinoL 100 MG tablet  Commonly known as: ZYLOPRIM  Take 2 tablets (200 mg total) by mouth once daily.     fluticasone propionate 50 mcg/actuation nasal spray  Commonly known as: FLONASE  SHAKE LIQUID AND USE 1 SPRAY(50 MCG) IN EACH NOSTRIL EVERY DAY     furosemide 20 MG tablet  Commonly known as: LASIX  Take 1 tablet (20 mg total) by mouth 2 (two) times daily as needed (1-2 pills as needed for fluid overload).     metoprolol succinate 50 MG 24 hr tablet  Commonly known as: TOPROL-XL  Take 1 tablet (50 mg total) by mouth 2 (two) times daily.     multivitamin with folic acid 400 mcg Tab  Take 1 tablet by mouth once daily.     potassium chloride 10 MEQ Tbsr  Commonly known as: KLOR-CON  Take 1 tablet by mouth once daily.     predniSONE 5 MG tablet  Commonly known as: DELTASONE  Take 10 mg by mouth once daily.     TRELEGY ELLIPTA 100-62.5-25 mcg Dsdv  Generic drug: fluticasone-umeclidin-vilanter  Inhale 1 puff into the lungs Daily.                  Future Orders:  Hospice Medical Director may dictate new orders for comfortable care measures & sign death certificate.        _________________________________  Srini Vu MD  01/12/2024

## 2024-01-12 NOTE — NURSING
Pt states he is having chest pain, EKG done vitals signs stable except heart rate. MD aware. Pt is very anxious since he spoke with Pulmonology. Xanax ordered per MD.

## 2024-01-12 NOTE — NURSING
Pt troponin is 0.029. No chest pain noted. HR fluctuating from 80s-120s. HR latest is 84. Pt lying comfortably at bed. Trini JEFFERS notified. Will continue to monitor.

## 2024-01-12 NOTE — ASSESSMENT & PLAN NOTE
Advance Care Planning     Date: 01/10/2024    Code Status  I engaged the the patient in a voluntary conversation about the patient's preferences for care  at the very end of life. The patient wishes to have CPR and other invasive treatments performed when his heart and/or breathing stops. I communicated to the patient that his wishes align with full code status and he agrees.  I spent a total of 16 minutes engaging the patient in this advance care planning discussion.       Code Status: DNR

## 2024-01-12 NOTE — SUBJECTIVE & OBJECTIVE
Past Medical History:   Diagnosis Date    A-fib 05/08/2023    Arthritis     Chronic hypoxemic respiratory failure 11/09/2023    Chronic right heart failure 11/09/2023    GERD (gastroesophageal reflux disease)     History of HCV, s/p successful treatment w/ SVR12 5/2015     Failed treatment (stage I/II) - followed by hepatology     Joint pain     Lung cancer     Lung disease caused by breathing particles     Widespread essentially symmetric interstitial lung disease    Obesity     On home oxygen therapy     Polycythemia vera     Prediabetes        Past Surgical History:   Procedure Laterality Date    ABLATION, ATRIAL FLUTTER, TYPICAL N/A 9/29/2023    Procedure: Ablation, Atrial Flutter, Typical;  Surgeon: Jonh Mcgill MD;  Location: [facility] EP LAB;  Service: Cardiology;  Laterality: N/A;  AFL, ELENA (Cx if SR), CTI, RFA, BETHANY, MAC, DM, 3 Prep    BUNIONECTOMY      bilateral    CARDIOVERSION N/A 05/08/2023    Procedure: Cardioversion;  Surgeon: David Cueva MD;  Location: [facility] CATH LAB;  Service: Cardiology;  Laterality: N/A;    CATARACT EXTRACTION Left 07/27/2023    COLONOSCOPY N/A 12/09/2015    Procedure: COLONOSCOPY;  Surgeon: Conrad Iqbal MD;  Location: [facility] ENDO;  Service: Endoscopy;  Laterality: N/A;    Liver biopsy x2      rotator cuff Right     TRANSESOPHAGEAL ECHOCARDIOGRAM WITH POSSIBLE CARDIOVERSION (ELENA W/ POSS CARDIOVERSION) N/A 05/08/2023    Procedure: TRANSESOPHAGEAL ECHOCARDIOGRAM WITH POSSIBLE CARDIOVERSION (ELENA W/ POSS CARDIOVERSION);  Surgeon: David Cueva MD;  Location: [facility] CATH LAB;  Service: Cardiology;  Laterality: N/A;  12:30PM    RN PREOP 5/4/2023---EKG ON ARRIVAL    TRANSESOPHAGEAL ECHOCARDIOGRAM WITH POSSIBLE CARDIOVERSION (ELENA W/ POSS CARDIOVERSION) N/A 7/29/2023    Procedure: Transesophageal echo (ELENA) intra-procedure log documentation;  Surgeon: David Cueva MD;  Location: [facility] CATH LAB;  Service: Cardiology;  Laterality: N/A;    TRANSESOPHAGEAL ECHOCARDIOGRAPHY N/A 05/08/2023     Procedure: ECHOCARDIOGRAM, TRANSESOPHAGEAL;  Surgeon: David Cueva MD;  Location: [facility] CATH LAB;  Service: Cardiology;  Laterality: N/A;    TRANSESOPHAGEAL ECHOCARDIOGRAPHY N/A 7/29/2023    Procedure: ECHOCARDIOGRAM, TRANSESOPHAGEAL;  Surgeon: David Cueva MD;  Location: [facility] CATH LAB;  Service: Cardiology;  Laterality: N/A;    TREATMENT OF CARDIAC ARRHYTHMIA N/A 8/7/2023    Procedure: Cardioversion or Defibrillation;  Surgeon: David Cueva MD;  Location: [facility] CATH LAB;  Service: Cardiology;  Laterality: N/A;       Review of patient's allergies indicates:   Allergen Reactions    Ace inhibitors Other (See Comments)     cough       Family History       Problem Relation (Age of Onset)    Chronic back pain Brother    Deep vein thrombosis Sister    Heart attack Sister    Heart disease Mother    Kidney disease Mother    Osteoarthritis Sister    Parkinsonism Father    Prostate cancer Cousin    Pulmonary embolism Sister          Tobacco Use    Smoking status: Former     Current packs/day: 1.00     Average packs/day: 1 pack/day for 30.2 years (30.2 ttl pk-yrs)     Types: Cigarettes     Start date: 11/9/1993    Smokeless tobacco: Never    Tobacco comments:     pt. smokes 10 cigars per day.   Substance and Sexual Activity    Alcohol use: Not Currently     Comment: Rarely    Drug use: Yes     Types: Marijuana     Comment: daily    Sexual activity: Yes     Partners: Female         Objective:     Vital Signs (Most Recent):  Temp: 97.8 °F (36.6 °C) (01/11/24 1737)  Pulse: 87 (01/11/24 1737)  Resp: 20 (01/11/24 1737)  BP: 117/71 (01/11/24 1737)  SpO2: (!) 94 % (01/11/24 1737) Vital Signs (24h Range):  Temp:  [97.5 °F (36.4 °C)-98.8 °F (37.1 °C)] 97.8 °F (36.6 °C)  Pulse:  [] 87  Resp:  [18-20] 20  SpO2:  [94 %-98 %] 94 %  BP: (104-123)/(59-81) 117/71     Weight: 89.2 kg (196 lb 10.4 oz)  Body mass index is 23.32 kg/m².      Intake/Output Summary (Last 24 hours) at 1/11/2024 1806  Last data filed at 1/11/2024  1742  Gross per 24 hour   Intake 804.26 ml   Output 750 ml   Net 54.26 ml        Physical Exam  Vitals and nursing note reviewed.   Constitutional:       General: He is not in acute distress.     Appearance: He is ill-appearing. He is not diaphoretic.      Comments: Appears uncomfortable   HENT:      Nose:      Comments: NGT     Mouth/Throat:      Comments: Oxygen mask  Eyes:      General: No scleral icterus.     Extraocular Movements: Extraocular movements intact.   Cardiovascular:      Rate and Rhythm: Tachycardia present.   Pulmonary:      Effort: Tachypnea present. No accessory muscle usage or retractions.   Abdominal:      General: There is no distension.   Skin:     General: Skin is warm and dry.      Coloration: Skin is not jaundiced.      Findings: No rash.   Neurological:      General: No focal deficit present.      Mental Status: Mental status is at baseline.          Vents:       Lines/Drains/Airways       Drain  Duration                  NG/OG Tube 01/10/24 1028 nasogastric;[place] sump 16 Fr. Right nostril 1 day              Peripheral Intravenous Line  Duration                  Peripheral IV - Single Lumen 01/09/24 2138 20 G Anterior;Right Forearm 1 day                    Significant Labs:    CBC/Anemia Profile:  Recent Labs   Lab 01/09/24 2139 01/11/24  0507   WBC 7.80 6.07   HGB 14.7 13.5*   HCT 49.3 47.4    270   MCV 66* 68*   RDW 22.2* 21.7*        Chemistries:  Recent Labs   Lab 01/09/24  2139 01/10/24  0750 01/11/24  0507    143 141   K 4.6 4.7 4.3   CL 97 96 101   CO2 28 36* 33*   BUN 11 14 16   CREATININE 0.8 0.9 0.8   CALCIUM 10.4 10.1 9.7   ALBUMIN 3.2* 3.0* 2.8*   PROT 8.7* 8.4 7.6   BILITOT 0.8 0.8 1.0   ALKPHOS 109 92 88   ALT 29 24 20   AST 32 25 21   MG  --  2.0 2.0   PHOS  --  5.5* 4.2       All pertinent labs within the past 24 hours have been reviewed.    Significant Imaging:   I have reviewed all pertinent imaging results/findings within the past 24 hours.

## 2024-01-12 NOTE — NURSING
Call Dr. Licea because pt was having some Nausea and vomiting. Per MD okay to hook suction back up. Pt put out 1950 and it is now hooked up to LIWS. Pt states he feels so much better.

## 2024-01-12 NOTE — PROGRESS NOTES
[facility]  Palliative Medicine  Progress Note    Patient Name: Josiah Orosco Jr.  MRN: [id number]  Admission Date: 1/9/2024  Hospital Length of Stay: 2 days  Code Status: DNR   Attending Provider: Srini Vu, *  Consulting Provider: Doreen Love NP  Primary Care Physician: Elaine Landry MD  Principal Problem:Small bowel obstruction          Assessment/Plan:   Palliative encounter:  Advance Care Planning   -interval chart review  -discussed with Dr Erna SUTHERLAND   -oncology consulted and discussed patient with Dr ford  And also with surgeon  -f/u with patient and his spouse who is present today; patient has made the decision for DNR after discussion with his spouse and receiving more medical  information.  -we discussed his current clinical condition and that oncology will be coming over today to discuss options which if any, will likely be palliative.   -we discussed QOL vs quantity and patient prefers QOL and wants to be comfortable. We did discuss hospice as an option and he is open to hospice if oncology does not feel there are any options but at the same time does not want to take tx palliative or not if his QOL will be worse.   -addressed questions and concerns  -emotional support provided    Addendum:  Met oncologist Dr Ford per his request. Along with oncology discussed that due to risks and co morbids chemo, XRT not an option, PEG for decompression not an option per surgeon. After much discussion patient states he wants to go home with comfort. We did discuss how hospice will offer the best benefit for patient at this time in his illness. He and spouse agree.   -Addressed all questions and concerns  -Emotional support provided  -updated Dr Umanzor and Cynthia CARDONA        Palliative encounter: 1/11/24  Advance Care Planning   -palliative consult received   -chart reviewed in detail  -discussed with Dr Erna SUTHERLAND  -at time of consult patient in room. Introduced myself and my role in his  "hospitalization. He denies pain or nausea this am.    -we discussed his current clinical condition SBO along with his multiple co morbids of CHF, IDL, and newly dx lung cancer. He is trying to establish care with [facility] but has not been seen yet so he has no idea as what to expect from this.   -we did discuss his AD Living Will and HPOA (his spouse), and no changes. I did let him know that if he were intubated he may have a difficult time weaning from it with his IDL and Lung Cancer. He would NOT want a tracheostomy  -at this time he will remain full code and open to more discussion once he has more information. He will discuss with his spouse. I left my card for him in room so he or spouse could call me if questions  -addressed all questions  -emotional support provided  -updated Dr Erna SUTHERLAND             Small bowel obstruction       -presented with abdominal pain associated with N/V for one day       -NGT placed in the ED given persistent vomiting       -CT chest abdomen: Fluid distention of the stomach and proximal to mid small bowel loops with air-fluid levels likely reflecting at least partial developing small bowel obstruction.        -Surgery consulted        -NPO with NGT to decompression         Lung cancer  -recently diagnosed with Adenocarcinoma of lung in 12/2023  -Patient has been referred to oncology outpatient, has not established care yet. Would like to establish care [facility]at [facility]  -CTA chest: Multiple pulmonary masses and nodules consistent with malignancy and known metastatic disease.  Abnormal mediastinal and hilar lymphadenopathy consistent with local spread of disease.  Possible left adrenal metastasis.   -pulmonology and oncology outpatient f/u             ILD (interstitial lung disease)       -follows with pulmonology outpatient.        -Per pulm note in 11/2023: "ILD with worsening subjective symptoms,   worsening PFT, worsening imaging findings"        -Chronically on 5L NC at home    "          Chronic hypoxemic respiratory failure    -see above     -follows with pulmonology outpatient.     -due to history of interstitial lung disease and adenocarcinoma of the lung     -Chronically on 5L NC at home            Atrial flutter    -Cardiology consulted' spoke with them and patient no longer in A fib    -mgmt per primary                      Pulmonary hypertension    -Due to chronic ILD           Polycythemia  -Followed by Hematology outpatient.             Hyperuricemia          Chronic right heart failure  -likely due to ILD          Subjective:     Chief Complaint:   Chief Complaint   Patient presents with    Abdominal Pain    Vomiting     Arrives to ER with complaints abdominal pain n/v since around this lunch time today. Reports change in diet recently normally eats a healthy diet has had some changes in food choices. Pt. Also reporting some SOB, ears 5L home oxygen baseline. Hx of afib       HPI:   HPI: per chart review 68-year-old male with a past medical history of ILD (on 5L O2), HFpEF (EF: 55%, reduced RV systolic function), pulmonary hypertension (sPAP of 74 mmHg) hypertension, atrial flutter (s/p ablation in 09/2023, not on AC anymore), polycythemia, recently dx lung cancer presents with abdominal pain for one day, associated with nausea and vomiting. Last bowel movement was one day ago, and stools were hard. Prior to that, he states it was both hard and liquid form for about a week.  Denies any history of abdominal surgeries.  No recent travel new medications.     In the ED, the patient was tachycardic (up to 130s, and AFib with RVR).  Labs were remarkable for an elevated BNP (837), elevated lactic acid (2.5).  CTA chest, abdomen and pelvis showed multiple pulmonary masses and nodules consistent with malignancy/metastatic disease, pulmonary emphysema and chronic interstitial lung disease findings, along with fluid distention of the stomach and proximal to mid small small bowel loops with  air-fluid levels reflecting at least a partial developing small-bowel obstruction.  Patient was given Lopressor 5 mg IV x1, morphine 8 mg IV, Zofran 4 mg IV x2, and 500 mL of NS.  Patient admitted to hospital medicine for further evaluation and management    Hospital Course:  No notes on file    Interval History: patient sitting up on BS this am. Reports feeling better    Medications:  Continuous Infusions:   dextrose 5 % and 0.9 % NaCl 100 mL/hr at 01/12/24 0635     Scheduled Meds:   allopurinoL  200 mg Oral Daily    glycerin 99.5%  100 mL Rectal ED 1 Time    And    magnesium citrate  296 mL Rectal ED 1 Time    And    sodium chloride 0.9%  500 mL Rectal ED 1 Time    heparin (porcine)  5,000 Units Subcutaneous Q8H    metoprolol tartrate  50 mg Oral BID     PRN Meds:acetaminophen, acetaminophen, albuterol-ipratropium, ALPRAZolam, aluminum-magnesium hydroxide-simethicone, dextrose 10%, dextrose 10%, dextrose 10%, dextrose 10%, furosemide, glucagon (human recombinant), glucagon (human recombinant), glucose, glucose, glucose, glucose, magnesium oxide, magnesium oxide, melatonin, metoprolol, naloxone, potassium bicarbonate, potassium bicarbonate, potassium bicarbonate, potassium, sodium phosphates, potassium, sodium phosphates, potassium, sodium phosphates, prochlorperazine, simethicone, sodium chloride 0.9%, sodium chloride 0.9%    Objective:     Vital Signs (Most Recent):  Temp: 97.6 °F (36.4 °C) (01/12/24 0744)  Pulse: (!) 112 (01/12/24 0744)  Resp: 20 (01/12/24 0744)  BP: 133/80 (01/12/24 0744)  SpO2: 95 % (01/12/24 0744) Vital Signs (24h Range):  Temp:  [97.4 °F (36.3 °C)-98.4 °F (36.9 °C)] 97.6 °F (36.4 °C)  Pulse:  [] 112  Resp:  [18-20] 20  SpO2:  [94 %-99 %] 95 %  BP: (116-133)/(59-80) 133/80     Weight: 89.2 kg (196 lb 10.4 oz)  Body mass index is 23.32 kg/m².       Physical Exam  Vitals and nursing note reviewed.   Constitutional:       General: He is not in acute distress.     Appearance: He is  ill-appearing. He is not toxic-appearing or diaphoretic.   HENT:      Nose:      Comments: NGT to decompression  Cardiovascular:      Rate and Rhythm: Tachycardia present.   Pulmonary:      Effort: No respiratory distress.      Comments: Increased work of breathing with oxygen on  Abdominal:      General: There is no distension.      Palpations: Abdomen is soft.   Musculoskeletal:      Right lower leg: No edema.      Left lower leg: No edema.   Skin:     General: Skin is warm and dry.   Neurological:      Mental Status: He is alert and oriented to person, place, and time.   Psychiatric:         Mood and Affect: Mood normal.         Behavior: Behavior normal.                Advance Care Planning  Advance Directives:   Living Will: Yes        Copy on chart: Yes    Do Not Resuscitate Status: Yes    Medical Power of : Yes    Goals of Care: The patient and spouse endorses that what is most important right now is to focus on quality of life, even if it means sacrificing a little time and comfort and QOL     Accordingly, we have decided that the best plan to meet the patient's goals includes pivot to comfort-focused care           Significant Labs: All pertinent labs within the past 24 hours have been reviewed.  CBC:   Recent Labs   Lab 01/12/24  0301   WBC 5.32   HGB 12.8*   HCT 45.6   MCV 69*        BMP:  Recent Labs   Lab 01/12/24  0301   *   *   K 4.1      CO2 32*   BUN 11   CREATININE 0.8   CALCIUM 9.1   MG 2.0     LFT:  Lab Results   Component Value Date    AST 23 01/12/2024    ALKPHOS 82 01/12/2024    BILITOT 1.6 (H) 01/12/2024     Albumin:   Albumin   Date Value Ref Range Status   01/12/2024 2.5 (L) 3.5 - 5.2 g/dL Final     Protein:   Total Protein   Date Value Ref Range Status   01/12/2024 7.1 6.0 - 8.4 g/dL Final     Lactic acid:   Lab Results   Component Value Date    LACTATE 1.4 01/10/2024    LACTATE 2.5 (H) 01/09/2024       Significant Imaging: I have reviewed all pertinent  imaging results/findings within the past 24 hours. XR Gastrogaffin    Doreen Love, NP  Palliative Medicine  [facility] - [facility] S[facility]       17 min ACP time spent in goals of care, emotional support, formulating and communicating prognosis, exploring burden/benefit of various approaches of treatment.      >50% of 35 mins spent in chart review, face to face discussion, symptom assessment, coordination of care with other specialists and d/c planning.      Total time 52 mins

## 2024-01-13 NOTE — NURSING
IV site removed aseptically. Cardiac monitoring removed by morning nurse. Will go home with NGT tube. With o2 support at 5L NC. Reports given to the ems. Called pt's wife to give an update that the patient is on his way home. Pts belongings and stuffs given to the ems. Wheeled pt via stretcher safely and comfortably.

## 2024-01-13 NOTE — PLAN OF CARE
TN sent a secure message to med surg nurse Cathryn, all forms have been signed family with home [facility]. DME will be delivered by 6PM. Transportation 6:30 PM ETA.Cleared for discharge from case management's viewpoint.    5:23 PMLG Doreen Orosco (Spouse) [phone] (Mobile     01/12/24 1703   Final Note   Assessment Type Final Discharge Note   Anticipated Discharge Disposition HospiceHome   What phone number can be called within the next 1-3 days to see how you are doing after discharge?   (see chart)   Hospital Resources/Appts/Education Provided Provided patient/caregiver with written discharge plan information   Post-Acute Status   Post-Acute Authorization Hospice   Hospice Status Pending equipment/medication delivery   Coverage ) [employer] [facility]   Patient choice form signed by patient/caregiver List with quality metrics by geographic area provided   Discharge Delays None known at this time     ADT 30 order placed for Stretcher Transportation.  Requested  time:6:30 PM to home [address] [facility] [id number]   If transportation does not arrive at ETA time nurse will be instructed to follow protocol for transportation below:  How can I get in touch directly with dispatch, if needed?                 Non-emergent (stretcher): [phone]  option 6     ++NURSING:  If Stretcher does not arrive at requested time please call the above Non Emergent Dispatcher.  If issue not resolved please escalate to your charge nurse for further instructions.

## 2024-01-29 ENCOUNTER — PATIENT OUTREACH (OUTPATIENT)
Dept: ADMINISTRATIVE | Facility: OTHER | Age: 69
End: 2024-01-29
Payer: MEDICARE

## 2024-01-29 NOTE — PROGRESS NOTES
CHW - Follow Up    This Community Health Worker completed a follow up visit with caregiver via telephone today.  Pt/Caregiver reported: Patient's spouse/caregive states pt has food insecurities.   Community Health Worker provided: mailed information of referrals for food insecurities, will follow up in one week to check status of referrals and pt's future needs.  Follow up required: yes.  Follow-up Outreach - Due: 2/5/2024

## 2024-02-19 ENCOUNTER — PATIENT OUTREACH (OUTPATIENT)
Dept: ADMINISTRATIVE | Facility: OTHER | Age: 69
End: 2024-02-19
Payer: MEDICARE

## (undated) DEVICE — INTRODUCER HEMOSTASIS 7.5F

## (undated) DEVICE — PACK EP DRAPE OMC

## (undated) DEVICE — PAD DEFIB CADENCE ADULT R2

## (undated) DEVICE — PAD RADI FEMORAL

## (undated) DEVICE — SHEATH RAMP FAST CATH 8.5F60CM

## (undated) DEVICE — R CATH POLARIS X 6FR 105CM

## (undated) DEVICE — CATH SAFIRE BI-DIR 7FR LRG

## (undated) DEVICE — KIT ENSITE ELECTRODE SURFACE

## (undated) DEVICE — ELECTRODE PAD DEFIB STERILE

## (undated) DEVICE — CATH TRICUSPID HALO XP 7FRX110

## (undated) DEVICE — R CATH DUO DECAPOLAR 7FRX95CM

## (undated) DEVICE — CATH POLARIS X 6FR 105CM

## (undated) DEVICE — PAD GROUND UNIV STYLE CORD 9IN

## (undated) DEVICE — R CATH BIDIRECTIONL DF CRV 7FR